# Patient Record
Sex: MALE | Race: WHITE | NOT HISPANIC OR LATINO | Employment: OTHER | URBAN - NONMETROPOLITAN AREA
[De-identification: names, ages, dates, MRNs, and addresses within clinical notes are randomized per-mention and may not be internally consistent; named-entity substitution may affect disease eponyms.]

---

## 2017-02-12 ENCOUNTER — HOSPITAL ENCOUNTER (INPATIENT)
Facility: HOSPITAL | Age: 72
LOS: 3 days | Discharge: NURSING, REHAB OR LONG-TERM CARE | End: 2017-02-15
Attending: EMERGENCY MEDICINE | Admitting: INTERNAL MEDICINE

## 2017-02-12 ENCOUNTER — APPOINTMENT (OUTPATIENT)
Dept: GENERAL RADIOLOGY | Facility: HOSPITAL | Age: 72
End: 2017-02-12

## 2017-02-12 ENCOUNTER — APPOINTMENT (OUTPATIENT)
Dept: CT IMAGING | Facility: HOSPITAL | Age: 72
End: 2017-02-12

## 2017-02-12 DIAGNOSIS — Z74.09 IMPAIRED FUNCTIONAL MOBILITY, BALANCE, GAIT, AND ENDURANCE: ICD-10-CM

## 2017-02-12 DIAGNOSIS — Z78.9 IMPAIRED MOBILITY AND ADLS: ICD-10-CM

## 2017-02-12 DIAGNOSIS — J18.9 PNEUMONIA OF LOWER LOBE DUE TO INFECTIOUS ORGANISM, UNSPECIFIED LATERALITY: ICD-10-CM

## 2017-02-12 DIAGNOSIS — I48.0 PAROXYSMAL ATRIAL FIBRILLATION (HCC): Primary | ICD-10-CM

## 2017-02-12 DIAGNOSIS — Z74.09 IMPAIRED MOBILITY AND ADLS: ICD-10-CM

## 2017-02-12 PROBLEM — J44.9 COPD (CHRONIC OBSTRUCTIVE PULMONARY DISEASE) (HCC): Status: ACTIVE | Noted: 2017-02-12

## 2017-02-12 PROBLEM — R29.6 MULTIPLE FALLS: Status: ACTIVE | Noted: 2017-02-12

## 2017-02-12 PROBLEM — F10.10 ALCOHOL ABUSE: Status: ACTIVE | Noted: 2017-02-12

## 2017-02-12 PROBLEM — I48.20 CHRONIC ATRIAL FIBRILLATION WITH RVR (HCC): Status: ACTIVE | Noted: 2017-02-12

## 2017-02-12 PROBLEM — D75.1 POLYCYTHEMIA: Status: ACTIVE | Noted: 2017-02-12

## 2017-02-12 LAB
ALBUMIN SERPL-MCNC: 4.1 G/DL (ref 3.5–5)
ALBUMIN/GLOB SERPL: 1.4 G/DL (ref 1.1–2.5)
ALP SERPL-CCNC: 112 U/L (ref 24–120)
ALT SERPL W P-5'-P-CCNC: 24 U/L (ref 0–54)
ANION GAP SERPL CALCULATED.3IONS-SCNC: 12 MMOL/L (ref 4–13)
ANION GAP SERPL CALCULATED.3IONS-SCNC: 9 MMOL/L (ref 4–13)
APTT PPP: 32.5 SECONDS (ref 24.1–34.8)
AST SERPL-CCNC: 37 U/L (ref 7–45)
BASOPHILS # BLD AUTO: 0.01 10*3/MM3 (ref 0–0.2)
BASOPHILS # BLD AUTO: 0.01 10*3/MM3 (ref 0–0.2)
BASOPHILS NFR BLD AUTO: 0.1 % (ref 0–2)
BASOPHILS NFR BLD AUTO: 0.1 % (ref 0–2)
BILIRUB SERPL-MCNC: 2.5 MG/DL (ref 0.1–1)
BUN BLD-MCNC: 17 MG/DL (ref 5–21)
BUN BLD-MCNC: 17 MG/DL (ref 5–21)
BUN/CREAT SERPL: 13.5 (ref 7–25)
BUN/CREAT SERPL: 16.3 (ref 7–25)
CALCIUM SPEC-SCNC: 10.3 MG/DL (ref 8.4–10.4)
CALCIUM SPEC-SCNC: 10.8 MG/DL (ref 8.4–10.4)
CHLORIDE SERPL-SCNC: 105 MMOL/L (ref 98–110)
CHLORIDE SERPL-SCNC: 106 MMOL/L (ref 98–110)
CO2 SERPL-SCNC: 23 MMOL/L (ref 24–31)
CO2 SERPL-SCNC: 24 MMOL/L (ref 24–31)
CREAT BLD-MCNC: 1.04 MG/DL (ref 0.5–1.4)
CREAT BLD-MCNC: 1.26 MG/DL (ref 0.5–1.4)
D DIMER PPP FEU-MCNC: 2.81 MG/L (FEU) (ref 0–0.5)
DEPRECATED RDW RBC AUTO: 52.5 FL (ref 40–54)
DEPRECATED RDW RBC AUTO: 53.1 FL (ref 40–54)
EOSINOPHIL # BLD AUTO: 0 10*3/MM3 (ref 0–0.7)
EOSINOPHIL # BLD AUTO: 0 10*3/MM3 (ref 0–0.7)
EOSINOPHIL NFR BLD AUTO: 0 % (ref 0–4)
EOSINOPHIL NFR BLD AUTO: 0 % (ref 0–4)
ERYTHROCYTE [DISTWIDTH] IN BLOOD BY AUTOMATED COUNT: 14.4 % (ref 12–15)
ERYTHROCYTE [DISTWIDTH] IN BLOOD BY AUTOMATED COUNT: 14.5 % (ref 12–15)
ETHANOL UR QL: <0.01 %
FOLATE SERPL-MCNC: 10.2 NG/ML
GFR SERPL CREATININE-BSD FRML MDRD: 56 ML/MIN/1.73
GFR SERPL CREATININE-BSD FRML MDRD: 70 ML/MIN/1.73
GLOBULIN UR ELPH-MCNC: 2.9 GM/DL
GLUCOSE BLD-MCNC: 96 MG/DL (ref 70–100)
GLUCOSE BLD-MCNC: 98 MG/DL (ref 70–100)
HCT VFR BLD AUTO: 48.9 % (ref 40–52)
HCT VFR BLD AUTO: 53.5 % (ref 40–52)
HGB BLD-MCNC: 16.8 G/DL (ref 14–18)
HGB BLD-MCNC: 18.4 G/DL (ref 14–18)
HOLD SPECIMEN: NORMAL
HOLD SPECIMEN: NORMAL
IMM GRANULOCYTES # BLD: 0.04 10*3/MM3 (ref 0–0.03)
IMM GRANULOCYTES # BLD: 0.08 10*3/MM3 (ref 0–0.03)
IMM GRANULOCYTES NFR BLD: 0.3 % (ref 0–5)
IMM GRANULOCYTES NFR BLD: 0.5 % (ref 0–5)
INR PPP: 1.36 (ref 0.91–1.09)
LYMPHOCYTES # BLD AUTO: 1.02 10*3/MM3 (ref 0.72–4.86)
LYMPHOCYTES # BLD AUTO: 1.28 10*3/MM3 (ref 0.72–4.86)
LYMPHOCYTES NFR BLD AUTO: 6.5 % (ref 15–45)
LYMPHOCYTES NFR BLD AUTO: 9.9 % (ref 15–45)
MAGNESIUM SERPL-MCNC: 2.1 MG/DL (ref 1.4–2.2)
MCH RBC QN AUTO: 34.3 PG (ref 28–32)
MCH RBC QN AUTO: 34.7 PG (ref 28–32)
MCHC RBC AUTO-ENTMCNC: 34.4 G/DL (ref 33–36)
MCHC RBC AUTO-ENTMCNC: 34.4 G/DL (ref 33–36)
MCV RBC AUTO: 100.9 FL (ref 82–95)
MCV RBC AUTO: 99.8 FL (ref 82–95)
MONOCYTES # BLD AUTO: 1.13 10*3/MM3 (ref 0.19–1.3)
MONOCYTES # BLD AUTO: 1.4 10*3/MM3 (ref 0.19–1.3)
MONOCYTES NFR BLD AUTO: 8.7 % (ref 4–12)
MONOCYTES NFR BLD AUTO: 9 % (ref 4–12)
NEUTROPHILS # BLD AUTO: 10.51 10*3/MM3 (ref 1.87–8.4)
NEUTROPHILS # BLD AUTO: 13.08 10*3/MM3 (ref 1.87–8.4)
NEUTROPHILS NFR BLD AUTO: 81 % (ref 39–78)
NEUTROPHILS NFR BLD AUTO: 83.9 % (ref 39–78)
NT-PROBNP SERPL-MCNC: 4130 PG/ML (ref 0–900)
PLATELET # BLD AUTO: 133 10*3/MM3 (ref 130–400)
PLATELET # BLD AUTO: 147 10*3/MM3 (ref 130–400)
PMV BLD AUTO: 11.6 FL (ref 6–12)
PMV BLD AUTO: 12.3 FL (ref 6–12)
POTASSIUM BLD-SCNC: 3.8 MMOL/L (ref 3.5–5.3)
POTASSIUM BLD-SCNC: 4.7 MMOL/L (ref 3.5–5.3)
PROT SERPL-MCNC: 7 G/DL (ref 6.3–8.7)
PROTHROMBIN TIME: 17.2 SECONDS (ref 11.9–14.6)
RBC # BLD AUTO: 4.9 10*6/MM3 (ref 4.8–5.9)
RBC # BLD AUTO: 5.3 10*6/MM3 (ref 4.8–5.9)
SODIUM BLD-SCNC: 139 MMOL/L (ref 135–145)
SODIUM BLD-SCNC: 140 MMOL/L (ref 135–145)
TROPONIN I SERPL-MCNC: 0.02 NG/ML (ref 0–0.07)
VIT B12 BLD-MCNC: 293 PG/ML (ref 239–931)
WBC NRBC COR # BLD: 12.97 10*3/MM3 (ref 4.8–10.8)
WBC NRBC COR # BLD: 15.59 10*3/MM3 (ref 4.8–10.8)
WHOLE BLOOD HOLD SPECIMEN: NORMAL
WHOLE BLOOD HOLD SPECIMEN: NORMAL

## 2017-02-12 PROCEDURE — 84484 ASSAY OF TROPONIN QUANT: CPT

## 2017-02-12 PROCEDURE — 85610 PROTHROMBIN TIME: CPT | Performed by: EMERGENCY MEDICINE

## 2017-02-12 PROCEDURE — 85025 COMPLETE CBC W/AUTO DIFF WBC: CPT | Performed by: FAMILY MEDICINE

## 2017-02-12 PROCEDURE — 93010 ELECTROCARDIOGRAM REPORT: CPT | Performed by: INTERNAL MEDICINE

## 2017-02-12 PROCEDURE — 85379 FIBRIN DEGRADATION QUANT: CPT | Performed by: EMERGENCY MEDICINE

## 2017-02-12 PROCEDURE — 93005 ELECTROCARDIOGRAM TRACING: CPT | Performed by: EMERGENCY MEDICINE

## 2017-02-12 PROCEDURE — 83880 ASSAY OF NATRIURETIC PEPTIDE: CPT | Performed by: EMERGENCY MEDICINE

## 2017-02-12 PROCEDURE — 82746 ASSAY OF FOLIC ACID SERUM: CPT | Performed by: FAMILY MEDICINE

## 2017-02-12 PROCEDURE — 99284 EMERGENCY DEPT VISIT MOD MDM: CPT

## 2017-02-12 PROCEDURE — 82607 VITAMIN B-12: CPT | Performed by: FAMILY MEDICINE

## 2017-02-12 PROCEDURE — 80307 DRUG TEST PRSMV CHEM ANLYZR: CPT | Performed by: EMERGENCY MEDICINE

## 2017-02-12 PROCEDURE — 71250 CT THORAX DX C-: CPT

## 2017-02-12 PROCEDURE — 71010 HC CHEST PA OR AP: CPT

## 2017-02-12 PROCEDURE — 83735 ASSAY OF MAGNESIUM: CPT | Performed by: EMERGENCY MEDICINE

## 2017-02-12 PROCEDURE — 85730 THROMBOPLASTIN TIME PARTIAL: CPT | Performed by: EMERGENCY MEDICINE

## 2017-02-12 PROCEDURE — 70450 CT HEAD/BRAIN W/O DYE: CPT

## 2017-02-12 PROCEDURE — 25010000002 CEFTRIAXONE: Performed by: EMERGENCY MEDICINE

## 2017-02-12 PROCEDURE — 85025 COMPLETE CBC W/AUTO DIFF WBC: CPT | Performed by: EMERGENCY MEDICINE

## 2017-02-12 PROCEDURE — 80053 COMPREHEN METABOLIC PANEL: CPT | Performed by: EMERGENCY MEDICINE

## 2017-02-12 RX ORDER — ESCITALOPRAM OXALATE 20 MG/1
20 TABLET ORAL DAILY
Status: ON HOLD | COMMUNITY
End: 2020-12-07

## 2017-02-12 RX ORDER — DILTIAZEM HCL/D5W 125 MG/125
10 PLASTIC BAG, INJECTION (ML) INTRAVENOUS
Status: DISCONTINUED | OUTPATIENT
Start: 2017-02-12 | End: 2017-02-14

## 2017-02-12 RX ORDER — LORAZEPAM 2 MG/ML
1 INJECTION INTRAMUSCULAR
Status: DISCONTINUED | OUTPATIENT
Start: 2017-02-12 | End: 2017-02-15 | Stop reason: HOSPADM

## 2017-02-12 RX ORDER — LORAZEPAM 0.5 MG/1
0.5 TABLET ORAL
Status: DISCONTINUED | OUTPATIENT
Start: 2017-02-12 | End: 2017-02-15 | Stop reason: HOSPADM

## 2017-02-12 RX ORDER — LORAZEPAM 2 MG/ML
0.5 INJECTION INTRAMUSCULAR
Status: DISCONTINUED | OUTPATIENT
Start: 2017-02-12 | End: 2017-02-15 | Stop reason: HOSPADM

## 2017-02-12 RX ORDER — ONDANSETRON 2 MG/ML
4 INJECTION INTRAMUSCULAR; INTRAVENOUS EVERY 6 HOURS PRN
Status: DISCONTINUED | OUTPATIENT
Start: 2017-02-12 | End: 2017-02-15 | Stop reason: HOSPADM

## 2017-02-12 RX ORDER — ACETAMINOPHEN 325 MG/1
650 TABLET ORAL EVERY 4 HOURS PRN
Status: DISCONTINUED | OUTPATIENT
Start: 2017-02-12 | End: 2017-02-15 | Stop reason: HOSPADM

## 2017-02-12 RX ORDER — MAGNESIUM HYDROXIDE/ALUMINUM HYDROXICE/SIMETHICONE 120; 1200; 1200 MG/30ML; MG/30ML; MG/30ML
30 SUSPENSION ORAL EVERY 6 HOURS PRN
Status: DISCONTINUED | OUTPATIENT
Start: 2017-02-12 | End: 2017-02-15 | Stop reason: HOSPADM

## 2017-02-12 RX ORDER — SODIUM CHLORIDE 0.9 % (FLUSH) 0.9 %
1-10 SYRINGE (ML) INJECTION AS NEEDED
Status: DISCONTINUED | OUTPATIENT
Start: 2017-02-12 | End: 2017-02-15 | Stop reason: HOSPADM

## 2017-02-12 RX ORDER — HYDROCODONE BITARTRATE AND ACETAMINOPHEN 5; 325 MG/1; MG/1
1 TABLET ORAL EVERY 4 HOURS PRN
Status: DISCONTINUED | OUTPATIENT
Start: 2017-02-12 | End: 2017-02-15 | Stop reason: HOSPADM

## 2017-02-12 RX ORDER — PRAVASTATIN SODIUM 20 MG
20 TABLET ORAL NIGHTLY
COMMUNITY

## 2017-02-12 RX ORDER — SODIUM CHLORIDE 0.9 % (FLUSH) 0.9 %
10 SYRINGE (ML) INJECTION AS NEEDED
Status: DISCONTINUED | OUTPATIENT
Start: 2017-02-12 | End: 2017-02-15 | Stop reason: HOSPADM

## 2017-02-12 RX ORDER — LORAZEPAM 1 MG/1
1 TABLET ORAL
Status: DISCONTINUED | OUTPATIENT
Start: 2017-02-12 | End: 2017-02-15 | Stop reason: HOSPADM

## 2017-02-12 RX ORDER — DONEPEZIL HYDROCHLORIDE 5 MG/1
5 TABLET, FILM COATED ORAL NIGHTLY
Status: ON HOLD | COMMUNITY
End: 2020-12-07

## 2017-02-12 RX ORDER — PRAVASTATIN SODIUM 20 MG
20 TABLET ORAL DAILY
Status: DISCONTINUED | OUTPATIENT
Start: 2017-02-12 | End: 2017-02-14

## 2017-02-12 RX ORDER — DONEPEZIL HYDROCHLORIDE 5 MG/1
5 TABLET, FILM COATED ORAL NIGHTLY
Status: DISCONTINUED | OUTPATIENT
Start: 2017-02-12 | End: 2017-02-15 | Stop reason: HOSPADM

## 2017-02-12 RX ORDER — NICOTINE 21 MG/24HR
1 PATCH, TRANSDERMAL 24 HOURS TRANSDERMAL EVERY 24 HOURS
Status: DISCONTINUED | OUTPATIENT
Start: 2017-02-12 | End: 2017-02-15 | Stop reason: HOSPADM

## 2017-02-12 RX ORDER — BISOPROLOL FUMARATE 10 MG/1
10 TABLET, FILM COATED ORAL DAILY
Status: ON HOLD | COMMUNITY
End: 2020-12-07

## 2017-02-12 RX ORDER — BISOPROLOL FUMARATE 10 MG/1
10 TABLET, FILM COATED ORAL DAILY
Status: DISCONTINUED | OUTPATIENT
Start: 2017-02-12 | End: 2017-02-15 | Stop reason: HOSPADM

## 2017-02-12 RX ORDER — OXYCODONE AND ACETAMINOPHEN 7.5; 325 MG/1; MG/1
2 TABLET ORAL EVERY 4 HOURS PRN
Status: DISCONTINUED | OUTPATIENT
Start: 2017-02-12 | End: 2017-02-15 | Stop reason: HOSPADM

## 2017-02-12 RX ORDER — ESCITALOPRAM OXALATE 10 MG/1
20 TABLET ORAL DAILY
Status: DISCONTINUED | OUTPATIENT
Start: 2017-02-12 | End: 2017-02-15 | Stop reason: HOSPADM

## 2017-02-12 RX ADMIN — HYDROCODONE BITARTRATE AND ACETAMINOPHEN 1 TABLET: 5; 325 TABLET ORAL at 17:15

## 2017-02-12 RX ADMIN — PRAVASTATIN SODIUM 20 MG: 20 TABLET ORAL at 17:14

## 2017-02-12 RX ADMIN — RIVAROXABAN 20 MG: 20 TABLET, FILM COATED ORAL at 17:14

## 2017-02-12 RX ADMIN — CEFTRIAXONE 1 G: 1 INJECTION, POWDER, FOR SOLUTION INTRAMUSCULAR; INTRAVENOUS at 13:22

## 2017-02-12 RX ADMIN — ESCITALOPRAM 20 MG: 10 TABLET, FILM COATED ORAL at 17:14

## 2017-02-12 RX ADMIN — BISOPROLOL FUMARATE 10 MG: 10 TABLET ORAL at 17:15

## 2017-02-12 RX ADMIN — NICOTINE 1 PATCH: 21 PATCH, EXTENDED RELEASE TRANSDERMAL at 17:14

## 2017-02-12 RX ADMIN — Medication 10 MG/HR: at 09:27

## 2017-02-12 RX ADMIN — DONEPEZIL HYDROCHLORIDE 5 MG: 5 TABLET, FILM COATED ORAL at 20:44

## 2017-02-12 NOTE — H&P
[facility] Medicine Services  HISTORY AND PHYSICAL    Date of Admission: 2/12/2017  Primary Care Physician: Yohana Carlson MD    Subjective     Chief Complaint:   Fall with pain right shoulder and right ribs    History of Present Illness  This 71-year-old white male was admitted after a fall at home a chief complaint of right shoulder discomfort and right rib pain.  The patient's caregiver states that the patient has been falling frequently over the past several days.  He apparently fell this morning attempting to go to the bathroom.        Review of Systems   Constitutional: Negative.    HENT: Positive for hearing loss.    Eyes: Negative.    Respiratory: Positive for cough and wheezing.    Cardiovascular: Positive for chest pain.   Gastrointestinal: Negative.    Endocrine: Negative.    Genitourinary: Negative.    Musculoskeletal: Positive for arthralgias and gait problem.        Right rib pain and right shoulder pain   Skin: Negative.    Allergic/Immunologic: Negative.    Hematological: Negative.    Psychiatric/Behavioral: Negative.         Otherwise complete ROS reviewed and negative except as mentioned in the HPI.      Past Medical History:     Past Medical History   Diagnosis Date   • A-fib    • Alcohol abuse    • Anxiety    • COPD (chronic obstructive pulmonary disease)    • COPD (chronic obstructive pulmonary disease)    • Hypotension    • Lung disease    • Neurological disease    • Polycythemia        Past Surgical History:  Past Surgical History   Procedure Laterality Date   • Pacemaker implantation     • Knee surgery     • Wrist surgery         Family History:   family history includes COPD in his sister; Cancer in his father; Heart disease in his father; No Known Problems in his mother.    Social History:    reports that he has been smoking.  He has a 120.00 pack-year smoking history. He does not have any smokeless tobacco history on file. He reports that he  "drinks about 16.8 oz of alcohol per week  He reports that he does not use illicit drugs.    Medications:  Prior to Admission medications    Medication Sig Start Date End Date Taking? Authorizing Provider   bisoprolol (ZEBeta) 10 MG tablet Take 10 mg by mouth Daily.   Yes Historical Provider, MD   donepezil (ARICEPT) 5 MG tablet Take 5 mg by mouth Every Night.   Yes Historical Provider, MD   escitalopram (LEXAPRO) 20 MG tablet Take 20 mg by mouth Daily.   Yes Historical Provider, MD   pravastatin (PRAVACHOL) 20 MG tablet Take 20 mg by mouth Daily.   Yes Historical Provider, MD   rivaroxaban (XARELTO) 20 MG tablet Take 20 mg by mouth Daily.   Yes Historical Provider, MD       Allergies:  No Known Allergies    Objective     Vital Signs:   Visit Vitals   • /89 (BP Location: Right arm, Patient Position: Lying)   • Pulse 106   • Temp 98.1 °F (36.7 °C) (Tympanic)   • Resp 20   • Ht 69\" (175.3 cm)   • Wt 190 lb 9.6 oz (86.5 kg)   • SpO2 95%   • BMI 28.15 kg/m2       Physical Exam   Constitutional: He is oriented to person, place, and time. He appears well-developed.   HENT:   Head: Normocephalic and atraumatic.   Right Ear: External ear normal.   Left Ear: External ear normal.   Nose: Nose normal.   Mouth/Throat: Oropharynx is clear and moist.   Eyes: Conjunctivae and EOM are normal. Pupils are equal, round, and reactive to light. No scleral icterus.   Neck: Normal range of motion. Neck supple. No JVD present. No tracheal deviation present. No thyromegaly present.   Cardiovascular: Normal heart sounds.  An irregularly irregular rhythm present. Exam reveals decreased pulses.    Pulmonary/Chest: Effort normal. He has wheezes (scattered throughout). He has rhonchi (scattered throughout).   Abdominal: Soft. Bowel sounds are normal. He exhibits no distension and no mass. There is no tenderness.   Musculoskeletal: Normal range of motion. He exhibits no edema or deformity.   Neurological: He is alert and oriented to person, " place, and time. He has normal reflexes. No cranial nerve deficit or sensory deficit. He exhibits abnormal muscle tone (bilateral lower extremity weakness).   Skin: Skin is warm and dry. Ecchymosis: right hand. No erythema.   Psychiatric: He has a normal mood and affect. Judgment and thought content normal. His speech is delayed. He is slowed. Cognition and memory are impaired. He exhibits abnormal recent memory.           Results Reviewed:   Specimen:  Blood Updated:  02/12/17 0929     WBC 15.59 (H) 10*3/mm3      RBC 5.30 10*6/mm3      Hemoglobin 18.4 (H) g/dL      Hematocrit 53.5 (H) %      .9 (H) fL      MCH 34.7 (H) pg      MCHC 34.4 g/dL      RDW 14.4 %      RDW-SD 53.1 fl      MPV 11.6 fL      Platelets 147 10*3/mm3      Neutrophil % 83.9 (H) %      Lymphocyte % 6.5 (L) %      Monocyte % 9.0 %      Eosinophil % 0.0 %      Basophil % 0.1 %      Immature Grans % 0.5 %      Neutrophils, Absolute 13.08 (H) 10*3/mm3      Lymphocytes, Absolute 1.02 10*3/mm3      Monocytes, Absolute 1.40 (H) 10*3/mm3      Eosinophils, Absolute 0.00 10*3/mm3      Basophils, Absolute 0.01 10*3/mm3      Immature Grans, Absolute 0.08 (H) 10*3/mm3     Comprehensive Metabolic Panel [[id number]]  (Abnormal) Collected:  02/12/17 0919    Specimen:  Blood Updated:  02/12/17 0939     Glucose 98 mg/dL      BUN 17 mg/dL      Creatinine 1.26 mg/dL      Sodium 140 mmol/L      Potassium 4.7 mmol/L      Chloride 105 mmol/L      CO2 23.0 (L) mmol/L      Calcium 10.8 (H) mg/dL      Total Protein 7.0 g/dL      Albumin 4.10 g/dL      ALT (SGPT) 24 U/L      AST (SGOT) 37 U/L      Alkaline Phosphatase 112 U/L      Total Bilirubin 2.5 (H) mg/dL      eGFR Non African Amer 56 (L) mL/min/1.73      Globulin 2.9 gm/dL      A/G Ratio 1.4 g/dL      BUN/Creatinine Ratio 13.5      Anion Gap 12.0 mmol/L     Narrative:       The MDRD GFR formula is only valid for adults with stable renal function between ages 18 and 70.    Magnesium [[id number]]  (Normal)  Collected:  02/12/17 0919    Specimen:  Blood Updated:  02/12/17 0939     Magnesium 2.1 mg/dL     POC Troponin, Rapid [[id number]]  (Normal) Collected:  02/12/17 0925    Specimen:  Blood Updated:  02/12/17 0940     Troponin I 0.02 ng/mL       Serial Number: [id number]    : [id number]       BNP [[id number]]  (Abnormal) Collected:  02/12/17 0919    Specimen:  Blood Updated:  02/12/17 0948     proBNP 4130.0 (H) pg/mL     aPTT [[id number]]  (Normal) Collected:  02/12/17 0919    Specimen:  Blood Updated:  02/12/17 0948     PTT 32.5 seconds     Protime-INR [[id number]]  (Abnormal) Collected:  02/12/17 0919    Specimen:  Blood Updated:  02/12/17 0948     Protime 17.2 (H) Seconds      INR 1.36 (H)     D-dimer, Quantitative [[id number]]  (Abnormal) Collected:  02/12/17 0919    Specimen:  Blood Updated:  02/12/17 0948     D-Dimer, Quantitative 2.81 (H) mg/L (FEU)     Ethanol [[id number]]  (Normal) Collected:  02/12/17 0919    Specimen:  Blood Updated:  02/12/17 1009     Ethanol % <0.010 %        Ct Head Without Contrast    Result Date: 2/12/2017  Narrative: EXAMINATION:  CT HEAD WO CONTRAST-  2/12/2017 10:09 AM CST  HISTORY: The patient fell and hit the head. Difficulty walking.  TECHNIQUE: Multiple axial images were obtained through the brain without contrast infusion. Multiplanar images were reconstructed.  DLP: 647 mGy-cm. Automated dosage control was utilized.  COMPARISON: 10/21/2014.  FINDINGS: There are no hemorrhage, edema or mass effect. There is mild to moderate atrophy with associated ventricular prominence. There are chronic lacunar infarcts in both of the thalami. Low density in the white matter is nonspecific and most likely due to chronic small vessel disease. There is mild mucosal thickening in the right ethmoid region. The other visualized paranasal sinuses and mastoid air cells are clear. Vascular calcification is noted.      Impression: 1. No hemorrhage, edema or mass effect. 2. Mild to moderate atrophy with  associated ventricular prominence. 3. Chronic-appearing lacunar infarcts in the thalami bilaterally. 4. Low density in the white matter is nonspecific and likely due to chronic small vessel disease. Vascular calcification is noted. 5. Mild mucosal thickening in the right ethmoid region.  The full report of this exam was immediately signed and available to the emergency room. The patient is currently in the emergency room.   This report was finalized on 02/12/2017 10:45 by Dr. Jett Gore MD.    Ct Chest Without Contrast    Result Date: 2/12/2017  Narrative: EXAMINATION:  CT CHEST WO CONTRAST-  2/12/2017 10:09 AM CST  HISTORY: Multiple falling episodes. No other history is given for this study.  COMPARISON: No comparison study.  DLP: 467 mGy-cm. Automated dosage control was utilized.  TECHNIQUE: Spiral CT was performed of the chest without contrast. Multiplanar images were reconstructed.  FINDINGS: There is atheromatous disease of the thoracic aorta and coronary arteries. Heart size appears to be normal. There are no enlarged mediastinal lymph nodes. There is mild dependent atelectasis posteriorly in both lungs. There is no dense consolidation. There is mild bullous disease. There are degenerative changes of the spine. Chronic appearing compression fractures are noted.      Impression: 1. Atheromatous disease of the thoracic aorta and coronary arteries. 2. Mild dependent atelectasis posteriorly in both lungs. No dense consolidation. Mild bullous disease. 3. Mild ectasia of the thoracic aorta measuring about 4.6 cm in diameter.  The full report of this exam was immediately signed and available to the emergency room. The patient is currently in the emergency room.   This report was finalized on 02/12/2017 10:49 by Dr. Jett Gore MD.    Xr Chest 1 View    Result Date: 2/12/2017  Narrative: EXAMINATION:  XR CHEST 1 VW-  2/12/2017 9:39 AM CST  HISTORY: Shortness of breath. Rib pain.  COMPARISON: 10/21/2014.   FINDINGS:  There is poor inspiration. There is patchy infiltrate in the perihilar regions and lung bases. There is mild cardiomegaly. There is a pacemaker on the left. There are old rib fractures on the right.      Impression: 1. Poor inspiration. 2. Patchy infiltrate in the perihilar regions and lung bases. This may represent mild edema or pneumonia. 3. Mild cardiomegaly.   This report was finalized on 02/12/2017 10:04 by Dr. Jett Gore MD.     I have personally reviewed and interpreted the radiology studies and ECG obtained at time of admission.     Assessment / Plan      Assessment & Plan  Hospital Problem List     * (Principal)Chronic atrial fibrillation with RVR    Multiple falls    Polycythemia    COPD (chronic obstructive pulmonary disease)    Alcohol abuse          PLAN:   Admit to telemetry floor  [facility] dr  Cardiology consultation  Ativan IV when necessary for evidence of alcohol withdrawal  Given the patient's recent history of increased falls, he may no longer be a good candidate for anticoagulation.  Defer that decision to cardiology.    Code Status:   Full  Surrogate decision-maker is Corw Redding, his guardian         I discussed the patients findings and my recommendations with: Patient and sister at bedside    Estimated length of stay: 2-3 days    Parag Stokes DO   02/12/17   3:27 PM

## 2017-02-12 NOTE — ED PROVIDER NOTES
Subjective   HPI Comments: Patient found by caregiver on floor this AM where he apparently fell last night trying to go to bathroom.   C/o pain in right shoulder and right ribs.  Caregiver says that patient has been falling a lot over past several days.    Patient is a 71 y.o. male presenting with fall.   History provided by:  Patient and caregiver   used: No    Fall   Mechanism of injury: fall    Injury location:  Shoulder/arm and torso  Shoulder/arm injury location:  R shoulder  Torso injury location:  R chest  Incident location:  Home  Time since incident:  10 hours  Arrived directly from scene: yes    Fall:     Fall occurred:  Standing    Impact surface:  Hard floor    Point of impact:  Unable to specify    Entrapped after fall: no    Protective equipment: none    Suspicion of alcohol use: yes    Suspicion of drug use: no    Tetanus status:  Unknown  Prior to arrival data:     Bystander interventions:  None    Patient ambulatory at scene: no      Blood loss:  None    Responsiveness at scene:  Alert    Orientation at scene:  Person, place, situation and time    Loss of consciousness: no      Amnesic to event: no      Airway interventions:  None    Breathing interventions:  None    IV access status:  None    IO access:  None    Fluids administered:  None    Cardiac interventions:  None    Medications administered:  None    Immobilization:  None    Airway condition since incident:  Stable    Breathing condition since incident:  Stable    Circulation condition since incident:  Stable    Mental status condition since incident:  Stable      Review of Systems   Constitutional: Negative.    HENT: Negative.    Eyes: Negative.    Respiratory: Positive for cough.    Cardiovascular: Negative.    Gastrointestinal: Negative.    Genitourinary: Negative.    Musculoskeletal: Negative.    Hematological: Negative.    Psychiatric/Behavioral: Negative.    All other systems reviewed and are negative.      Past  Medical History   Diagnosis Date   • A-fib    • Alcohol abuse    • Anxiety    • COPD (chronic obstructive pulmonary disease)    • COPD (chronic obstructive pulmonary disease)    • Hypotension    • Lung disease    • Neurological disease    • Polycythemia        No Known Allergies    Past Surgical History   Procedure Laterality Date   • Pacemaker implantation     • Knee surgery     • Wrist surgery         Family History   Problem Relation Age of Onset   • No Known Problems Mother    • Cancer Father    • Heart disease Father    • COPD Sister        Social History     Social History   • Marital status:      Spouse name: N/A   • Number of children: N/A   • Years of education: N/A     Social History Main Topics   • Smoking status: Heavy Tobacco Smoker     Packs/day: 2.00     Years: 60.00   • Smokeless tobacco: None   • Alcohol use 16.8 oz/week     7 Shots of liquor, 21 Cans of beer per week      Comment: 2-3 BEERS, 1 GIN DAILY   • Drug use: No   • Sexual activity: No     Other Topics Concern   • None     Social History Narrative   • None       Prior to Admission medications    Medication Sig Start Date End Date Taking? Authorizing Provider   bisoprolol (ZEBeta) 10 MG tablet Take 10 mg by mouth Daily.   Yes Historical Provider, MD   donepezil (ARICEPT) 5 MG tablet Take 5 mg by mouth Every Night.   Yes Historical Provider, MD   escitalopram (LEXAPRO) 20 MG tablet Take 20 mg by mouth Daily.   Yes Historical Provider, MD   pravastatin (PRAVACHOL) 20 MG tablet Take 20 mg by mouth Daily.   Yes Historical Provider, MD   rivaroxaban (XARELTO) 20 MG tablet Take 20 mg by mouth Daily.   Yes Historical Provider, MD       Medications   sodium chloride 0.9 % flush 10 mL (not administered)   diltiaZEM (CARDIZEM) 125mg/125 mL infusion (5 mg/hr Intravenous Rate/Dose Change 2/12/17 5797)   bisoprolol (ZEBeta) tablet 10 mg (not administered)   donepezil (ARICEPT) tablet 5 mg (not administered)   escitalopram (LEXAPRO) tablet 20 mg  (not administered)   pravastatin (PRAVACHOL) tablet 20 mg (not administered)   rivaroxaban (XARELTO) tablet 20 mg (not administered)   sodium chloride 0.9 % flush 1-10 mL (not administered)   acetaminophen (TYLENOL) tablet 650 mg (not administered)   HYDROcodone-acetaminophen (NORCO) 5-325 MG per tablet 1 tablet (not administered)   oxyCODONE-acetaminophen (PERCOCET) 7.5-325 MG per tablet 2 tablet (not administered)   aluminum-magnesium hydroxide-simethicone (MAALOX/MYLANTA) suspension 30 mL (not administered)   bisacodyl (DULCOLAX) EC tablet 5 mg (not administered)   ondansetron (ZOFRAN) injection 4 mg (not administered)   nicotine (NICODERM CQ) 21 MG/24HR patch 1 patch (not administered)   LORazepam (ATIVAN) tablet 0.5 mg (not administered)     Or   LORazepam (ATIVAN) injection 0.5 mg (not administered)     Or   LORazepam (ATIVAN) tablet 1 mg (not administered)     Or   LORazepam (ATIVAN) injection 1 mg (not administered)     Or   LORazepam (ATIVAN) injection 1 mg (not administered)     Or   LORazepam (ATIVAN) injection 1 mg (not administered)   cefTRIAXone (ROCEPHIN) 1 g/100 mL 0.9% NS (MBP) (1 g Intravenous New Bag 2/12/17 1322)       Vitals:    02/12/17 1414   BP: 131/89   Pulse: 106   Resp: 20   Temp: 98.1 °F (36.7 °C)   SpO2: 95%         Objective   Physical Exam   Constitutional: He is oriented to person, place, and time. He appears well-developed and well-nourished.   HENT:   Head: Normocephalic.   Some bruising on forehead.   Eyes: EOM are normal. Pupils are equal, round, and reactive to light.   Cardiovascular:   Tachycardic and irregular   Pulmonary/Chest: Effort normal.   Scattered rhonchi.   Abdominal: Soft. Bowel sounds are normal.   Musculoskeletal: Normal range of motion.   Neurological: He is alert and oriented to person, place, and time.   Skin: Skin is warm and dry.   Psychiatric: He has a normal mood and affect. His behavior is normal.   Nursing note and vitals reviewed.      Procedures          Lab Results (last 24 hours)     Procedure Component Value Units Date/Time    CBC & Differential [[id number]] Collected:  02/12/17 0919    Specimen:  Blood Updated:  02/12/17 0929    Narrative:       The following orders were created for panel order CBC & Differential.  Procedure                               Abnormality         Status                     ---------                               -----------         ------                     CBC Auto Differential[[id number]]         Abnormal            Final result                 Please view results for these tests on the individual orders.    Comprehensive Metabolic Panel [[id number]]  (Abnormal) Collected:  02/12/17 0919    Specimen:  Blood Updated:  02/12/17 0939     Glucose 98 mg/dL      BUN 17 mg/dL      Creatinine 1.26 mg/dL      Sodium 140 mmol/L      Potassium 4.7 mmol/L      Chloride 105 mmol/L      CO2 23.0 (L) mmol/L      Calcium 10.8 (H) mg/dL      Total Protein 7.0 g/dL      Albumin 4.10 g/dL      ALT (SGPT) 24 U/L      AST (SGOT) 37 U/L      Alkaline Phosphatase 112 U/L      Total Bilirubin 2.5 (H) mg/dL      eGFR Non African Amer 56 (L) mL/min/1.73      Globulin 2.9 gm/dL      A/G Ratio 1.4 g/dL      BUN/Creatinine Ratio 13.5      Anion Gap 12.0 mmol/L     Narrative:       The MDRD GFR formula is only valid for adults with stable renal function between ages 18 and 70.    aPTT [[id number]]  (Normal) Collected:  02/12/17 0919    Specimen:  Blood Updated:  02/12/17 0948     PTT 32.5 seconds     Protime-INR [[id number]]  (Abnormal) Collected:  02/12/17 0919    Specimen:  Blood Updated:  02/12/17 0948     Protime 17.2 (H) Seconds      INR 1.36 (H)     D-dimer, Quantitative [[id number]]  (Abnormal) Collected:  02/12/17 0919    Specimen:  Blood Updated:  02/12/17 0948     D-Dimer, Quantitative 2.81 (H) mg/L (FEU)     Narrative:       Reference Range is 0-0.50 mg/L FEU. However, results <0.50 mg/L FEU tends to rule out DVT or PE. Results >0.50 mg/L FEU are not useful  in predicting absence or presence of DVT or PE.    BNP [[id number]]  (Abnormal) Collected:  02/12/17 0919    Specimen:  Blood Updated:  02/12/17 0948     proBNP 4130.0 (H) pg/mL     Magnesium [[id number]]  (Normal) Collected:  02/12/17 0919    Specimen:  Blood Updated:  02/12/17 0939     Magnesium 2.1 mg/dL     Ethanol [[id number]]  (Normal) Collected:  02/12/17 0919    Specimen:  Blood Updated:  02/12/17 1009     Ethanol % <0.010 %     Narrative:       Not for legal purposes. Chain of Custody not followed.     CBC Auto Differential [[id number]]  (Abnormal) Collected:  02/12/17 0919    Specimen:  Blood Updated:  02/12/17 0929     WBC 15.59 (H) 10*3/mm3      RBC 5.30 10*6/mm3      Hemoglobin 18.4 (H) g/dL      Hematocrit 53.5 (H) %      .9 (H) fL      MCH 34.7 (H) pg      MCHC 34.4 g/dL      RDW 14.4 %      RDW-SD 53.1 fl      MPV 11.6 fL      Platelets 147 10*3/mm3      Neutrophil % 83.9 (H) %      Lymphocyte % 6.5 (L) %      Monocyte % 9.0 %      Eosinophil % 0.0 %      Basophil % 0.1 %      Immature Grans % 0.5 %      Neutrophils, Absolute 13.08 (H) 10*3/mm3      Lymphocytes, Absolute 1.02 10*3/mm3      Monocytes, Absolute 1.40 (H) 10*3/mm3      Eosinophils, Absolute 0.00 10*3/mm3      Basophils, Absolute 0.01 10*3/mm3      Immature Grans, Absolute 0.08 (H) 10*3/mm3     POC Troponin, Rapid [[id number]]  (Normal) Collected:  02/12/17 0925    Specimen:  Blood Updated:  02/12/17 0940     Troponin I 0.02 ng/mL       Serial Number: [id number]    : [id number]             CT Chest Without Contrast   Final Result   1. Atheromatous disease of the thoracic aorta and coronary arteries.   2. Mild dependent atelectasis posteriorly in both lungs. No dense   consolidation. Mild bullous disease.   3. Mild ectasia of the thoracic aorta measuring about 4.6 cm in   diameter.       The full report of this exam was immediately signed and available to the   emergency room. The patient is currently in the emergency room.           This  report was finalized on 02/12/2017 10:49 by Dr. Jett Gore MD.      CT Head Without Contrast   Final Result   1. No hemorrhage, edema or mass effect.   2. Mild to moderate atrophy with associated ventricular prominence.   3. Chronic-appearing lacunar infarcts in the thalami bilaterally.   4. Low density in the white matter is nonspecific and likely due to   chronic small vessel disease. Vascular calcification is noted.   5. Mild mucosal thickening in the right ethmoid region.       The full report of this exam was immediately signed and available to the   emergency room. The patient is currently in the emergency room.           This report was finalized on 02/12/2017 10:45 by Dr. Jett Gore MD.      XR Chest 1 View   Final Result   1. Poor inspiration.   2. Patchy infiltrate in the perihilar regions and lung bases. This may   represent mild edema or pneumonia.   3. Mild cardiomegaly.           This report was finalized on 02/12/2017 10:04 by Dr. Jett Gore MD.          ED Course  ED Course          MDM  Number of Diagnoses or Management Options  Paroxysmal atrial fibrillation: new and requires workup  Pneumonia of lower lobe due to infectious organism, unspecified laterality: new and requires workup     Amount and/or Complexity of Data Reviewed  Clinical lab tests: ordered and reviewed  Tests in the radiology section of CPT®: ordered and reviewed  Tests in the medicine section of CPT®: ordered and reviewed    Risk of Complications, Morbidity, and/or Mortality  Presenting problems: moderate  Diagnostic procedures: moderate  Management options: moderate    Patient Progress  Patient progress: stable      Final diagnoses:   Paroxysmal atrial fibrillation   Pneumonia of lower lobe due to infectious organism, unspecified laterality        Laz Joshi Jr., MD  02/12/17 3286

## 2017-02-13 ENCOUNTER — APPOINTMENT (OUTPATIENT)
Dept: CARDIOLOGY | Facility: HOSPITAL | Age: 72
End: 2017-02-13

## 2017-02-13 LAB
ANION GAP SERPL CALCULATED.3IONS-SCNC: 5 MMOL/L (ref 4–13)
BASOPHILS # BLD AUTO: 0.01 10*3/MM3 (ref 0–0.2)
BASOPHILS NFR BLD AUTO: 0.1 % (ref 0–2)
BH CV ECHO MEAS - AO MAX PG (FULL): 2.3 MMHG
BH CV ECHO MEAS - AO MAX PG: 5.7 MMHG
BH CV ECHO MEAS - AO MEAN PG (FULL): 1 MMHG
BH CV ECHO MEAS - AO MEAN PG: 3 MMHG
BH CV ECHO MEAS - AO ROOT AREA: 10.8 CM^2
BH CV ECHO MEAS - AO ROOT DIAM: 3.7 CM
BH CV ECHO MEAS - AO V2 MAX: 119 CM/SEC
BH CV ECHO MEAS - AO V2 MEAN: 76.5 CM/SEC
BH CV ECHO MEAS - AO V2 VTI: 17.1 CM
BH CV ECHO MEAS - AVA(I,A): 2.8 CM^2
BH CV ECHO MEAS - AVA(I,D): 2.8 CM^2
BH CV ECHO MEAS - AVA(V,A): 2.7 CM^2
BH CV ECHO MEAS - AVA(V,D): 2.7 CM^2
BH CV ECHO MEAS - CONTRAST EF 4CH: 69.3 ML/M^2
BH CV ECHO MEAS - EDV(CUBED): 175.6 ML
BH CV ECHO MEAS - EDV(MOD-SP4): 75.2 ML
BH CV ECHO MEAS - EDV(TEICH): 153.7 ML
BH CV ECHO MEAS - EF(CUBED): 72.8 %
BH CV ECHO MEAS - EF(MOD-SP4): 69.3 %
BH CV ECHO MEAS - EF(TEICH): 63.9 %
BH CV ECHO MEAS - ESV(CUBED): 47.8 ML
BH CV ECHO MEAS - ESV(MOD-SP4): 23.1 ML
BH CV ECHO MEAS - ESV(TEICH): 55.5 ML
BH CV ECHO MEAS - FS: 35.2 %
BH CV ECHO MEAS - IVS/LVPW: 0.98
BH CV ECHO MEAS - IVSD: 1.6 CM
BH CV ECHO MEAS - LA DIMENSION: 4.7 CM
BH CV ECHO MEAS - LA/AO: 1.3
BH CV ECHO MEAS - LAT PEAK E' VEL: 9.9 CM/SEC
BH CV ECHO MEAS - LV MASS(C)D: 408 GRAMS
BH CV ECHO MEAS - LV MAX PG: 3.3 MMHG
BH CV ECHO MEAS - LV MEAN PG: 2 MMHG
BH CV ECHO MEAS - LV V1 MAX: 91.4 CM/SEC
BH CV ECHO MEAS - LV V1 MEAN: 59.9 CM/SEC
BH CV ECHO MEAS - LV V1 VTI: 13.8 CM
BH CV ECHO MEAS - LVIDD: 5.6 CM
BH CV ECHO MEAS - LVIDS: 3.6 CM
BH CV ECHO MEAS - LVLD AP4: 6.5 CM
BH CV ECHO MEAS - LVLS AP4: 5.3 CM
BH CV ECHO MEAS - LVOT AREA (M): 3.5 CM^2
BH CV ECHO MEAS - LVOT AREA: 3.5 CM^2
BH CV ECHO MEAS - LVOT DIAM: 2.1 CM
BH CV ECHO MEAS - LVPWD: 1.6 CM
BH CV ECHO MEAS - MV DEC TIME: 0.14 SEC
BH CV ECHO MEAS - MV E MAX VEL: 52.9 CM/SEC
BH CV ECHO MEAS - RAP SYSTOLE: 10 MMHG
BH CV ECHO MEAS - RVSP: 36.6 MMHG
BH CV ECHO MEAS - SV(AO): 183.9 ML
BH CV ECHO MEAS - SV(CUBED): 127.8 ML
BH CV ECHO MEAS - SV(LVOT): 47.8 ML
BH CV ECHO MEAS - SV(MOD-SP4): 52.1 ML
BH CV ECHO MEAS - SV(TEICH): 98.1 ML
BH CV ECHO MEAS - TR MAX VEL: 258 CM/SEC
BUN BLD-MCNC: 15 MG/DL (ref 5–21)
BUN/CREAT SERPL: 18.1 (ref 7–25)
CALCIUM SPEC-SCNC: 10.2 MG/DL (ref 8.4–10.4)
CHLORIDE SERPL-SCNC: 108 MMOL/L (ref 98–110)
CO2 SERPL-SCNC: 26 MMOL/L (ref 24–31)
CREAT BLD-MCNC: 0.83 MG/DL (ref 0.5–1.4)
DEPRECATED RDW RBC AUTO: 52 FL (ref 40–54)
EOSINOPHIL # BLD AUTO: 0.01 10*3/MM3 (ref 0–0.7)
EOSINOPHIL NFR BLD AUTO: 0.1 % (ref 0–4)
ERYTHROCYTE [DISTWIDTH] IN BLOOD BY AUTOMATED COUNT: 14.4 % (ref 12–15)
GFR SERPL CREATININE-BSD FRML MDRD: 91 ML/MIN/1.73
GLUCOSE BLD-MCNC: 106 MG/DL (ref 70–100)
HCT VFR BLD AUTO: 47.2 % (ref 40–52)
HGB BLD-MCNC: 16.1 G/DL (ref 14–18)
IMM GRANULOCYTES # BLD: 0.05 10*3/MM3 (ref 0–0.03)
IMM GRANULOCYTES NFR BLD: 0.4 % (ref 0–5)
LEFT ATRIUM VOLUME: 99.4 CM3
LYMPHOCYTES # BLD AUTO: 1.24 10*3/MM3 (ref 0.72–4.86)
LYMPHOCYTES NFR BLD AUTO: 10.2 % (ref 15–45)
MCH RBC QN AUTO: 34 PG (ref 28–32)
MCHC RBC AUTO-ENTMCNC: 34.1 G/DL (ref 33–36)
MCV RBC AUTO: 99.8 FL (ref 82–95)
MONOCYTES # BLD AUTO: 1.02 10*3/MM3 (ref 0.19–1.3)
MONOCYTES NFR BLD AUTO: 8.4 % (ref 4–12)
NEUTROPHILS # BLD AUTO: 9.79 10*3/MM3 (ref 1.87–8.4)
NEUTROPHILS NFR BLD AUTO: 80.8 % (ref 39–78)
PLATELET # BLD AUTO: 124 10*3/MM3 (ref 130–400)
PMV BLD AUTO: 12 FL (ref 6–12)
POTASSIUM BLD-SCNC: 3.5 MMOL/L (ref 3.5–5.3)
RBC # BLD AUTO: 4.73 10*6/MM3 (ref 4.8–5.9)
SODIUM BLD-SCNC: 139 MMOL/L (ref 135–145)
WBC NRBC COR # BLD: 12.12 10*3/MM3 (ref 4.8–10.8)

## 2017-02-13 PROCEDURE — 99222 1ST HOSP IP/OBS MODERATE 55: CPT | Performed by: INTERNAL MEDICINE

## 2017-02-13 PROCEDURE — 97166 OT EVAL MOD COMPLEX 45 MIN: CPT | Performed by: OCCUPATIONAL THERAPIST

## 2017-02-13 PROCEDURE — G8979 MOBILITY GOAL STATUS: HCPCS | Performed by: PHYSICAL THERAPIST

## 2017-02-13 PROCEDURE — 80048 BASIC METABOLIC PNL TOTAL CA: CPT | Performed by: FAMILY MEDICINE

## 2017-02-13 PROCEDURE — 97162 PT EVAL MOD COMPLEX 30 MIN: CPT

## 2017-02-13 PROCEDURE — G8987 SELF CARE CURRENT STATUS: HCPCS | Performed by: OCCUPATIONAL THERAPIST

## 2017-02-13 PROCEDURE — G8988 SELF CARE GOAL STATUS: HCPCS | Performed by: OCCUPATIONAL THERAPIST

## 2017-02-13 PROCEDURE — G8978 MOBILITY CURRENT STATUS: HCPCS | Performed by: PHYSICAL THERAPIST

## 2017-02-13 PROCEDURE — 25010000002 PERFLUTREN (DEFINITY) 8.476 MG IN SODIUM CHLORIDE 10 ML INJECTION: Performed by: FAMILY MEDICINE

## 2017-02-13 PROCEDURE — 93306 TTE W/DOPPLER COMPLETE: CPT | Performed by: INTERNAL MEDICINE

## 2017-02-13 PROCEDURE — C8929 TTE W OR WO FOL WCON,DOPPLER: HCPCS

## 2017-02-13 PROCEDURE — 85025 COMPLETE CBC W/AUTO DIFF WBC: CPT | Performed by: FAMILY MEDICINE

## 2017-02-13 RX ADMIN — RIVAROXABAN 20 MG: 20 TABLET, FILM COATED ORAL at 08:27

## 2017-02-13 RX ADMIN — DONEPEZIL HYDROCHLORIDE 5 MG: 5 TABLET, FILM COATED ORAL at 21:22

## 2017-02-13 RX ADMIN — ESCITALOPRAM 20 MG: 10 TABLET, FILM COATED ORAL at 08:27

## 2017-02-13 RX ADMIN — NICOTINE 1 PATCH: 21 PATCH, EXTENDED RELEASE TRANSDERMAL at 16:20

## 2017-02-13 RX ADMIN — PRAVASTATIN SODIUM 20 MG: 20 TABLET ORAL at 08:27

## 2017-02-13 RX ADMIN — SODIUM CHLORIDE 5 ML: 9 INJECTION INTRAMUSCULAR; INTRAVENOUS; SUBCUTANEOUS at 12:40

## 2017-02-13 RX ADMIN — BISOPROLOL FUMARATE 10 MG: 10 TABLET ORAL at 08:27

## 2017-02-13 RX ADMIN — Medication 10 MG/HR: at 08:56

## 2017-02-13 RX ADMIN — HYDROCODONE BITARTRATE AND ACETAMINOPHEN 1 TABLET: 5; 325 TABLET ORAL at 02:38

## 2017-02-13 NOTE — PROGRESS NOTES
Acute Care - Occupational Therapy Initial Evaluation  [facility]     Patient Name: Ezequiel Abdalla  : 1945  MRN: [id number]  Today's Date: 2017  Onset of Illness/Injury or Date of Surgery Date: 17  Date of Referral to OT: 17  Referring Physician: NSARIN Remy    Admit Date: 2017       ICD-10-CM ICD-9-CM   1. Paroxysmal atrial fibrillation I48.0 427.31   2. Pneumonia of lower lobe due to infectious organism, unspecified laterality J18.9 483.8   3. Impaired functional mobility, balance, gait, and endurance Z74.09 V49.89   4. Impaired mobility and ADLs Z74.09 799.89     Patient Active Problem List   Diagnosis   • Polycythemia   • COPD (chronic obstructive pulmonary disease)   • Alcohol abuse   • Chronic atrial fibrillation with RVR   • Multiple falls     Past Medical History   Diagnosis Date   • A-fib    • Alcohol abuse    • Anxiety    • COPD (chronic obstructive pulmonary disease)    • COPD (chronic obstructive pulmonary disease)    • Hypotension    • Lung disease    • Neurological disease    • Polycythemia      Past Surgical History   Procedure Laterality Date   • Pacemaker implantation       [facility] [facility]    • Knee surgery     • Wrist surgery            OT ASSESSMENT FLOWSHEET (last 72 hours)      OT Evaluation       17 1305 17 1040 17 1517 17 1508       Rehab Evaluation    Document Type evaluation   See MAR  -TR evaluation  -LC (r) AK (t) LC (c)       Subjective Information agree to therapy;complains of;weakness;fatigue;dyspnea  -TR agree to therapy;complains of;weakness;fatigue;dyspnea;pain  -LC (r) AK (t) LC (c)       Patient Effort, Rehab Treatment good  -TR        Symptoms Noted During/After Treatment shortness of breath  -TR        Symptoms Noted Comment with mobility  -TR        General Information    Patient Profile Review yes  -TR yes  -LC (r) AK (t) LC (c)       Onset of Illness/Injury or Date of Surgery Date 17  -TR 17  -LC (r) AK (t) LC (c)        Referring Physician NASRIN Remy  -TR Dr. Stokes  - (r) AK (t) TAYE (c)       General Observations Pt reclined in chair, IV L UE, alert, family member present.   -TR pt. fowlers in bed; IV; family/friends in room  - (r) AK (t) LC (c)       Pertinent History Of Current Problem Multiple falls at home, A-fib, pneumonia, polycythemia, alcohol abuse, dementia, R shoulder and rib pain, old rib fxs per pt.   -TR pt. admitted after increasing weakness and multiple falls over the past year. His caregiver says he can not care for him as well anymore. pt. has hx of chronic Afib with RVR, COPD, and polycythemia  - (r) AK (t) TAYE (c)       Precautions/Limitations fall precautions  -TR fall precautions  - (r) AK (t) TAYE (c)       Prior Level of Function min assist:;mod assist:;all household mobility;transfer;gait;ADL's   Min to Mod A for mobility and ADL, supervision toileting.   -TR min assist:;mod assist:;all household mobility;gait;transfer;bed mobility;feeding;grooming;dressing;bathing;home management  - (r) AK (t) LC (c)       Equipment Currently Used at Home shower chair;walker, rolling;cane, straight;wheelchair  -TR shower chair;walker, rolling;cane, straight;wheelchair  - (r) AK (t) TAYE (c)  shower chair  -     Plans/Goals Discussed With patient and family;agreed upon  -TR patient;other;agreed upon  - (r) AK (t) LC (c)       Risks Reviewed patient and family:;LOB;dizziness;increased discomfort;change in vital signs  -TR patient:;other:;LOB;nausea/vomiting;increased discomfort;dizziness;change in vital signs   caregiver  - (r) AK (t) TAYE (c)       Benefits Reviewed patient and family:;improve function;increase independence;increase strength;increase balance;increase knowledge  -TR patient:;other:;improve function;increase balance;increase strength;increase independence;decrease pain;decrease risk of DVT;improve skin integrity  - (r) AK (t) LC (c)       Barriers to Rehab medically complex;previous  functional deficit;cognitive status  -TR medically complex;previous functional deficit;cognitive status;environmental barriers  -LC (r) AK (t) LC (c)       Living Environment    Lives With alone  -TR alone  -LC (r) AK (t) LC (c) alone  -SJ      Living Arrangements house  -TR house  -LC (r) AK (t) LC (c) house  -SJ      Home Accessibility stairs to enter home;tub/shower is not walk in  -TR stairs to enter home  -LC (r) AK (t) LC (c) no concerns  -SJ      Number of Stairs to Enter Home 2  -TR 2  -LC (r) AK (t) LC (c)       Stair Railings at Home none  -TR none  -LC (r) AK (t) LC (c) none  -SJ      Type of Financial/Environmental Concern  none  -LC (r) AK (t) LC (c) none  -SJ      Transportation Available  ambulance  -LC (r) AK (t) LC (c) car  -SJ      Living Environment Comment home too small to navigate walker and wheelchair  -TR home too small to navigate walker and wheelchair  -LC (r) AK (t) LC (c)       Clinical Impression    Date of Referral to OT 02/13/17  -TR        OT Diagnosis Impaired mobility and ADL  -TR        Impairments Found (describe specific impairments) aerobic capacity/endurance;arousal, attention, and cognition;ergonomics and body mechanics;gait, locomotion, and balance;muscle performance;posture;ROM  -TR        Patient/Family Goals Statement Return home. Family reports they are working on providing 24 hour care for pt.   -TR        Criteria for Skilled Therapeutic Interventions Met yes;treatment indicated  -TR        Rehab Potential good, to achieve stated therapy goals  -TR        Therapy Frequency 3-5 times/wk  -TR        Predicted Duration of Therapy Intervention (days/wks) 10 days  -TR        Anticipated Equipment Needs At Discharge tub bench   grab bars in bathroom  -TR        Anticipated Discharge Disposition home with 24/7 care;home with home health;skilled nursing facility;inpatient rehabilitation facility   TCU, depending upon pt progress  -TR        Functional Level Prior    Ambulation     1-->assistive equipment   falls frequently at home  doesn't use walker but supposed to  -SJ     Transferring    1-->assistive equipment  -SJ     Toileting    1-->assistive equipment  -SJ     Bathing    1-->assistive equipment  -SJ     Dressing    0-->independent  -SJ     Eating    0-->independent  -SJ     Communication    0-->understands/communicates without difficulty  -SJ     Swallowing    0-->swallows foods/liquids without difficulty  -SJ     Vital Signs    Pretreatment Heart Rate (beats/min) 96  -TR        Intratreatment Heart Rate (beats/min) 97  -TR        Posttreatment Heart Rate (beats/min) 98  -TR        Pre SpO2 (%) 92  -TR        O2 Delivery Pre Treatment room air  -TR        Intra SpO2 (%) 90  -TR        O2 Delivery Intra Treatment room air  -TR        Post SpO2 (%) 93  -TR        O2 Delivery Post Treatment room air  -TR        Pre Patient Position Sitting  -TR        Intra Patient Position Sitting   Following mobility  -TR        Post Patient Position Sitting  -TR        Pain Assessment    Pain Assessment 0-10  -TR [place] FACES  -LC (r) AK (t) LC (c)       [place] FACES Pain Rating  2  -LC (r) AK (t) LC (c)       Pain Score 7  -TR        Pain Type Acute pain  -TR Chronic pain  -LC (r) AK (t) LC (c)       Pain Location Shoulder   and R ribs  -TR Abdomen  -LC (r) AK (t) LC (c)       Pain Orientation Right  -TR Right  -LC (r) AK (t) LC (c)       Pain Frequency  Intermittent  -LC (r) AK (t) LC (c)       Pain Onset  Sudden  -LC (r) AK (t) LC (c)       Pain Intervention(s) Repositioned;Ambulation/increased activity;Medication (See MAR)   Notified RN  -TR Repositioned;Ambulation/increased activity  -LC (r) AK (t) LC (c)       Response to Interventions Tolerated  -TR tolerated  -LC (r) AK (t) LC (c)       Vision Assessment/Intervention    Visual Impairment WFL with corrective lenses  -TR WFL with corrective lenses  -LC (r) AK (t) LC (c)       Visual Impairment Comment able to read name badge  -TR         Cognitive Assessment/Intervention    Current Cognitive/Communication Assessment functional  -TR functional  -LC (r) AK (t) LC (c)       Orientation Status oriented to;person;place;time   knew month but not year  -TR oriented to;person;disoriented to;place;time;situation  -LC (r) AK (t) LC (c)       Follows Commands/Answers Questions able to follow single-step instructions;100% of the time  -TR able to follow single-step instructions;100% of the time  -LC (r) AK (t) LC (c)       Personal Safety decreased awareness, need for assist;decreased awareness, need for safety  -TR mild impairment;at risk behaviors demonstrated;decreased awareness, need for assist;decreased awareness, need for safety;decreased insight to deficits  -LC (r) AK (t) LC (c)       Personal Safety Interventions fall prevention program maintained;gait belt;nonskid shoes/slippers when out of bed;supervised activity  -TR fall prevention program maintained;gait belt;nonskid shoes/slippers when out of bed;supervised activity  -LC (r) AK (t) LC (c)       ROM (Range of Motion)    General ROM Detail L UE AROM WFL. R UE distal AROM WFL. R shoulder flexion 25% limited by pain.   -TR BLE grossly WFL  -LC (r) AK (t) LC (c)       MMT (Manual Muscle Testing)    General MMT Assessment Detail L UE 4-/5. R UE not tested due to pain.   -TR BLE grossly 3+/5  -LC (r) AK (t) LC (c)       Bed Mobility, Assessment/Treatment    Bed Mobility, Assistive Device  bed rails;head of bed elevated  -LC (r) AK (t) LC (c)       Bed Mobility, Scoot/Bridge, [place]  minimum assist (75% patient effort);verbal cues required  -LC (r) AK (t) LC (c)       Bed Mob, Supine to Sit, [place]  moderate assist (50% patient effort);verbal cues required  -LC (r) AK (t) LC (c)       Bed Mobility, Safety Issues  decreased use of arms for pushing/pulling;decreased use of legs for bridging/pushing;cognitive deficits limit understanding  -LC (r) AK (t) LC (c)       Bed Mobility, Impairments   strength decreased;impaired balance  -LC (r) AK (t) LC (c)       Bed Mobility, Comment Not tested, up in chair.   -TR        Transfer Assessment/Treatment    Transfers, Sit-Stand [place] minimum assist (75% patient effort);verbal cues required  -TR minimum assist (75% patient effort);verbal cues required  -LC (r) AK (t) LC (c)       Transfers, Stand-Sit [place] minimum assist (75% patient effort);verbal cues required  -TR minimum assist (75% patient effort);verbal cues required  -LC (r) AK (t) LC (c)       Transfers, Sit-Stand-Sit, Assist Device rolling walker  -TR rolling walker  -LC (r) AK (t) LC (c)       Toilet Transfer, [place] minimum assist (75% patient effort);verbal cues required  -TR        Toilet Transfer, Assistive Device rolling walker   grab bar  -TR        Transfer, Safety Issues balance decreased during turns;step length decreased;weight-shifting ability decreased;impulsivity;loses balance backward;sequencing ability decreased  -TR balance decreased during turns;step length decreased;weight-shifting ability decreased;knees buckling;sequencing ability decreased  -LC (r) AK (t) LC (c)       Transfer, Impairments impaired balance;strength decreased;postural control impaired;pain  -TR strength decreased;impaired balance;coordination impaired  -LC (r) AK (t) LC (c)       Functional Mobility    Functional Mobility- Ind. Level minimum assist (75% patient effort);verbal cues required  -TR        Functional Mobility- Device rolling walker  -TR        Functional Mobility- Safety Issues balance decreased during turns;sequencing ability decreased;step length decreased;weight-shifting ability decreased;loses balance backward  -TR        Functional Mobility- Comment In room and bathroom  -TR        Upper Body Bathing Assessment/Training    UB Bathing Assess/Train, Comment Expected level: Min A  -TR        Lower Body Bathing Assessment/Training    LB Bathing Assess/Train, Comment Expected level: Mod  A  -TR        Upper Body Dressing Assessment/Training    UB Dressing Assess/Train, Comment Expected level: Min A  -TR        Lower Body Dressing Assessment/Training    LB Dressing Assess/Train, Clothing Type donning:;socks  -TR        LB Dressing Assess/Train, Position sitting  -TR        LB Dressing Assess/Train, [place] maximum assist (25% patient effort)  -TR        LB Dressing Assess/Train, Impairments decreased flexibility;pain;postural control impaired  -TR        LB Dressing Assess/Train, Comment Simulated  -TR        Toileting Assessment/Training    Toileting Assess/Train, Assistive Device grab bars  -TR        Toileting Assess/Train, Position sitting  -TR        Toileting Assess/Train, Indepen Level minimum assist (75% patient effort)  -TR        Toileting Assess/Train, Impairments pain;impaired balance;strength decreased;postural control impaired  -TR        Toileting Assess/Train, Comment Min A for clothing mgt and transfer  -TR        Grooming Assessment/Training    Grooming Assess/Train, Position standing  -TR        Grooming Assess/Train, Indepen Level contact guard assist;minimum assist (75% patient effort)  -TR        Grooming Assess/Train, Impairments impaired balance;strength decreased;postural control impaired;pain  -TR        Grooming Assess/Train, Comment Washing hands at sink. Min A for balance.   -TR        Motor Skills/Interventions    Additional Documentation Balance Skills Training (Group)  -TR Balance Skills Training (Group)  -TAYE (r) AK (t) LC (c)       Balance Skills Training    Sitting-Level of Assistance Close supervision;Contact guard  -TR Close supervision;Contact guard  -LC (r) AK (t) LC (c)       Sitting-Balance Support Feet supported  -TR Left upper extremity supported;Right upper extremity supported;Feet supported  -LC (r) AK (t) LC (c)       Standing-Level of Assistance Contact guard;Minimum assistance   1 loss of balance, Min A to regain balance.   -TR Contact guard;Minimum  assistance  -LC (r) AK (t) LC (c)       Static Standing Balance Support assistive device  -TR assistive device  -LC (r) AK (t) LC (c)       Gait Balance-Level of Assistance  Minimum assistance;Moderate assistance  -LC (r) AK (t) LC (c)       Gait Balance Support  assistive device  -LC (r) AK (t) LC (c)       Sensory Assessment/Intervention    Sensory Impairment --   WNL per pt.   -TR        General Therapy Interventions    Planned Therapy Interventions activity intolerance;adaptive equipment training;ADL retraining;balance training;bed mobility training;energy conservation;home exercise program;ROM (Range of Motion);strengthening;transfer training  -TR        Positioning and Restraints    Pre-Treatment Position sitting in chair/recliner  -TR in bed  -LC (r) AK (t) LC (c)       Post Treatment Position chair  -TR chair  -LC (r) AK (t) LC (c)       In Chair sitting;call light within reach;encouraged to call for assist;with nsg  -TR sitting;call light within reach;encouraged to call for assist;notified nsg;legs elevated  -LC (r) AK (t) LC (c)         User Key  (r) = Recorded By, (t) = Taken By, (c) = Cosigned By    Initials Name Effective Dates    SJ Brit Landa RN 08/02/16 -     TAYE Sow, PT DPT 08/02/16 -     BIN Hernandez, OTR/L 06/22/15 -     FRANNIE Higgins, PT Student 12/19/16 -            Occupational Therapy Education     Title: PT OT SLP Therapies (Active)     Topic: Occupational Therapy (Active)     Point: ADL training (Done)    Description: Instruct learner(s) on proper safety adaptation and remediation techniques during self care or transfers.   Instruct in proper use of assistive devices.    Learning Progress Summary    Learner Readiness Method Response Comment Documented by Status   Patient Acceptance E,D VU,NR Education provided on purpose of OT eval, impairments found, need for continued intervention, d/c planning, fall prevention and planned interventions. TR 02/13/17 1345 Done    Family Acceptance E,D VU,NR Education provided on purpose of OT eval, impairments found, need for continued intervention, d/c planning, fall prevention and planned interventions. TR 02/13/17 1348 Done               Point: Precautions (Done)    Description: Instruct learner(s) on prescribed precautions during self-care and functional transfers.    Learning Progress Summary    Learner Readiness Method Response Comment Documented by Status   Patient Acceptance E,D VU,NR Education provided on purpose of OT eval, impairments found, need for continued intervention, d/c planning, fall prevention and planned interventions. TR 02/13/17 1348 Done   Family Acceptance E,D VU,NR Education provided on purpose of OT eval, impairments found, need for continued intervention, d/c planning, fall prevention and planned interventions. TR 02/13/17 1348 Done               Point: Body mechanics (Done)    Description: Instruct learner(s) on proper positioning and spine alignment during self-care, functional mobility activities and/or exercises.    Learning Progress Summary    Learner Readiness Method Response Comment Documented by Status   Patient Acceptance E,D VU,NR Education provided on purpose of OT eval, impairments found, need for continued intervention, d/c planning, fall prevention and planned interventions. TR 02/13/17 1348 Done   Family Acceptance E,D VU,NR Education provided on purpose of OT eval, impairments found, need for continued intervention, d/c planning, fall prevention and planned interventions. TR 02/13/17 1348 Done                      User Key     Initials Effective Dates Name Provider Type Discipline    TR 06/22/15 -  Cleo Hernandez, OTR/L Occupational Therapist OT                  OT Recommendation and Plan  Anticipated Equipment Needs At Discharge: tub bench (grab bars in bathroom)  Anticipated Discharge Disposition: home with 24/7 care, home with home health, skilled nursing facility, inpatient rehabilitation  facility (TCU, depending upon pt progress)  Planned Therapy Interventions: activity intolerance, adaptive equipment training, ADL retraining, balance training, bed mobility training, energy conservation, home exercise program, ROM (Range of Motion), strengthening, transfer training  Therapy Frequency: 3-5 times/wk  Plan of Care Review  Plan Of Care Reviewed With: patient, family  Progress: improving  Outcome Summary/Follow up Plan: OT Eval completed. Pt displays decreased UE strength, endurance, ROM, impaired sitting/standing balance and decreased safety awareness. Min A for UB ADL and Mod A for LB ADL. Min A for transfers and mobility with a RW. OT will address these deficits. Anticipated d/c is home with 24 hour care, SNF, TCU or inpatient rehab depending upon pt progress. He is at a high risk for falls at this time.           OT Goals       02/13/17 1349          Transfer Training OT LTG    Transfer Training OT LTG, Date Established 02/13/17  -TR      Transfer Training OT LTG, Time to Achieve by discharge  -TR      Transfer Training OT LTG, Activity Type bed to chair /chair to bed;sit to stand/stand to sit;toilet  -TR      Transfer Training OT LTG, [place] Level supervision required  -TR      Transfer Training OT LTG, Assist Device walker, rolling  -TR      Transfer Training OT LTG, Additional Goal with good safety awareness  -TR      Safety Awareness OT LTG    Safety Awareness OT LTG, Date Established 02/13/17  -TR      Safety Awareness OT LTG, Time to Achieve by discharge  -TR      Safety Awareness OT LTG, Activity Type good safety awareness;with ADL's  -TR      Safety Awareness OT LTG, [place] Level min verbal cues  -TR      Safety Awareness OT LTG, Additional Goal Caregivers will be independent with fall prevention techniques.   -TR      ADL OT LTG    ADL OT LTG, Date Established 02/13/17  -TR      ADL OT LTG, Time to Achieve by discharge  -TR      ADL OT LTG, Activity Type ADL skills  -TR      ADL  OT LTG, [place] Level standby assist  -TR      ADL OT LTG, Additional Goal UB bathing, UB dressing, Grooming while standing and toileting.   -TR        User Key  (r) = Recorded By, (t) = Taken By, (c) = Cosigned By    Initials Name Provider Type    BIN Hernandez OTR/L Occupational Therapist                Outcome Measures       02/13/17 1305 02/13/17 1040       How much help from another person do you currently need...    Turning from your back to your side while in flat bed without using bedrails?  3  -LC (r) AK (t) LC (c)     Moving from lying on back to sitting on the side of a flat bed without bedrails?  3  -LC (r) AK (t) LC (c)     Moving to and from a bed to a chair (including a wheelchair)?  2  -LC (r) AK (t) LC (c)     Standing up from a chair using your arms (e.g., wheelchair, bedside chair)?  3  -LC (r) AK (t) LC (c)     Climbing 3-5 steps with a railing?  1  -LC (r) AK (t) LC (c)     To walk in hospital room?  3  -LC (r) AK (t) LC (c)     AM-PAC 6 Clicks Score  15  -LC (r) AK (t)     How much help from another is currently needed...    Putting on and taking off regular lower body clothing? 2  -TR      Bathing (including washing, rinsing, and drying) 2  -TR      Toileting (which includes using toilet bed pan or urinal) 3  -TR      Putting on and taking off regular upper body clothing 3  -TR      Taking care of personal grooming (such as brushing teeth) 3  -TR      Eating meals 4  -TR      Score 17  -TR      Functional Assessment    Outcome Measure Options AM-PAC 6 Clicks Daily Activity (OT)  -TR AM-PAC 6 Clicks Basic Mobility (PT)  -LC (r) AK (t) LC (c)       User Key  (r) = Recorded By, (t) = Taken By, (c) = Cosigned By    Initials Name Provider Type    TAYE Sow, PT DPT Physical Therapist    BIN Hernandez, OTR/L Occupational Therapist    FRANNIE Higgins, PT Student PT Student          Time Calculation:   OT Start Time: 1250  OT Stop Time: 1345  OT Time Calculation (min): 55  min  OT Non-Billable Time (min):  (Chart review and eval time split. )    Therapy Charges for Today     Code Description Service Date Service Provider Modifiers Qty    13681735154 HC OT SELFCARE CURRENT 2/13/2017 ERICK Dominguez/L GO, CK 1    08958065620 HC OT SELFCARE PROJECTED 2/13/2017 ERICK Dominguez/L GO, CJ 1    43068116782 HC OT EVAL MOD COMPLEXITY 4 2/13/2017 ERICK Dominguez/L GO, KX 1          OT G-codes  OT Professional Judgement Used?: Yes  OT Functional Scales Options: AM-PAC 6 Clicks Daily Activity (OT)  Score: 17  Functional Limitation: Self care  Self Care Current Status (): At least 40 percent but less than 60 percent impaired, limited or restricted  Self Care Goal Status (): At least 20 percent but less than 40 percent impaired, limited or restricted    ERICK Platt/ORALIA  2/13/2017

## 2017-02-13 NOTE — PLAN OF CARE
Problem: Patient Care Overview (Adult)  Goal: Plan of Care Review  Outcome: Ongoing (interventions implemented as appropriate)    02/12/17 3973   Coping/Psychosocial Response Interventions   Plan Of Care Reviewed With patient   Patient Care Overview   Progress no change   Outcome Evaluation   Outcome Summary/Follow up Plan patient admitted to floor today for a recent fall so came to er and was found to be in afib rvr. complains of pain in right side and right shoulder. doesn't rate pain using number scale r/t dementia         Problem: Arrhythmia/Dysrhythmia (Symptomatic) (Adult)  Goal: Signs and Symptoms of Listed Potential Problems Will be Absent or Manageable (Arrhythmia/Dysrhythmia)  Outcome: Ongoing (interventions implemented as appropriate)    Problem: Fall Risk (Adult)  Goal: Identify Related Risk Factors and Signs and Symptoms  Outcome: Outcome(s) achieved Date Met:  02/12/17  Goal: Absence of Falls  Outcome: Ongoing (interventions implemented as appropriate)    Problem: Pressure Ulcer Risk (Eric Scale) (Adult,Obstetrics,Pediatric)  Goal: Identify Related Risk Factors and Signs and Symptoms  Outcome: Outcome(s) achieved Date Met:  02/12/17  Goal: Skin Integrity  Outcome: Ongoing (interventions implemented as appropriate)

## 2017-02-13 NOTE — DISCHARGE PLACEMENT REQUEST
"Leelee Rendon (71 y.o. Male)     Date of Birth Social Security Number Address Home Phone MRN    1945 [id number] [facility] [address] [phone] [id number]    Yazidism Marital Status          Jew        Admission Date Admission Type Admitting Provider Attending Provider Department, Room/Bed    2/12/17 Emergency Parag Stokes DO Robinson, Maurice S, DO [facility] 4B, 443/1    Discharge Date Discharge Disposition Discharge Destination                      Attending Provider: Parag Stokes DO     Allergies:  No Known Allergies    Isolation:  None   Infection:  None   Code Status:  FULL    Ht:  69\" (175.3 cm)   Wt:  190 lb 9.6 oz (86.5 kg)    Admission Cmt:  None   Principal Problem:  Chronic atrial fibrillation with RVR [I48.2]                 Active Insurance as of 2/12/2017     Primary Coverage     Payor Plan Insurance Group Employer/Plan Group    MEDICARE MEDICARE A & B      Payor Plan Address Payor Plan Phone Number Effective From Effective To    [address] [phone] 2/1/2008     [place] [address]       Subscriber Name Subscriber Birth Date Member ID       LEELEE RENDON 1945 [id number]           Secondary Coverage     Payor Plan Insurance Group Employer/Plan Group    [facility] [id number]     Payor Plan Address Payor Plan Phone Number Effective From Effective To    [address]  1/1/2017     [place] [address]       Subscriber Name Subscriber Birth Date Member ID       LEELEE RENDON 1945 [id number]                 Emergency Contacts      (Rel.) Home Phone Work Phone Mobile Phone    Gorline,Joe (Guardian) -- -- [phone]               History & Physical      Parag Stokes DO at 2/12/2017  3:06 PM              [facility]  HISTORY AND PHYSICAL    Date of Admission: 2/12/2017  Primary Care Physician: Yohana Carlson MD    Subjective     Chief " Complaint:   Fall with pain right shoulder and right ribs    History of Present Illness  This 71-year-old white male was admitted after a fall at home a chief complaint of right shoulder discomfort and right rib pain.  The patient's caregiver states that the patient has been falling frequently over the past several days.  He apparently fell this morning attempting to go to the bathroom.        Review of Systems   Constitutional: Negative.    HENT: Positive for hearing loss.    Eyes: Negative.    Respiratory: Positive for cough and wheezing.    Cardiovascular: Positive for chest pain.   Gastrointestinal: Negative.    Endocrine: Negative.    Genitourinary: Negative.    Musculoskeletal: Positive for arthralgias and gait problem.        Right rib pain and right shoulder pain   Skin: Negative.    Allergic/Immunologic: Negative.    Hematological: Negative.    Psychiatric/Behavioral: Negative.         Otherwise complete ROS reviewed and negative except as mentioned in the HPI.      Past Medical History:     Past Medical History   Diagnosis Date   • A-fib    • Alcohol abuse    • Anxiety    • COPD (chronic obstructive pulmonary disease)    • COPD (chronic obstructive pulmonary disease)    • Hypotension    • Lung disease    • Neurological disease    • Polycythemia        Past Surgical History:  Past Surgical History   Procedure Laterality Date   • Pacemaker implantation     • Knee surgery     • Wrist surgery         Family History:   family history includes COPD in his sister; Cancer in his father; Heart disease in his father; No Known Problems in his mother.    Social History:    reports that he has been smoking.  He has a 120.00 pack-year smoking history. He does not have any smokeless tobacco history on file. He reports that he drinks about 16.8 oz of alcohol per week  He reports that he does not use illicit drugs.    Medications:  Prior to Admission medications    Medication Sig Start Date End Date Taking? Authorizing  "Provider   bisoprolol (ZEBeta) 10 MG tablet Take 10 mg by mouth Daily.   Yes Historical Provider, MD   donepezil (ARICEPT) 5 MG tablet Take 5 mg by mouth Every Night.   Yes Historical Provider, MD   escitalopram (LEXAPRO) 20 MG tablet Take 20 mg by mouth Daily.   Yes Historical Provider, MD   pravastatin (PRAVACHOL) 20 MG tablet Take 20 mg by mouth Daily.   Yes Historical Provider, MD   rivaroxaban (XARELTO) 20 MG tablet Take 20 mg by mouth Daily.   Yes Historical Provider, MD       Allergies:  No Known Allergies    Objective     Vital Signs:   Visit Vitals   • /89 (BP Location: Right arm, Patient Position: Lying)   • Pulse 106   • Temp 98.1 °F (36.7 °C) (Tympanic)   • Resp 20   • Ht 69\" (175.3 cm)   • Wt 190 lb 9.6 oz (86.5 kg)   • SpO2 95%   • BMI 28.15 kg/m2       Physical Exam   Constitutional: He is oriented to person, place, and time. He appears well-developed.   HENT:   Head: Normocephalic and atraumatic.   Right Ear: External ear normal.   Left Ear: External ear normal.   Nose: Nose normal.   Mouth/Throat: Oropharynx is clear and moist.   Eyes: Conjunctivae and EOM are normal. Pupils are equal, round, and reactive to light. No scleral icterus.   Neck: Normal range of motion. Neck supple. No JVD present. No tracheal deviation present. No thyromegaly present.   Cardiovascular: Normal heart sounds.  An irregularly irregular rhythm present. Exam reveals decreased pulses.    Pulmonary/Chest: Effort normal. He has wheezes (scattered throughout). He has rhonchi (scattered throughout).   Abdominal: Soft. Bowel sounds are normal. He exhibits no distension and no mass. There is no tenderness.   Musculoskeletal: Normal range of motion. He exhibits no edema or deformity.   Neurological: He is alert and oriented to person, place, and time. He has normal reflexes. No cranial nerve deficit or sensory deficit. He exhibits abnormal muscle tone (bilateral lower extremity weakness).   Skin: Skin is warm and dry. " Ecchymosis: right hand. No erythema.   Psychiatric: He has a normal mood and affect. Judgment and thought content normal. His speech is delayed. He is slowed. Cognition and memory are impaired. He exhibits abnormal recent memory.           Results Reviewed:   Specimen:  Blood Updated:  02/12/17 0929     WBC 15.59 (H) 10*3/mm3      RBC 5.30 10*6/mm3      Hemoglobin 18.4 (H) g/dL      Hematocrit 53.5 (H) %      .9 (H) fL      MCH 34.7 (H) pg      MCHC 34.4 g/dL      RDW 14.4 %      RDW-SD 53.1 fl      MPV 11.6 fL      Platelets 147 10*3/mm3      Neutrophil % 83.9 (H) %      Lymphocyte % 6.5 (L) %      Monocyte % 9.0 %      Eosinophil % 0.0 %      Basophil % 0.1 %      Immature Grans % 0.5 %      Neutrophils, Absolute 13.08 (H) 10*3/mm3      Lymphocytes, Absolute 1.02 10*3/mm3      Monocytes, Absolute 1.40 (H) 10*3/mm3      Eosinophils, Absolute 0.00 10*3/mm3      Basophils, Absolute 0.01 10*3/mm3      Immature Grans, Absolute 0.08 (H) 10*3/mm3     Comprehensive Metabolic Panel [[id number]]  (Abnormal) Collected:  02/12/17 0919    Specimen:  Blood Updated:  02/12/17 0939     Glucose 98 mg/dL      BUN 17 mg/dL      Creatinine 1.26 mg/dL      Sodium 140 mmol/L      Potassium 4.7 mmol/L      Chloride 105 mmol/L      CO2 23.0 (L) mmol/L      Calcium 10.8 (H) mg/dL      Total Protein 7.0 g/dL      Albumin 4.10 g/dL      ALT (SGPT) 24 U/L      AST (SGOT) 37 U/L      Alkaline Phosphatase 112 U/L      Total Bilirubin 2.5 (H) mg/dL      eGFR Non African Amer 56 (L) mL/min/1.73      Globulin 2.9 gm/dL      A/G Ratio 1.4 g/dL      BUN/Creatinine Ratio 13.5      Anion Gap 12.0 mmol/L     Narrative:       The MDRD GFR formula is only valid for adults with stable renal function between ages 18 and 70.    Magnesium [[id number]]  (Normal) Collected:  02/12/17 0919    Specimen:  Blood Updated:  02/12/17 0939     Magnesium 2.1 mg/dL     POC Troponin, Rapid [[id number]]  (Normal) Collected:  02/12/17 0925    Specimen:  Blood Updated:   02/12/17 0940     Troponin I 0.02 ng/mL       Serial Number: [id number]    : [id number]       BNP [[id number]]  (Abnormal) Collected:  02/12/17 0919    Specimen:  Blood Updated:  02/12/17 0948     proBNP 4130.0 (H) pg/mL     aPTT [[id number]]  (Normal) Collected:  02/12/17 0919    Specimen:  Blood Updated:  02/12/17 0948     PTT 32.5 seconds     Protime-INR [[id number]]  (Abnormal) Collected:  02/12/17 0919    Specimen:  Blood Updated:  02/12/17 0948     Protime 17.2 (H) Seconds      INR 1.36 (H)     D-dimer, Quantitative [[id number]]  (Abnormal) Collected:  02/12/17 0919    Specimen:  Blood Updated:  02/12/17 0948     D-Dimer, Quantitative 2.81 (H) mg/L (FEU)     Ethanol [[id number]]  (Normal) Collected:  02/12/17 0919    Specimen:  Blood Updated:  02/12/17 1009     Ethanol % <0.010 %        Ct Head Without Contrast    Result Date: 2/12/2017  Narrative: EXAMINATION:  CT HEAD WO CONTRAST-  2/12/2017 10:09 AM CST  HISTORY: The patient fell and hit the head. Difficulty walking.  TECHNIQUE: Multiple axial images were obtained through the brain without contrast infusion. Multiplanar images were reconstructed.  DLP: 647 mGy-cm. Automated dosage control was utilized.  COMPARISON: 10/21/2014.  FINDINGS: There are no hemorrhage, edema or mass effect. There is mild to moderate atrophy with associated ventricular prominence. There are chronic lacunar infarcts in both of the thalami. Low density in the white matter is nonspecific and most likely due to chronic small vessel disease. There is mild mucosal thickening in the right ethmoid region. The other visualized paranasal sinuses and mastoid air cells are clear. Vascular calcification is noted.      Impression: 1. No hemorrhage, edema or mass effect. 2. Mild to moderate atrophy with associated ventricular prominence. 3. Chronic-appearing lacunar infarcts in the thalami bilaterally. 4. Low density in the white matter is nonspecific and likely due to chronic small vessel disease.  Vascular calcification is noted. 5. Mild mucosal thickening in the right ethmoid region.  The full report of this exam was immediately signed and available to the emergency room. The patient is currently in the emergency room.   This report was finalized on 02/12/2017 10:45 by Dr. eJtt Gore MD.    Ct Chest Without Contrast    Result Date: 2/12/2017  Narrative: EXAMINATION:  CT CHEST WO CONTRAST-  2/12/2017 10:09 AM CST  HISTORY: Multiple falling episodes. No other history is given for this study.  COMPARISON: No comparison study.  DLP: 467 mGy-cm. Automated dosage control was utilized.  TECHNIQUE: Spiral CT was performed of the chest without contrast. Multiplanar images were reconstructed.  FINDINGS: There is atheromatous disease of the thoracic aorta and coronary arteries. Heart size appears to be normal. There are no enlarged mediastinal lymph nodes. There is mild dependent atelectasis posteriorly in both lungs. There is no dense consolidation. There is mild bullous disease. There are degenerative changes of the spine. Chronic appearing compression fractures are noted.      Impression: 1. Atheromatous disease of the thoracic aorta and coronary arteries. 2. Mild dependent atelectasis posteriorly in both lungs. No dense consolidation. Mild bullous disease. 3. Mild ectasia of the thoracic aorta measuring about 4.6 cm in diameter.  The full report of this exam was immediately signed and available to the emergency room. The patient is currently in the emergency room.   This report was finalized on 02/12/2017 10:49 by Dr. Jett Gore MD.    Xr Chest 1 View    Result Date: 2/12/2017  Narrative: EXAMINATION:  XR CHEST 1 VW-  2/12/2017 9:39 AM CST  HISTORY: Shortness of breath. Rib pain.  COMPARISON: 10/21/2014.  FINDINGS:  There is poor inspiration. There is patchy infiltrate in the perihilar regions and lung bases. There is mild cardiomegaly. There is a pacemaker on the left. There are old rib fractures on the  right.      Impression: 1. Poor inspiration. 2. Patchy infiltrate in the perihilar regions and lung bases. This may represent mild edema or pneumonia. 3. Mild cardiomegaly.   This report was finalized on 02/12/2017 10:04 by Dr. Jett Gore MD.     I have personally reviewed and interpreted the radiology studies and ECG obtained at time of admission.     Assessment / Plan      Assessment & Plan  Hospital Problem List     * (Principal)Chronic atrial fibrillation with RVR    Multiple falls    Polycythemia    COPD (chronic obstructive pulmonary disease)    Alcohol abuse          PLAN:   Admit to telemetry floor  [facility]  Cardiology consultation  [facility]  Given the patient's recent history of increased falls, he may no longer be a good candidate for anticoagulation.  Defer that decision to cardiology.    Code Status:   Full  Surrogate decision-maker is Crow Redding, his guardian         I discussed the patients findings and my recommendations with: Patient and sister at bedside    Estimated length of stay: 2-3 days    Parag Stokes DO   02/12/17   3:27 PM                 Electronically signed by Parag Stokes DO at 2/12/2017  3:27 PM        Physician Progress Notes (last 72 hours) (Notes from 2/10/2017  2:29 PM through 2/13/2017  2:29 PM)     No notes of this type exist for this encounter.           Consult Notes (last 72 hours) (Notes from 2/10/2017  2:29 PM through 2/13/2017  2:29 PM)      NASRIN Cardenas at 2/13/2017  9:49 AM  Version 2 of 2         [facility] CONSULT NOTE    Referring Provider: Parag Stokes DO    Reason for Consultation: A-Fib RVR     Chief Complaint   Patient presents with   • Fall     friend reports he left pt last night around 2300 and when he checke don him this am pt was found laying in the bathroom floor, pt is unaware of what time he fell, pt does deny a loc and has a hx of falling     • Shoulder Pain   • Rib Pain       Subjective .     History of present illness:  Ezequiel Abdalla is a 71 y.o. male with a known PMH significant for ETOH abuse, tobacco abuse, COPD, Atrial Fibrillation chronically anticoagulated with Xarelto, pacemaker, who was admitted to [facility] on 2/12 under the care of the hospitalist team after being found down at home. At presentation, he was complaining of right shoulder and rib pain. According to records, his caregiver states that he frequently falls at home due to generalized weakness.  He was noted to be in A-Fib RVR and placed on a Cardizem gtt. Cardiology was consulted for the management of A-Fib. Patient is an extremely vague historian. Guardian expressed concerns regarding him being discharged home and he feels that he needs inpatient rehab due to his weakness.     History  Past Medical History   Diagnosis Date   • A-fib    • Alcohol abuse    • Anxiety    • COPD (chronic obstructive pulmonary disease)    • COPD (chronic obstructive pulmonary disease)    • Hypotension    • Lung disease    • Neurological disease    • Polycythemia    ,   Past Surgical History   Procedure Laterality Date   • Pacemaker implantation     • Knee surgery     • Wrist surgery     ,   Family History   Problem Relation Age of Onset   • No Known Problems Mother    • Cancer Father    • Heart disease Father    • COPD Sister    ,   Social History   Substance Use Topics   • Smoking status: Heavy Tobacco Smoker     Packs/day: 2.00     Years: 60.00   • Smokeless tobacco: None   • Alcohol use 16.8 oz/week     7 Shots of liquor, 21 Cans of beer per week      Comment: 2-3 BEERS, 1 GIN DAILY   ,     Medications  Current Facility-Administered Medications   Medication Dose Route Frequency Provider Last Rate Last Dose   • acetaminophen (TYLENOL) tablet 650 mg  650 mg Oral Q4H PRN Parag Stokes DO       • aluminum-magnesium hydroxide-simethicone (MAALOX/MYLANTA) suspension 30 mL  30 mL Oral Q6H PRN Parag TORRES  Kike, DO       • bisacodyl (DULCOLAX) EC tablet 5 mg  5 mg Oral Daily PRN Parag Stokes DO       • bisoprolol (ZEBeta) tablet 10 mg  10 mg Oral Daily Parag Stokes, DO   10 mg at 02/13/17 0827   • diltiaZEM (CARDIZEM) 125mg/125 mL infusion  10 mg/hr Intravenous Titrated Laz Joshi Jr., MD 10 mL/hr at 02/13/17 0856 10 mg/hr at 02/13/17 0856   • donepezil (ARICEPT) tablet 5 mg  5 mg Oral Nightly Parag Stokes DO   5 mg at 02/12/17 2044   • escitalopram (LEXAPRO) tablet 20 mg  20 mg Oral Daily Parag Stokes, DO   20 mg at 02/13/17 0827   • HYDROcodone-acetaminophen (NORCO) 5-325 MG per tablet 1 tablet  1 tablet Oral Q4H PRN Parag Stokes DO   1 tablet at 02/13/17 0238   • LORazepam (ATIVAN) tablet 0.5 mg  0.5 mg Oral Q2H PRN Parag Stokes DO        Or   • LORazepam (ATIVAN) injection 0.5 mg  0.5 mg Intravenous Q2H PRN Parag Stokes DO        Or   • LORazepam (ATIVAN) tablet 1 mg  1 mg Oral Q1H PRN Parag Stokes DO        Or   • LORazepam (ATIVAN) injection 1 mg  1 mg Intravenous Q1H PRN Parag Stokes DO        Or   • LORazepam (ATIVAN) injection 1 mg  1 mg Intravenous Q15 Min PRN Parag Stokes DO        Or   • LORazepam (ATIVAN) injection 1 mg  1 mg Intramuscular Q15 Min PRN Parag Stokes DO       • nicotine (NICODERM CQ) 21 MG/24HR patch 1 patch  1 patch Transdermal Q24H Parag Stokes DO   1 patch at 02/12/17 1714   • ondansetron (ZOFRAN) injection 4 mg  4 mg Intravenous Q6H PRN Parag Stokes DO       • oxyCODONE-acetaminophen (PERCOCET) 7.5-325 MG per tablet 2 tablet  2 tablet Oral Q4H PRN Parag Stokes DO       • pravastatin (PRAVACHOL) tablet 20 mg  20 mg Oral Daily Parag Stokes DO   20 mg at 02/13/17 0827   • rivaroxaban (XARELTO) tablet 20 mg  20 mg Oral Daily Parag Stokes DO   20 mg at 02/13/17 0827   • sodium chloride 0.9 % flush 1-10 mL  1-10 mL Intravenous PRN Parag Stokes DO       • sodium  "chloride 0.9 % flush 10 mL  10 mL Intravenous PRN Laz Joshi Jr., MD           Allergies:  Review of patient's allergies indicates no known allergies.    Review of Systems  Review of Systems   Unable to perform ROS: other   Cardiovascular: Positive for chest pain (Left sided rib pain r/t falls). Negative for irregular heartbeat and leg swelling.   Respiratory: Negative for hemoptysis.    Musculoskeletal: Positive for falls (Frequent at home).   Gastrointestinal: Negative for hematemesis, hematochezia, melena, nausea and vomiting.   Genitourinary: Negative for hematuria.   Patient is an extremely poor historian. The documented ROS was obtained from the guardian at the bedside.     Objective     Physical Exam:  Patient Vitals for the past 24 hrs:   BP Temp Temp src Pulse Resp SpO2 Height Weight   02/13/17 0807 124/89 98.6 °F (37 °C) Temporal Art 92 18 94 % - -   02/13/17 0400 97/78 98.9 °F (37.2 °C) Temporal Art 93 18 93 % - -   02/13/17 0000 117/82 98.6 °F (37 °C) Temporal Art 96 16 93 % - -   02/12/17 2012 100/79 99.3 °F (37.4 °C) Temporal Art 93 20 90 % - -   02/12/17 1414 131/89 98.1 °F (36.7 °C) Tympanic 106 20 95 % 69\" (175.3 cm) 190 lb 9.6 oz (86.5 kg)   02/12/17 1201 104/71 - - 109 - 92 % - -   02/12/17 1106 103/76 - - 103 - 92 % - -   02/12/17 1102 96/72 - - 104 - 93 % - -     Physical Exam   Constitutional: No distress.   Guardian at bedside    HENT:   Head: Normocephalic.   Cardiovascular: Normal rate, S1 normal, S2 normal, intact distal pulses and normal pulses.  An irregular rhythm present. murmur heard.  Telemetry: Paroxysmal A-Fib    Pulmonary/Chest: Effort normal and breath sounds normal. No accessory muscle usage. No respiratory distress.   Abdominal: Soft. Normal appearance and bowel sounds are normal. He exhibits no distension. There is no tenderness.   Neurological: He is alert.   Skin: Skin is warm and dry. No rash noted. He is not diaphoretic.   Scattered ecchymosis on bilateral arms, " left chest/abdomen   Psychiatric: Cognition and memory are impaired.   Vitals reviewed.      Results Review:   I reviewed the patient's new clinical results.  Lab Results   Component Value Date    WBC 12.12 (H) 02/13/2017    HGB 16.1 02/13/2017    HCT 47.2 02/13/2017    MCV 99.8 (H) 02/13/2017     (L) 02/13/2017     Lab Results   Component Value Date    GLUCOSE 106 (H) 02/13/2017    CALCIUM 10.2 02/13/2017     02/13/2017    K 3.5 02/13/2017    CO2 26.0 02/13/2017     02/13/2017    BUN 15 02/13/2017    CREATININE 0.83 02/13/2017    EGFRIFNONA 91 02/13/2017    BCR 18.1 02/13/2017    ANIONGAP 5.0 02/13/2017       Imaging Results (last 72 hours)     Procedure Component Value Units Date/Time    XR Chest 1 View [[id number]] Collected:  02/12/17 1003     Updated:  02/12/17 1006    Narrative:       EXAMINATION:  XR CHEST 1 VW-  2/12/2017 9:39 AM CST     HISTORY: Shortness of breath. Rib pain.     COMPARISON: 10/21/2014.     FINDINGS:  There is poor inspiration. There is patchy infiltrate in the  perihilar regions and lung bases. There is mild cardiomegaly. There is a  pacemaker on the left. There are old rib fractures on the right.       Impression:       1. Poor inspiration.  2. Patchy infiltrate in the perihilar regions and lung bases. This may  represent mild edema or pneumonia.  3. Mild cardiomegaly.        This report was finalized on 02/12/2017 10:04 by Dr. Jett Gore MD.    CT Head Without Contrast [[id number]] Collected:  02/12/17 1043     Updated:  02/12/17 1048    Narrative:       EXAMINATION:  CT HEAD WO CONTRAST-  2/12/2017 10:09 AM CST     HISTORY: The patient fell and hit the head. Difficulty walking.     TECHNIQUE: Multiple axial images were obtained through the brain without  contrast infusion. Multiplanar images were reconstructed.     DLP: 647 mGy-cm. Automated dosage control was utilized.     COMPARISON: 10/21/2014.     FINDINGS: There are no hemorrhage, edema or mass effect. There is  mild  to moderate atrophy with associated ventricular prominence. There are  chronic lacunar infarcts in both of the thalami. Low density in the  white matter is nonspecific and most likely due to chronic small vessel  disease. There is mild mucosal thickening in the right ethmoid region.  The other visualized paranasal sinuses and mastoid air cells are clear.  Vascular calcification is noted.       Impression:       1. No hemorrhage, edema or mass effect.  2. Mild to moderate atrophy with associated ventricular prominence.  3. Chronic-appearing lacunar infarcts in the thalami bilaterally.  4. Low density in the white matter is nonspecific and likely due to  chronic small vessel disease. Vascular calcification is noted.  5. Mild mucosal thickening in the right ethmoid region.     The full report of this exam was immediately signed and available to the  emergency room. The patient is currently in the emergency room.        This report was finalized on 02/12/2017 10:45 by Dr. Jett Gore MD.    CT Chest Without Contrast [[id number]] Collected:  02/12/17 1046     Updated:  02/12/17 1051    Narrative:       EXAMINATION:  CT CHEST WO CONTRAST-  2/12/2017 10:09 AM CST     HISTORY: Multiple falling episodes. No other history is given for this  study.     COMPARISON: No comparison study.     DLP: 467 mGy-cm. Automated dosage control was utilized.     TECHNIQUE: Spiral CT was performed of the chest without contrast.  Multiplanar images were reconstructed.     FINDINGS: There is atheromatous disease of the thoracic aorta and  coronary arteries. Heart size appears to be normal. There are no  enlarged mediastinal lymph nodes. There is mild dependent atelectasis  posteriorly in both lungs. There is no dense consolidation. There is  mild bullous disease. There are degenerative changes of the spine.  Chronic appearing compression fractures are noted.       Impression:       1. Atheromatous disease of the thoracic aorta and  coronary arteries.  2. Mild dependent atelectasis posteriorly in both lungs. No dense  consolidation. Mild bullous disease.  3. Mild ectasia of the thoracic aorta measuring about 4.6 cm in  diameter.     The full report of this exam was immediately signed and available to the  emergency room. The patient is currently in the emergency room.        This report was finalized on 02/12/2017 10:49 by Dr. Jett Gore MD.          Principal Problem:    -Paroxysmal atrial fibrillation with RVR- HR has improved. Chronically anticoagulated with Xarelto. I do not feel that the patient is a good candidate for anticoagulation due to his ETOH abuse and frequent falls at home.     Active Problems:    -Polycythemia    -COPD (chronic obstructive pulmonary disease)    -Alcohol abuse    -Multiple falls at home     -Generalized weakness    Plan   1. Monitor telemetry  2. Continue anticoagulation with Xarelto for now- but would recommend discontinuing at discharge due to frequent falls and ETOH abuse.   3. Falls precautions  4. ETOH withdrawal per primary team   5. Wean Cardizem gtt  6. Obtain Echo   7. PT and OT eval and treat.   8. Guardian expressed concerns regarding him being discharged home due to his significant weakness. He will likely need inpatient rehab at discharge.   Obtain pacemaker information     Further orders per Dr. Ansari upon his evaluation of the patient.     Thank you for the consultation, cardiology will gladly continue to follow.     NASRIN Cardenas        Please note this cardiology consultation note is the result of a face to face consultation with the patient, in addition to reviewing medical records at length by myself, NASRIN Greenberg.     Time: More than 50% of time spent in counseling and coordination of care:  Total face-to-face/floor time 45 min.  Time spent in counseling 25 min. Counseling included the following topics: lab results, reviewing medical records, assessment, discussing the  plan of care      Electronically signed by NASRIN Cardenas at 2/13/2017 11:35 AM      NASRIN Cardenas at 2/13/2017  9:49 AM  Version 1 of 2         [facility] HEART GROUP CONSULT NOTE    Referring Provider: Parag Stokes DO    Reason for Consultation: A-Fib RVR     Chief Complaint   Patient presents with   • Fall     friend reports he left pt last night around 2300 and when he checke don him this am pt was found laying in the bathroom floor, pt is unaware of what time he fell, pt does deny a loc and has a hx of falling    • Shoulder Pain   • Rib Pain       Subjective .     History of present illness:  Ezequiel Abdalla is a 71 y.o. male with a known PMH significant for ETOH abuse, tobacco abuse, COPD, Atrial Fibrillation chronically anticoagulated with Xarelto, pacemaker, who was admitted to [facility] on 2/12 under the care of the hospitalist team after being found down at home. At presentation, he was complaining of right shoulder and rib pain. According to records, his caregiver states that he frequently falls at home due to generalized weakness.  He was noted to be in A-Fib RVR and placed on a Cardizem gtt. Cardiology was consulted for the management of A-Fib. Patient is an extremely vague historian. Guardian expressed concerns regarding him being discharged home and he feels that he needs inpatient rehab due to his weakness.     History  Past Medical History   Diagnosis Date   • A-fib    • Alcohol abuse    • Anxiety    • COPD (chronic obstructive pulmonary disease)    • COPD (chronic obstructive pulmonary disease)    • Hypotension    • Lung disease    • Neurological disease    • Polycythemia    ,   Past Surgical History   Procedure Laterality Date   • Pacemaker implantation     • Knee surgery     • Wrist surgery     ,   Family History   Problem Relation Age of Onset   • No Known Problems Mother    • Cancer Father    • Heart disease Father    • COPD Sister    ,   Social History   Substance  Use Topics   • Smoking status: Heavy Tobacco Smoker     Packs/day: 2.00     Years: 60.00   • Smokeless tobacco: None   • Alcohol use 16.8 oz/week     7 Shots of liquor, 21 Cans of beer per week      Comment: 2-3 BEERS, 1 GIN DAILY   ,     Medications  Current Facility-Administered Medications   Medication Dose Route Frequency Provider Last Rate Last Dose   • acetaminophen (TYLENOL) tablet 650 mg  650 mg Oral Q4H PRN Parag Stokes DO       • aluminum-magnesium hydroxide-simethicone (MAALOX/MYLANTA) suspension 30 mL  30 mL Oral Q6H PRN Parag Stokes, DO       • bisacodyl (DULCOLAX) EC tablet 5 mg  5 mg Oral Daily PRN Parag Stokes DO       • bisoprolol (ZEBeta) tablet 10 mg  10 mg Oral Daily Parag Stokes DO   10 mg at 02/13/17 0827   • diltiaZEM (CARDIZEM) 125mg/125 mL infusion  10 mg/hr Intravenous Titrated Laz Joshi Jr., MD 10 mL/hr at 02/13/17 0856 10 mg/hr at 02/13/17 0856   • donepezil (ARICEPT) tablet 5 mg  5 mg Oral Nightly Parag Stokes, DO   5 mg at 02/12/17 2044   • escitalopram (LEXAPRO) tablet 20 mg  20 mg Oral Daily Parag Stokes DO   20 mg at 02/13/17 0827   • HYDROcodone-acetaminophen (NORCO) 5-325 MG per tablet 1 tablet  1 tablet Oral Q4H PRN Parag Stokes DO   1 tablet at 02/13/17 0238   • LORazepam (ATIVAN) tablet 0.5 mg  0.5 mg Oral Q2H PRN Parag Stokes DO        Or   • LORazepam (ATIVAN) injection 0.5 mg  0.5 mg Intravenous Q2H PRN Parag Stokes DO        Or   • LORazepam (ATIVAN) tablet 1 mg  1 mg Oral Q1H PRN Parag Stokes DO        Or   • LORazepam (ATIVAN) injection 1 mg  1 mg Intravenous Q1H PRN Parag Stokes DO        Or   • LORazepam (ATIVAN) injection 1 mg  1 mg Intravenous Q15 Min PRN Parag Stokes DO        Or   • LORazepam (ATIVAN) injection 1 mg  1 mg Intramuscular Q15 Min PRN Parag Stokes, DO       • nicotine (NICODERM CQ) 21 MG/24HR patch 1 patch  1 patch Transdermal Q24H Parag Stokes, DO   1  "patch at 02/12/17 1714   • ondansetron (ZOFRAN) injection 4 mg  4 mg Intravenous Q6H PRN Parag Stokes, DO       • oxyCODONE-acetaminophen (PERCOCET) 7.5-325 MG per tablet 2 tablet  2 tablet Oral Q4H PRN Parag Stokes, DO       • pravastatin (PRAVACHOL) tablet 20 mg  20 mg Oral Daily Parag Stokes, DO   20 mg at 02/13/17 0827   • rivaroxaban (XARELTO) tablet 20 mg  20 mg Oral Daily Parag Stokes, DO   20 mg at 02/13/17 0827   • sodium chloride 0.9 % flush 1-10 mL  1-10 mL Intravenous PRN Parag Stokes, DO       • sodium chloride 0.9 % flush 10 mL  10 mL Intravenous PRN Laz Joshi Jr., MD           Allergies:  Review of patient's allergies indicates no known allergies.    Review of Systems  Review of Systems   Unable to perform ROS: other   Cardiovascular: Positive for chest pain (Left sided rib pain r/t falls). Negative for irregular heartbeat and leg swelling.   Respiratory: Negative for hemoptysis.    Musculoskeletal: Positive for falls (Frequent at home).   Gastrointestinal: Negative for hematemesis, hematochezia, melena, nausea and vomiting.   Genitourinary: Negative for hematuria.   Patient is an extremely poor historian. The documented ROS was obtained from the guardian at the bedside.     Objective     Physical Exam:  Patient Vitals for the past 24 hrs:   BP Temp Temp src Pulse Resp SpO2 Height Weight   02/13/17 0807 124/89 98.6 °F (37 °C) Temporal Art 92 18 94 % - -   02/13/17 0400 97/78 98.9 °F (37.2 °C) Temporal Art 93 18 93 % - -   02/13/17 0000 117/82 98.6 °F (37 °C) Temporal Art 96 16 93 % - -   02/12/17 2012 100/79 99.3 °F (37.4 °C) Temporal Art 93 20 90 % - -   02/12/17 1414 131/89 98.1 °F (36.7 °C) Tympanic 106 20 95 % 69\" (175.3 cm) 190 lb 9.6 oz (86.5 kg)   02/12/17 1201 104/71 - - 109 - 92 % - -   02/12/17 1106 103/76 - - 103 - 92 % - -   02/12/17 1102 96/72 - - 104 - 93 % - -     Physical Exam   Constitutional: No distress.   Guardian at bedside    HENT:   Head: " Normocephalic.   Cardiovascular: Normal rate, S1 normal, S2 normal, intact distal pulses and normal pulses.  An irregular rhythm present. murmur heard.  Telemetry: Paroxysmal A-Fib    Pulmonary/Chest: Effort normal and breath sounds normal. No accessory muscle usage. No respiratory distress.   Abdominal: Soft. Normal appearance and bowel sounds are normal. He exhibits no distension. There is no tenderness.   Neurological: He is alert.   Skin: Skin is warm and dry. No rash noted. He is not diaphoretic.   Scattered ecchymosis on bilateral arms, left chest/abdomen   Psychiatric: Cognition and memory are impaired.   Vitals reviewed.      Results Review:   I reviewed the patient's new clinical results.  Lab Results   Component Value Date    WBC 12.12 (H) 02/13/2017    HGB 16.1 02/13/2017    HCT 47.2 02/13/2017    MCV 99.8 (H) 02/13/2017     (L) 02/13/2017     Lab Results   Component Value Date    GLUCOSE 106 (H) 02/13/2017    CALCIUM 10.2 02/13/2017     02/13/2017    K 3.5 02/13/2017    CO2 26.0 02/13/2017     02/13/2017    BUN 15 02/13/2017    CREATININE 0.83 02/13/2017    EGFRIFNONA 91 02/13/2017    BCR 18.1 02/13/2017    ANIONGAP 5.0 02/13/2017       Imaging Results (last 72 hours)     Procedure Component Value Units Date/Time    XR Chest 1 View [[id number]] Collected:  02/12/17 1003     Updated:  02/12/17 1006    Narrative:       EXAMINATION:  XR CHEST 1 VW-  2/12/2017 9:39 AM CST     HISTORY: Shortness of breath. Rib pain.     COMPARISON: 10/21/2014.     FINDINGS:  There is poor inspiration. There is patchy infiltrate in the  perihilar regions and lung bases. There is mild cardiomegaly. There is a  pacemaker on the left. There are old rib fractures on the right.       Impression:       1. Poor inspiration.  2. Patchy infiltrate in the perihilar regions and lung bases. This may  represent mild edema or pneumonia.  3. Mild cardiomegaly.        This report was finalized on 02/12/2017 10:04 by   Jett Gore MD.    CT Head Without Contrast [[id number]] Collected:  02/12/17 1043     Updated:  02/12/17 1048    Narrative:       EXAMINATION:  CT HEAD WO CONTRAST-  2/12/2017 10:09 AM CST     HISTORY: The patient fell and hit the head. Difficulty walking.     TECHNIQUE: Multiple axial images were obtained through the brain without  contrast infusion. Multiplanar images were reconstructed.     DLP: 647 mGy-cm. Automated dosage control was utilized.     COMPARISON: 10/21/2014.     FINDINGS: There are no hemorrhage, edema or mass effect. There is mild  to moderate atrophy with associated ventricular prominence. There are  chronic lacunar infarcts in both of the thalami. Low density in the  white matter is nonspecific and most likely due to chronic small vessel  disease. There is mild mucosal thickening in the right ethmoid region.  The other visualized paranasal sinuses and mastoid air cells are clear.  Vascular calcification is noted.       Impression:       1. No hemorrhage, edema or mass effect.  2. Mild to moderate atrophy with associated ventricular prominence.  3. Chronic-appearing lacunar infarcts in the thalami bilaterally.  4. Low density in the white matter is nonspecific and likely due to  chronic small vessel disease. Vascular calcification is noted.  5. Mild mucosal thickening in the right ethmoid region.     The full report of this exam was immediately signed and available to the  emergency room. The patient is currently in the emergency room.        This report was finalized on 02/12/2017 10:45 by Dr. Jett Gore MD.    CT Chest Without Contrast [[id number]] Collected:  02/12/17 1046     Updated:  02/12/17 1051    Narrative:       EXAMINATION:  CT CHEST WO CONTRAST-  2/12/2017 10:09 AM CST     HISTORY: Multiple falling episodes. No other history is given for this  study.     COMPARISON: No comparison study.     DLP: 467 mGy-cm. Automated dosage control was utilized.     TECHNIQUE: Spiral CT was  performed of the chest without contrast.  Multiplanar images were reconstructed.     FINDINGS: There is atheromatous disease of the thoracic aorta and  coronary arteries. Heart size appears to be normal. There are no  enlarged mediastinal lymph nodes. There is mild dependent atelectasis  posteriorly in both lungs. There is no dense consolidation. There is  mild bullous disease. There are degenerative changes of the spine.  Chronic appearing compression fractures are noted.       Impression:       1. Atheromatous disease of the thoracic aorta and coronary arteries.  2. Mild dependent atelectasis posteriorly in both lungs. No dense  consolidation. Mild bullous disease.  3. Mild ectasia of the thoracic aorta measuring about 4.6 cm in  diameter.     The full report of this exam was immediately signed and available to the  emergency room. The patient is currently in the emergency room.        This report was finalized on 02/12/2017 10:49 by Dr. Jett Gore MD.          Principal Problem:    -Paroxysmal atrial fibrillation with RVR- HR has improved. Chronically anticoagulated with Xarelto. I do not feel that the patient is a good candidate for anticoagulation due to his ETOH abuse and frequent falls at home.     Active Problems:    -Polycythemia    -COPD (chronic obstructive pulmonary disease)    -Alcohol abuse    -Multiple falls at home     -Generalized weakness    Plan   1. Monitor telemetry  2. Continue anticoagulation with Xarelto for now- but would recommend discontinuing at discharge due to frequent falls and ETOH abuse.   3. Falls precautions  4. ETOH withdrawal per primary team   5. Wean Cardizem gtt  6. Obtain Echo   7. PT and OT eval and treat.   8. Guardian expressed concerns regarding him being discharged home due to his significant weakness. He will likely need inpatient rehab at discharge.     Further orders per Dr. Ansari upon his evaluation of the patient.     Thank you for the consultation, cardiology  will gladly continue to follow.     NASRIN Cardenas        Please note this cardiology consultation note is the result of a face to face consultation with the patient, in addition to reviewing medical records at length by myself, NASRIN Greenberg.     Time: More than 50% of time spent in counseling and coordination of care:  Total face-to-face/floor time 45 min.  Time spent in counseling 25 min. Counseling included the following topics: lab results, reviewing medical records, assessment, discussing the plan of care      Electronically signed by NASRIN Cardenas at 2/13/2017 10:55 AM             Occupational Therapy Notes (last 72 hours) (Notes from 2/10/2017  2:29 PM through 2/13/2017  2:29 PM)      ERICK Dominguez/L at 2/13/2017  1:53 PM  Version 1 of 1         Problem: Patient Care Overview (Adult)  Goal: Plan of Care Review  Outcome: Ongoing (interventions implemented as appropriate)    02/13/17 1349   Coping/Psychosocial Response Interventions   Plan Of Care Reviewed With patient;family   Patient Care Overview   Progress improving   Outcome Evaluation   Outcome Summary/Follow up Plan OT Eval completed. Pt displays decreased UE strength, endurance, ROM, impaired sitting/standing balance and decreased safety awareness. Min A for UB ADL and Mod A for LB ADL. Min A for transfers and mobility with a RW. OT will address these deficits. Anticipated d/c is home with 24 hour care, SNF, TCU or inpatient rehab depending upon pt progress. He is at a high risk for falls at this time.          Problem: Inpatient Occupational Therapy  Goal: Transfer Training Goal 1 LTG- OT  Outcome: Ongoing (interventions implemented as appropriate)    02/13/17 1349   Transfer Training OT LTG   Transfer Training OT LTG, Date Established 02/13/17   Transfer Training OT LTG, Time to Achieve by discharge   Transfer Training OT LTG, Activity Type bed to chair /chair to bed;sit to stand/stand to sit;toilet   Transfer  Training OT LTG, [place] Level supervision required   Transfer Training OT LTG, Assist Device walker, rolling   Transfer Training OT LTG, Additional Goal with good safety awareness       Goal: Safety Awareness Goal LTG- OT  Outcome: Ongoing (interventions implemented as appropriate)    17 1349   Safety Awareness OT LTG   Safety Awareness OT LTG, Date Established 17   Safety Awareness OT LTG, Time to Achieve by discharge   Safety Awareness OT LTG, Activity Type good safety awareness;with ADL's   Safety Awareness OT LTG, [place] Level min verbal cues   Safety Awareness OT LTG, Additional Goal Caregivers will be independent with fall prevention techniques.        Goal: ADL Goal LTG- OT  Outcome: Ongoing (interventions implemented as appropriate)    17 1349   ADL OT LTG   ADL OT LTG, Date Established 17   ADL OT LTG, Time to Achieve by discharge   ADL OT LTG, Activity Type ADL skills   ADL OT LTG, [place] Level standby assist   ADL OT LTG, Additional Goal UB bathing, UB dressing, Grooming while standing and toileting.               Electronically signed by Cleo Hernandez OTR/L at 2017  1:53 PM      Cleo Hernandez OTR/L at 2017  1:55 PM  Version 1 of 1         Acute Care - Occupational Therapy Initial Evaluation  [facility]     Patient Name: Ezequiel Abdalla  : 1945  MRN: [id number]  Today's Date: 2017  Onset of Illness/Injury or Date of Surgery Date: 17  Date of Referral to OT: 17  Referring Physician: NASRIN Remy    Admit Date: 2017       ICD-10-CM ICD-9-CM   1. Paroxysmal atrial fibrillation I48.0 427.31   2. Pneumonia of lower lobe due to infectious organism, unspecified laterality J18.9 483.8   3. Impaired functional mobility, balance, gait, and endurance Z74.09 V49.89   4. Impaired mobility and ADLs Z74.09 799.89     Patient Active Problem List   Diagnosis   • Polycythemia   • COPD (chronic obstructive pulmonary disease)   •  Alcohol abuse   • Chronic atrial fibrillation with RVR   • Multiple falls     Past Medical History   Diagnosis Date   • A-fib    • Alcohol abuse    • Anxiety    • COPD (chronic obstructive pulmonary disease)    • COPD (chronic obstructive pulmonary disease)    • Hypotension    • Lung disease    • Neurological disease    • Polycythemia      Past Surgical History   Procedure Laterality Date   • Pacemaker implantation       [facility]    • Knee surgery     • Wrist surgery            OT ASSESSMENT FLOWSHEET (last 72 hours)      OT Evaluation       02/13/17 1305 02/13/17 1040 02/12/17 1517 02/12/17 1508       Rehab Evaluation    Document Type evaluation   See MAR  -TR evaluation  -LC (r) AK (t) LC (c)       Subjective Information agree to therapy;complains of;weakness;fatigue;dyspnea  -TR agree to therapy;complains of;weakness;fatigue;dyspnea;pain  -LC (r) AK (t) LC (c)       Patient Effort, Rehab Treatment good  -TR        Symptoms Noted During/After Treatment shortness of breath  -TR        Symptoms Noted Comment with mobility  -TR        General Information    Patient Profile Review yes  -TR yes  -LC (r) AK (t) LC (c)       Onset of Illness/Injury or Date of Surgery Date 02/12/17  -TR 02/12/17  -LC (r) AK (t) LC (c)       Referring Physician NASRIN Remy  -TR Dr. Stokes  -LC (r) AK (t) LC (c)       General Observations Pt reclined in chair, IV L UE, alert, family member present.   -TR pt. fowlers in bed; IV; family/friends in room  -LC (r) AK (t) LC (c)       Pertinent History Of Current Problem Multiple falls at home, A-fib, pneumonia, polycythemia, alcohol abuse, dementia, R shoulder and rib pain, old rib fxs per pt.   -TR pt. admitted after increasing weakness and multiple falls over the past year. His caregiver says he can not care for him as well anymore. pt. has hx of chronic Afib with RVR, COPD, and polycythemia  -LC (r) AK (t) LC (c)       Precautions/Limitations fall precautions  -TR fall precautions   - (r) AK (t) TAYE (c)       Prior Level of Function min assist:;mod assist:;all household mobility;transfer;gait;ADL's   Min to Mod A for mobility and ADL, supervision toileting.   -TR min assist:;mod assist:;all household mobility;gait;transfer;bed mobility;feeding;grooming;dressing;bathing;home management  - (r) AK (t) TAYE (c)       Equipment Currently Used at Home shower chair;walker, rolling;cane, straight;wheelchair  -TR shower chair;walker, rolling;cane, straight;wheelchair  -TAYE (r) AK (t) TAYE (c)  shower chair  -     Plans/Goals Discussed With patient and family;agreed upon  -TR patient;other;agreed upon  - (r) AK (t) TAYE (c)       Risks Reviewed patient and family:;LOB;dizziness;increased discomfort;change in vital signs  -TR patient:;other:;LOB;nausea/vomiting;increased discomfort;dizziness;change in vital signs   caregiver  -TAYE (r) AK (t) TAYE (c)       Benefits Reviewed patient and family:;improve function;increase independence;increase strength;increase balance;increase knowledge  -TR patient:;other:;improve function;increase balance;increase strength;increase independence;decrease pain;decrease risk of DVT;improve skin integrity  - (r) AK (t) LC (c)       Barriers to Rehab medically complex;previous functional deficit;cognitive status  -TR medically complex;previous functional deficit;cognitive status;environmental barriers  - (r) AK (t) LC (c)       Living Environment    Lives With alone  -TR alone  - (r) AK (t) LC (c) alone  -SJ      Living Arrangements house  -TR house  - (r) AK (t) LC (c) house  -SJ      Home Accessibility stairs to enter home;tub/shower is not walk in  -TR stairs to enter home  - (r) AK (t) LC (c) no concerns  -SJ      Number of Stairs to Enter Home 2  -TR 2  -LC (r) AK (t) TAYE (c)       Stair Railings at Home none  -TR none  -LC (r) AK (t) LC (c) none  -SJ      Type of Financial/Environmental Concern  none  - (r) AK (t) LC (c) none  -SJ      Transportation Available   ambulance  -LC (r) AK (t) TAYE (c) car  -SJ      Living Environment Comment home too small to navigate walker and wheelchair  -TR home too small to navigate walker and wheelchair  -LC (r) AK (t) TAYE (c)       Clinical Impression    Date of Referral to OT 02/13/17  -TR        OT Diagnosis Impaired mobility and ADL  -TR        Impairments Found (describe specific impairments) aerobic capacity/endurance;arousal, attention, and cognition;ergonomics and body mechanics;gait, locomotion, and balance;muscle performance;posture;ROM  -TR        Patient/Family Goals Statement Return home. Family reports they are working on providing 24 hour care for pt.   -TR        Criteria for Skilled Therapeutic Interventions Met yes;treatment indicated  -TR        Rehab Potential good, to achieve stated therapy goals  -TR        Therapy Frequency 3-5 times/wk  -TR        Predicted Duration of Therapy Intervention (days/wks) 10 days  -TR        Anticipated Equipment Needs At Discharge tub bench   grab bars in bathroom  -TR        Anticipated Discharge Disposition home with 24/7 care;home with home health;skilled nursing facility;inpatient rehabilitation facility   TCU, depending upon pt progress  -TR        Functional Level Prior    Ambulation    1-->assistive equipment   falls frequently at home  doesn't use walker but supposed to  -SJ     Transferring    1-->assistive equipment  -SJ     Toileting    1-->assistive equipment  -SJ     Bathing    1-->assistive equipment  -SJ     Dressing    0-->independent  -SJ     Eating    0-->independent  -SJ     Communication    0-->understands/communicates without difficulty  -SJ     Swallowing    0-->swallows foods/liquids without difficulty  -SJ     Vital Signs    Pretreatment Heart Rate (beats/min) 96  -TR        Intratreatment Heart Rate (beats/min) 97  -TR        Posttreatment Heart Rate (beats/min) 98  -TR        Pre SpO2 (%) 92  -TR        O2 Delivery Pre Treatment room air  -TR        Intra SpO2 (%)  90  -TR        O2 Delivery Intra Treatment room air  -TR        Post SpO2 (%) 93  -TR        O2 Delivery Post Treatment room air  -TR        Pre Patient Position Sitting  -TR        Intra Patient Position Sitting   Following mobility  -TR        Post Patient Position Sitting  -TR        Pain Assessment    Pain Assessment 0-10  -TR [place] FACES  -LC (r) AK (t) LC (c)       [place] FACES Pain Rating  2  -LC (r) AK (t) LC (c)       Pain Score 7  -TR        Pain Type Acute pain  -TR Chronic pain  -LC (r) AK (t) LC (c)       Pain Location Shoulder   and R ribs  -TR Abdomen  -LC (r) AK (t) LC (c)       Pain Orientation Right  -TR Right  -LC (r) AK (t) LC (c)       Pain Frequency  Intermittent  -LC (r) AK (t) LC (c)       Pain Onset  Sudden  -LC (r) AK (t) LC (c)       Pain Intervention(s) Repositioned;Ambulation/increased activity;Medication (See MAR)   Notified RN  -TR Repositioned;Ambulation/increased activity  -LC (r) AK (t) LC (c)       Response to Interventions Tolerated  -TR tolerated  -LC (r) AK (t) LC (c)       Vision Assessment/Intervention    Visual Impairment WFL with corrective lenses  -TR WFL with corrective lenses  -LC (r) AK (t) LC (c)       Visual Impairment Comment able to read name badge  -TR        Cognitive Assessment/Intervention    Current Cognitive/Communication Assessment functional  -TR functional  -LC (r) AK (t) LC (c)       Orientation Status oriented to;person;place;time   knew month but not year  -TR oriented to;person;disoriented to;place;time;situation  -LC (r) AK (t) LC (c)       Follows Commands/Answers Questions able to follow single-step instructions;100% of the time  -TR able to follow single-step instructions;100% of the time  -LC (r) AK (t) LC (c)       Personal Safety decreased awareness, need for assist;decreased awareness, need for safety  -TR mild impairment;at risk behaviors demonstrated;decreased awareness, need for assist;decreased awareness, need for safety;decreased  insight to deficits  -LC (r) AK (t) LC (c)       Personal Safety Interventions fall prevention program maintained;gait belt;nonskid shoes/slippers when out of bed;supervised activity  -TR fall prevention program maintained;gait belt;nonskid shoes/slippers when out of bed;supervised activity  -LC (r) AK (t) LC (c)       ROM (Range of Motion)    General ROM Detail L UE AROM WFL. R UE distal AROM WFL. R shoulder flexion 25% limited by pain.   -TR BLE grossly WFL  -LC (r) AK (t) LC (c)       MMT (Manual Muscle Testing)    General MMT Assessment Detail L UE 4-/5. R UE not tested due to pain.   -TR BLE grossly 3+/5  -LC (r) AK (t) LC (c)       Bed Mobility, Assessment/Treatment    Bed Mobility, Assistive Device  bed rails;head of bed elevated  -LC (r) AK (t) LC (c)       Bed Mobility, Scoot/Bridge, [place]  minimum assist (75% patient effort);verbal cues required  -LC (r) AK (t) LC (c)       Bed Mob, Supine to Sit, [place]  moderate assist (50% patient effort);verbal cues required  -LC (r) AK (t) LC (c)       Bed Mobility, Safety Issues  decreased use of arms for pushing/pulling;decreased use of legs for bridging/pushing;cognitive deficits limit understanding  -LC (r) AK (t) LC (c)       Bed Mobility, Impairments  strength decreased;impaired balance  -LC (r) AK (t) LC (c)       Bed Mobility, Comment Not tested, up in chair.   -TR        Transfer Assessment/Treatment    Transfers, Sit-Stand [place] minimum assist (75% patient effort);verbal cues required  -TR minimum assist (75% patient effort);verbal cues required  -LC (r) AK (t) LC (c)       Transfers, Stand-Sit [place] minimum assist (75% patient effort);verbal cues required  -TR minimum assist (75% patient effort);verbal cues required  -LC (r) AK (t) LC (c)       Transfers, Sit-Stand-Sit, Assist Device rolling walker  -TR rolling walker  -LC (r) AK (t) LC (c)       Toilet Transfer, [place] minimum assist (75% patient effort);verbal cues  required  -TR        Toilet Transfer, Assistive Device rolling walker   grab bar  -TR        Transfer, Safety Issues balance decreased during turns;step length decreased;weight-shifting ability decreased;impulsivity;loses balance backward;sequencing ability decreased  -TR balance decreased during turns;step length decreased;weight-shifting ability decreased;knees buckling;sequencing ability decreased  -LC (r) AK (t) LC (c)       Transfer, Impairments impaired balance;strength decreased;postural control impaired;pain  -TR strength decreased;impaired balance;coordination impaired  -LC (r) AK (t) LC (c)       Functional Mobility    Functional Mobility- Ind. Level minimum assist (75% patient effort);verbal cues required  -TR        Functional Mobility- Device rolling walker  -TR        Functional Mobility- Safety Issues balance decreased during turns;sequencing ability decreased;step length decreased;weight-shifting ability decreased;loses balance backward  -TR        Functional Mobility- Comment In room and bathroom  -TR        Upper Body Bathing Assessment/Training    UB Bathing Assess/Train, Comment Expected level: Min A  -TR        Lower Body Bathing Assessment/Training    LB Bathing Assess/Train, Comment Expected level: Mod A  -TR        Upper Body Dressing Assessment/Training    UB Dressing Assess/Train, Comment Expected level: Min A  -TR        Lower Body Dressing Assessment/Training    LB Dressing Assess/Train, Clothing Type donning:;socks  -TR        LB Dressing Assess/Train, Position sitting  -TR        LB Dressing Assess/Train, [place] maximum assist (25% patient effort)  -TR        LB Dressing Assess/Train, Impairments decreased flexibility;pain;postural control impaired  -TR        LB Dressing Assess/Train, Comment Simulated  -TR        Toileting Assessment/Training    Toileting Assess/Train, Assistive Device grab bars  -TR        Toileting Assess/Train, Position sitting  -TR        Toileting  Assess/Train, [place]ep[place] Level minimum assist (75% patient effort)  -TR        Toileting Assess/Train, Impairments pain;impaired balance;strength decreased;postural control impaired  -TR        Toileting Assess/Train, Comment Min A for clothing mgt and transfer  -TR        Grooming Assessment/Training    Grooming Assess/Train, Position standing  -TR        Grooming Assess/Train, Indepen Level contact guard assist;minimum assist (75% patient effort)  -TR        Grooming Assess/Train, Impairments impaired balance;strength decreased;postural control impaired;pain  -TR        Grooming Assess/Train, Comment Washing hands at sink. Min A for balance.   -TR        Motor Skills/Interventions    Additional Documentation Balance Skills Training (Group)  -TR Balance Skills Training (Group)  -LC (r) AK (t) LC (c)       Balance Skills Training    Sitting-Level of Assistance Close supervision;Contact guard  -TR Close supervision;Contact guard  -LC (r) AK (t) LC (c)       Sitting-Balance Support Feet supported  -TR Left upper extremity supported;Right upper extremity supported;Feet supported  -LC (r) AK (t) LC (c)       Standing-Level of Assistance Contact guard;Minimum assistance   1 loss of balance, Min A to regain balance.   -TR Contact guard;Minimum assistance  -LC (r) AK (t) LC (c)       Static Standing Balance Support assistive device  -TR assistive device  -LC (r) AK (t) LC (c)       Gait Balance-Level of Assistance  Minimum assistance;Moderate assistance  -LC (r) AK (t) LC (c)       Gait Balance Support  assistive device  -LC (r) AK (t) LC (c)       Sensory Assessment/Intervention    Sensory Impairment --   WNL per pt.   -TR        General Therapy Interventions    Planned Therapy Interventions activity intolerance;adaptive equipment training;ADL retraining;balance training;bed mobility training;energy conservation;home exercise program;ROM (Range of Motion);strengthening;transfer training  -TR        Positioning and Restraints     Pre-Treatment Position sitting in chair/recliner  -TR in bed  -LC (r) AK (t) LC (c)       Post Treatment Position chair  -TR chair  -LC (r) AK (t) LC (c)       In Chair sitting;call light within reach;encouraged to call for assist;with nsg  -TR sitting;call light within reach;encouraged to call for assist;notified nsg;legs elevated  -LC (r) AK (t) LC (c)         User Key  (r) = Recorded By, (t) = Taken By, (c) = Cosigned By    Initials Name Effective Dates    SJ Brit Landa RN 08/02/16 -     LC Michael Sow, PT DPT 08/02/16 -     TR Cleo Hernandez, OTR/L 06/22/15 -     AK Jayesh Higgins, PT Student 12/19/16 -            Occupational Therapy Education     Title: PT OT SLP Therapies (Active)     Topic: Occupational Therapy (Active)     Point: ADL training (Done)    Description: Instruct learner(s) on proper safety adaptation and remediation techniques during self care or transfers.   Instruct in proper use of assistive devices.    Learning Progress Summary    Learner Readiness Method Response Comment Documented by Status   Patient Acceptance E,D VU,NR Education provided on purpose of OT eval, impairments found, need for continued intervention, d/c planning, fall prevention and planned interventions. TR 02/13/17 1348 Done   Family Acceptance E,D VU,NR Education provided on purpose of OT eval, impairments found, need for continued intervention, d/c planning, fall prevention and planned interventions. TR 02/13/17 1348 Done               Point: Precautions (Done)    Description: Instruct learner(s) on prescribed precautions during self-care and functional transfers.    Learning Progress Summary    Learner Readiness Method Response Comment Documented by Status   Patient Acceptance E,D VU,NR Education provided on purpose of OT eval, impairments found, need for continued intervention, d/c planning, fall prevention and planned interventions. TR 02/13/17 1348 Done   Family Acceptance E,D VU,NR Education provided  on purpose of OT eval, impairments found, need for continued intervention, d/c planning, fall prevention and planned interventions. TR 02/13/17 1348 Done               Point: Body mechanics (Done)    Description: Instruct learner(s) on proper positioning and spine alignment during self-care, functional mobility activities and/or exercises.    Learning Progress Summary    Learner Readiness Method Response Comment Documented by Status   Patient Acceptance E,D VU,NR Education provided on purpose of OT eval, impairments found, need for continued intervention, d/c planning, fall prevention and planned interventions. TR 02/13/17 1348 Done   Family Acceptance E,D VU,NR Education provided on purpose of OT eval, impairments found, need for continued intervention, d/c planning, fall prevention and planned interventions. TR 02/13/17 1348 Done                      User Key     Initials Effective Dates Name Provider Type Discipline    TR 06/22/15 -  Cleo Hernandez, OTR/L Occupational Therapist OT                  OT Recommendation and Plan  Anticipated Equipment Needs At Discharge: tub bench (grab bars in bathroom)  Anticipated Discharge Disposition: home with /7 care, home with home health, skilled nursing facility, inpatient rehabilitation facility (TCU, depending upon pt progress)  Planned Therapy Interventions: activity intolerance, adaptive equipment training, ADL retraining, balance training, bed mobility training, energy conservation, home exercise program, ROM (Range of Motion), strengthening, transfer training  Therapy Frequency: 3-5 times/wk  Plan of Care Review  Plan Of Care Reviewed With: patient, family  Progress: improving  Outcome Summary/Follow up Plan: OT Eval completed. Pt displays decreased UE strength, endurance, ROM, impaired sitting/standing balance and decreased safety awareness. Min A for UB ADL and Mod A for LB ADL. Min A for transfers and mobility with a RW. OT will address these deficits.  Anticipated d/c is home with 24 hour care, SNF, TCU or inpatient rehab depending upon pt progress. He is at a high risk for falls at this time.           OT Goals       02/13/17 1349          Transfer Training OT LTG    Transfer Training OT LTG, Date Established 02/13/17  -TR      Transfer Training OT LTG, Time to Achieve by discharge  -TR      Transfer Training OT LTG, Activity Type bed to chair /chair to bed;sit to stand/stand to sit;toilet  -TR      Transfer Training OT LTG, [place] Level supervision required  -TR      Transfer Training OT LTG, Assist Device walker, rolling  -TR      Transfer Training OT LTG, Additional Goal with good safety awareness  -TR      Safety Awareness OT LTG    Safety Awareness OT LTG, Date Established 02/13/17  -TR      Safety Awareness OT LTG, Time to Achieve by discharge  -TR      Safety Awareness OT LTG, Activity Type good safety awareness;with ADL's  -TR      Safety Awareness OT LTG, [place] Level min verbal cues  -TR      Safety Awareness OT LTG, Additional Goal Caregivers will be independent with fall prevention techniques.   -TR      ADL OT LTG    ADL OT LTG, Date Established 02/13/17  -TR      ADL OT LTG, Time to Achieve by discharge  -TR      ADL OT LTG, Activity Type ADL skills  -TR      ADL OT LTG, [place] Level standby assist  -TR      ADL OT LTG, Additional Goal UB bathing, UB dressing, Grooming while standing and toileting.   -TR        User Key  (r) = Recorded By, (t) = Taken By, (c) = Cosigned By    Initials Name Provider Type    TR Cleo Hernandez, OTR/L Occupational Therapist                Outcome Measures       02/13/17 1305 02/13/17 1040       How much help from another person do you currently need...    Turning from your back to your side while in flat bed without using bedrails?  3  -LC (r) AK (t) LC (c)     Moving from lying on back to sitting on the side of a flat bed without bedrails?  3  -LC (r) AK (t) LC (c)     Moving to and from a bed to a  chair (including a wheelchair)?  2  -LC (r) AK (t) LC (c)     Standing up from a chair using your arms (e.g., wheelchair, bedside chair)?  3  -LC (r) AK (t) LC (c)     Climbing 3-5 steps with a railing?  1  -LC (r) AK (t) LC (c)     To walk in hospital room?  3  -LC (r) AK (t) LC (c)     AM-PAC 6 Clicks Score  15  -LC (r) AK (t)     How much help from another is currently needed...    Putting on and taking off regular lower body clothing? 2  -TR      Bathing (including washing, rinsing, and drying) 2  -TR      Toileting (which includes using toilet bed pan or urinal) 3  -TR      Putting on and taking off regular upper body clothing 3  -TR      Taking care of personal grooming (such as brushing teeth) 3  -TR      Eating meals 4  -TR      Score 17  -TR      Functional Assessment    Outcome Measure Options AM-PAC 6 Clicks Daily Activity (OT)  -TR AM-PAC 6 Clicks Basic Mobility (PT)  -LC (r) AK (t) LC (c)       User Key  (r) = Recorded By, (t) = Taken By, (c) = Cosigned By    Initials Name Provider Type    TAYE Sow, PT DPT Physical Therapist    TR ERICK Dominguez/L Occupational Therapist    FRANNIE Higgins, PT Student PT Student          Time Calculation:   OT Start Time: 1250  OT Stop Time: 1345  OT Time Calculation (min): 55 min  OT Non-Billable Time (min):  (Chart review and eval time split. )    Therapy Charges for Today     Code Description Service Date Service Provider Modifiers Qty    88400727159 HC OT SELFCARE CURRENT 2/13/2017 ERICK Dominguez/L GO, CK 1    18580599771 HC OT SELFCARE PROJECTED 2/13/2017 ERICK Dominguez/L GO, CJ 1    30638278939 HC OT EVAL MOD COMPLEXITY 4 2/13/2017 ERICK Dominguez/L GO, KX 1          OT G-codes  OT Professional Judgement Used?: Yes  OT Functional Scales Options: AM-PAC 6 Clicks Daily Activity (OT)  Score: 17  Functional Limitation: Self care  Self Care Current Status (): At least 40 percent but less than 60 percent impaired, limited  or restricted  Self Care Goal Status (): At least 20 percent but less than 40 percent impaired, limited or restricted    Cleo Hernandez OTR/L  2/13/2017     Electronically signed by ERICK Dominguez/L at 2/13/2017  1:55 PM

## 2017-02-13 NOTE — PLAN OF CARE
Problem: Patient Care Overview (Adult)  Goal: Plan of Care Review  Outcome: Ongoing (interventions implemented as appropriate)    02/13/17 1641   Coping/Psychosocial Response Interventions   Plan Of Care Reviewed With patient;family   Patient Care Overview   Progress progress toward functional goals as expected   Outcome Evaluation   Outcome Summary/Follow up Plan pt HR 90's sinus rhythm. no falls encouraged to ask for assistance with getting up out of bed.       Goal: Adult Individualization and Mutuality  Outcome: Ongoing (interventions implemented as appropriate)  Goal: Discharge Needs Assessment  Outcome: Ongoing (interventions implemented as appropriate)    Problem: Arrhythmia/Dysrhythmia (Symptomatic) (Adult)  Goal: Signs and Symptoms of Listed Potential Problems Will be Absent or Manageable (Arrhythmia/Dysrhythmia)  Outcome: Ongoing (interventions implemented as appropriate)    Problem: Fall Risk (Adult)  Goal: Absence of Falls  Outcome: Ongoing (interventions implemented as appropriate)    Problem: Pressure Ulcer Risk (Eric Scale) (Adult,Obstetrics,Pediatric)  Goal: Skin Integrity  Outcome: Ongoing (interventions implemented as appropriate)

## 2017-02-13 NOTE — CONSULTS
[facility] HEART GROUP CONSULT NOTE    Referring Provider: Parag Stokes DO    Reason for Consultation: A-Fib RVR     Chief Complaint   Patient presents with   • Fall     friend reports he left pt last night around 2300 and when he checke don him this am pt was found laying in the bathroom floor, pt is unaware of what time he fell, pt does deny a loc and has a hx of falling    • Shoulder Pain   • Rib Pain       Subjective .     History of present illness:  Ezequiel Abdalla is a 71 y.o. male with a known PMH significant for ETOH abuse, tobacco abuse, COPD, Atrial Fibrillation chronically anticoagulated with Xarelto, pacemaker, who was admitted to [facility] on 2/12 under the care of the hospitalist team after being found down at home. At presentation, he was complaining of right shoulder and rib pain. According to records, his caregiver states that he frequently falls at home due to generalized weakness.  He was noted to be in A-Fib RVR and placed on a Cardizem gtt. Cardiology was consulted for the management of A-Fib. Patient is an extremely vague historian. Guardian expressed concerns regarding him being discharged home and he feels that he needs inpatient rehab due to his weakness.     History  Past Medical History   Diagnosis Date   • A-fib    • Alcohol abuse    • Anxiety    • COPD (chronic obstructive pulmonary disease)    • COPD (chronic obstructive pulmonary disease)    • Hypotension    • Lung disease    • Neurological disease    • Polycythemia    ,   Past Surgical History   Procedure Laterality Date   • Pacemaker implantation     • Knee surgery     • Wrist surgery     ,   Family History   Problem Relation Age of Onset   • No Known Problems Mother    • Cancer Father    • Heart disease Father    • COPD Sister    ,   Social History   Substance Use Topics   • Smoking status: Heavy Tobacco Smoker     Packs/day: 2.00     Years: 60.00   • Smokeless tobacco: None   • Alcohol use 16.8 oz/week     7 Shots of  liquor, 21 Cans of beer per week      Comment: 2-3 BEERS, 1 GIN DAILY   ,     Medications  Current Facility-Administered Medications   Medication Dose Route Frequency Provider Last Rate Last Dose   • acetaminophen (TYLENOL) tablet 650 mg  650 mg Oral Q4H PRN Parag Stokes, DO       • aluminum-magnesium hydroxide-simethicone (MAALOX/MYLANTA) suspension 30 mL  30 mL Oral Q6H PRN Parag Stokes, DO       • bisacodyl (DULCOLAX) EC tablet 5 mg  5 mg Oral Daily PRN Parag Stokes DO       • bisoprolol (ZEBeta) tablet 10 mg  10 mg Oral Daily Parag Stokes DO   10 mg at 02/13/17 0827   • diltiaZEM (CARDIZEM) 125mg/125 mL infusion  10 mg/hr Intravenous Titrated Laz Joshi Jr., MD 10 mL/hr at 02/13/17 0856 10 mg/hr at 02/13/17 0856   • donepezil (ARICEPT) tablet 5 mg  5 mg Oral Nightly Parag Stokes DO   5 mg at 02/12/17 2044   • escitalopram (LEXAPRO) tablet 20 mg  20 mg Oral Daily Parag Stokes DO   20 mg at 02/13/17 0827   • HYDROcodone-acetaminophen (NORCO) 5-325 MG per tablet 1 tablet  1 tablet Oral Q4H PRN Parag Stokes, DO   1 tablet at 02/13/17 0238   • LORazepam (ATIVAN) tablet 0.5 mg  0.5 mg Oral Q2H PRN Parag Stokes DO        Or   • LORazepam (ATIVAN) injection 0.5 mg  0.5 mg Intravenous Q2H PRN Parag Stokes DO        Or   • LORazepam (ATIVAN) tablet 1 mg  1 mg Oral Q1H PRN Parag Stokes DO        Or   • LORazepam (ATIVAN) injection 1 mg  1 mg Intravenous Q1H PRN Parag Stokes DO        Or   • LORazepam (ATIVAN) injection 1 mg  1 mg Intravenous Q15 Min PRN Parag Stokes DO        Or   • LORazepam (ATIVAN) injection 1 mg  1 mg Intramuscular Q15 Min PRN Parag Stokes DO       • nicotine (NICODERM CQ) 21 MG/24HR patch 1 patch  1 patch Transdermal Q24H Parag Stokes DO   1 patch at 02/12/17 1714   • ondansetron (ZOFRAN) injection 4 mg  4 mg Intravenous Q6H PRN Parag Stokes DO       • oxyCODONE-acetaminophen (PERCOCET) 7.5-325  "MG per tablet 2 tablet  2 tablet Oral Q4H PRN Parag Stokes, DO       • pravastatin (PRAVACHOL) tablet 20 mg  20 mg Oral Daily Parag S Kike, DO   20 mg at 02/13/17 0827   • rivaroxaban (XARELTO) tablet 20 mg  20 mg Oral Daily Parag S Kike, DO   20 mg at 02/13/17 0827   • sodium chloride 0.9 % flush 1-10 mL  1-10 mL Intravenous PRN Parag Stokes, DO       • sodium chloride 0.9 % flush 10 mL  10 mL Intravenous PRN Laz Joshi Jr., MD           Allergies:  Review of patient's allergies indicates no known allergies.    Review of Systems  Review of Systems   Unable to perform ROS: other   Cardiovascular: Positive for chest pain (Left sided rib pain r/t falls). Negative for irregular heartbeat and leg swelling.   Respiratory: Negative for hemoptysis.    Musculoskeletal: Positive for falls (Frequent at home).   Gastrointestinal: Negative for hematemesis, hematochezia, melena, nausea and vomiting.   Genitourinary: Negative for hematuria.   Patient is an extremely poor historian. The documented ROS was obtained from the guardian at the bedside.     Objective     Physical Exam:  Patient Vitals for the past 24 hrs:   BP Temp Temp src Pulse Resp SpO2 Height Weight   02/13/17 0807 124/89 98.6 °F (37 °C) Temporal Art 92 18 94 % - -   02/13/17 0400 97/78 98.9 °F (37.2 °C) Temporal Art 93 18 93 % - -   02/13/17 0000 117/82 98.6 °F (37 °C) Temporal Art 96 16 93 % - -   02/12/17 2012 100/79 99.3 °F (37.4 °C) Temporal Art 93 20 90 % - -   02/12/17 1414 131/89 98.1 °F (36.7 °C) Tympanic 106 20 95 % 69\" (175.3 cm) 190 lb 9.6 oz (86.5 kg)   02/12/17 1201 104/71 - - 109 - 92 % - -   02/12/17 1106 103/76 - - 103 - 92 % - -   02/12/17 1102 96/72 - - 104 - 93 % - -     Physical Exam   Constitutional: No distress.   Guardian at bedside    HENT:   Head: Normocephalic.   Cardiovascular: Normal rate, S1 normal, S2 normal, intact distal pulses and normal pulses.  An irregular rhythm present.   No murmur " heard.  Telemetry: Paroxysmal A-Fib    Pulmonary/Chest: Effort normal and breath sounds normal. No accessory muscle usage. No respiratory distress.   Abdominal: Soft. Normal appearance and bowel sounds are normal. He exhibits no distension. There is no tenderness.   Neurological: He is alert.   Skin: Skin is warm and dry. No rash noted. He is not diaphoretic.   Scattered ecchymosis on bilateral arms, left chest/abdomen   Psychiatric: Cognition and memory are impaired.   Vitals reviewed.      Results Review:   I reviewed the patient's new clinical results.  Lab Results   Component Value Date    WBC 12.12 (H) 02/13/2017    HGB 16.1 02/13/2017    HCT 47.2 02/13/2017    MCV 99.8 (H) 02/13/2017     (L) 02/13/2017     Lab Results   Component Value Date    GLUCOSE 106 (H) 02/13/2017    CALCIUM 10.2 02/13/2017     02/13/2017    K 3.5 02/13/2017    CO2 26.0 02/13/2017     02/13/2017    BUN 15 02/13/2017    CREATININE 0.83 02/13/2017    EGFRIFNONA 91 02/13/2017    BCR 18.1 02/13/2017    ANIONGAP 5.0 02/13/2017       Imaging Results (last 72 hours)     Procedure Component Value Units Date/Time    XR Chest 1 View [[id number]] Collected:  02/12/17 1003     Updated:  02/12/17 1006    Narrative:       EXAMINATION:  XR CHEST 1 VW-  2/12/2017 9:39 AM CST     HISTORY: Shortness of breath. Rib pain.     COMPARISON: 10/21/2014.     FINDINGS:  There is poor inspiration. There is patchy infiltrate in the  perihilar regions and lung bases. There is mild cardiomegaly. There is a  pacemaker on the left. There are old rib fractures on the right.       Impression:       1. Poor inspiration.  2. Patchy infiltrate in the perihilar regions and lung bases. This may  represent mild edema or pneumonia.  3. Mild cardiomegaly.        This report was finalized on 02/12/2017 10:04 by Dr. Jett Gore MD.    CT Head Without Contrast [[id number]] Collected:  02/12/17 1043     Updated:  02/12/17 1048    Narrative:       EXAMINATION:   CT HEAD WO CONTRAST-  2/12/2017 10:09 AM CST     HISTORY: The patient fell and hit the head. Difficulty walking.     TECHNIQUE: Multiple axial images were obtained through the brain without  contrast infusion. Multiplanar images were reconstructed.     DLP: 647 mGy-cm. Automated dosage control was utilized.     COMPARISON: 10/21/2014.     FINDINGS: There are no hemorrhage, edema or mass effect. There is mild  to moderate atrophy with associated ventricular prominence. There are  chronic lacunar infarcts in both of the thalami. Low density in the  white matter is nonspecific and most likely due to chronic small vessel  disease. There is mild mucosal thickening in the right ethmoid region.  The other visualized paranasal sinuses and mastoid air cells are clear.  Vascular calcification is noted.       Impression:       1. No hemorrhage, edema or mass effect.  2. Mild to moderate atrophy with associated ventricular prominence.  3. Chronic-appearing lacunar infarcts in the thalami bilaterally.  4. Low density in the white matter is nonspecific and likely due to  chronic small vessel disease. Vascular calcification is noted.  5. Mild mucosal thickening in the right ethmoid region.     The full report of this exam was immediately signed and available to the  emergency room. The patient is currently in the emergency room.        This report was finalized on 02/12/2017 10:45 by Dr. Jett Gore MD.    CT Chest Without Contrast [[id number]] Collected:  02/12/17 1046     Updated:  02/12/17 1051    Narrative:       EXAMINATION:  CT CHEST WO CONTRAST-  2/12/2017 10:09 AM CST     HISTORY: Multiple falling episodes. No other history is given for this  study.     COMPARISON: No comparison study.     DLP: 467 mGy-cm. Automated dosage control was utilized.     TECHNIQUE: Spiral CT was performed of the chest without contrast.  Multiplanar images were reconstructed.     FINDINGS: There is atheromatous disease of the thoracic aorta  and  coronary arteries. Heart size appears to be normal. There are no  enlarged mediastinal lymph nodes. There is mild dependent atelectasis  posteriorly in both lungs. There is no dense consolidation. There is  mild bullous disease. There are degenerative changes of the spine.  Chronic appearing compression fractures are noted.       Impression:       1. Atheromatous disease of the thoracic aorta and coronary arteries.  2. Mild dependent atelectasis posteriorly in both lungs. No dense  consolidation. Mild bullous disease.  3. Mild ectasia of the thoracic aorta measuring about 4.6 cm in  diameter.     The full report of this exam was immediately signed and available to the  emergency room. The patient is currently in the emergency room.        This report was finalized on 02/12/2017 10:49 by Dr. Jett Gore MD.          Principal Problem:    -Paroxysmal atrial fibrillation with RVR- HR has improved. Chronically anticoagulated with Xarelto. I do not feel that the patient is a good candidate for anticoagulation due to his ETOH abuse and frequent falls at home.     Active Problems:    -Polycythemia    -COPD (chronic obstructive pulmonary disease)    -Alcohol abuse    -Multiple falls at home     -Generalized weakness    Plan   1. Monitor telemetry  2. Continue anticoagulation with Xarelto for now- but would recommend discontinuing at discharge due to frequent falls and ETOH abuse.   3. Falls precautions  4. ETOH withdrawal per primary team   5. Wean Cardizem gtt  6. Obtain Echo   7. PT and OT eval and treat.   8. Guardian expressed concerns regarding him being discharged home due to his significant weakness. He will likely need inpatient rehab at discharge.   9. Obtain pacemaker information     Further orders per Dr. Ansari upon his evaluation of the patient.     Thank you for the consultation, cardiology will gladly continue to follow.     NASRIN Cardenas        Please note this cardiology consultation note is  the result of a face to face consultation with the patient, in addition to reviewing medical records at length by myself, NASRIN Greenberg.     Time: More than 50% of time spent in counseling and coordination of care:  Total face-to-face/floor time 45 min.  Time spent in counseling 25 min. Counseling included the following topics: lab results, reviewing medical records, assessment, discussing the plan of care

## 2017-02-13 NOTE — PLAN OF CARE
Problem: Patient Care Overview (Adult)  Goal: Plan of Care Review  Outcome: Ongoing (interventions implemented as appropriate)    02/13/17 0439   Coping/Psychosocial Response Interventions   Plan Of Care Reviewed With patient   Patient Care Overview   Progress progress toward functional goals as expected   Outcome Evaluation   Outcome Summary/Follow up Plan Alert to self and place, bed alarm on due to recent falls. C/o discomfort in shoulder, unable to use pain scale verbally d/t dementia. Continue to infuse Cardizem at 5 for afib.       Goal: Adult Individualization and Mutuality  Outcome: Ongoing (interventions implemented as appropriate)  Goal: Discharge Needs Assessment  Outcome: Ongoing (interventions implemented as appropriate)    Problem: Arrhythmia/Dysrhythmia (Symptomatic) (Adult)  Goal: Signs and Symptoms of Listed Potential Problems Will be Absent or Manageable (Arrhythmia/Dysrhythmia)  Outcome: Ongoing (interventions implemented as appropriate)    Problem: Fall Risk (Adult)  Goal: Absence of Falls  Outcome: Ongoing (interventions implemented as appropriate)    Problem: Pressure Ulcer Risk (Eric Scale) (Adult,Obstetrics,Pediatric)  Goal: Skin Integrity  Outcome: Ongoing (interventions implemented as appropriate)

## 2017-02-13 NOTE — CONSULTS
Adult Nutrition  Assessment/PES    Patient Name:  Ezequiel Abdalla  YOB: 1945  MRN: [id number]  Admit Date:  2/12/2017    Assessment Date:  2/13/2017        Reason for Assessment       02/13/17 1620    Reason for Assessment    Reason For Assessment/Visit identified at risk by screening criteria;admission assessment    Identified At Risk By Screening Criteria MST SCORE 2+;reduced oral intake over the last month;unintentional loss of 10 lbs or more in the past 2 mos    Diagnosis Diagnosis    Cardiac PAF    Hematological Other (comment)   Polycythemia    Neurological Other (comment)   ?occassional disoriented    Pulmonary/Critical Care COPD    Substance Use ETOH              Nutrition/Diet History       02/13/17 1623    Nutrition/Diet History    Factors Affecting Nutritional Intake Factors    Reported/Observed By Patient;Family    Appetite Poor;Poor at this Time    Food Habit/Preferences Small Meals    Best Intake of Sweet Snacks            Anthropometrics       02/13/17 1623    Anthropometrics    RD Documented Current Weight  86.2 kg (190 lb)    Anthropometrics (Special Considerations)    RD Calculated    lb    RD Calculated %     Usual Body Weight (UBW)    Usual Body Weight --   unknown    Weight Loss Time Frame unknown    Body Mass Index (BMI)    BMI Grade 25 - 29.9 - overweight   28.15            Labs/Tests/Procedures/Meds       02/13/17 1624    Labs/Tests/Procedures/Meds    Labs/Tests Review Reviewed    Medication Review Reviewed, pertinent;Anticoag            Physical Findings       02/13/17 1627    Physical Findings/Assessment    Additional Documentation Physical Appearance (Group)    Physical Appearance    Overall Physical Appearance listlessness    Skin other (see comments)   Eric Score 17-low risk indicated            Estimated/Assessed Needs       02/13/17 1627    Calculation Measurements    Weight Used For Calculations 86.2 kg (190 lb)    Height Used for Calculations 1.753 m (5'  "9\")    Estimated/Assessed Energy Needs    Energy Need Method Kcal/kg    kcal/kg 25    25 Kcal/Kg (kcal) 2154.57    Estimated Kcal Range  3515-8250 kcal/day    Estimated/Assessed Protein Needs    Weight Used for Protein Calculation 86.2 kg (190 lb)    Protein (gm/kg) 1.3    1.3 Gm Protein (gm) 112.04    Estimated Protein Range 110-115 gm PRO/day    Estimated/Assessed Fluid Needs    Fluid Need Method Other (comment)   1ml/kcal; 2100-2200ml/day            Nutrition Prescription Ordered       02/13/17 1629    Nutrition Prescription PO    Current PO Diet Regular    Fluid Consistency Thin            Evaluation of Received Nutrient/Fluid Intake       02/13/17 1627    Evaluation of Received Nutrient/Fluid Intake    Number of Days Evaluated Other (comment)   <24 hr intake    Nutrition Delivered Fluid Evaluation    Fluid Intake Evaluation    Oral Fluid (mL) 600    IV Fluid (mL) 275    Total Fluid Intake (mL) 875    Total Fluid Intake (mL/kg) 10.15 mL/kg    PO Evaluation    Number of Days PO Intake Evaluated Other (comment)   <24 hr intake    Number of Meals 1    % PO Intake 50              Problem/Interventions:        Problem 1       02/13/17 1631    Nutrition Diagnoses Problem 1    Problem 1 Predicted Suboptimal Intake    Etiology (related to) Medical Diagnosis    Cardiac PAF    Pulmonary/Critical Care COPD    Substance Use ETOH    Signs/Symptoms (evidenced by) PO Intake    Percent (%) intake recorded 50 %    Over number of meals 1                    Intervention Goal       02/13/17 1631    Intervention Goal    General Meet nutritional needs for age/condition    PO Meet estimated needs;Increase intake    Weight Maintain weight            Nutrition Intervention       02/13/17 1633    Nutrition Intervention    RD/Tech Action Follow Tx progress;Care plan reviewd;Encourage intake;Interview for preference;Advise available snack;Advise alternate selection;Recommend/ordered    Recommended/Ordered Supplement;Other (comment)   MV " and thiamine            Nutrition Prescription       02/13/17 1633    Nutrition Prescription PO    PO Prescription Begin/change supplement    Supplement Ensure Pudding    Supplement Frequency 2 times a day    New PO Prescription Ordered? Yes    Other Orders    Supplement Vitamin mineral supplement   Pt may benefit from Multivitamin and Thiamine            Education/Evaluation       02/13/17 1635    Monitor/Evaluation    Monitor Per protocol   likely to d/c to SNF, RD at facility to continue to assess/treat nutritional needs per facility protocol        Comments:  Pt with hx ETOH abuse and noted weakness. Likely to benefit from Multivitamin and Thiamine if felt to be clinically appropriate. RD did order Ensure Pudding BID secondary to reported poor appetite and PO intake per family.    Electronically signed by:  Donna Pate  02/13/17 4:36 PM

## 2017-02-13 NOTE — PLAN OF CARE
Problem: Patient Care Overview (Adult)  Goal: Plan of Care Review  Outcome: Ongoing (interventions implemented as appropriate)    02/13/17 0528   Coping/Psychosocial Response Interventions   Plan Of Care Reviewed With patient;family   Patient Care Overview   Progress no change   Outcome Evaluation   Outcome Summary/Follow up Plan Initial RD assessment, Poor appetite/intake noted. Ordered Ensure Pudding BID. Recommend multivitamin and thiamine.

## 2017-02-13 NOTE — PLAN OF CARE
Problem: Patient Care Overview (Adult)  Goal: Plan of Care Review  Outcome: Ongoing (interventions implemented as appropriate)    02/13/17 1148   Coping/Psychosocial Response Interventions   Plan Of Care Reviewed With patient;caregiver   Outcome Evaluation   Outcome Summary/Follow up Plan PT eval completed. pt. didn't self report any pain but winces and withdraws with movement from pain in R abdomen. pt. is a poor historian so his caregiver gave much of the history. caregiver states that the patient has been falling quite often recently and he is unable to provide the care that the pt requires anymore. pt. is min a-mod a for all bed mobility. pt. transferred sit to stand min a x1 and ambulated 10 ft. from bed to chair min-mod a x2 with RW. pt. could benefit from skilled physical therapy to address deficits in strength, balance, and functional mobility. Due to pt's decline in functional status and living environment I recommend d/c to TCU vs. SNF vs. extended care facility to further address functional deficits before returning home.         Problem: Inpatient Physical Therapy  Goal: Bed Mobility Goal LTG- PT  Outcome: Ongoing (interventions implemented as appropriate)    02/13/17 1040   Bed Mobility PT LTG   Bed Mobility PT LTG, Date Established 02/13/17   Bed Mobility PT LTG, Time to Achieve 2 wks   Bed Mobility PT LTG, Activity Type all bed mobility   Bed Mobility PT LTG, [place] Level contact guard assist   Bed Mobility PT Goal LTG, Assist Device bed rails       Goal: Transfer Training Goal 1 LTG- PT  Outcome: Ongoing (interventions implemented as appropriate)    02/13/17 1040   Transfer Training PT LTG   Transfer Training PT LTG, Date Established 02/13/17   Transfer Training PT LTG, Time to Achieve 2 wks   Transfer Training PT LTG, Activity Type bed to chair /chair to bed;sit to stand/stand to sit   Transfer Training PT LTG, [place] Level minimum assist (75% patient effort);contact guard assist    Transfer Training PT LTG, Assist Device walker, rolling       Goal: Gait Training Goal LTG- PT  Outcome: Ongoing (interventions implemented as appropriate)    02/13/17 1040   Gait Training PT LTG   Gait Training Goal PT LTG, Date Established 02/13/17   Gait Training Goal PT LTG, Time to Achieve 2 wks   Gait Training Goal PT LTG, [place] Level minimum assist (75% patient effort)   Gait Training Goal PT LTG, Assist Device walker, rolling   Gait Training Goal PT LTG, Distance to Achieve 80 ft.       Goal: Strength Goal LTG- PT  Outcome: Ongoing (interventions implemented as appropriate)    02/13/17 1040   Strength Goal PT LTG   Strength Goal PT LTG, Date Established 02/13/17   Strength Goal PT LTG, Time to Achieve 2 wks   Strength Goal PT LTG, Functional Goal pt. will complete 5 sit to stands CGA with RW.

## 2017-02-13 NOTE — PROGRESS NOTES
Acute Care - Physical Therapy Initial Evaluation  [facility]     Patient Name: Ezequiel Abdalla  : 1945  MRN: [id number]  Today's Date: 2017   Onset of Illness/Injury or Date of Surgery Date: 17  Date of Referral to PT: 17  Referring Physician: NASRIN Remy      Admit Date: 2017     Visit Dx:    ICD-10-CM ICD-9-CM   1. Paroxysmal atrial fibrillation I48.0 427.31   2. Pneumonia of lower lobe due to infectious organism, unspecified laterality J18.9 483.8   3. Impaired functional mobility, balance, gait, and endurance Z74.09 V49.89   4. Impaired mobility and ADLs Z74.09 799.89     Patient Active Problem List   Diagnosis   • Polycythemia   • COPD (chronic obstructive pulmonary disease)   • Alcohol abuse   • Chronic atrial fibrillation with RVR   • Multiple falls     Past Medical History   Diagnosis Date   • A-fib    • Alcohol abuse    • Anxiety    • COPD (chronic obstructive pulmonary disease)    • COPD (chronic obstructive pulmonary disease)    • Hypotension    • Lung disease    • Neurological disease    • Polycythemia      Past Surgical History   Procedure Laterality Date   • Pacemaker implantation       [place]    • Knee surgery     • Wrist surgery            PT ASSESSMENT (last 72 hours)      PT Evaluation       17 1425 17 1305    Rehab Evaluation    Document Type  evaluation   See MAR  -TR    Subjective Information  agree to therapy;complains of;weakness;fatigue;dyspnea  -TR    Patient Effort, Rehab Treatment  good  -TR    Symptoms Noted During/After Treatment  shortness of breath  -TR    Symptoms Noted Comment  with mobility  -TR    General Information    Patient Profile Review  yes  -TR    Onset of Illness/Injury or Date of Surgery Date  17  -TR    Referring Physician  NASRIN Remy  -TR    General Observations  Pt reclined in chair, IV L UE, alert, family member present.   -TR    Pertinent History Of Current Problem  Multiple falls at home, A-fib,  pneumonia, polycythemia, alcohol abuse, dementia, R shoulder and rib pain, old rib fxs per pt.   -TR    Precautions/Limitations  fall precautions  -TR    Prior Level of Function  min assist:;mod assist:;all household mobility;transfer;gait;ADL's   Min to Mod A for mobility and ADL, supervision toileting.   -TR    Equipment Currently Used at Home walker, rolling;cane, straight;wheelchair;shower chair  -ALEXIA shower chair;walker, rolling;cane, straight;wheelchair  -TR    Plans/Goals Discussed With  patient and family;agreed upon  -TR    Risks Reviewed  patient and family:;LOB;dizziness;increased discomfort;change in vital signs  -TR    Benefits Reviewed  patient and family:;improve function;increase independence;increase strength;increase balance;increase knowledge  -TR    Barriers to Rehab  medically complex;previous functional deficit;cognitive status  -TR    Living Environment    Lives With alone  - alone  -TR    Living Arrangements house  - house  -TR    Home Accessibility  stairs to enter home;tub/shower is not walk in  -TR    Number of Stairs to Enter Home  2  -TR    Stair Railings at Home  none  -TR    Transportation Available ambulance;family or friend will provide  -     Living Environment Comment  home too small to navigate walker and wheelchair  -TR    Vital Signs    Pretreatment Heart Rate (beats/min)  96  -TR    Intratreatment Heart Rate (beats/min)  97  -TR    Posttreatment Heart Rate (beats/min)  98  -TR    Pre SpO2 (%)  92  -TR    O2 Delivery Pre Treatment  room air  -TR    Intra SpO2 (%)  90  -TR    O2 Delivery Intra Treatment  room air  -TR    Post SpO2 (%)  93  -TR    O2 Delivery Post Treatment  room air  -TR    Pre Patient Position  Sitting  -TR    Intra Patient Position  Sitting   Following mobility  -TR    Post Patient Position  Sitting  -TR    Pain Assessment    Pain Assessment  0-10  -TR    Pain Score  7  -TR    Pain Type  Acute pain  -TR    Pain Location  Shoulder   and R ribs  -TR    Pain  Orientation  Right  -TR    Pain Intervention(s)  Repositioned;Ambulation/increased activity;Medication (See MAR)   Notified RN  -TR    Response to Interventions  Tolerated  -TR    Vision Assessment/Intervention    Visual Impairment  WFL with corrective lenses  -TR    Visual Impairment Comment  able to read name badge  -TR    Cognitive Assessment/Intervention    Current Cognitive/Communication Assessment  functional  -TR    Orientation Status  oriented to;person;place;time   knew month but not year  -TR    Follows Commands/Answers Questions  able to follow single-step instructions;100% of the time  -TR    Personal Safety  decreased awareness, need for assist;decreased awareness, need for safety  -TR    Personal Safety Interventions  fall prevention program maintained;gait belt;nonskid shoes/slippers when out of bed;supervised activity  -TR    ROM (Range of Motion)    General ROM Detail  L UE AROM WFL. R UE distal AROM WFL. R shoulder flexion 25% limited by pain.   -TR    MMT (Manual Muscle Testing)    General MMT Assessment Detail  L UE 4-/5. R UE not tested due to pain.   -TR    Bed Mobility, Assessment/Treatment    Bed Mobility, Comment  Not tested, up in chair.   -TR    Transfer Assessment/Treatment    Transfers, Sit-Stand [place]  minimum assist (75% patient effort);verbal cues required  -TR    Transfers, Stand-Sit [place]  minimum assist (75% patient effort);verbal cues required  -TR    Transfers, Sit-Stand-Sit, Assist Device  rolling walker  -TR    Toilet Transfer, [place]  minimum assist (75% patient effort);verbal cues required  -TR    Toilet Transfer, Assistive Device  rolling walker   grab bar  -TR    Transfer, Safety Issues  balance decreased during turns;step length decreased;weight-shifting ability decreased;impulsivity;loses balance backward;sequencing ability decreased  -TR    Transfer, Impairments  impaired balance;strength decreased;postural control impaired;pain  -TR    Motor  Skills/Interventions    Additional Documentation  Balance Skills Training (Group)  -TR    Balance Skills Training    Sitting-Level of Assistance  Close supervision;Contact guard  -TR    Sitting-Balance Support  Feet supported  -TR    Standing-Level of Assistance  Contact guard;Minimum assistance   1 loss of balance, Min A to regain balance.   -TR    Static Standing Balance Support  assistive device  -TR    Sensory Assessment/Intervention    Sensory Impairment  --   WNL per pt.   -TR    Positioning and Restraints    Pre-Treatment Position  sitting in chair/recliner  -TR    Post Treatment Position  chair  -TR    In Chair  sitting;call light within reach;encouraged to call for assist;with nsg  -TR      02/13/17 1040 02/12/17 2557    Rehab Evaluation    Document Type evaluation  - (r) AK (t) LC (c)     Subjective Information agree to therapy;complains of;weakness;fatigue;dyspnea;pain  - (r) AK (t) LC (c)     General Information    Patient Profile Review yes  - (r) AK (t) LC (c)     Onset of Illness/Injury or Date of Surgery Date 02/12/17  - (r) AK (t) LC (c)     Referring Physician Dr. Stokes  - (r) AK (t) LC (c)     General Observations pt. fowlers in bed; IV; family/friends in room  - (r) AK (t) LC (c)     Pertinent History Of Current Problem pt. admitted after increasing weakness and multiple falls over the past year. His caregiver says he can not care for him as well anymore. pt. has hx of chronic Afib with RVR, COPD, and polycythemia  - (r) AK (t) LC (c)     Precautions/Limitations fall precautions  - (r) AK (t) LC (c)     Prior Level of Function min assist:;mod assist:;all household mobility;gait;transfer;bed mobility;feeding;grooming;dressing;bathing;home management  - (r) AK (t) LC (c)     Equipment Currently Used at Home shower chair;walker, rolling;cane, straight;wheelchair  - (r) AK (t) LC (c)     Plans/Goals Discussed With patient;other;agreed upon  - (r) AK (t) LC (c)     Risks  Reviewed patient:;other:;LOB;nausea/vomiting;increased discomfort;dizziness;change in vital signs   caregiver  - (r) AK (t) LC (c)     Benefits Reviewed patient:;other:;improve function;increase balance;increase strength;increase independence;decrease pain;decrease risk of DVT;improve skin integrity  - (r) AK (t) LC (c)     Barriers to Rehab medically complex;previous functional deficit;cognitive status;environmental barriers  - (r) AK (t) LC (c)     Living Environment    Lives With alone  - (r) AK (t) LC (c) alone  -    Living Arrangements house  - (r) AK (t) LC (c) house  -SJ    Home Accessibility stairs to enter home  - (r) AK (t) LC (c) no concerns  -    Number of Stairs to Enter Home 2  - (r) AK (t)  (c)     Stair Railings at Home none  - (r) AK (t) LC (c) none  -    Type of Financial/Environmental Concern none  - (r) AK (t) LC (c) none  -    Transportation Available ambulance  - (r) AK (t)  (c) car  -    Living Environment Comment home too small to navigate walker and wheelchair  - (r) AK (t) LC (c)     Clinical Impression    Date of Referral to PT 02/13/17  - (r) AK (t)  (c)     PT Diagnosis impaired gait and mobility  - (r) AK (t)  (c)     Patient/Family Goals Statement caregiver feels unable to take care of pt. and feels he needs alt. care plan  - (r) AK (t)  (c)     Criteria for Skilled Therapeutic Interventions Met yes;treatment indicated  - (r) AK (t) LC (c)     Rehab Potential fair, will monitor progress closely  - (r) AK (t) LC (c)     Predicted Duration of Therapy Intervention (days/wks) 10  - (r) AK (t)  (c)     Pain Assessment    Pain Assessment [place] FACES  - (r) AK (t) LC (c)     [place] FACES Pain Rating 2  - (r) AK (t) LC (c)     Pain Type Chronic pain  - (r) AK (t) LC (c)     Pain Location Abdomen  - (r) AK (t) LC (c)     Pain Orientation Right  -LC (r) AK (t) LC (c)     Pain Frequency Intermittent  -LC (r) AK (t) LC (c)      Pain Onset Sudden  -LC (r) AK (t) LC (c)     Pain Intervention(s) Repositioned;Ambulation/increased activity  - (r) AK (t) LC (c)     Response to Interventions tolerated  -LC (r) AK (t) LC (c)     Vision Assessment/Intervention    Visual Impairment WFL with corrective lenses  - (r) AK (t) LC (c)     Cognitive Assessment/Intervention    Current Cognitive/Communication Assessment functional  - (r) AK (t) LC (c)     Orientation Status oriented to;person;disoriented to;place;time;situation  - (r) AK (t)  (c)     Follows Commands/Answers Questions able to follow single-step instructions;100% of the time  - (r) AK (t) LC (c)     Personal Safety mild impairment;at risk behaviors demonstrated;decreased awareness, need for assist;decreased awareness, need for safety;decreased insight to deficits  - (r) AK (t)  (c)     Personal Safety Interventions fall prevention program maintained;gait belt;nonskid shoes/slippers when out of bed;supervised activity  - (r) AK (t)  (c)     ROM (Range of Motion)    General ROM Detail BLE grossly WFL  - (r) AK (t) LC (c)     MMT (Manual Muscle Testing)    General MMT Assessment Detail BLE grossly 3+/5  - (r) AK (t)  (c)     Bed Mobility, Assessment/Treatment    Bed Mobility, Assistive Device bed rails;head of bed elevated  - (r) AK (t)  (c)     Bed Mobility, Scoot/Bridge, [place] minimum assist (75% patient effort);verbal cues required  - (r) AK (t)  (c)     Bed Mob, Supine to Sit, [place] moderate assist (50% patient effort);verbal cues required  - (r) AK (t) LC (c)     Bed Mobility, Safety Issues decreased use of arms for pushing/pulling;decreased use of legs for bridging/pushing;cognitive deficits limit understanding  - (r) AK (t)  (c)     Bed Mobility, Impairments strength decreased;impaired balance  - (r) AK (t)  (c)     Transfer Assessment/Treatment    Transfers, Sit-Stand [place] minimum assist (75% patient effort);verbal cues  required  - (r) AK (t)  (c)     Transfers, Stand-Sit [place] minimum assist (75% patient effort);verbal cues required  - (r) AK (t)  (c)     Transfers, Sit-Stand-Sit, Assist Device rolling walker  - (r) AK (t)  (c)     Transfer, Safety Issues balance decreased during turns;step length decreased;weight-shifting ability decreased;knees buckling;sequencing ability decreased  - (r) AK (t)  (c)     Transfer, Impairments strength decreased;impaired balance;coordination impaired  - (r) AK (t)  (c)     Gait Assessment/Treatment    Gait, [place] Level minimum assist (75% patient effort);moderate assist (50% patient effort);1 person + 1 person to manage equipment  - (r) AK (t)  (c)     Gait, Assistive Device rolling walker  - (r) AK (t)  (c)     Gait, Distance (Feet) 10  - (r) AK (t)  (c)     Gait, Gait Pattern Analysis swing-to gait  - (r) AK (t)  (c)     Gait, Gait Deviations [place]vin decreased;forward flexed posture;knee buckling;limb motion velocity decreased;narrow base;step length decreased;stride length decreased  - (r) AK (t)  (c)     Gait, Safety Issues balance decreased during turns;step length decreased;weight-shifting ability decreased  - (r) AK (t)  (c)     Gait, Impairments strength decreased;impaired balance;coordination impaired  - (r) AK (t)  (c)     Motor Skills/Interventions    Additional Documentation Balance Skills Training (Group)  - (r) AK (t)  (c)     Balance Skills Training    Sitting-Level of Assistance Close supervision;Contact guard  - (r) AK (t)  (c)     Sitting-Balance Support Left upper extremity supported;Right upper extremity supported;Feet supported  - (r) AK (t)  (c)     Standing-Level of Assistance Contact guard;Minimum assistance  - (r) AK (t)  (c)     Static Standing Balance Support assistive device  - (r) AK (t)  (c)     Gait Balance-Level of Assistance Minimum assistance;Moderate assistance  - (r) AK (t)   (c)     Gait Balance Support assistive device  -LC (r) AK (t) LC (c)     Positioning and Restraints    Pre-Treatment Position in bed  -LC (r) AK (t) LC (c)     Post Treatment Position chair  -LC (r) AK (t) LC (c)     In Chair sitting;call light within reach;encouraged to call for assist;notified nsg;legs elevated  -LC (r) AK (t) LC (c)       02/12/17 1508       General Information    Equipment Currently Used at Home shower chair  -SJ       User Key  (r) = Recorded By, (t) = Taken By, (c) = Cosigned By    Initials Name Provider Type     Brit Landa, RN Registered Nurse    TAYE Sow, PT DPT Physical Therapist    ALEXIA Martel, MSW     TR Cleo Hernandez, OTR/L Occupational Therapist    FRANNIE Higgins, PT Student PT Student          Physical Therapy Education     Title: PT OT SLP Therapies (Active)     Topic: Physical Therapy (Done)     Point: Mobility training (Done)    Learning Progress Summary    Learner Readiness Method Response Comment Documented by Status   Patient Acceptance E VU,NR,NL educated on ambulating with assistive device. educated on benefits of activity. educated on safety during transfers. AK 02/13/17 1147 Done   Caregiver Acceptance E VU,NR,NL educated on ambulating with assistive device. educated on benefits of activity. educated on safety during transfers. AK 02/13/17 1147 Done                      User Key     Initials Effective Dates Name Provider Type Discipline    AK 12/19/16 -  Jayesh Higgins, PT Student PT Student PT                PT Recommendation and Plan  Anticipated Equipment Needs At Discharge: gait belt  Anticipated Discharge Disposition: transitional care, skilled nursing facility, extended care facility  Planned Therapy Interventions: balance training, bed mobility training, gait training, patient/family education, strengthening, transfer training, stair training  PT Frequency: daily, 2 times/day, per priority policy  Plan of Care Review  Plan Of  Care Reviewed With: patient, caregiver  Outcome Summary/Follow up Plan: PT alexal completed. pt. didn't self report any pain but winces and withdraws with movement from pain in R abdomen. pt. is a poor historian so his caregiver gave much of the history. caregiver states that the patient has been falling quite often recently and he is unable to provide the care that the pt requires anymore. pt. is min a-mod a for all bed mobility. pt. transferred sit to stand min a x1 and ambulated 10 ft. from bed to chair min-mod a x2 with RW. pt. could benefit from skilled physical therapy to address deficits in strength, balance, and functional mobility. Due to pt's decline in functional status and living environment I recommend d/c to TCU vs. SNF vs. extended care facility to further address functional deficits before returning home.          IP PT Goals       02/13/17 1040 02/13/17 0940       Bed Mobility PT LTG    Bed Mobility PT LTG, Date Established  02/13/17  -LC (r) AK (t) LC (c)     Bed Mobility PT LTG, Time to Achieve  2 wks  -LC (r) AK (t) LC (c)     Bed Mobility PT LTG, Activity Type  all bed mobility  -LC (r) AK (t) LC (c)     Bed Mobility PT LTG, [place] Level  contact guard assist  -LC (r) AK (t) LC (c)     Bed Mobility PT Goal  LTG, Assist Device  bed rails  -LC (r) AK (t) LC (c)     Transfer Training PT LTG    Transfer Training PT LTG, Date Established 02/13/17  -LC (r) AK (t) LC (c)      Transfer Training PT LTG, Time to Achieve 2 wks  -LC (r) AK (t) LC (c)      Transfer Training PT LTG, Activity Type bed to chair /chair to bed;sit to stand/stand to sit  -LC (r) AK (t) LC (c)      Transfer Training PT LTG, [place] Level minimum assist (75% patient effort);contact guard assist  -LC (r) AK (t) LC (c)      Transfer Training PT LTG, Assist Device walker, rolling  -LC (r) AK (t) LC (c)      Gait Training PT LTG    Gait Training Goal PT LTG, Date Established 02/13/17  -LC (r) AK (t) LC (c)      Gait Training  Goal PT LTG, Time to Achieve 2 wks  -LC (r) AK (t) LC (c)      Gait Training Goal PT LTG, [place] Level minimum assist (75% patient effort)  -LC (r) AK (t) LC (c)      Gait Training Goal PT LTG, Assist Device walker, rolling  -LC (r) AK (t) LC (c)      Gait Training Goal PT LTG, Distance to Achieve 80 ft.  -LC (r) AK (t) LC (c)      Strength Goal PT LTG    Strength Goal PT LTG, Date Established 02/13/17  -LC (r) AK (t) LC (c)      Strength Goal PT LTG, Time to Achieve 2 wks  -LC (r) AK (t) LC (c)      Strength Goal PT LTG, Functional Goal pt. will complete 5 sit to stands CGA with RW.  -LC (r) AK (t) LC (c)        User Key  (r) = Recorded By, (t) = Taken By, (c) = Cosigned By    Initials Name Provider Type    TAYE Sow, PT DPT Physical Therapist    FRANNIE Higgins, PT Student PT Student                Outcome Measures       02/13/17 1305 02/13/17 1040       How much help from another person do you currently need...    Turning from your back to your side while in flat bed without using bedrails?  3  -LC (r) AK (t) LC (c)     Moving from lying on back to sitting on the side of a flat bed without bedrails?  3  -LC (r) AK (t) LC (c)     Moving to and from a bed to a chair (including a wheelchair)?  2  -LC (r) AK (t) LC (c)     Standing up from a chair using your arms (e.g., wheelchair, bedside chair)?  3  -LC (r) AK (t) LC (c)     Climbing 3-5 steps with a railing?  1  -LC (r) AK (t) LC (c)     To walk in hospital room?  3  -LC (r) AK (t) LC (c)     AM-PAC 6 Clicks Score  15  -LC (r) AK (t)     How much help from another is currently needed...    Putting on and taking off regular lower body clothing? 2  -TR      Bathing (including washing, rinsing, and drying) 2  -TR      Toileting (which includes using toilet bed pan or urinal) 3  -TR      Putting on and taking off regular upper body clothing 3  -TR      Taking care of personal grooming (such as brushing teeth) 3  -TR      Eating meals 4  -TR      Score  17  -TR      Functional Assessment    Outcome Measure Options AM-PAC 6 Clicks Daily Activity (OT)  -TR AM-PAC 6 Clicks Basic Mobility (PT)  -LC (r) AK (t) TAYE (c)       User Key  (r) = Recorded By, (t) = Taken By, (c) = Cosigned By    Initials Name Provider Type    TAYE Sow, PT DPT Physical Therapist    TR Cleo Hernandez, OTR/L Occupational Therapist    FRANNIE Higgins, PT Student PT Student           Time Calculation:       Therapy Charges for Today     Code Description Service Date Service Provider Modifiers Qty    [id number] HC PT EVAL MOD COMPLEXITY 3 2/13/2017 Jayesh Higgins, PT Student GP 1          PT G-Codes  PT Professional Judgement Used?: Yes  Outcome Measure Options: AM-PAC 6 Clicks Daily Activity (OT)  Score: 15  Functional Limitation: Mobility: Walking and moving around  Mobility: Walking and Moving Around Current Status (): At least 40 percent but less than 60 percent impaired, limited or restricted  Mobility: Walking and Moving Around Goal Status (): At least 20 percent but less than 40 percent impaired, limited or restricted      Jayesh Higgins, PT Student  2/13/2017

## 2017-02-13 NOTE — PROGRESS NOTES
[facility] Medicine Services  INPATIENT PROGRESS NOTE    Length of Stay: 1  Date of Admission: 2/12/2017  Primary Care Physician: Katalina Freeman DO    Subjective     Chief Complaint:     Fall with right shoulder and right rib pain    HPI     The patient appears alert today.  He is profoundly hard of hearing so interaction is somewhat limited as a result.  The patient has agreed to a plan of discharge to SNF for rehabilitation.  Basic metabolic panel is normal and white blood cell count is trending downward.  The patient displays no evidence of active alcohol withdrawal at present.      Review of Systems     Constitutional: Negative.   HENT: Positive for hearing loss.   Eyes: Negative.   Respiratory: Positive for cough and wheezing.   Cardiovascular: Positive for chest pain.   Gastrointestinal: Negative.   Endocrine: Negative.   Genitourinary: Negative.   Musculoskeletal: Positive for arthralgias and gait problem.   Right rib pain and right shoulder pain   Skin: Negative.   Allergic/Immunologic: Negative.   Hematological: Negative.   Psychiatric/Behavioral: Negative.   All pertinent negatives and positives are as above. All other systems have been reviewed and are negative unless otherwise stated.     Objective    Temp:  [98.6 °F (37 °C)-99.3 °F (37.4 °C)] 98.6 °F (37 °C)  Heart Rate:  [92-96] 92  Resp:  [16-20] 18  BP: ()/(78-89) 124/89     Specimen:  Blood Updated:  02/12/17 1720     WBC 12.97 (H) 10*3/mm3      RBC 4.90 10*6/mm3      Hemoglobin 16.8 g/dL      Hematocrit 48.9 %      MCV 99.8 (H) fL      MCH 34.3 (H) pg      MCHC 34.4 g/dL      RDW 14.5 %      RDW-SD 52.5 fl      MPV 12.3 (H) fL      Platelets 133 10*3/mm3      Neutrophil % 81.0 (H) %      Lymphocyte % 9.9 (L) %      Monocyte % 8.7 %      Eosinophil % 0.0 %      Basophil % 0.1 %      Immature Grans % 0.3 %      Neutrophils, Absolute 10.51 (H) 10*3/mm3      Lymphocytes, Absolute 1.28 10*3/mm3       Monocytes, Absolute 1.13 10*3/mm3      Eosinophils, Absolute 0.00 10*3/mm3      Basophils, Absolute 0.01 10*3/mm3      Immature Grans, Absolute 0.04 (H) 10*3/mm3     Basic Metabolic Panel [[id number]]  (Normal) Collected:  02/12/17 1647    Specimen:  Blood Updated:  02/12/17 1731     Glucose 96 mg/dL      BUN 17 mg/dL      Creatinine 1.04 mg/dL      Sodium 139 mmol/L      Potassium 3.8 mmol/L      Chloride 106 mmol/L      CO2 24.0 mmol/L      Calcium 10.3 mg/dL      eGFR Non African Amer 70 mL/min/1.73      BUN/Creatinine Ratio 16.3      Anion Gap 9.0 mmol/L     Narrative:       The MDRD GFR formula is only valid for adults with stable renal function between ages 18 and 70.    Vitamin B12 [[id number]]  (Normal) Collected:  02/12/17 1647    Specimen:  Blood Updated:  02/12/17 1834     Vitamin B-12 293 pg/mL     Folate [[id number]] Collected:  02/12/17 1647    Specimen:  Blood Updated:  02/12/17 1834     Folate 10.20 ng/mL     CBC Auto Differential [[id number]]  (Abnormal) Collected:  02/13/17 0452    Specimen:  Blood Updated:  02/13/17 0523     WBC 12.12 (H) 10*3/mm3      RBC 4.73 (L) 10*6/mm3      Hemoglobin 16.1 g/dL      Hematocrit 47.2 %      MCV 99.8 (H) fL      MCH 34.0 (H) pg      MCHC 34.1 g/dL      RDW 14.4 %      RDW-SD 52.0 fl      MPV 12.0 fL      Platelets 124 (L) 10*3/mm3      Neutrophil % 80.8 (H) %      Lymphocyte % 10.2 (L) %      Monocyte % 8.4 %      Eosinophil % 0.1 %      Basophil % 0.1 %      Immature Grans % 0.4 %      Neutrophils, Absolute 9.79 (H) 10*3/mm3      Lymphocytes, Absolute 1.24 10*3/mm3      Monocytes, Absolute 1.02 10*3/mm3      Eosinophils, Absolute 0.01 10*3/mm3      Basophils, Absolute 0.01 10*3/mm3      Immature Grans, Absolute 0.05 (H) 10*3/mm3     Basic Metabolic Panel [[id number]]  (Abnormal) Collected:  02/13/17 0452    Specimen:  Blood Updated:  02/13/17 0535     Glucose 106 (H) mg/dL      BUN 15 mg/dL      Creatinine 0.83 mg/dL      Sodium 139 mmol/L      Potassium 3.5 mmol/L       Chloride 108 mmol/L      CO2 26.0 mmol/L      Calcium 10.2 mg/dL      eGFR Non African Amer 91 mL/min/1.73      BUN/Creatinine Ratio 18.1      Anion Gap 5.0 mmol/L     Narrative:       The MDRD GFR formula is only valid for adults with stable renal function between ages 18 and 70.          Imaging Results (last 24 hours)     ** No results found for the last 24 hours. **             Intake/Output Summary (Last 24 hours) at 02/13/17 1458  Last data filed at 02/13/17 0000   Gross per 24 hour   Intake     75 ml   Output    400 ml   Net   -325 ml       Physical Exam    Constitutional: He is oriented to person, place, and time. He appears well-developed.   HENT:   Head: Normocephalic and atraumatic.   Right Ear: External ear normal.   Left Ear: External ear normal.   Nose: Nose normal.   Mouth/Throat: Oropharynx is clear and moist.   Eyes: Conjunctivae and EOM are normal. Pupils are equal, round, and reactive to light. No scleral icterus.   Neck: Normal range of motion. Neck supple. No JVD present. No tracheal deviation present. No thyromegaly present.   Cardiovascular: Normal heart sounds. An irregularly irregular rhythm present. Exam reveals decreased pulses.   Pulmonary/Chest: Effort normal. He has wheezes (scattered throughout). He has rhonchi (scattered throughout).   Abdominal: Soft. Bowel sounds are normal. He exhibits no distension and no mass. There is no tenderness.   Musculoskeletal: Normal range of motion. He exhibits no edema or deformity.   Neurological: He is alert and oriented to person, place, and time. He has normal reflexes. No cranial nerve deficit or sensory deficit. He exhibits abnormal muscle tone (bilateral lower extremity weakness).   Skin: Skin is warm and dry. Ecchymosis: right hand. No erythema.   Psychiatric: He has a normal mood and affect. Judgment and thought content normal. His speech is delayed. He is slowed. Cognition and memory are impaired. He exhibits abnormal recent memory.           Results Review:  I have reviewed the labs, radiology results, and diagnostic studies since my last progress note and made treatment changes reflective of the results.   I have reviewed the current medications.    Assessment/Plan     Hospital Problem List     * (Principal)Chronic atrial fibrillation with RVR    Multiple falls    Polycythemia    COPD (chronic obstructive pulmonary disease)    Alcohol abuse          PLAN:    Discontinue anticoagulants upon discharge from the hospital  Pursue SNF placement.    Parag Stokes DO   02/13/17   2:58 PM

## 2017-02-13 NOTE — PLAN OF CARE
Problem: Patient Care Overview (Adult)  Goal: Plan of Care Review  Outcome: Ongoing (interventions implemented as appropriate)    02/13/17 1349   Coping/Psychosocial Response Interventions   Plan Of Care Reviewed With patient;family   Patient Care Overview   Progress improving   Outcome Evaluation   Outcome Summary/Follow up Plan OT Eval completed. Pt displays decreased UE strength, endurance, ROM, impaired sitting/standing balance and decreased safety awareness. Min A for UB ADL and Mod A for LB ADL. Min A for transfers and mobility with a RW. OT will address these deficits. Anticipated d/c is home with 24 hour care, SNF, TCU or inpatient rehab depending upon pt progress. He is at a high risk for falls at this time.          Problem: Inpatient Occupational Therapy  Goal: Transfer Training Goal 1 LTG- OT  Outcome: Ongoing (interventions implemented as appropriate)    02/13/17 1349   Transfer Training OT LTG   Transfer Training OT LTG, Date Established 02/13/17   Transfer Training OT LTG, Time to Achieve by discharge   Transfer Training OT LTG, Activity Type bed to chair /chair to bed;sit to stand/stand to sit;toilet   Transfer Training OT LTG, Schleicher Level supervision required   Transfer Training OT LTG, Assist Device walker, rolling   Transfer Training OT LTG, Additional Goal with good safety awareness       Goal: Safety Awareness Goal LTG- OT  Outcome: Ongoing (interventions implemented as appropriate)    02/13/17 1349   Safety Awareness OT LTG   Safety Awareness OT LTG, Date Established 02/13/17   Safety Awareness OT LTG, Time to Achieve by discharge   Safety Awareness OT LTG, Activity Type good safety awareness;with ADL's   Safety Awareness OT LTG, Schleicher Level min verbal cues   Safety Awareness OT LTG, Additional Goal Caregivers will be independent with fall prevention techniques.        Goal: ADL Goal LTG- OT  Outcome: Ongoing (interventions implemented as appropriate)    02/13/17 1349   ADL OT LTG    ADL OT LTG, Date Established 02/13/17   ADL OT LTG, Time to Achieve by discharge   ADL OT LTG, Activity Type ADL skills   ADL OT LTG, Starke Level standby assist   ADL OT LTG, Additional Goal UB bathing, UB dressing, Grooming while standing and toileting.

## 2017-02-14 LAB
ANION GAP SERPL CALCULATED.3IONS-SCNC: 6 MMOL/L (ref 4–13)
BASOPHILS # BLD AUTO: 0.01 10*3/MM3 (ref 0–0.2)
BASOPHILS NFR BLD AUTO: 0.1 % (ref 0–2)
BUN BLD-MCNC: 13 MG/DL (ref 5–21)
BUN/CREAT SERPL: 16.5 (ref 7–25)
CALCIUM SPEC-SCNC: 10.1 MG/DL (ref 8.4–10.4)
CHLORIDE SERPL-SCNC: 108 MMOL/L (ref 98–110)
CO2 SERPL-SCNC: 26 MMOL/L (ref 24–31)
CREAT BLD-MCNC: 0.79 MG/DL (ref 0.5–1.4)
DEPRECATED RDW RBC AUTO: 53.1 FL (ref 40–54)
EOSINOPHIL # BLD AUTO: 0.1 10*3/MM3 (ref 0–0.7)
EOSINOPHIL NFR BLD AUTO: 1 % (ref 0–4)
ERYTHROCYTE [DISTWIDTH] IN BLOOD BY AUTOMATED COUNT: 14.5 % (ref 12–15)
GFR SERPL CREATININE-BSD FRML MDRD: 96 ML/MIN/1.73
GLUCOSE BLD-MCNC: 93 MG/DL (ref 70–100)
HCT VFR BLD AUTO: 47.2 % (ref 40–52)
HGB BLD-MCNC: 15.9 G/DL (ref 14–18)
IMM GRANULOCYTES # BLD: 0.02 10*3/MM3 (ref 0–0.03)
IMM GRANULOCYTES NFR BLD: 0.2 % (ref 0–5)
LYMPHOCYTES # BLD AUTO: 1.76 10*3/MM3 (ref 0.72–4.86)
LYMPHOCYTES NFR BLD AUTO: 18.1 % (ref 15–45)
MCH RBC QN AUTO: 33.9 PG (ref 28–32)
MCHC RBC AUTO-ENTMCNC: 33.7 G/DL (ref 33–36)
MCV RBC AUTO: 100.6 FL (ref 82–95)
MONOCYTES # BLD AUTO: 0.92 10*3/MM3 (ref 0.19–1.3)
MONOCYTES NFR BLD AUTO: 9.5 % (ref 4–12)
NEUTROPHILS # BLD AUTO: 6.92 10*3/MM3 (ref 1.87–8.4)
NEUTROPHILS NFR BLD AUTO: 71.1 % (ref 39–78)
PLATELET # BLD AUTO: 131 10*3/MM3 (ref 130–400)
PMV BLD AUTO: 12 FL (ref 6–12)
POTASSIUM BLD-SCNC: 3.4 MMOL/L (ref 3.5–5.3)
RBC # BLD AUTO: 4.69 10*6/MM3 (ref 4.8–5.9)
SODIUM BLD-SCNC: 140 MMOL/L (ref 135–145)
WBC NRBC COR # BLD: 9.73 10*3/MM3 (ref 4.8–10.8)

## 2017-02-14 PROCEDURE — 97110 THERAPEUTIC EXERCISES: CPT

## 2017-02-14 PROCEDURE — 85025 COMPLETE CBC W/AUTO DIFF WBC: CPT | Performed by: FAMILY MEDICINE

## 2017-02-14 PROCEDURE — 94799 UNLISTED PULMONARY SVC/PX: CPT

## 2017-02-14 PROCEDURE — 94760 N-INVAS EAR/PLS OXIMETRY 1: CPT

## 2017-02-14 PROCEDURE — 97116 GAIT TRAINING THERAPY: CPT

## 2017-02-14 PROCEDURE — 94640 AIRWAY INHALATION TREATMENT: CPT

## 2017-02-14 PROCEDURE — 99233 SBSQ HOSP IP/OBS HIGH 50: CPT | Performed by: INTERNAL MEDICINE

## 2017-02-14 PROCEDURE — 80048 BASIC METABOLIC PNL TOTAL CA: CPT | Performed by: FAMILY MEDICINE

## 2017-02-14 RX ORDER — PRAVASTATIN SODIUM 20 MG
20 TABLET ORAL NIGHTLY
Status: DISCONTINUED | OUTPATIENT
Start: 2017-02-14 | End: 2017-02-15 | Stop reason: HOSPADM

## 2017-02-14 RX ORDER — DILTIAZEM HYDROCHLORIDE 240 MG/1
240 CAPSULE, COATED, EXTENDED RELEASE ORAL
Status: DISCONTINUED | OUTPATIENT
Start: 2017-02-14 | End: 2017-02-14

## 2017-02-14 RX ORDER — DILTIAZEM HYDROCHLORIDE 180 MG/1
180 CAPSULE, COATED, EXTENDED RELEASE ORAL
Status: DISCONTINUED | OUTPATIENT
Start: 2017-02-14 | End: 2017-02-15 | Stop reason: HOSPADM

## 2017-02-14 RX ADMIN — HYDROCODONE BITARTRATE AND ACETAMINOPHEN 1 TABLET: 5; 325 TABLET ORAL at 04:16

## 2017-02-14 RX ADMIN — ESCITALOPRAM 20 MG: 10 TABLET, FILM COATED ORAL at 08:55

## 2017-02-14 RX ADMIN — PRAVASTATIN SODIUM 20 MG: 20 TABLET ORAL at 23:32

## 2017-02-14 RX ADMIN — NICOTINE 1 PATCH: 21 PATCH, EXTENDED RELEASE TRANSDERMAL at 15:45

## 2017-02-14 RX ADMIN — DONEPEZIL HYDROCHLORIDE 5 MG: 5 TABLET, FILM COATED ORAL at 23:30

## 2017-02-14 RX ADMIN — BISOPROLOL FUMARATE 10 MG: 10 TABLET ORAL at 08:55

## 2017-02-14 RX ADMIN — DILTIAZEM HYDROCHLORIDE 180 MG: 180 CAPSULE, COATED, EXTENDED RELEASE ORAL at 15:37

## 2017-02-14 RX ADMIN — RIVAROXABAN 20 MG: 20 TABLET, FILM COATED ORAL at 08:55

## 2017-02-14 RX ADMIN — TIOTROPIUM BROMIDE 1 CAPSULE: 18 CAPSULE ORAL; RESPIRATORY (INHALATION) at 08:42

## 2017-02-14 NOTE — PLAN OF CARE
Problem: Patient Care Overview (Adult)  Goal: Plan of Care Review  Outcome: Ongoing (interventions implemented as appropriate)    Problem: Arrhythmia/Dysrhythmia (Symptomatic) (Adult)  Goal: Signs and Symptoms of Listed Potential Problems Will be Absent or Manageable (Arrhythmia/Dysrhythmia)  Outcome: Ongoing (interventions implemented as appropriate)    02/14/17 0428   Arrhythmia/Dysrhythmia (Symptomatic)   Problems Assessed (Arrhythmia/Dysrhythmia) all   Problems Present (Arrhythmia/Dysrhythmia) none         Problem: Fall Risk (Adult)  Goal: Absence of Falls  Outcome: Ongoing (interventions implemented as appropriate)    02/14/17 0428   Fall Risk (Adult)   Absence of Falls achieves outcome         Problem: Pressure Ulcer Risk (Eric Scale) (Adult,Obstetrics,Pediatric)  Goal: Skin Integrity  Outcome: Ongoing (interventions implemented as appropriate)    02/14/17 0428   Pressure Ulcer Risk (Eric Scale) (Adult,Obstetrics,Pediatric)   Skin Integrity making progress toward outcome

## 2017-02-14 NOTE — PLAN OF CARE
Problem: Patient Care Overview (Adult)  Goal: Plan of Care Review  Outcome: Ongoing (interventions implemented as appropriate)    02/14/17 1437   Coping/Psychosocial Response Interventions   Plan Of Care Reviewed With patient   Patient Care Overview   Progress improving   Outcome Evaluation   Outcome Summary/Follow up Plan Pt alert and oriented x 3 disoriented to time, vss Afib WNL, Cardizem decreased to 180mg, awaiting plcement         Problem: Arrhythmia/Dysrhythmia (Symptomatic) (Adult)  Goal: Signs and Symptoms of Listed Potential Problems Will be Absent or Manageable (Arrhythmia/Dysrhythmia)  Outcome: Ongoing (interventions implemented as appropriate)    Problem: Fall Risk (Adult)  Goal: Absence of Falls  Outcome: Ongoing (interventions implemented as appropriate)    Problem: Pressure Ulcer Risk (Eric Scale) (Adult,Obstetrics,Pediatric)  Goal: Skin Integrity  Outcome: Ongoing (interventions implemented as appropriate)

## 2017-02-14 NOTE — PLAN OF CARE
Problem: Patient Care Overview (Adult)  Goal: Plan of Care Review  Outcome: Ongoing (interventions implemented as appropriate)    02/14/17 0428 02/14/17 0436   Coping/Psychosocial Response Interventions   Plan Of Care Reviewed With --  patient   Patient Care Overview   Progress --  no change   Outcome Evaluation   Outcome Summary/Follow up Plan Oriented to self only, VSS, pt has not rested tonight, continues to deny pain but BP was elevated this am and pt has been restless most of the night changing position from bed to chair and back again. VIKIWA 7. Telemetry A-fib HR  w/ freq. MF PVCs, coup, and 3R. --

## 2017-02-14 NOTE — PROGRESS NOTES
[facility] Medicine Services  INPATIENT PROGRESS NOTE    Length of Stay: 2  Date of Admission: 2/12/2017  Primary Care Physician: Katalina Freeman DO    Subjective     Chief Complaint:     Fall with right shoulder and right rib pain.    HPI     No events noted of the past 24 hours.  The patient is still being evaluated by multiple SNFs for rehabilitation placement.  His heart rate is well controlled.      Review of Systems     Constitutional: Negative.   HENT: Positive for hearing loss.   Eyes: Negative.   Respiratory: Positive for cough and wheezing.   Cardiovascular: Positive for chest pain.   Gastrointestinal: Negative.   Endocrine: Negative.   Genitourinary: Negative.   Musculoskeletal: Positive for arthralgias and gait problem.   Right rib pain and right shoulder pain   Skin: Negative.   Allergic/Immunologic: Negative.   Hematological: Negative.   Psychiatric/Behavioral: Negative.  All pertinent negatives and positives are as above. All other systems have been reviewed and are negative unless otherwise stated.     Objective    Temp:  [97.1 °F (36.2 °C)-98.2 °F (36.8 °C)] 97.1 °F (36.2 °C)  Heart Rate:  [94-99] 94  Resp:  [17-20] 18  BP: (106-158)/() 106/54    Lab Results (last 24 hours)     Procedure Component Value Units Date/Time    CBC & Differential [[id number]] Collected:  02/14/17 0430    Specimen:  Blood Updated:  02/14/17 0451    Narrative:       The following orders were created for panel order CBC & Differential.  Procedure                               Abnormality         Status                     ---------                               -----------         ------                     CBC Auto Differential[[id number]]         Abnormal            Final result                 Please view results for these tests on the individual orders.    CBC Auto Differential [[id number]]  (Abnormal) Collected:  02/14/17 0430    Specimen:  Blood Updated:  02/14/17 0451      WBC 9.73 10*3/mm3      RBC 4.69 (L) 10*6/mm3      Hemoglobin 15.9 g/dL      Hematocrit 47.2 %      .6 (H) fL      MCH 33.9 (H) pg      MCHC 33.7 g/dL      RDW 14.5 %      RDW-SD 53.1 fl      MPV 12.0 fL      Platelets 131 10*3/mm3      Neutrophil % 71.1 %      Lymphocyte % 18.1 %      Monocyte % 9.5 %      Eosinophil % 1.0 %      Basophil % 0.1 %      Immature Grans % 0.2 %      Neutrophils, Absolute 6.92 10*3/mm3      Lymphocytes, Absolute 1.76 10*3/mm3      Monocytes, Absolute 0.92 10*3/mm3      Eosinophils, Absolute 0.10 10*3/mm3      Basophils, Absolute 0.01 10*3/mm3      Immature Grans, Absolute 0.02 10*3/mm3     Basic Metabolic Panel [[id number]]  (Abnormal) Collected:  02/14/17 0430    Specimen:  Blood Updated:  02/14/17 0500     Glucose 93 mg/dL      BUN 13 mg/dL      Creatinine 0.79 mg/dL      Sodium 140 mmol/L      Potassium 3.4 (L) mmol/L      Chloride 108 mmol/L      CO2 26.0 mmol/L      Calcium 10.1 mg/dL      eGFR Non African Amer 96 mL/min/1.73      BUN/Creatinine Ratio 16.5      Anion Gap 6.0 mmol/L     Narrative:       The MDRD GFR formula is only valid for adults with stable renal function between ages 18 and 70.          Imaging Results (last 24 hours)     ** No results found for the last 24 hours. **             Intake/Output Summary (Last 24 hours) at 02/14/17 1351  Last data filed at 02/14/17 0842   Gross per 24 hour   Intake    600 ml   Output    300 ml   Net    300 ml       Physical Exam    Constitutional: He is oriented to person, place, and time. He appears well-developed.   HENT:   Head: Normocephalic and atraumatic.   Right Ear: External ear normal.   Left Ear: External ear normal.   Nose: Nose normal.   Mouth/Throat: Oropharynx is clear and moist.   Eyes: Conjunctivae and EOM are normal. Pupils are equal, round, and reactive to light. No scleral icterus.   Neck: Normal range of motion. Neck supple. No JVD present. No tracheal deviation present. No thyromegaly present.    Cardiovascular: Normal heart sounds. An irregularly irregular rhythm present. Exam reveals decreased pulses.   Pulmonary/Chest: Effort normal. He has wheezes (scattered throughout). He has rhonchi (scattered throughout).   Abdominal: Soft. Bowel sounds are normal. He exhibits no distension and no mass. There is no tenderness.   Musculoskeletal: Normal range of motion. He exhibits no edema or deformity.   Neurological: He is alert and oriented to person, place, and time. He has normal reflexes. No cranial nerve deficit or sensory deficit. He exhibits abnormal muscle tone (bilateral lower extremity weakness).   Skin: Skin is warm and dry. Ecchymosis: right hand. No erythema.   Psychiatric: He has a normal mood and affect. Judgment and thought content normal. His speech is delayed. He is slowed. Cognition and memory are impaired but are significantly improved.y.      Results Review:  I have reviewed the labs, radiology results, and diagnostic studies since my last progress note and made treatment changes reflective of the results.   I have reviewed the current medications.    Assessment/Plan     Hospital Problem List     * (Principal)Chronic atrial fibrillation with RVR    Multiple falls    Polycythemia    COPD (chronic obstructive pulmonary disease)    Alcohol abuse          PLAN:    Discharge pending SNF placement.  D/C Cardizem drip in favor of Cardizem  mg by mouth daily    Parag Stokes DO   02/14/17   1:51 PM

## 2017-02-14 NOTE — PLAN OF CARE
Problem: Patient Care Overview (Adult)  Goal: Plan of Care Review  Outcome: Ongoing (interventions implemented as appropriate)    02/14/17 0957   Coping/Psychosocial Response Interventions   Plan Of Care Reviewed With patient   Patient Care Overview   Progress progress towards functional goals is fair   Outcome Evaluation   Outcome Summary/Follow up Plan Pt. improved with PT today. Pt. was cga-min x 2 for bed mobility and min x 1 to stand up. Pt. walked 75' with rwx, cga x 1. Pt. would benefit from further PT to increase strength and endurance, as well as safety.

## 2017-02-15 VITALS
SYSTOLIC BLOOD PRESSURE: 117 MMHG | TEMPERATURE: 98.2 F | WEIGHT: 191.13 LBS | BODY MASS INDEX: 28.31 KG/M2 | HEIGHT: 69 IN | DIASTOLIC BLOOD PRESSURE: 82 MMHG | OXYGEN SATURATION: 93 % | RESPIRATION RATE: 18 BRPM | HEART RATE: 93 BPM

## 2017-02-15 LAB
ANION GAP SERPL CALCULATED.3IONS-SCNC: 6 MMOL/L (ref 4–13)
BASOPHILS # BLD AUTO: 0.01 10*3/MM3 (ref 0–0.2)
BASOPHILS NFR BLD AUTO: 0.1 % (ref 0–2)
BUN BLD-MCNC: 10 MG/DL (ref 5–21)
BUN/CREAT SERPL: 14.1 (ref 7–25)
CALCIUM SPEC-SCNC: 9.7 MG/DL (ref 8.4–10.4)
CHLORIDE SERPL-SCNC: 108 MMOL/L (ref 98–110)
CO2 SERPL-SCNC: 25 MMOL/L (ref 24–31)
CREAT BLD-MCNC: 0.71 MG/DL (ref 0.5–1.4)
DEPRECATED RDW RBC AUTO: 53 FL (ref 40–54)
EOSINOPHIL # BLD AUTO: 0.07 10*3/MM3 (ref 0–0.7)
EOSINOPHIL NFR BLD AUTO: 0.8 % (ref 0–4)
ERYTHROCYTE [DISTWIDTH] IN BLOOD BY AUTOMATED COUNT: 14.5 % (ref 12–15)
GFR SERPL CREATININE-BSD FRML MDRD: 109 ML/MIN/1.73
GLUCOSE BLD-MCNC: 99 MG/DL (ref 70–100)
HCT VFR BLD AUTO: 46.8 % (ref 40–52)
HGB BLD-MCNC: 15.7 G/DL (ref 14–18)
IMM GRANULOCYTES # BLD: 0.04 10*3/MM3 (ref 0–0.03)
IMM GRANULOCYTES NFR BLD: 0.4 % (ref 0–5)
LYMPHOCYTES # BLD AUTO: 1.5 10*3/MM3 (ref 0.72–4.86)
LYMPHOCYTES NFR BLD AUTO: 16.5 % (ref 15–45)
MCH RBC QN AUTO: 33.5 PG (ref 28–32)
MCHC RBC AUTO-ENTMCNC: 33.5 G/DL (ref 33–36)
MCV RBC AUTO: 100 FL (ref 82–95)
MONOCYTES # BLD AUTO: 0.84 10*3/MM3 (ref 0.19–1.3)
MONOCYTES NFR BLD AUTO: 9.2 % (ref 4–12)
NEUTROPHILS # BLD AUTO: 6.65 10*3/MM3 (ref 1.87–8.4)
NEUTROPHILS NFR BLD AUTO: 73 % (ref 39–78)
PLATELET # BLD AUTO: 147 10*3/MM3 (ref 130–400)
PMV BLD AUTO: 12.4 FL (ref 6–12)
POTASSIUM BLD-SCNC: 3.4 MMOL/L (ref 3.5–5.3)
RBC # BLD AUTO: 4.68 10*6/MM3 (ref 4.8–5.9)
SODIUM BLD-SCNC: 139 MMOL/L (ref 135–145)
WBC NRBC COR # BLD: 9.11 10*3/MM3 (ref 4.8–10.8)

## 2017-02-15 PROCEDURE — 97535 SELF CARE MNGMENT TRAINING: CPT | Performed by: OCCUPATIONAL THERAPIST

## 2017-02-15 PROCEDURE — 97116 GAIT TRAINING THERAPY: CPT

## 2017-02-15 PROCEDURE — 80048 BASIC METABOLIC PNL TOTAL CA: CPT | Performed by: FAMILY MEDICINE

## 2017-02-15 PROCEDURE — 94640 AIRWAY INHALATION TREATMENT: CPT

## 2017-02-15 PROCEDURE — 85025 COMPLETE CBC W/AUTO DIFF WBC: CPT | Performed by: FAMILY MEDICINE

## 2017-02-15 RX ORDER — ACETAMINOPHEN 325 MG/1
650 TABLET ORAL EVERY 6 HOURS PRN
Refills: 0 | Status: ON HOLD
Start: 2017-02-15 | End: 2020-12-07

## 2017-02-15 RX ORDER — DILTIAZEM HYDROCHLORIDE 180 MG/1
180 CAPSULE, COATED, EXTENDED RELEASE ORAL
Status: ON HOLD
Start: 2017-02-15 | End: 2020-12-07

## 2017-02-15 RX ORDER — POTASSIUM CHLORIDE 20MEQ/15ML
40 LIQUID (ML) ORAL ONCE
Status: COMPLETED | OUTPATIENT
Start: 2017-02-15 | End: 2017-02-15

## 2017-02-15 RX ORDER — LANOLIN ALCOHOL/MO/W.PET/CERES
100 CREAM (GRAM) TOPICAL DAILY
Start: 2017-02-15

## 2017-02-15 RX ADMIN — HYDROCODONE BITARTRATE AND ACETAMINOPHEN 1 TABLET: 5; 325 TABLET ORAL at 08:56

## 2017-02-15 RX ADMIN — ESCITALOPRAM 20 MG: 10 TABLET, FILM COATED ORAL at 08:55

## 2017-02-15 RX ADMIN — BISOPROLOL FUMARATE 10 MG: 10 TABLET ORAL at 08:55

## 2017-02-15 RX ADMIN — RIVAROXABAN 20 MG: 20 TABLET, FILM COATED ORAL at 08:55

## 2017-02-15 RX ADMIN — DILTIAZEM HYDROCHLORIDE 180 MG: 180 CAPSULE, COATED, EXTENDED RELEASE ORAL at 08:55

## 2017-02-15 RX ADMIN — POTASSIUM CHLORIDE 40 MEQ: 20 SOLUTION ORAL at 13:42

## 2017-02-15 NOTE — PLAN OF CARE
Problem: Patient Care Overview (Adult)  Goal: Plan of Care Review  Outcome: Ongoing (interventions implemented as appropriate)    02/15/17 0359   Coping/Psychosocial Response Interventions   Plan Of Care Reviewed With patient   Patient Care Overview   Progress progress toward functional goals as expected   Outcome Evaluation   Outcome Summary/Follow up Plan alert and orinted, vss, ativan given due to pt anxious wanting a drink of gen or whiskey, no tremors noted        Goal: Adult Individualization and Mutuality  Outcome: Ongoing (interventions implemented as appropriate)    Problem: Arrhythmia/Dysrhythmia (Symptomatic) (Adult)  Goal: Signs and Symptoms of Listed Potential Problems Will be Absent or Manageable (Arrhythmia/Dysrhythmia)  Outcome: Ongoing (interventions implemented as appropriate)    Problem: Fall Risk (Adult)  Goal: Absence of Falls  Outcome: Ongoing (interventions implemented as appropriate)    Problem: Pressure Ulcer Risk (Eric Scale) (Adult,Obstetrics,Pediatric)  Goal: Skin Integrity  Outcome: Outcome(s) achieved Date Met:  02/15/17

## 2017-02-15 NOTE — PROGRESS NOTES
LOS: 2 days   Patient Care Team:  Katalina Freeman DO as PCP - General (Family Medicine)  Carlos Ansari MD as Consulting Physician (Cardiology)    Chief Complaint: Shortness of breath and falls    Subjective   Feeling much better  No chest pain or excessive shortness of breath  No new complaints    Interval History: Improved overall    Patient Complaints:  Chief Complaint   Patient presents with   • Fall     friend reports he left pt last night around 2300 and when he checke don him this am pt was found laying in the bathroom floor, pt is unaware of what time he fell, pt does deny a loc and has a hx of falling    • Shoulder Pain   • Rib Pain     Denies chest pain. Denies excessive shortness of breath. Denies abdominal pain, nausea vomiting or diarrhoea.    Telemetry: no malignant arrhythmia. No significant pauses. Controlled artes    History taken from: Patient and chart  Review of Systems:    The following systems were reviewed and negative; ENT, respiratory,  gastrointestinal, integument, hematologic / lymphatic, neurological, behavioral/psych and allergies / immunologic    Labs:    WBC WBC   Date Value Ref Range Status   02/14/2017 9.73 4.80 - 10.80 10*3/mm3 Final   02/13/2017 12.12 (H) 4.80 - 10.80 10*3/mm3 Final   02/12/2017 12.97 (H) 4.80 - 10.80 10*3/mm3 Final   02/12/2017 15.59 (H) 4.80 - 10.80 10*3/mm3 Final      HGB HEMOGLOBIN   Date Value Ref Range Status   02/14/2017 15.9 14.0 - 18.0 g/dL Final   02/13/2017 16.1 14.0 - 18.0 g/dL Final   02/12/2017 16.8 14.0 - 18.0 g/dL Final   02/12/2017 18.4 (H) 14.0 - 18.0 g/dL Final      HCT HEMATOCRIT   Date Value Ref Range Status   02/14/2017 47.2 40.0 - 52.0 % Final   02/13/2017 47.2 40.0 - 52.0 % Final   02/12/2017 48.9 40.0 - 52.0 % Final   02/12/2017 53.5 (H) 40.0 - 52.0 % Final      Platlets PLATELETS   Date Value Ref Range Status   02/14/2017 131 130 - 400 10*3/mm3 Final   02/13/2017 124 (L) 130 - 400 10*3/mm3 Final   02/12/2017 133 130 - 400 10*3/mm3  Final   02/12/2017 147 130 - 400 10*3/mm3 Final      MCV MCV   Date Value Ref Range Status   02/14/2017 100.6 (H) 82.0 - 95.0 fL Final   02/13/2017 99.8 (H) 82.0 - 95.0 fL Final   02/12/2017 99.8 (H) 82.0 - 95.0 fL Final   02/12/2017 100.9 (H) 82.0 - 95.0 fL Final        Results from last 7 days  Lab Units 02/14/17  0430 02/13/17  0452 02/12/17  1647 02/12/17  0919   SODIUM mmol/L 140 139 139 140   POTASSIUM mmol/L 3.4* 3.5 3.8 4.7   CHLORIDE mmol/L 108 108 106 105   TOTAL CO2 mmol/L 26.0 26.0 24.0 23.0*   BUN mg/dL 13 15 17 17   CREATININE mg/dL 0.79 0.83 1.04 1.26   CALCIUM mg/dL 10.1 10.2 10.3 10.8*   BILIRUBIN mg/dL  --   --   --  2.5*   ALK PHOS U/L  --   --   --  112   ALT (SGPT) U/L  --   --   --  24   AST (SGOT) U/L  --   --   --  37   GLUCOSE mg/dL 93 106* 96 98   No results found for: CKTOTAL, CKMB, CKMBINDEX, TROPONINI, TROPONINT  PT/INR:    PROTIME   Date Value Ref Range Status   02/12/2017 17.2 (H) 11.9 - 14.6 Seconds Final   /  INR   Date Value Ref Range Status   02/12/2017 1.36 (H) 0.91 - 1.09 Final       Imaging Results (last 72 hours)     Procedure Component Value Units Date/Time    XR Chest 1 View [[id number]] Collected:  02/12/17 1003     Updated:  02/12/17 1006    Narrative:       EXAMINATION:  XR CHEST 1 VW-  2/12/2017 9:39 AM CST     HISTORY: Shortness of breath. Rib pain.     COMPARISON: 10/21/2014.     FINDINGS:  There is poor inspiration. There is patchy infiltrate in the  perihilar regions and lung bases. There is mild cardiomegaly. There is a  pacemaker on the left. There are old rib fractures on the right.       Impression:       1. Poor inspiration.  2. Patchy infiltrate in the perihilar regions and lung bases. This may  represent mild edema or pneumonia.  3. Mild cardiomegaly.        This report was finalized on 02/12/2017 10:04 by Dr. Jett Gore MD.    CT Head Without Contrast [[id number]] Collected:  02/12/17 1043     Updated:  02/12/17 1048    Narrative:       EXAMINATION:  CT HEAD  WO CONTRAST-  2/12/2017 10:09 AM CST     HISTORY: The patient fell and hit the head. Difficulty walking.     TECHNIQUE: Multiple axial images were obtained through the brain without  contrast infusion. Multiplanar images were reconstructed.     DLP: 647 mGy-cm. Automated dosage control was utilized.     COMPARISON: 10/21/2014.     FINDINGS: There are no hemorrhage, edema or mass effect. There is mild  to moderate atrophy with associated ventricular prominence. There are  chronic lacunar infarcts in both of the thalami. Low density in the  white matter is nonspecific and most likely due to chronic small vessel  disease. There is mild mucosal thickening in the right ethmoid region.  The other visualized paranasal sinuses and mastoid air cells are clear.  Vascular calcification is noted.       Impression:       1. No hemorrhage, edema or mass effect.  2. Mild to moderate atrophy with associated ventricular prominence.  3. Chronic-appearing lacunar infarcts in the thalami bilaterally.  4. Low density in the white matter is nonspecific and likely due to  chronic small vessel disease. Vascular calcification is noted.  5. Mild mucosal thickening in the right ethmoid region.     The full report of this exam was immediately signed and available to the  emergency room. The patient is currently in the emergency room.        This report was finalized on 02/12/2017 10:45 by Dr. Jett Gore MD.    CT Chest Without Contrast [[id number]] Collected:  02/12/17 1046     Updated:  02/12/17 1051    Narrative:       EXAMINATION:  CT CHEST WO CONTRAST-  2/12/2017 10:09 AM CST     HISTORY: Multiple falling episodes. No other history is given for this  study.     COMPARISON: No comparison study.     DLP: 467 mGy-cm. Automated dosage control was utilized.     TECHNIQUE: Spiral CT was performed of the chest without contrast.  Multiplanar images were reconstructed.     FINDINGS: There is atheromatous disease of the thoracic aorta  and  coronary arteries. Heart size appears to be normal. There are no  enlarged mediastinal lymph nodes. There is mild dependent atelectasis  posteriorly in both lungs. There is no dense consolidation. There is  mild bullous disease. There are degenerative changes of the spine.  Chronic appearing compression fractures are noted.       Impression:       1. Atheromatous disease of the thoracic aorta and coronary arteries.  2. Mild dependent atelectasis posteriorly in both lungs. No dense  consolidation. Mild bullous disease.  3. Mild ectasia of the thoracic aorta measuring about 4.6 cm in  diameter.     The full report of this exam was immediately signed and available to the  emergency room. The patient is currently in the emergency room.        This report was finalized on 02/12/2017 10:49 by Dr. Jett Gore MD.          Objective     Medication Review:   Current Facility-Administered Medications   Medication Dose Route Frequency Provider Last Rate Last Dose   • acetaminophen (TYLENOL) tablet 650 mg  650 mg Oral Q4H PRN Parag Stokes, DO       • aluminum-magnesium hydroxide-simethicone (MAALOX/MYLANTA) suspension 30 mL  30 mL Oral Q6H PRN Parag Stokes DO       • bisacodyl (DULCOLAX) EC tablet 5 mg  5 mg Oral Daily PRN Parag Stokes DO       • bisoprolol (ZEBeta) tablet 10 mg  10 mg Oral Daily Parag Stokes DO   10 mg at 02/14/17 0855   • diltiaZEM CD (CARDIZEM CD) 24 hr capsule 180 mg  180 mg Oral Q24H Parag Stokes, DO   180 mg at 02/14/17 1537   • donepezil (ARICEPT) tablet 5 mg  5 mg Oral Nightly Parag Stokes DO   5 mg at 02/13/17 2122   • escitalopram (LEXAPRO) tablet 20 mg  20 mg Oral Daily Parag Stokes DO   20 mg at 02/14/17 0855   • HYDROcodone-acetaminophen (NORCO) 5-325 MG per tablet 1 tablet  1 tablet Oral Q4H PRN Parag Stokes DO   1 tablet at 02/14/17 0416   • LORazepam (ATIVAN) tablet 0.5 mg  0.5 mg Oral Q2H PRN Parag Stokes DO        Or   •  "LORazepam (ATIVAN) injection 0.5 mg  0.5 mg Intravenous Q2H PRN Parag Stokes DO        Or   • LORazepam (ATIVAN) tablet 1 mg  1 mg Oral Q1H PRN Parag Stokes DO        Or   • LORazepam (ATIVAN) injection 1 mg  1 mg Intravenous Q1H PRN Parag Stokes DO        Or   • LORazepam (ATIVAN) injection 1 mg  1 mg Intravenous Q15 Min PRN Parag Stokes DO        Or   • LORazepam (ATIVAN) injection 1 mg  1 mg Intramuscular Q15 Min PRN Parag Stokse DO       • nicotine (NICODERM CQ) 21 MG/24HR patch 1 patch  1 patch Transdermal Q24H Parag Stokes DO   1 patch at 02/14/17 1545   • ondansetron (ZOFRAN) injection 4 mg  4 mg Intravenous Q6H PRN Parag Stokes DO       • oxyCODONE-acetaminophen (PERCOCET) 7.5-325 MG per tablet 2 tablet  2 tablet Oral Q4H PRN Parag Stokes DO       • pravastatin (PRAVACHOL) tablet 20 mg  20 mg Oral Nightly Carlos Ansari MD       • rivaroxaban (XARELTO) tablet 20 mg  20 mg Oral Daily Parag Stokes DO   20 mg at 02/14/17 0855   • sodium chloride 0.9 % flush 1-10 mL  1-10 mL Intravenous PRN Parag Stokes DO       • sodium chloride 0.9 % flush 10 mL  10 mL Intravenous PRN Laz Joshi Jr., MD       • tiotropium (SPIRIVA) 18 MCG per inhalation capsule 1 capsule  1 capsule Inhalation Daily - RT Parag Stokes DO   1 capsule at 02/14/17 0842       Vital Sign Min/Max for last 24 hours  Temp  Min: 97.1 °F (36.2 °C)  Max: 98.1 °F (36.7 °C)   BP  Min: 106/54  Max: 158/104   Pulse  Min: 94  Max: 102   Resp  Min: 17  Max: 20   SpO2  Min: 91 %  Max: 96 %   No Data Recorded   Weight  Min: 191 lb 2 oz (86.7 kg)  Max: 191 lb 2 oz (86.7 kg)     Flowsheet Rows         First Filed Value    Admission Height  69\" (175.3 cm) Documented at 02/12/2017 0901    Admission Weight  190 lb (86.2 kg) Documented at 02/12/2017 0901          Physical Exam:  Ears ears appear intact with no abnormalities noted  Nose nares normal, septum midline, mucosa normal and no " drainage  Neck suppple, trachea midline, no thyromegaly, no carotid bruit and no JVD  Back no kyphosis present, no scoliosis present, no skin lesions, erythema, or scars, no tenderness to percussion or palpation and range of motion normal  Lungs respirations regular, respirations even and respirations unlabored  Heart normal S1, S2,  2/6 pansystolic murmur in the left sternal border,  Irregular, no rub and no click  Abdomen normal bowel sounds, no masses, no hepatomegaly, no splenomegaly, no guarding and no rebound tenderness  Skin no bleeding, bruising or rash     Results Review:   I reviewed the patient's new clinical results.  I reviewed the patient's new imaging results and agree with the interpretation.  I reviewed the patient's other test results and agree with the interpretation  I personally viewed and interpreted the patient's EKG/Telemetry data  Discussed with patient    Medication Review: Performed    Assessment/Plan     Principal Problem:    Chronic atrial fibrillation with RVR  Active Problems:    Polycythemia    COPD (chronic obstructive pulmonary disease)    Alcohol abuse    Multiple falls    Plan  Agree with oral rate control agents  No long term anticoagulation due to repeated falls with injury  Supportive care  Telemetry  Optimal medical therapy  Deep vein thrombosis prophylaxis   Low salt cardiac diet  Carlos Ansari MD  02/14/17  10:20 PM

## 2017-02-15 NOTE — PLAN OF CARE
Problem: Patient Care Overview (Adult)  Goal: Plan of Care Review  Outcome: Ongoing (interventions implemented as appropriate)    02/15/17 0951   Coping/Psychosocial Response Interventions   Plan Of Care Reviewed With patient   Patient Care Overview   Progress progress towards functional goals is fair   Outcome Evaluation   Outcome Summary/Follow up Plan OT tx completed. Min A with UB ADL. Mod to Max A with LB ADL. Min A for transfers and mobility with a RW. CGA for bed mobility. Pt is a high fall risk and was incontinent urine prior to tx. Anticipated d/c is home with 24 hour care or SNF.

## 2017-02-15 NOTE — PLAN OF CARE
Problem: Patient Care Overview (Adult)  Goal: Plan of Care Review  Outcome: Ongoing (interventions implemented as appropriate)    02/15/17 1039   Coping/Psychosocial Response Interventions   Plan Of Care Reviewed With patient   Patient Care Overview   Progress progress towards functional goals is fair   Outcome Evaluation   Outcome Summary/Follow up Plan Pt. was more lethargic this morning, but participated. Pt. was cga-min for bed mobilty, transfers, and gait. Pt. walked 80' with rwx. Pt. required cues to take longer steps and for walker placement. Pt. would benefit from further PT for strength and balance.

## 2017-02-15 NOTE — PROGRESS NOTES
Continued Stay Note   [facility]     Patient Name: Ezequiel Abdalla  MRN: [id number]  Today's Date: 2/15/2017    Admit Date: 2/12/2017          Discharge Plan       02/15/17 1133    Case Management/Social Work Plan    Plan PT  has a bed offer with [facility]. PT's guardian has selected [facility]. PT may dc to SNF when medically ready. PT's Guardian will transport PT to SNF.  [facility] [phone] phone [phone] fax    Patient/Family In Agreement With Plan yes              Discharge Codes     None            FARHANA Sebastian

## 2017-02-15 NOTE — DISCHARGE SUMMARY
[facility] Medicine Services  DISCHARGE SUMMARY       Date of Admission: 2/12/2017  Date of Discharge:  2/15/2017  Primary Care Physician: Katalina Freeman DO    Presenting Problem/History of Present Illness:  Paroxysmal atrial fibrillation [I48.0]     Final Discharge Diagnoses:  Hospital Problem List     * (Principal)Chronic atrial fibrillation with RVR    Polycythemia    COPD (chronic obstructive pulmonary disease)    Alcohol abuse    Multiple falls          Consults: Dr. Ansari with Cardiology    Procedures Performed:   Imaging Results (last 7 days)     Procedure Component Value Units Date/Time    XR Chest 1 View [[id number]] Collected:  02/12/17 1003     Updated:  02/12/17 1006    Narrative:       EXAMINATION:  XR CHEST 1 VW-  2/12/2017 9:39 AM CST     HISTORY: Shortness of breath. Rib pain.     COMPARISON: 10/21/2014.     FINDINGS:  There is poor inspiration. There is patchy infiltrate in the  perihilar regions and lung bases. There is mild cardiomegaly. There is a  pacemaker on the left. There are old rib fractures on the right.       Impression:       1. Poor inspiration.  2. Patchy infiltrate in the perihilar regions and lung bases. This may  represent mild edema or pneumonia.  3. Mild cardiomegaly.        This report was finalized on 02/12/2017 10:04 by Dr. Jett Gore MD.    CT Head Without Contrast [[id number]] Collected:  02/12/17 1043     Updated:  02/12/17 1048    Narrative:       EXAMINATION:  CT HEAD WO CONTRAST-  2/12/2017 10:09 AM CST     HISTORY: The patient fell and hit the head. Difficulty walking.     TECHNIQUE: Multiple axial images were obtained through the brain without  contrast infusion. Multiplanar images were reconstructed.     DLP: 647 mGy-cm. Automated dosage control was utilized.     COMPARISON: 10/21/2014.     FINDINGS: There are no hemorrhage, edema or mass effect. There is mild  to moderate atrophy with associated ventricular prominence.  There are  chronic lacunar infarcts in both of the thalami. Low density in the  white matter is nonspecific and most likely due to chronic small vessel  disease. There is mild mucosal thickening in the right ethmoid region.  The other visualized paranasal sinuses and mastoid air cells are clear.  Vascular calcification is noted.       Impression:       1. No hemorrhage, edema or mass effect.  2. Mild to moderate atrophy with associated ventricular prominence.  3. Chronic-appearing lacunar infarcts in the thalami bilaterally.  4. Low density in the white matter is nonspecific and likely due to  chronic small vessel disease. Vascular calcification is noted.  5. Mild mucosal thickening in the right ethmoid region.     The full report of this exam was immediately signed and available to the  emergency room. The patient is currently in the emergency room.        This report was finalized on 02/12/2017 10:45 by Dr. Jett Gore MD.    CT Chest Without Contrast [[id number]] Collected:  02/12/17 1046     Updated:  02/12/17 1051    Narrative:       EXAMINATION:  CT CHEST WO CONTRAST-  2/12/2017 10:09 AM CST     HISTORY: Multiple falling episodes. No other history is given for this  study.     COMPARISON: No comparison study.     DLP: 467 mGy-cm. Automated dosage control was utilized.     TECHNIQUE: Spiral CT was performed of the chest without contrast.  Multiplanar images were reconstructed.     FINDINGS: There is atheromatous disease of the thoracic aorta and  coronary arteries. Heart size appears to be normal. There are no  enlarged mediastinal lymph nodes. There is mild dependent atelectasis  posteriorly in both lungs. There is no dense consolidation. There is  mild bullous disease. There are degenerative changes of the spine.  Chronic appearing compression fractures are noted.       Impression:       1. Atheromatous disease of the thoracic aorta and coronary arteries.  2. Mild dependent atelectasis posteriorly in both  lungs. No dense  consolidation. Mild bullous disease.  3. Mild ectasia of the thoracic aorta measuring about 4.6 cm in  diameter.     The full report of this exam was immediately signed and available to the  emergency room. The patient is currently in the emergency room.        This report was finalized on 02/12/2017 10:49 by Dr. Jett Gore MD.          Pertinent Test Results:   Lab Results (last 24 hours)     Procedure Component Value Units Date/Time    CBC & Differential [[id number]] Collected:  02/15/17 0359    Specimen:  Blood Updated:  02/15/17 0451    Narrative:       The following orders were created for panel order CBC & Differential.  Procedure                               Abnormality         Status                     ---------                               -----------         ------                     CBC Auto Differential[[id number]]         Abnormal            Final result                 Please view results for these tests on the individual orders.    CBC Auto Differential [[id number]]  (Abnormal) Collected:  02/15/17 0359    Specimen:  Blood Updated:  02/15/17 0451     WBC 9.11 10*3/mm3      RBC 4.68 (L) 10*6/mm3      Hemoglobin 15.7 g/dL      Hematocrit 46.8 %      .0 (H) fL      MCH 33.5 (H) pg      MCHC 33.5 g/dL      RDW 14.5 %      RDW-SD 53.0 fl      MPV 12.4 (H) fL      Platelets 147 10*3/mm3      Neutrophil % 73.0 %      Lymphocyte % 16.5 %      Monocyte % 9.2 %      Eosinophil % 0.8 %      Basophil % 0.1 %      Immature Grans % 0.4 %      Neutrophils, Absolute 6.65 10*3/mm3      Lymphocytes, Absolute 1.50 10*3/mm3      Monocytes, Absolute 0.84 10*3/mm3      Eosinophils, Absolute 0.07 10*3/mm3      Basophils, Absolute 0.01 10*3/mm3      Immature Grans, Absolute 0.04 (H) 10*3/mm3     Basic Metabolic Panel [[id number]]  (Abnormal) Collected:  02/15/17 0359    Specimen:  Blood Updated:  02/15/17 0516     Glucose 99 mg/dL      BUN 10 mg/dL      Creatinine 0.71 mg/dL      Sodium 139 mmol/L       Potassium 3.4 (L) mmol/L      Chloride 108 mmol/L      CO2 25.0 mmol/L      Calcium 9.7 mg/dL      eGFR Non African Amer 109 mL/min/1.73      BUN/Creatinine Ratio 14.1      Anion Gap 6.0 mmol/L     Narrative:       The MDRD GFR formula is only valid for adults with stable renal function between ages 18 and 70.          Chief Complaint on Day of Discharge: No complaints; denies pain    Hospital Course:  The patient is a 71 y.o. male who presented to [facility] with a chief complaint of shoulder and rib pain after sustaining a fall.  Patient is had a very unsteady gait per family which is resulted in multiple falls preceding this hospitalization.  He has a known history of atrial fibrillation, and is also anticoagulated with Xarelto on an outpatient basis.  Unfortunately, he also has a long-standing history of alcohol abuse.  His granddaughter currently is at bedside and reports that in the past he typically would drink a fifth of gin on a daily basis.  He now drinks less than that amount of hard liquor, more like a pint of gin daily, but also drinks a sixpack of beer on a daily basis as well.  During this hospitalization he has been monitored closely for any signs or symptoms of alcohol withdrawal.  He has had only one dose of Ativan, and at the present time does not appear to be experiencing any signs or symptoms of alcohol withdrawal.    Cardiology was consult to during this hospitalization as he did have A. fib with RVR.  He briefly hard a Cardizem intravenous drip.  He is now been transitioned from intravenous over to oral Cardizem.  We have also continued his outpatient beta blocker therapy.  His heart rate is much better controlled at this time.  I discussed the case with Dr. Ansari earlier this morning, and based upon his recent history of frequent falls, decision was made to discontinue his anticoagulation.  He is in need of ongoing physical therapy and occupational therapy, is too unsteady to be  "discharged back home to his previous living environment, and we have made arrangements for him to be discharged to [facility] and rehabilitation today.  He will have outpatient follow-up with his primary care provider, in addition to Dr. Ansari on an outpatient basis.  Please continue physical therapy and occupational therapy after discharge to [facility] and rehabilitation.    Nutrition also evaluated patient during this hospitalization.  Family reports that his primary diet was alcohol.  Would recommend continuing Ensure supplements at least twice daily.  Would also recommend continuing a multivitamin in addition to an oral thiamine supplement following discharge.  Plan for discharge today for ongoing rehabilitation.      Condition on Discharge:  stable    Physical Exam on Discharge:  Visit Vitals   • /82 (BP Location: Right arm, Patient Position: Lying)   • Pulse 93   • Temp 98.2 °F (36.8 °C) (Tympanic)   • Resp 18   • Ht 69\" (175.3 cm)   • Wt 191 lb 2 oz (86.7 kg)   • SpO2 93%   • BMI 28.22 kg/m2     Physical Exam      Discharge Disposition:  Skilled Nursing Facility (DC - External)    Discharge Medications:   Ezequiel Abdalla   Home Medication Instructions ALINE:[id number]    Printed on:02/15/17 9038   Medication Information                      acetaminophen (TYLENOL) 325 MG tablet  Take 2 tablets by mouth Every 6 (Six) Hours As Needed for mild pain (1-3).             bisoprolol (ZEBeta) 10 MG tablet  Take 10 mg by mouth Daily.             diltiaZEM CD (CARDIZEM CD) 180 MG 24 hr capsule  Take 1 capsule by mouth Daily.             donepezil (ARICEPT) 5 MG tablet  Take 5 mg by mouth Every Night.             escitalopram (LEXAPRO) 20 MG tablet  Take 20 mg by mouth Daily.             pravastatin (PRAVACHOL) 20 MG tablet  Take 20 mg by mouth Daily.             tiotropium (SPIRIVA) 18 MCG per inhalation capsule  Place 1 capsule into inhaler and inhale Daily.                 Discharge " Diet: Regular diet; please add Ensure supplements/pudding BID.  Continue MVI and thiamine on outpatient basis    Activity at Discharge: walk with assistance only; fall risk    Follow-up Appointments:   Follow-up with primary care provider [facility] and rehabilitation in 1 week.  Follow-up with Dr. Ansari in the cardiology clinic in 6 weeks.    Test Results Pending at Discharge: None    Andrea Cote MD  02/15/17  12:25 PM    Time: 25 minutes

## 2017-02-16 NOTE — THERAPY DISCHARGE NOTE
Acute Care - Physical Therapy Discharge Summary  [facility]       Patient Name: Ezequiel Abdalla  : 1945  MRN: [id number]    Today's Date: 2017  Onset of Illness/Injury or Date of Surgery Date: 17    Date of Referral to PT: 17  Referring Physician: NASRIN Remy      Admit Date: 2017      PT Recommendation and Plan    Visit Dx:    ICD-10-CM ICD-9-CM   1. Paroxysmal atrial fibrillation I48.0 427.31   2. Pneumonia of lower lobe due to infectious organism, unspecified laterality J18.9 483.8   3. Impaired functional mobility, balance, gait, and endurance Z74.09 V49.89   4. Impaired mobility and ADLs Z74.09 799.89             Outcome Measures       02/15/17 1039 02/15/17 0852 17 0938    How much help from another person do you currently need...    Turning from your back to your side while in flat bed without using bedrails? 3  -MF  3  -MF    Moving from lying on back to sitting on the side of a flat bed without bedrails? 3  -MF  3  -MF    Moving to and from a bed to a chair (including a wheelchair)? 3  -MF  3  -MF    Standing up from a chair using your arms (e.g., wheelchair, bedside chair)? 3  -MF  3  -MF    Climbing 3-5 steps with a railing? 2  -MF  2  -MF    To walk in hospital room? 3  -MF  3  -MF    AM-PAC 6 Clicks Score 17  -MF  17  -MF    How much help from another is currently needed...    Putting on and taking off regular lower body clothing?  2  -TR     Bathing (including washing, rinsing, and drying)  2  -TR     Toileting (which includes using toilet bed pan or urinal)  2  -TR     Putting on and taking off regular upper body clothing  3  -TR     Taking care of personal grooming (such as brushing teeth)  3  -TR     Eating meals  4  -TR     Score  16  -TR     Functional Assessment    Outcome Measure Options AM-PAC 6 Clicks Basic Mobility (PT)  -MF AM-PAC 6 Clicks Daily Activity (OT)  -TR AM-PAC 6 Clicks Basic Mobility (PT)  -MF      17 1305 17 1040       How much  help from another person do you currently need...    Turning from your back to your side while in flat bed without using bedrails?  3  -LC (r) AK (t) LC (c)     Moving from lying on back to sitting on the side of a flat bed without bedrails?  3  -LC (r) AK (t) LC (c)     Moving to and from a bed to a chair (including a wheelchair)?  2  -LC (r) AK (t) LC (c)     Standing up from a chair using your arms (e.g., wheelchair, bedside chair)?  3  -LC (r) AK (t) LC (c)     Climbing 3-5 steps with a railing?  1  -LC (r) AK (t) LC (c)     To walk in hospital room?  3  -LC (r) AK (t) LC (c)     AM-PAC 6 Clicks Score  15  -LC (r) AK (t)     How much help from another is currently needed...    Putting on and taking off regular lower body clothing? 2  -TR      Bathing (including washing, rinsing, and drying) 2  -TR      Toileting (which includes using toilet bed pan or urinal) 3  -TR      Putting on and taking off regular upper body clothing 3  -TR      Taking care of personal grooming (such as brushing teeth) 3  -TR      Eating meals 4  -TR      Score 17  -TR      Functional Assessment    Outcome Measure Options AM-PAC 6 Clicks Daily Activity (OT)  -TR AM-PAC 6 Clicks Basic Mobility (PT)  -LC (r) AK (t) LC (c)       User Key  (r) = Recorded By, (t) = Taken By, (c) = Cosigned By    Initials Name Provider Type    KEVIN Hernandez, PTA Physical Therapy Assistant    TAYE Sow, PT DPT Physical Therapist    TR Cleo Hernandez, OTR/L Occupational Therapist    AK Jayesh Higgins, PT Student PT Student                      IP PT Goals       02/16/17 0959 02/13/17 1040 02/13/17 0940    Bed Mobility PT LTG    Bed Mobility PT LTG, Date Established   02/13/17  -LC (r) AK (t) LC (c)    Bed Mobility PT LTG, Time to Achieve   2 wks  -LC (r) AK (t) LC (c)    Bed Mobility PT LTG, Activity Type   all bed mobility  -LC (r) AK (t) LC (c)    Bed Mobility PT LTG, [place] Level   contact guard assist  -LC (r) AK (t) TAYE (c)    Bed  Mobility PT Goal  LTG, Assist Device   bed rails  -LC (r) AK (t) LC (c)    Bed Mobility PT LTG, Date Goal Reviewed 02/16/17  -CORY      Bed Mobility PT LTG, Outcome goal not met  -CORY      Bed Mobility PT LTG, Reason Goal Not Met discharged from facility  -CORY      Transfer Training PT LTG    Transfer Training PT LTG, Date Established  02/13/17  -LC (r) AK (t) LC (c)     Transfer Training PT LTG, Time to Achieve  2 wks  -LC (r) AK (t) LC (c)     Transfer Training PT LTG, Activity Type  bed to chair /chair to bed;sit to stand/stand to sit  -LC (r) AK (t) LC (c)     Transfer Training PT LTG, [place] Level  minimum assist (75% patient effort);contact guard assist  -LC (r) AK (t) LC (c)     Transfer Training PT LTG, Assist Device  walker, rolling  -LC (r) AK (t) LC (c)     Transfer Training PT  LTG, Date Goal Reviewed 02/16/17  -CORY      Transfer Training PT LTG, Outcome goal met  -CORY      Gait Training PT LTG    Gait Training Goal PT LTG, Date Established  02/13/17  -LC (r) AK (t) LC (c)     Gait Training Goal PT LTG, Time to Achieve  2 wks  -LC (r) AK (t) LC (c)     Gait Training Goal PT LTG, [place] Level  minimum assist (75% patient effort)  -LC (r) AK (t) LC (c)     Gait Training Goal PT LTG, Assist Device  walker, rolling  -LC (r) AK (t) LC (c)     Gait Training Goal PT LTG, Distance to Achieve  80 ft.  -LC (r) AK (t) LC (c)     Gait Training Goal PT LTG, Date Goal Reviewed 02/16/17  -CORY      Gait Training Goal PT LTG, Outcome goal met  -CORY      Strength Goal PT LTG    Strength Goal PT LTG, Date Established  02/13/17  -LC (r) AK (t) LC (c)     Strength Goal PT LTG, Time to Achieve  2 wks  -LC (r) AK (t) LC (c)     Strength Goal PT LTG, Functional Goal  pt. will complete 5 sit to stands CGA with RW.  -LC (r) AK (t) LC (c)     Strength Goal PT LTG, Date Goal Reviewed 02/16/17  -CORY      Strength Goal PT LTG, Outcome goal not met  -CORY      Strength Goal PT LTG, Reason Goal Not Met discharged from facility  -CORY         User Key  (r) = Recorded By, (t) = Taken By, (c) = Cosigned By    Initials Name Provider Type    CORY James, PTA Physical Therapy Assistant    TAYE Sow, PT DPT Physical Therapist    FRANNIE Higgins, PT Student PT Student              PT Discharge Summary  Anticipated Discharge Disposition: skilled nursing facility  Reason for Discharge: Discharge from facility  Outcomes Achieved: Patient able to partially acheive established goals, Refer to plan of care for updates on goals achieved  Discharge Destination: SNF      Natanael James PTA   2/16/2017

## 2017-02-16 NOTE — PLAN OF CARE
Problem: Inpatient Physical Therapy  Goal: Bed Mobility Goal LTG- PT  Outcome: Unable to achieve outcome(s) by discharge Date Met:  02/16/17 02/13/17 0940 02/16/17 0959   Bed Mobility PT LTG   Bed Mobility PT LTG, Date Established 02/13/17 --    Bed Mobility PT LTG, Time to Achieve 2 wks --    Bed Mobility PT LTG, Activity Type all bed mobility --    Bed Mobility PT LTG, [place] Level contact guard assist --    Bed Mobility PT Goal LTG, Assist Device bed rails --    Bed Mobility PT LTG, Date Goal Reviewed --  02/16/17   Bed Mobility PT LTG, Outcome --  goal not met   Bed Mobility PT LTG, Reason Goal Not Met --  discharged from facility       Goal: Transfer Training Goal 1 LTG- PT  Outcome: Outcome(s) achieved Date Met:  02/16/17 02/13/17 1040 02/16/17 0959   Transfer Training PT LTG   Transfer Training PT LTG, Date Established 02/13/17 --    Transfer Training PT LTG, Time to Achieve 2 wks --    Transfer Training PT LTG, Activity Type bed to chair /chair to bed;sit to stand/stand to sit --    Transfer Training PT LTG, [place] Level minimum assist (75% patient effort);contact guard assist --    Transfer Training PT LTG, Assist Device walker, rolling --    Transfer Training PT LTG, Date Goal Reviewed --  02/16/17   Transfer Training PT LTG, Outcome --  goal met       Goal: Gait Training Goal LTG- PT  Outcome: Outcome(s) achieved Date Met:  02/16/17 02/13/17 1040 02/16/17 0959   Gait Training PT LTG   Gait Training Goal PT LTG, Date Established 02/13/17 --    Gait Training Goal PT LTG, Time to Achieve 2 wks --    Gait Training Goal PT LTG, [place] Level minimum assist (75% patient effort) --    Gait Training Goal PT LTG, Assist Device walker, rolling --    Gait Training Goal PT LTG, Distance to Achieve 80 ft. --    Gait Training Goal PT LTG, Date Goal Reviewed --  02/16/17   Gait Training Goal PT LTG, Outcome --  goal met       Goal: Strength Goal LTG- PT  Outcome: Unable to achieve outcome(s) by  discharge Date Met:  02/16/17 02/13/17 1040 02/16/17 0959   Strength Goal PT LTG   Strength Goal PT LTG, Date Established 02/13/17 --    Strength Goal PT LTG, Time to Achieve 2 wks --    Strength Goal PT LTG, Functional Goal pt. will complete 5 sit to stands CGA with RW. --    Strength Goal PT LTG, Date Goal Reviewed --  02/16/17   Strength Goal PT LTG, Outcome --  goal not met   Strength Goal PT LTG, Reason Goal Not Met --  discharged from facility

## 2017-02-16 NOTE — THERAPY DISCHARGE NOTE
Acute Care - Occupational Therapy Discharge Summary  [facility]     Patient Name: Ezequiel Abdalla  : 1945  MRN: [id number]    Today's Date: 2017  Onset of Illness/Injury or Date of Surgery Date: 17    Date of Referral to OT: 17  Referring Physician: NASRIN Remy      Admit Date: 2017        OT Recommendation and Plan    Visit Dx:    ICD-10-CM ICD-9-CM   1. Paroxysmal atrial fibrillation I48.0 427.31   2. Pneumonia of lower lobe due to infectious organism, unspecified laterality J18.9 483.8   3. Impaired functional mobility, balance, gait, and endurance Z74.09 V49.89   4. Impaired mobility and ADLs Z74.09 799.89                     OT Goals       17 0719 17 1349       Transfer Training OT LTG    Transfer Training OT LTG, Date Established  17  -TR     Transfer Training OT LTG, Time to Achieve  by discharge  -TR     Transfer Training OT LTG, Activity Type  bed to chair /chair to bed;sit to stand/stand to sit;toilet  -TR     Transfer Training OT LTG, [place] Level  supervision required  -TR     Transfer Training OT LTG, Assist Device  walker, rolling  -TR     Transfer Training OT LTG, Additional Goal  with good safety awareness  -TR     Transfer Training OT LTG, Date Goal Reviewed 17  -TS      Transfer Training OT LTG, Outcome goal not met  -TS      Transfer Training OT LTG, Reason Goal Not Met discharged from facility  -TS      Safety Awareness OT LTG    Safety Awareness OT LTG, Date Established  17  -TR     Safety Awareness OT LTG, Time to Achieve  by discharge  -TR     Safety Awareness OT LTG, Activity Type  good safety awareness;with ADL's  -TR     Safety Awareness OT LTG, [place] Level  min verbal cues  -TR     Safety Awareness OT LTG, Additional Goal  Caregivers will be independent with fall prevention techniques.   -TR     Safety Awareness OT LTG, Date Goal Reviewed 17  -TS      Safety Awareness OT LTG, Outcome goal not met  -TS       Safety Awareness OT LTG, Reason Goal Not Met discharged from facility  -TS      ADL OT LTG    ADL OT LTG, Date Established  02/13/17  -TR     ADL OT LTG, Time to Achieve  by discharge  -TR     ADL OT LTG, Activity Type  ADL skills  -TR     ADL OT LTG, [place] Level  standby assist  -TR     ADL OT LTG, Additional Goal  UB bathing, UB dressing, Grooming while standing and toileting.   -TR     ADL OT LTG, Date Goal Reviewed 02/16/17  -TS      ADL OT LTG, Outcome goal not met  -TS      ADL OT LTG, Reason Goal Not Met discharged from facility  -TS        User Key  (r) = Recorded By, (t) = Taken By, (c) = Cosigned By    Initials Name Provider Type     Cleo Reddy DEGROOT/L Occupational Therapy Assistant    TR Cleo Hernandez OTR/L Occupational Therapist                Outcome Measures       02/15/17 1039 02/15/17 0852 02/14/17 0938    How much help from another person do you currently need...    Turning from your back to your side while in flat bed without using bedrails? 3  -MF  3  -MF    Moving from lying on back to sitting on the side of a flat bed without bedrails? 3  -MF  3  -MF    Moving to and from a bed to a chair (including a wheelchair)? 3  -MF  3  -MF    Standing up from a chair using your arms (e.g., wheelchair, bedside chair)? 3  -MF  3  -MF    Climbing 3-5 steps with a railing? 2  -MF  2  -MF    To walk in hospital room? 3  -MF  3  -MF    AM-PAC 6 Clicks Score 17  -MF  17  -MF    How much help from another is currently needed...    Putting on and taking off regular lower body clothing?  2  -TR     Bathing (including washing, rinsing, and drying)  2  -TR     Toileting (which includes using toilet bed pan or urinal)  2  -TR     Putting on and taking off regular upper body clothing  3  -TR     Taking care of personal grooming (such as brushing teeth)  3  -TR     Eating meals  4  -TR     Score  16  -TR     Functional Assessment    Outcome Measure Options AM-PAC 6 Clicks Basic Mobility (PT)  -MF  AM-PAC 6 Clicks Daily Activity (OT)  -TR AM-[facility] 6 Clicks Basic Mobility (PT)  -MF      02/13/17 1305 02/13/17 1040       How much help from another person do you currently need...    Turning from your back to your side while in flat bed without using bedrails?  3  -LC (r) AK (t) LC (c)     Moving from lying on back to sitting on the side of a flat bed without bedrails?  3  -LC (r) AK (t) LC (c)     Moving to and from a bed to a chair (including a wheelchair)?  2  -LC (r) AK (t) LC (c)     Standing up from a chair using your arms (e.g., wheelchair, bedside chair)?  3  -LC (r) AK (t) LC (c)     Climbing 3-5 steps with a railing?  1  -LC (r) AK (t) LC (c)     To walk in hospital room?  3  -LC (r) AK (t) LC (c)     AM-PAC 6 Clicks Score  15  -LC (r) AK (t)     How much help from another is currently needed...    Putting on and taking off regular lower body clothing? 2  -TR      Bathing (including washing, rinsing, and drying) 2  -TR      Toileting (which includes using toilet bed pan or urinal) 3  -TR      Putting on and taking off regular upper body clothing 3  -TR      Taking care of personal grooming (such as brushing teeth) 3  -TR      Eating meals 4  -TR      Score 17  -TR      Functional Assessment    Outcome Measure Options AM-PAC 6 Clicks Daily Activity (OT)  -TR AM-PAC 6 Clicks Basic Mobility (PT)  -LC (r) AK (t) LC (c)       User Key  (r) = Recorded By, (t) = Taken By, (c) = Cosigned By    Initials Name Provider Type     Rosemary Hernandez, PTA Physical Therapy Assistant    TAYE Sow, PT DPT Physical Therapist    BIN Hernandez, OTR/L Occupational Therapist    FRANNIE Higgins, PT Student PT Student              OT Discharge Summary  Reason for Discharge: Discharge from facility  Outcomes Achieved: Refer to plan of care for updates on goals achieved  Discharge Destination: SNF      Cleo Reddy, DEGROOT/ORALIA  2/16/2017

## 2017-02-16 NOTE — PLAN OF CARE
Problem: Inpatient Occupational Therapy  Goal: Transfer Training Goal 1 LTG- OT  Outcome: Unable to achieve outcome(s) by discharge Date Met:  02/16/17 02/13/17 1349 02/16/17 0719   Transfer Training OT LTG   Transfer Training OT LTG, Date Established 02/13/17 --    Transfer Training OT LTG, Time to Achieve by discharge --    Transfer Training OT LTG, Activity Type bed to chair /chair to bed;sit to stand/stand to sit;toilet --    Transfer Training OT LTG, [place] Level supervision required --    Transfer Training OT LTG, Assist Device walker, rolling --    Transfer Training OT LTG, Additional Goal with good safety awareness --    Transfer Training OT LTG, Date Goal Reviewed --  02/16/17   Transfer Training OT LTG, Outcome --  goal not met   Transfer Training OT LTG, Reason Goal Not Met --  discharged from facility       Goal: Safety Awareness Goal LTG- OT  Outcome: Unable to achieve outcome(s) by discharge Date Met:  02/16/17 02/13/17 1349 02/16/17 0719   Safety Awareness OT LTG   Safety Awareness OT LTG, Date Established 02/13/17 --    Safety Awareness OT LTG, Time to Achieve by discharge --    Safety Awareness OT LTG, Activity Type good safety awareness;with ADL's --    Safety Awareness OT LTG, [place] Level min verbal cues --    Safety Awareness OT LTG, Additional Goal Caregivers will be independent with fall prevention techniques.  --    Safety Awareness OT LTG, Date Goal Reviewed --  02/16/17   Safety Awareness OT LTG, Outcome --  goal not met   Safety Awareness OT LTG, Reason Goal Not Met --  discharged from facility       Goal: ADL Goal LTG- OT  Outcome: Unable to achieve outcome(s) by discharge Date Met:  02/16/17 02/13/17 1349 02/16/17 0719   ADL OT LTG   ADL OT LTG, Date Established 02/13/17 --    ADL OT LTG, Time to Achieve by discharge --    ADL OT LTG, Activity Type ADL skills --    ADL OT LTG, [place] Level standby assist --    ADL OT LTG, Additional Goal UB bathing, UB  dressing, Grooming while standing and toileting.  --    ADL OT LTG, Date Goal Reviewed --  02/16/17   ADL OT LTG, Outcome --  goal not met   ADL OT LTG, Reason Goal Not Met --  discharged from facility

## 2017-03-14 ENCOUNTER — APPOINTMENT (OUTPATIENT)
Dept: GENERAL RADIOLOGY | Age: 72
End: 2017-03-14
Payer: MEDICARE

## 2017-03-14 ENCOUNTER — HOSPITAL ENCOUNTER (EMERGENCY)
Age: 72
Discharge: NURSING, REHAB OR LONG-TERM CARE | End: 2017-03-14
Attending: EMERGENCY MEDICINE
Payer: MEDICARE

## 2017-03-14 VITALS
HEART RATE: 103 BPM | OXYGEN SATURATION: 96 % | RESPIRATION RATE: 14 BRPM | HEIGHT: 69 IN | SYSTOLIC BLOOD PRESSURE: 95 MMHG | BODY MASS INDEX: 28.14 KG/M2 | WEIGHT: 190 LBS | DIASTOLIC BLOOD PRESSURE: 78 MMHG | TEMPERATURE: 97.9 F

## 2017-03-14 DIAGNOSIS — S42.294A OTHER CLOSED NONDISPLACED FRACTURE OF PROXIMAL END OF RIGHT HUMERUS, INITIAL ENCOUNTER: Primary | ICD-10-CM

## 2017-03-14 PROCEDURE — 73060 X-RAY EXAM OF HUMERUS: CPT

## 2017-03-14 PROCEDURE — 99282 EMERGENCY DEPT VISIT SF MDM: CPT | Performed by: EMERGENCY MEDICINE

## 2017-03-14 PROCEDURE — 99284 EMERGENCY DEPT VISIT MOD MDM: CPT

## 2017-03-14 ASSESSMENT — ENCOUNTER SYMPTOMS: BACK PAIN: 0

## 2017-09-23 ENCOUNTER — APPOINTMENT (OUTPATIENT)
Dept: GENERAL RADIOLOGY | Facility: HOSPITAL | Age: 72
End: 2017-09-23

## 2017-09-23 ENCOUNTER — HOSPITAL ENCOUNTER (EMERGENCY)
Facility: HOSPITAL | Age: 72
Discharge: HOME | End: 2017-09-24
Admitting: FAMILY MEDICINE

## 2017-09-23 DIAGNOSIS — S70.01XA CONTUSION OF RIGHT HIP, INITIAL ENCOUNTER: Primary | ICD-10-CM

## 2017-09-23 DIAGNOSIS — S51.011A SKIN TEAR OF ELBOW WITHOUT COMPLICATION, RIGHT, INITIAL ENCOUNTER: ICD-10-CM

## 2017-09-23 PROCEDURE — 73070 X-RAY EXAM OF ELBOW: CPT

## 2017-09-23 PROCEDURE — 73502 X-RAY EXAM HIP UNI 2-3 VIEWS: CPT

## 2017-09-23 PROCEDURE — 99284 EMERGENCY DEPT VISIT MOD MDM: CPT

## 2017-09-24 VITALS
SYSTOLIC BLOOD PRESSURE: 111 MMHG | OXYGEN SATURATION: 94 % | RESPIRATION RATE: 20 BRPM | BODY MASS INDEX: 28.29 KG/M2 | WEIGHT: 191 LBS | DIASTOLIC BLOOD PRESSURE: 71 MMHG | HEIGHT: 69 IN | HEART RATE: 96 BPM | TEMPERATURE: 98.3 F

## 2017-09-24 NOTE — ED PROVIDER NOTES
Subjective   History of Present Illness  72-year-old male presents from the nursing home who had found him on the floor and he is complaining of right hip pain.  Patient is not a good historian and believes that his wife is here with him and that he was at home walking casually and then had fallen.  Patient does not usually ambulate.  Patient was found at the nursing home floor.  They do not believe he had hit his head noticed the patient.  There is no evidence of head injury.  The patient just reports he has right anterior hip pain.  There is also a superficial skin tear to his right elbow.  The patient does not complain of pain here  Review of Systems   All other systems reviewed and are negative.      Past Medical History:   Diagnosis Date   • A-fib    • Alcohol abuse    • Anxiety    • COPD (chronic obstructive pulmonary disease)    • COPD (chronic obstructive pulmonary disease)    • Hypotension    • Lung disease    • Malaise and fatigue    • Neurological disease    • Polycythemia    • SOB (shortness of breath)    • Brina-Parkinson-White syndrome        No Known Allergies    Past Surgical History:   Procedure Laterality Date   • KNEE SURGERY     • PACEMAKER IMPLANTATION      [place]    • WRIST SURGERY         Family History   Problem Relation Age of Onset   • No Known Problems Mother    • Cancer Father    • Heart disease Father    • COPD Sister        Social History     Social History   • Marital status:      Spouse name: N/A   • Number of children: N/A   • Years of education: N/A     Social History Main Topics   • Smoking status: Heavy Tobacco Smoker     Packs/day: 2.00     Years: 60.00   • Smokeless tobacco: None   • Alcohol use 16.8 oz/week     21 Cans of beer, 7 Shots of liquor per week      Comment: 2-3 BEERS, 1 GIN DAILY   • Drug use: No   • Sexual activity: No     Other Topics Concern   • None     Social History Narrative           Objective   Physical Exam   Constitutional: He is oriented to  person, place, and time. He appears well-developed and well-nourished.   HENT:   Head: Normocephalic.   Eyes: Pupils are equal, round, and reactive to light.   Neck: Normal range of motion.   Cardiovascular: Normal rate and regular rhythm.    Pulmonary/Chest: Effort normal.   Abdominal: Soft.   Musculoskeletal: Normal range of motion.   Mild tenderness to palpation lesser trochanter femur   Neurological: He is alert and oriented to person, place, and time.   Psychiatric: He has a normal mood and affect. His behavior is normal.   Nursing note and vitals reviewed.      Procedures         ED Course  ED Course                  MDM  Number of Diagnoses or Management Options  Diagnosis management comments: Negative films.  We will discharge this patient stable condition       Amount and/or Complexity of Data Reviewed  Tests in the radiology section of CPT®: reviewed and ordered    Risk of Complications, Morbidity, and/or Mortality  Presenting problems: moderate  Diagnostic procedures: moderate  Management options: moderate    Patient Progress  Patient progress: improved      Final diagnoses:   Contusion of right hip, initial encounter   Skin tear of elbow without complication, right, initial encounter            Mendez Bennett PA-C  09/24/17 0206

## 2018-04-20 LAB
ALBUMIN SERPL-MCNC: 3.6 G/DL (ref 3.5–5.2)
ALP BLD-CCNC: 113 U/L (ref 40–130)
ALT SERPL-CCNC: 7 U/L (ref 5–41)
ANION GAP SERPL CALCULATED.3IONS-SCNC: 9 MMOL/L (ref 7–19)
AST SERPL-CCNC: 11 U/L (ref 5–40)
BASOPHILS ABSOLUTE: 0 K/UL (ref 0–0.2)
BASOPHILS RELATIVE PERCENT: 0.6 % (ref 0–1)
BILIRUB SERPL-MCNC: 0.6 MG/DL (ref 0.2–1.2)
BUN BLDV-MCNC: 10 MG/DL (ref 8–23)
CALCIUM SERPL-MCNC: 10.1 MG/DL (ref 8.8–10.2)
CHLORIDE BLD-SCNC: 107 MMOL/L (ref 98–111)
CHOLESTEROL, TOTAL: 125 MG/DL (ref 160–199)
CO2: 28 MMOL/L (ref 22–29)
CREAT SERPL-MCNC: 0.7 MG/DL (ref 0.5–1.2)
EOSINOPHILS ABSOLUTE: 0.1 K/UL (ref 0–0.6)
EOSINOPHILS RELATIVE PERCENT: 1.7 % (ref 0–5)
GFR NON-AFRICAN AMERICAN: >60
GLUCOSE BLD-MCNC: 70 MG/DL (ref 74–109)
HCT VFR BLD CALC: 45.7 % (ref 42–52)
HDLC SERPL-MCNC: 48 MG/DL (ref 55–121)
HEMOGLOBIN: 14.4 G/DL (ref 14–18)
LDL CHOLESTEROL CALCULATED: 67 MG/DL
LYMPHOCYTES ABSOLUTE: 2.7 K/UL (ref 1.1–4.5)
LYMPHOCYTES RELATIVE PERCENT: 38.8 % (ref 20–40)
MCH RBC QN AUTO: 30.7 PG (ref 27–31)
MCHC RBC AUTO-ENTMCNC: 31.5 G/DL (ref 33–37)
MCV RBC AUTO: 97.4 FL (ref 80–94)
MONOCYTES ABSOLUTE: 0.5 K/UL (ref 0–0.9)
MONOCYTES RELATIVE PERCENT: 7.5 % (ref 0–10)
NEUTROPHILS ABSOLUTE: 3.6 K/UL (ref 1.5–7.5)
NEUTROPHILS RELATIVE PERCENT: 51.1 % (ref 50–65)
PDW BLD-RTO: 14.2 % (ref 11.5–14.5)
PLATELET # BLD: 180 K/UL (ref 130–400)
PMV BLD AUTO: 10.9 FL (ref 9.4–12.4)
POTASSIUM SERPL-SCNC: 4.1 MMOL/L (ref 3.5–5)
RBC # BLD: 4.69 M/UL (ref 4.7–6.1)
SODIUM BLD-SCNC: 144 MMOL/L (ref 136–145)
TOTAL PROTEIN: 5.5 G/DL (ref 6.6–8.7)
TRIGL SERPL-MCNC: 49 MG/DL (ref 0–149)
WBC # BLD: 7 K/UL (ref 4.8–10.8)

## 2018-06-09 ENCOUNTER — HOSPITAL ENCOUNTER (EMERGENCY)
Age: 73
Discharge: HOME | End: 2018-06-10
Attending: EMERGENCY MEDICINE
Payer: MEDICARE

## 2018-06-09 DIAGNOSIS — N39.0 URINARY TRACT INFECTION WITHOUT HEMATURIA, SITE UNSPECIFIED: ICD-10-CM

## 2018-06-09 DIAGNOSIS — F03.91 DEMENTIA WITH BEHAVIORAL DISTURBANCE, UNSPECIFIED DEMENTIA TYPE: Primary | ICD-10-CM

## 2018-06-09 LAB
AMPHETAMINE SCREEN, URINE: NEGATIVE
BARBITURATE SCREEN URINE: NEGATIVE
BENZODIAZEPINE SCREEN, URINE: NEGATIVE
CANNABINOID SCREEN URINE: NEGATIVE
COCAINE METABOLITE SCREEN URINE: NEGATIVE
Lab: ABNORMAL
OPIATE SCREEN URINE: POSITIVE

## 2018-06-09 PROCEDURE — 99285 EMERGENCY DEPT VISIT HI MDM: CPT

## 2018-06-09 RX ORDER — BUPROPION HYDROCHLORIDE 150 MG/1
150 TABLET, EXTENDED RELEASE ORAL 2 TIMES DAILY
COMMUNITY

## 2018-06-09 RX ORDER — ACETAMINOPHEN 325 MG/1
650 TABLET ORAL EVERY 6 HOURS PRN
COMMUNITY

## 2018-06-09 RX ORDER — POLYETHYLENE GLYCOL 3350 17 G/17G
17 POWDER, FOR SOLUTION ORAL DAILY PRN
COMMUNITY

## 2018-06-09 RX ORDER — DIVALPROEX SODIUM 125 MG/1
125 CAPSULE, COATED PELLETS ORAL 2 TIMES DAILY
COMMUNITY

## 2018-06-09 RX ORDER — POTASSIUM CHLORIDE 7.45 MG/ML
10 INJECTION INTRAVENOUS ONCE
COMMUNITY

## 2018-06-09 RX ORDER — PRAVASTATIN SODIUM 20 MG
20 TABLET ORAL DAILY
COMMUNITY

## 2018-06-09 RX ORDER — THIAMINE MONONITRATE (VIT B1) 100 MG
100 TABLET ORAL DAILY
COMMUNITY

## 2018-06-09 RX ORDER — DILTIAZEM HYDROCHLORIDE 180 MG/1
180 CAPSULE, EXTENDED RELEASE ORAL DAILY
COMMUNITY

## 2018-06-09 RX ORDER — HYDROCODONE BITARTRATE AND ACETAMINOPHEN 7.5; 325 MG/1; MG/1
1 TABLET ORAL EVERY 6 HOURS PRN
Status: ON HOLD | COMMUNITY
End: 2019-06-07 | Stop reason: SDUPTHER

## 2018-06-09 RX ORDER — LORAZEPAM 0.5 MG/1
0.5 TABLET ORAL EVERY 6 HOURS PRN
COMMUNITY

## 2018-06-10 ENCOUNTER — APPOINTMENT (OUTPATIENT)
Dept: CT IMAGING | Age: 73
End: 2018-06-10
Payer: MEDICARE

## 2018-06-10 VITALS
WEIGHT: 180 LBS | DIASTOLIC BLOOD PRESSURE: 69 MMHG | HEIGHT: 69 IN | TEMPERATURE: 97.9 F | BODY MASS INDEX: 26.66 KG/M2 | RESPIRATION RATE: 16 BRPM | SYSTOLIC BLOOD PRESSURE: 106 MMHG | HEART RATE: 85 BPM | OXYGEN SATURATION: 92 %

## 2018-06-10 LAB
ALBUMIN SERPL-MCNC: 3.4 G/DL (ref 3.5–5.2)
ALP BLD-CCNC: 100 U/L (ref 40–130)
ALT SERPL-CCNC: <5 U/L (ref 5–41)
ANION GAP SERPL CALCULATED.3IONS-SCNC: 9 MMOL/L (ref 7–19)
AST SERPL-CCNC: 7 U/L (ref 5–40)
BACTERIA: NEGATIVE /HPF
BASOPHILS ABSOLUTE: 0 K/UL (ref 0–0.2)
BASOPHILS RELATIVE PERCENT: 0.3 % (ref 0–1)
BILIRUB SERPL-MCNC: 0.3 MG/DL (ref 0.2–1.2)
BILIRUBIN URINE: NEGATIVE
BLOOD, URINE: NEGATIVE
BUN BLDV-MCNC: 16 MG/DL (ref 8–23)
CALCIUM SERPL-MCNC: 9.8 MG/DL (ref 8.8–10.2)
CHLORIDE BLD-SCNC: 105 MMOL/L (ref 98–111)
CLARITY: CLEAR
CO2: 28 MMOL/L (ref 22–29)
COLOR: YELLOW
CREAT SERPL-MCNC: 0.8 MG/DL (ref 0.5–1.2)
EOSINOPHILS ABSOLUTE: 0.1 K/UL (ref 0–0.6)
EOSINOPHILS RELATIVE PERCENT: 1.1 % (ref 0–5)
EPITHELIAL CELLS, UA: 3 /HPF (ref 0–5)
ETHANOL: <10 MG/DL (ref 0–0.08)
GFR NON-AFRICAN AMERICAN: >60
GLUCOSE BLD-MCNC: 133 MG/DL (ref 74–109)
GLUCOSE URINE: NEGATIVE MG/DL
HCT VFR BLD CALC: 46.6 % (ref 42–52)
HEMOGLOBIN: 14.7 G/DL (ref 14–18)
HYALINE CASTS: 6 /HPF (ref 0–8)
INR BLD: 1.02 (ref 0.88–1.18)
KETONES, URINE: NEGATIVE MG/DL
LEUKOCYTE ESTERASE, URINE: ABNORMAL
LYMPHOCYTES ABSOLUTE: 2.5 K/UL (ref 1.1–4.5)
LYMPHOCYTES RELATIVE PERCENT: 28.5 % (ref 20–40)
MCH RBC QN AUTO: 31.5 PG (ref 27–31)
MCHC RBC AUTO-ENTMCNC: 31.5 G/DL (ref 33–37)
MCV RBC AUTO: 100 FL (ref 80–94)
MONOCYTES ABSOLUTE: 0.5 K/UL (ref 0–0.9)
MONOCYTES RELATIVE PERCENT: 6.1 % (ref 0–10)
NEUTROPHILS ABSOLUTE: 5.6 K/UL (ref 1.5–7.5)
NEUTROPHILS RELATIVE PERCENT: 63.7 % (ref 50–65)
NITRITE, URINE: NEGATIVE
PDW BLD-RTO: 14.1 % (ref 11.5–14.5)
PH UA: 6
PLATELET # BLD: 165 K/UL (ref 130–400)
PMV BLD AUTO: 10.8 FL (ref 9.4–12.4)
POTASSIUM SERPL-SCNC: 4 MMOL/L (ref 3.5–5)
PROTEIN UA: NEGATIVE MG/DL
PROTHROMBIN TIME: 13.3 SEC (ref 12–14.6)
RBC # BLD: 4.66 M/UL (ref 4.7–6.1)
RBC UA: 1 /HPF (ref 0–4)
SODIUM BLD-SCNC: 142 MMOL/L (ref 136–145)
SPECIFIC GRAVITY UA: 1.02
TOTAL PROTEIN: 5.9 G/DL (ref 6.6–8.7)
URINE REFLEX TO CULTURE: YES
UROBILINOGEN, URINE: 1 E.U./DL
VALPROIC ACID LEVEL: 33.5 UG/ML (ref 50–100)
WBC # BLD: 8.8 K/UL (ref 4.8–10.8)
WBC UA: 8 /HPF (ref 0–5)

## 2018-06-10 PROCEDURE — 80053 COMPREHEN METABOLIC PANEL: CPT

## 2018-06-10 PROCEDURE — 2580000003 HC RX 258: Performed by: EMERGENCY MEDICINE

## 2018-06-10 PROCEDURE — 80164 ASSAY DIPROPYLACETIC ACD TOT: CPT

## 2018-06-10 PROCEDURE — 85025 COMPLETE CBC W/AUTO DIFF WBC: CPT

## 2018-06-10 PROCEDURE — 81001 URINALYSIS AUTO W/SCOPE: CPT

## 2018-06-10 PROCEDURE — 99284 EMERGENCY DEPT VISIT MOD MDM: CPT | Performed by: EMERGENCY MEDICINE

## 2018-06-10 PROCEDURE — 87086 URINE CULTURE/COLONY COUNT: CPT

## 2018-06-10 PROCEDURE — 36415 COLL VENOUS BLD VENIPUNCTURE: CPT

## 2018-06-10 PROCEDURE — 85610 PROTHROMBIN TIME: CPT

## 2018-06-10 PROCEDURE — 80307 DRUG TEST PRSMV CHEM ANLYZR: CPT

## 2018-06-10 PROCEDURE — 70450 CT HEAD/BRAIN W/O DYE: CPT

## 2018-06-10 PROCEDURE — 6360000002 HC RX W HCPCS: Performed by: EMERGENCY MEDICINE

## 2018-06-10 PROCEDURE — G0480 DRUG TEST DEF 1-7 CLASSES: HCPCS

## 2018-06-10 PROCEDURE — 96374 THER/PROPH/DIAG INJ IV PUSH: CPT

## 2018-06-10 RX ORDER — CEPHALEXIN 500 MG/1
500 CAPSULE ORAL 2 TIMES DAILY
Qty: 14 CAPSULE | Refills: 0 | Status: SHIPPED | OUTPATIENT
Start: 2018-06-10 | End: 2018-06-17

## 2018-06-10 RX ADMIN — CEFTRIAXONE 1 G: 1 INJECTION, POWDER, FOR SOLUTION INTRAMUSCULAR; INTRAVENOUS at 02:15

## 2018-06-12 LAB — URINE CULTURE, ROUTINE: NORMAL

## 2019-04-16 ENCOUNTER — OFFICE VISIT (OUTPATIENT)
Dept: UROLOGY | Facility: CLINIC | Age: 74
End: 2019-04-16

## 2019-04-16 VITALS — BODY MASS INDEX: 28.29 KG/M2 | HEIGHT: 69 IN | TEMPERATURE: 98 F | WEIGHT: 191 LBS

## 2019-04-16 DIAGNOSIS — R97.20 ELEVATED PROSTATE SPECIFIC ANTIGEN (PSA): Primary | ICD-10-CM

## 2019-04-16 PROCEDURE — 99204 OFFICE O/P NEW MOD 45 MIN: CPT | Performed by: UROLOGY

## 2019-04-16 RX ORDER — PHENAZOPYRIDINE HYDROCHLORIDE 100 MG/1
100 TABLET, FILM COATED ORAL 3 TIMES DAILY PRN
Status: ON HOLD | COMMUNITY
End: 2020-12-07

## 2019-04-16 RX ORDER — DIVALPROEX SODIUM 125 MG/1
125 TABLET, DELAYED RELEASE ORAL 3 TIMES DAILY
Status: ON HOLD | COMMUNITY
End: 2020-12-07

## 2019-04-16 RX ORDER — POTASSIUM CHLORIDE 20 MEQ/1
20 TABLET, EXTENDED RELEASE ORAL 2 TIMES DAILY
COMMUNITY
End: 2020-12-10 | Stop reason: HOSPADM

## 2019-04-16 RX ORDER — QUETIAPINE FUMARATE 25 MG/1
25 TABLET, FILM COATED ORAL NIGHTLY
Status: ON HOLD | COMMUNITY
End: 2020-12-07

## 2019-04-16 NOTE — PROGRESS NOTES
Mr. Abdalla is 74 y.o. male    Chief Complaint   Patient presents with   • Elevated PSA       History of Present Illness  Elevated PSA  Patient is here with an elevated PSA. He has no personal history of prostate cancer. His AUA Symptom Score is 4/35, manifested as obstructive symptoms including weak stream. He has no prior genitourinary history of previous  surgery.  Previous PSA values are :   5.4-4.1   He reports weak stream. He denies frequency. Patient states symptoms are of mild severity. Onset of symptoms was unknown years ago and was gradual in onset.    Unfortunately patient has severe dementia and is unable to answer very many questions at all.  He is with a representative from the nursing home staff he was also unable to answer any of his questions regarding his health.  Most of his review of systems and HPI is down from review of his notes.    The following portions of the patient's history were reviewed and updated as appropriate: allergies, current medications, past family history, past medical history, past social history, past surgical history and problem list.    Review of Systems   Constitutional: Negative for appetite change, chills, fever and unexpected weight change.   HENT: Negative for congestion, ear pain, facial swelling, hearing loss, nosebleeds, trouble swallowing and voice change.    Eyes: Negative for photophobia, pain, discharge and visual disturbance.   Respiratory: Negative for cough, choking, chest tightness and shortness of breath.    Cardiovascular: Negative for chest pain and palpitations.   Gastrointestinal: Negative for abdominal distention, abdominal pain, blood in stool, constipation, diarrhea, nausea and vomiting.   Endocrine: Negative for cold intolerance, heat intolerance and polydipsia.   Genitourinary: Negative for decreased urine volume, difficulty urinating, discharge, dysuria, enuresis, flank pain, frequency, genital sores, hematuria, penile pain, penile swelling,  scrotal swelling, testicular pain and urgency.   Musculoskeletal: Negative for arthralgias, joint swelling, neck pain and neck stiffness.   Skin: Negative for pallor and rash.   Allergic/Immunologic: Negative for immunocompromised state.   Neurological: Negative for dizziness, tremors, seizures, syncope, light-headedness and headaches.   Hematological: Negative for adenopathy. Does not bruise/bleed easily.   Psychiatric/Behavioral: Negative for agitation, confusion, dysphoric mood, hallucinations, self-injury and suicidal ideas.       I have reviewed the review of systems      Current Outpatient Medications:   •  acetaminophen (TYLENOL) 325 MG tablet, Take 2 tablets by mouth Every 6 (Six) Hours As Needed for mild pain (1-3)., Disp: , Rfl: 0  •  bisoprolol (ZEBeta) 10 MG tablet, Take 10 mg by mouth Daily., Disp: , Rfl:   •  diltiaZEM CD (CARDIZEM CD) 180 MG 24 hr capsule, Take 1 capsule by mouth Daily., Disp: , Rfl:   •  divalproex (DEPAKOTE) 125 MG DR tablet, Take 125 mg by mouth 3 (Three) Times a Day., Disp: , Rfl:   •  phenazopyridine (PYRIDIUM) 100 MG tablet, Take 100 mg by mouth 3 (Three) Times a Day As Needed for bladder spasms., Disp: , Rfl:   •  potassium chloride (K-DUR,KLOR-CON) 20 MEQ CR tablet, Take 20 mEq by mouth 2 (Two) Times a Day., Disp: , Rfl:   •  QUEtiapine (SEROquel) 25 MG tablet, Take 25 mg by mouth Every Night., Disp: , Rfl:   •  diclofenac (VOLTAREN) 50 MG EC tablet, Take 1 tablet by mouth 2 (Two) Times a Day., Disp: 14 tablet, Rfl: 0  •  donepezil (ARICEPT) 5 MG tablet, Take 5 mg by mouth Every Night., Disp: , Rfl:   •  escitalopram (LEXAPRO) 20 MG tablet, Take 20 mg by mouth Daily., Disp: , Rfl:   •  pravastatin (PRAVACHOL) 20 MG tablet, Take 20 mg by mouth Daily., Disp: , Rfl:   •  thiamine (VITAMIN B1) 100 MG tablet, Take 1 tablet by mouth Daily., Disp: , Rfl:   •  tiotropium (SPIRIVA) 18 MCG per inhalation capsule, Place 1 capsule into inhaler and inhale Daily., Disp: , Rfl:     Past  "Medical History:   Diagnosis Date   • A-fib (CMS/[facility])    • Alcohol abuse    • Anxiety    • COPD (chronic obstructive pulmonary disease) (CMS/[facility])    • COPD (chronic obstructive pulmonary disease) (CMS/[facility])    • Hypotension    • Lung disease    • Malaise and fatigue    • Neurological disease    • Polycythemia    • SOB (shortness of breath)    • Brina-Parkinson-White syndrome        Past Surgical History:   Procedure Laterality Date   • KNEE SURGERY     • PACEMAKER IMPLANTATION      [facility]    • WRIST SURGERY         Social History     Socioeconomic History   • Marital status:      Spouse name: Not on file   • Number of children: Not on file   • Years of education: Not on file   • Highest education level: Not on file   Tobacco Use   • Smoking status: Heavy Tobacco Smoker     Packs/day: 2.00     Years: 60.00     Pack years: 120.00   • Smokeless tobacco: Never Used   Substance and Sexual Activity   • Alcohol use: Yes     Alcohol/week: 16.8 oz     Types: 21 Cans of beer, 7 Shots of liquor per week     Comment: 2-3 BEERS, 1 GIN DAILY   • Drug use: No   • Sexual activity: No       Family History   Problem Relation Age of Onset   • No Known Problems Mother    • Cancer Father    • Heart disease Father    • COPD Sister        Objective    Temp 98 °F (36.7 °C)   Ht 175.3 cm (69\")   Wt 86.6 kg (191 lb)   BMI 28.21 kg/m²     Physical Exam  Constitutional: No apparent distress.  His vital signs are reviewed  Psychiatric: Patient does have a flat affect.  Oriented to self but not place or time  Eyes: Unremarkable  Musculoskeletal: Patient is in a wheelchair and does apparently have lower extremity atrophy  GI: Abdomen is soft, non-tender  Respiratory: No distress; Unlabored movement; No accessory musculature needed with symmetric movements  Skin: No pallor or diaphoresis  ; Penis and testicles are normal; Prostate 40-50 mL without nodule        Admission on 02/12/2017, Discharged on 02/15/2017   No results displayed " because visit has over 200 results.          Results for orders placed or performed during the hospital encounter of 02/12/17   Comprehensive Metabolic Panel   Result Value Ref Range    Glucose 98 70 - 100 mg/dL    BUN 17 5 - 21 mg/dL    Creatinine 1.26 0.50 - 1.40 mg/dL    Sodium 140 135 - 145 mmol/L    Potassium 4.7 3.5 - 5.3 mmol/L    Chloride 105 98 - 110 mmol/L    CO2 23.0 (L) 24.0 - 31.0 mmol/L    Calcium 10.8 (H) 8.4 - 10.4 mg/dL    Total Protein 7.0 6.3 - 8.7 g/dL    Albumin 4.10 3.50 - 5.00 g/dL    ALT (SGPT) 24 0 - 54 U/L    AST (SGOT) 37 7 - 45 U/L    Alkaline Phosphatase 112 24 - 120 U/L    Total Bilirubin 2.5 (H) 0.1 - 1.0 mg/dL    eGFR Non African Amer 56 (L) >60 mL/min/1.73    Globulin 2.9 gm/dL    A/G Ratio 1.4 1.1 - 2.5 g/dL    BUN/Creatinine Ratio 13.5 7.0 - 25.0    Anion Gap 12.0 4.0 - 13.0 mmol/L   aPTT   Result Value Ref Range    PTT 32.5 24.1 - 34.8 seconds   Protime-INR   Result Value Ref Range    Protime 17.2 (H) 11.9 - 14.6 Seconds    INR 1.36 (H) 0.91 - 1.09   D-dimer, Quantitative   Result Value Ref Range    D-Dimer, Quantitative 2.81 (H) 0.00 - 0.50 mg/L (FEU)   BNP   Result Value Ref Range    proBNP 4,130.0 (H) 0.0 - 900.0 pg/mL   Magnesium   Result Value Ref Range    Magnesium 2.1 1.4 - 2.2 mg/dL   Ethanol   Result Value Ref Range    Ethanol % <0.010 <0.010 %   CBC Auto Differential   Result Value Ref Range    WBC 15.59 (H) 4.80 - 10.80 10*3/mm3    RBC 5.30 4.80 - 5.90 10*6/mm3    Hemoglobin 18.4 (H) 14.0 - 18.0 g/dL    Hematocrit 53.5 (H) 40.0 - 52.0 %    .9 (H) 82.0 - 95.0 fL    MCH 34.7 (H) 28.0 - 32.0 pg    MCHC 34.4 33.0 - 36.0 g/dL    RDW 14.4 12.0 - 15.0 %    RDW-SD 53.1 40.0 - 54.0 fl    MPV 11.6 6.0 - 12.0 fL    Platelets 147 130 - 400 10*3/mm3    Neutrophil % 83.9 (H) 39.0 - 78.0 %    Lymphocyte % 6.5 (L) 15.0 - 45.0 %    Monocyte % 9.0 4.0 - 12.0 %    Eosinophil % 0.0 0.0 - 4.0 %    Basophil % 0.1 0.0 - 2.0 %    Immature Grans % 0.5 0.0 - 5.0 %    Neutrophils, Absolute  13.08 (H) 1.87 - 8.40 10*3/mm3    Lymphocytes, Absolute 1.02 0.72 - 4.86 10*3/mm3    Monocytes, Absolute 1.40 (H) 0.19 - 1.30 10*3/mm3    Eosinophils, Absolute 0.00 0.00 - 0.70 10*3/mm3    Basophils, Absolute 0.01 0.00 - 0.20 10*3/mm3    Immature Grans, Absolute 0.08 (H) 0.00 - 0.03 10*3/mm3   Basic Metabolic Panel   Result Value Ref Range    Glucose 96 70 - 100 mg/dL    BUN 17 5 - 21 mg/dL    Creatinine 1.04 0.50 - 1.40 mg/dL    Sodium 139 135 - 145 mmol/L    Potassium 3.8 3.5 - 5.3 mmol/L    Chloride 106 98 - 110 mmol/L    CO2 24.0 24.0 - 31.0 mmol/L    Calcium 10.3 8.4 - 10.4 mg/dL    eGFR Non African Amer 70 >60 mL/min/1.73    BUN/Creatinine Ratio 16.3 7.0 - 25.0    Anion Gap 9.0 4.0 - 13.0 mmol/L   Vitamin B12   Result Value Ref Range    Vitamin B-12 293 239 - 931 pg/mL   Folate   Result Value Ref Range    Folate 10.20 >2.75 ng/mL   CBC Auto Differential   Result Value Ref Range    WBC 12.97 (H) 4.80 - 10.80 10*3/mm3    RBC 4.90 4.80 - 5.90 10*6/mm3    Hemoglobin 16.8 14.0 - 18.0 g/dL    Hematocrit 48.9 40.0 - 52.0 %    MCV 99.8 (H) 82.0 - 95.0 fL    MCH 34.3 (H) 28.0 - 32.0 pg    MCHC 34.4 33.0 - 36.0 g/dL    RDW 14.5 12.0 - 15.0 %    RDW-SD 52.5 40.0 - 54.0 fl    MPV 12.3 (H) 6.0 - 12.0 fL    Platelets 133 130 - 400 10*3/mm3    Neutrophil % 81.0 (H) 39.0 - 78.0 %    Lymphocyte % 9.9 (L) 15.0 - 45.0 %    Monocyte % 8.7 4.0 - 12.0 %    Eosinophil % 0.0 0.0 - 4.0 %    Basophil % 0.1 0.0 - 2.0 %    Immature Grans % 0.3 0.0 - 5.0 %    Neutrophils, Absolute 10.51 (H) 1.87 - 8.40 10*3/mm3    Lymphocytes, Absolute 1.28 0.72 - 4.86 10*3/mm3    Monocytes, Absolute 1.13 0.19 - 1.30 10*3/mm3    Eosinophils, Absolute 0.00 0.00 - 0.70 10*3/mm3    Basophils, Absolute 0.01 0.00 - 0.20 10*3/mm3    Immature Grans, Absolute 0.04 (H) 0.00 - 0.03 10*3/mm3   Basic Metabolic Panel   Result Value Ref Range    Glucose 106 (H) 70 - 100 mg/dL    BUN 15 5 - 21 mg/dL    Creatinine 0.83 0.50 - 1.40 mg/dL    Sodium 139 135 - 145 mmol/L     Potassium 3.5 3.5 - 5.3 mmol/L    Chloride 108 98 - 110 mmol/L    CO2 26.0 24.0 - 31.0 mmol/L    Calcium 10.2 8.4 - 10.4 mg/dL    eGFR Non African Amer 91 >60 mL/min/1.73    BUN/Creatinine Ratio 18.1 7.0 - 25.0    Anion Gap 5.0 4.0 - 13.0 mmol/L   CBC Auto Differential   Result Value Ref Range    WBC 12.12 (H) 4.80 - 10.80 10*3/mm3    RBC 4.73 (L) 4.80 - 5.90 10*6/mm3    Hemoglobin 16.1 14.0 - 18.0 g/dL    Hematocrit 47.2 40.0 - 52.0 %    MCV 99.8 (H) 82.0 - 95.0 fL    MCH 34.0 (H) 28.0 - 32.0 pg    MCHC 34.1 33.0 - 36.0 g/dL    RDW 14.4 12.0 - 15.0 %    RDW-SD 52.0 40.0 - 54.0 fl    MPV 12.0 6.0 - 12.0 fL    Platelets 124 (L) 130 - 400 10*3/mm3    Neutrophil % 80.8 (H) 39.0 - 78.0 %    Lymphocyte % 10.2 (L) 15.0 - 45.0 %    Monocyte % 8.4 4.0 - 12.0 %    Eosinophil % 0.1 0.0 - 4.0 %    Basophil % 0.1 0.0 - 2.0 %    Immature Grans % 0.4 0.0 - 5.0 %    Neutrophils, Absolute 9.79 (H) 1.87 - 8.40 10*3/mm3    Lymphocytes, Absolute 1.24 0.72 - 4.86 10*3/mm3    Monocytes, Absolute 1.02 0.19 - 1.30 10*3/mm3    Eosinophils, Absolute 0.01 0.00 - 0.70 10*3/mm3    Basophils, Absolute 0.01 0.00 - 0.20 10*3/mm3    Immature Grans, Absolute 0.05 (H) 0.00 - 0.03 10*3/mm3   Basic Metabolic Panel   Result Value Ref Range    Glucose 93 70 - 100 mg/dL    BUN 13 5 - 21 mg/dL    Creatinine 0.79 0.50 - 1.40 mg/dL    Sodium 140 135 - 145 mmol/L    Potassium 3.4 (L) 3.5 - 5.3 mmol/L    Chloride 108 98 - 110 mmol/L    CO2 26.0 24.0 - 31.0 mmol/L    Calcium 10.1 8.4 - 10.4 mg/dL    eGFR Non African Amer 96 >60 mL/min/1.73    BUN/Creatinine Ratio 16.5 7.0 - 25.0    Anion Gap 6.0 4.0 - 13.0 mmol/L   CBC Auto Differential   Result Value Ref Range    WBC 9.73 4.80 - 10.80 10*3/mm3    RBC 4.69 (L) 4.80 - 5.90 10*6/mm3    Hemoglobin 15.9 14.0 - 18.0 g/dL    Hematocrit 47.2 40.0 - 52.0 %    .6 (H) 82.0 - 95.0 fL    MCH 33.9 (H) 28.0 - 32.0 pg    MCHC 33.7 33.0 - 36.0 g/dL    RDW 14.5 12.0 - 15.0 %    RDW-SD 53.1 40.0 - 54.0 fl    MPV 12.0 6.0  - 12.0 fL    Platelets 131 130 - 400 10*3/mm3    Neutrophil % 71.1 39.0 - 78.0 %    Lymphocyte % 18.1 15.0 - 45.0 %    Monocyte % 9.5 4.0 - 12.0 %    Eosinophil % 1.0 0.0 - 4.0 %    Basophil % 0.1 0.0 - 2.0 %    Immature Grans % 0.2 0.0 - 5.0 %    Neutrophils, Absolute 6.92 1.87 - 8.40 10*3/mm3    Lymphocytes, Absolute 1.76 0.72 - 4.86 10*3/mm3    Monocytes, Absolute 0.92 0.19 - 1.30 10*3/mm3    Eosinophils, Absolute 0.10 0.00 - 0.70 10*3/mm3    Basophils, Absolute 0.01 0.00 - 0.20 10*3/mm3    Immature Grans, Absolute 0.02 0.00 - 0.03 10*3/mm3   Basic Metabolic Panel   Result Value Ref Range    Glucose 99 70 - 100 mg/dL    BUN 10 5 - 21 mg/dL    Creatinine 0.71 0.50 - 1.40 mg/dL    Sodium 139 135 - 145 mmol/L    Potassium 3.4 (L) 3.5 - 5.3 mmol/L    Chloride 108 98 - 110 mmol/L    CO2 25.0 24.0 - 31.0 mmol/L    Calcium 9.7 8.4 - 10.4 mg/dL    eGFR Non African Amer 109 >60 mL/min/1.73    BUN/Creatinine Ratio 14.1 7.0 - 25.0    Anion Gap 6.0 4.0 - 13.0 mmol/L   CBC Auto Differential   Result Value Ref Range    WBC 9.11 4.80 - 10.80 10*3/mm3    RBC 4.68 (L) 4.80 - 5.90 10*6/mm3    Hemoglobin 15.7 14.0 - 18.0 g/dL    Hematocrit 46.8 40.0 - 52.0 %    .0 (H) 82.0 - 95.0 fL    MCH 33.5 (H) 28.0 - 32.0 pg    MCHC 33.5 33.0 - 36.0 g/dL    RDW 14.5 12.0 - 15.0 %    RDW-SD 53.0 40.0 - 54.0 fl    MPV 12.4 (H) 6.0 - 12.0 fL    Platelets 147 130 - 400 10*3/mm3    Neutrophil % 73.0 39.0 - 78.0 %    Lymphocyte % 16.5 15.0 - 45.0 %    Monocyte % 9.2 4.0 - 12.0 %    Eosinophil % 0.8 0.0 - 4.0 %    Basophil % 0.1 0.0 - 2.0 %    Immature Grans % 0.4 0.0 - 5.0 %    Neutrophils, Absolute 6.65 1.87 - 8.40 10*3/mm3    Lymphocytes, Absolute 1.50 0.72 - 4.86 10*3/mm3    Monocytes, Absolute 0.84 0.19 - 1.30 10*3/mm3    Eosinophils, Absolute 0.07 0.00 - 0.70 10*3/mm3    Basophils, Absolute 0.01 0.00 - 0.20 10*3/mm3    Immature Grans, Absolute 0.04 (H) 0.00 - 0.03 10*3/mm3   POC Troponin, Rapid   Result Value Ref Range    Troponin I 0.02  0.00 - 0.07 ng/mL   Adult Transthoracic Echo Complete With Contrast   Result Value Ref Range    IVSd 1.6 cm    LVIDd 5.6 cm    LVIDs 3.6 cm    LVPWd 1.6 cm    IVS/LVPW 0.98     FS 35.2 %    EDV(Teich) 153.7 ml    ESV(Teich) 55.5 ml    EF(Teich) 63.9 %    EDV(cubed) 175.6 ml    ESV(cubed) 47.8 ml    EF(cubed) 72.8 %    LV mass(C)d 408.0 grams    SV(Teich) 98.1 ml    SV(cubed) 127.8 ml    Ao root diam 3.7 cm    Ao root area 10.8 cm^2    LA dimension 4.7 cm    LA/Ao 1.3     LVOT diam 2.1 cm    LVOT area 3.5 cm^2    LVOT area(traced) 3.5 cm^2    LVLd ap4 6.5 cm    EDV(MOD-sp4) 75.2 ml    LVLs ap4 5.3 cm    ESV(MOD-sp4) 23.1 ml    EF(MOD-sp4) 69.3 %    SV(MOD-sp4) 52.1 ml    EF - Contrast (4Ch) 69.3 ml/m^2    MV E max ji 52.9 cm/sec    MV dec time 0.14 sec    Ao pk ji 119.0 cm/sec    Ao max PG 5.7 mmHg    Ao max PG (full) 2.3 mmHg    Ao V2 mean 76.5 cm/sec    Ao mean PG 3.0 mmHg    Ao mean PG (full) 1.0 mmHg    Ao V2 VTI 17.1 cm    VINNY(I,A) 2.8 cm^2    VINNY(I,D) 2.8 cm^2    VINNY(V,A) 2.7 cm^2    VINNY(V,D) 2.7 cm^2    LV V1 max PG 3.3 mmHg    LV V1 mean PG 2.0 mmHg    LV V1 max 91.4 cm/sec    LV V1 mean 59.9 cm/sec    LV V1 VTI 13.8 cm    SV(Ao) 183.9 ml    SV(LVOT) 47.8 ml    TR max ji 258.0 cm/sec    RVSP(TR) 36.6 mmHg    RAP systole 10.0 mmHg    LA volume 99.4 cm3    Lat Peak E' Ji 9.9 cm/sec   Light Blue Top   Result Value Ref Range    Extra Tube hold for add-on    Green Top (Gel)   Result Value Ref Range    Extra Tube Hold for add-ons.    Lavender Top   Result Value Ref Range    Extra Tube hold for add-on    Red Top   Result Value Ref Range    Extra Tube Hold for add-ons.      Patient's Body mass index is 28.21 kg/m². BMI is above normal parameters. Recommendations include: educational material.    Assessment and Plan    Ezequiel was seen today for elevated psa.    Diagnoses and all orders for this visit:    Elevated prostate specific antigen (PSA)  -     PSA DIAGNOSTIC; Future    I reviewed his outside records.  In  summary this is a 74-year-old gentleman with severe dementia and COPD who was found to have a PSA of 5.4.  This was repeated and was 4.1.    Today I did attempt to discuss with him the meaning of PSA.  He very clearly did not grasp of the meaning of this due to his severe dementia.  I discussed with both him and the nursing home representative that I would not have ever checked his PSA to start with given his overall age and medical condition.  At this point I cannot guarantee that he does not have prostate cancer but he is in very poor health and would not recommend a biopsy.  I will recheck this in 6 months to ensure that it is not significantly rising and he will see the nurse practitioner for that.  If it is not I would not suggest ever rechecking this due to his overall medical condition

## 2019-05-16 ENCOUNTER — APPOINTMENT (OUTPATIENT)
Dept: GENERAL RADIOLOGY | Age: 74
End: 2019-05-16
Payer: MEDICARE

## 2019-05-16 ENCOUNTER — APPOINTMENT (OUTPATIENT)
Dept: CT IMAGING | Age: 74
End: 2019-05-16
Payer: MEDICARE

## 2019-05-16 ENCOUNTER — HOSPITAL ENCOUNTER (EMERGENCY)
Age: 74
Discharge: HOME | End: 2019-05-17
Attending: EMERGENCY MEDICINE
Payer: MEDICARE

## 2019-05-16 VITALS
WEIGHT: 180 LBS | HEART RATE: 71 BPM | OXYGEN SATURATION: 93 % | BODY MASS INDEX: 26.66 KG/M2 | RESPIRATION RATE: 16 BRPM | DIASTOLIC BLOOD PRESSURE: 65 MMHG | TEMPERATURE: 98.2 F | HEIGHT: 69 IN | SYSTOLIC BLOOD PRESSURE: 93 MMHG

## 2019-05-16 DIAGNOSIS — S09.90XA CLOSED HEAD INJURY, INITIAL ENCOUNTER: Primary | ICD-10-CM

## 2019-05-16 DIAGNOSIS — N30.00 ACUTE CYSTITIS WITHOUT HEMATURIA: ICD-10-CM

## 2019-05-16 LAB
ALBUMIN SERPL-MCNC: 3.2 G/DL (ref 3.5–5.2)
ALP BLD-CCNC: 109 U/L (ref 40–130)
ALT SERPL-CCNC: 11 U/L (ref 5–41)
ANION GAP SERPL CALCULATED.3IONS-SCNC: 5 MMOL/L (ref 7–19)
AST SERPL-CCNC: 10 U/L (ref 5–40)
BACTERIA: ABNORMAL /HPF
BASOPHILS ABSOLUTE: 0 K/UL (ref 0–0.2)
BASOPHILS RELATIVE PERCENT: 0.5 % (ref 0–1)
BILIRUB SERPL-MCNC: <0.2 MG/DL (ref 0.2–1.2)
BILIRUBIN URINE: NEGATIVE
BLOOD, URINE: NEGATIVE
BUN BLDV-MCNC: 16 MG/DL (ref 8–23)
CALCIUM SERPL-MCNC: 9.7 MG/DL (ref 8.8–10.2)
CHLORIDE BLD-SCNC: 110 MMOL/L (ref 98–111)
CLARITY: ABNORMAL
CO2: 25 MMOL/L (ref 22–29)
COLOR: ABNORMAL
CREAT SERPL-MCNC: 1 MG/DL (ref 0.5–1.2)
EOSINOPHILS ABSOLUTE: 0.1 K/UL (ref 0–0.6)
EOSINOPHILS RELATIVE PERCENT: 1.5 % (ref 0–5)
EPITHELIAL CELLS, UA: 0 /HPF (ref 0–5)
GFR NON-AFRICAN AMERICAN: >60
GLUCOSE BLD-MCNC: 114 MG/DL (ref 74–109)
GLUCOSE URINE: NEGATIVE MG/DL
HCT VFR BLD CALC: 45.4 % (ref 42–52)
HEMOGLOBIN: 14.2 G/DL (ref 14–18)
HYALINE CASTS: 7 /HPF (ref 0–8)
KETONES, URINE: NEGATIVE MG/DL
LEUKOCYTE ESTERASE, URINE: ABNORMAL
LYMPHOCYTES ABSOLUTE: 2.3 K/UL (ref 1.1–4.5)
LYMPHOCYTES RELATIVE PERCENT: 26.4 % (ref 20–40)
MCH RBC QN AUTO: 31.9 PG (ref 27–31)
MCHC RBC AUTO-ENTMCNC: 31.3 G/DL (ref 33–37)
MCV RBC AUTO: 102 FL (ref 80–94)
MONOCYTES ABSOLUTE: 0.6 K/UL (ref 0–0.9)
MONOCYTES RELATIVE PERCENT: 7.1 % (ref 0–10)
NEUTROPHILS ABSOLUTE: 5.5 K/UL (ref 1.5–7.5)
NEUTROPHILS RELATIVE PERCENT: 63.7 % (ref 50–65)
NITRITE, URINE: POSITIVE
PDW BLD-RTO: 14.1 % (ref 11.5–14.5)
PH UA: 8 (ref 5–8)
PLATELET # BLD: 175 K/UL (ref 130–400)
PMV BLD AUTO: 10.1 FL (ref 9.4–12.4)
POTASSIUM SERPL-SCNC: 4.7 MMOL/L (ref 3.5–5)
PROTEIN UA: 100 MG/DL
RBC # BLD: 4.45 M/UL (ref 4.7–6.1)
RBC UA: 2 /HPF (ref 0–4)
SODIUM BLD-SCNC: 140 MMOL/L (ref 136–145)
SPECIFIC GRAVITY UA: 1.02 (ref 1–1.03)
TOTAL PROTEIN: 5.4 G/DL (ref 6.6–8.7)
URINE REFLEX TO CULTURE: YES
UROBILINOGEN, URINE: 1 E.U./DL
VALPROIC ACID LEVEL: 17.7 UG/ML (ref 50–100)
WBC # BLD: 8.6 K/UL (ref 4.8–10.8)
WBC UA: 404 /HPF (ref 0–5)

## 2019-05-16 PROCEDURE — 96365 THER/PROPH/DIAG IV INF INIT: CPT

## 2019-05-16 PROCEDURE — 80053 COMPREHEN METABOLIC PANEL: CPT

## 2019-05-16 PROCEDURE — 80164 ASSAY DIPROPYLACETIC ACD TOT: CPT

## 2019-05-16 PROCEDURE — 99285 EMERGENCY DEPT VISIT HI MDM: CPT

## 2019-05-16 PROCEDURE — 36415 COLL VENOUS BLD VENIPUNCTURE: CPT

## 2019-05-16 PROCEDURE — 81001 URINALYSIS AUTO W/SCOPE: CPT

## 2019-05-16 PROCEDURE — 73080 X-RAY EXAM OF ELBOW: CPT

## 2019-05-16 PROCEDURE — 87086 URINE CULTURE/COLONY COUNT: CPT

## 2019-05-16 PROCEDURE — 2580000003 HC RX 258: Performed by: EMERGENCY MEDICINE

## 2019-05-16 PROCEDURE — 6360000002 HC RX W HCPCS: Performed by: EMERGENCY MEDICINE

## 2019-05-16 PROCEDURE — 87077 CULTURE AEROBIC IDENTIFY: CPT

## 2019-05-16 PROCEDURE — 96366 THER/PROPH/DIAG IV INF ADDON: CPT

## 2019-05-16 PROCEDURE — 93005 ELECTROCARDIOGRAM TRACING: CPT

## 2019-05-16 PROCEDURE — 70450 CT HEAD/BRAIN W/O DYE: CPT

## 2019-05-16 PROCEDURE — 85025 COMPLETE CBC W/AUTO DIFF WBC: CPT

## 2019-05-16 PROCEDURE — 87186 SC STD MICRODIL/AGAR DIL: CPT

## 2019-05-16 RX ADMIN — CEFTRIAXONE 1 G: 1 INJECTION, POWDER, FOR SOLUTION INTRAMUSCULAR; INTRAVENOUS at 22:23

## 2019-05-17 PROCEDURE — 96366 THER/PROPH/DIAG IV INF ADDON: CPT

## 2019-05-17 PROCEDURE — 99285 EMERGENCY DEPT VISIT HI MDM: CPT | Performed by: EMERGENCY MEDICINE

## 2019-05-17 RX ORDER — CEPHALEXIN 500 MG/1
500 CAPSULE ORAL 4 TIMES DAILY
Qty: 28 CAPSULE | Refills: 0 | Status: SHIPPED | OUTPATIENT
Start: 2019-05-17 | End: 2019-05-17 | Stop reason: SDUPTHER

## 2019-05-17 RX ORDER — CEPHALEXIN 500 MG/1
500 CAPSULE ORAL 4 TIMES DAILY
Qty: 28 CAPSULE | Refills: 0 | Status: SHIPPED | OUTPATIENT
Start: 2019-05-17 | End: 2019-05-24

## 2019-05-17 NOTE — ED NOTES
[address] [id number] notified of transfer back to [facility].       Garima Rasmussen RN  05/17/19 0109

## 2019-05-17 NOTE — ED NOTES
Bed: 03  Expected date:   Expected time:   Means of arrival:   Comments:  EMS: Reina Sampson RN  05/16/19 2044

## 2019-05-17 NOTE — ED PROVIDER NOTES
[facility]  eMERGENCY dEPARTMENT eNCOUnter      Pt Name: Devon Randle  MRN: [id number]  [place] 1945  Date of evaluation: 5/16/2019  Provider: Berkley Fernandez MD    [address] [place] [address]       Chief Complaint   Patient presents with   Rayshawn Drain Fall     pt presents to ED with c/o falls          HISTORY OF PRESENT ILLNESS   (Location/Symptom, Timing/Onset,Context/Setting, Quality, Duration, Modifying Factors, Severity)  Note limiting factors. Devon Randle is a 66 y.o. male who presents to the emergency department after an unwitnessed fall. Patient is a resident of [facility] and has a known history of dementia and somewhat recurrent falls. Send him here for further evaluation. Patient tells me he hurts all over can tell me his name and knows that he is at the hospital but does not know the current year. He is awake alert nontoxic appearing and very pleasant. HPI    NursingNotes were reviewed. REVIEW OF SYSTEMS    (2-9 systems for level 4, 10 or more for level 5)     Review of Systems   Unable to perform ROS: Dementia            PAST MEDICALHISTORY     Past Medical History:   Diagnosis Date    Alcohol abuse     Cerebellar ataxia ([facility] [address]     Dementia     Depression     Hypertension     Muscle weakness     Pacemaker     Polycythemia          SURGICAL HISTORY     No past surgical history on file. CURRENT MEDICATIONS     Discharge Medication List as of 5/17/2019  1:45 AM      CONTINUE these medications which have NOT CHANGED    Details   HYDROcodone-acetaminophen (NORCO) 7.5-325 MG per tablet Take 1 tablet by mouth every 6 hours as needed for Pain. Yogi Hawkins Historical Med      LORazepam (ATIVAN) 0.5 MG tablet Take 0.5 mg by mouth every 6 hours as needed for Anxiety. Yogi Hawkins Historical Med      acetaminophen (TYLENOL) 325 MG tablet Take 650 mg by mouth every 6 hours as needed for PainHistorical Med      potassium chloride 10 MEQ/100ML Infuse 10 mEq intravenously onceHistorical Med      vitamin B-1 (THIAMINE) 100 MG tablet Take 100 mg by mouth dailyHistorical Med      tiotropium (SPIRIVA) 18 MCG inhalation capsule Inhale 18 mcg into the lungs dailyHistorical Med      diltiazem (DILACOR XR) 180 MG extended release capsule Take 180 mg by mouth dailyHistorical Med      buPROPion (WELLBUTRIN SR) 150 MG extended release tablet Take 150 mg by mouth 2 times dailyHistorical Med      polyethylene glycol (GLYCOLAX) packet Take 17 g by mouth daily as needed for ConstipationHistorical Med      pravastatin (PRAVACHOL) 20 MG tablet Take 20 mg by mouth dailyHistorical Med      divalproex (DEPAKOTE SPRINKLE) 125 MG capsule Take 125 mg by mouth 2 times dailyHistorical Med             ALLERGIES     Patient has no known allergies. FAMILY HISTORY     No family history on file. SOCIAL HISTORY       Social History     Socioeconomic History    Marital status:      Spouse name: Not on file    Number of children: Not on file    Years of education: Not on file    Highest education level: Not on file   Occupational History    Not on file   Social Needs    Financial resource strain: Not on file    Food insecurity:     Worry: Not on file     Inability: Not on file    Transportation needs:     Medical: Not on file     Non-medical: Not on file   Tobacco Use    Smoking status: Former Smoker    Smokeless tobacco: Never Used   Substance and Sexual Activity    Alcohol use:  Yes    Drug use: No    Sexual activity: Not on file   Lifestyle    Physical activity:     Days per week: Not on file     Minutes per session: Not on file    Stress: Not on file   Relationships    Social connections:     Talks on phone: Not on file     Gets together: Not on file     Attends [employer] service: Not on file     Active member of club or organization: Not on file     Attends meetings of clubs or organizations: Not on file     Relationship status: Not on file    Intimate partner violence:     Fear of current or ex partner: Not on file     Emotionally abused: Not on file     Physically abused: Not on file     Forced sexual activity: Not on file   Other Topics Concern    Not on file   Social History Narrative    Not on file       SCREENINGS             PHYSICAL EXAM    (up to 7 for level 4, 8 or more for level 5)     ED Triage Vitals [05/16/19 2043]   BP Temp Temp Source Pulse Resp SpO2 Height Weight   93/65 98.2 °F (36.8 °C) Oral 71 16 93 % 5' 9\" (1.753 m) 180 lb (81.6 kg)       Physical Exam   Constitutional: He appears well-developed and well-nourished. HENT:   Head: Normocephalic. Head is with contusion. Right Ear: External ear normal.   Left Ear: External ear normal.   Mouth/Throat: Oropharynx is clear and moist.   Very small contusion as depicted   Eyes: Pupils are equal, round, and reactive to light. Conjunctivae and EOM are normal.   Neck: Normal range of motion. Neck supple. No spinous process tenderness present. No tracheal deviation and normal range of motion present. Cardiovascular: Normal rate, regular rhythm and normal heart sounds. No murmur heard. Pulmonary/Chest: Breath sounds normal. He has no wheezes. He has no rales. Abdominal: Soft. He exhibits no mass. There is no tenderness. Musculoskeletal: Normal range of motion. He exhibits no edema. Arms:  2 superficial skin tears to the right proximal forearm region, have ranged all 4 extremities and no obvious deformity or pain on exam   Neurological: He is alert. He exhibits normal muscle tone. GCS eye subscore is 4. GCS verbal subscore is 4. GCS motor subscore is 6. Awake alert moving all extremities pleasantly confused alert to person and place suspect this is similar to baseline   Skin: Skin is warm and dry. Vitals reviewed.       DIAGNOSTIC RESULTS     EKG: All EKG's areinterpreted by the Emergency Department Physician who either signs or Co-signs this chart in the absence of a cardiologist.    105, sinus with bigeminy non diagnostic ekg    RADIOLOGY:  Non-plain film images such as CT, Ultrasound and MRI are read by the radiologist. Plain radiographic images are visualized and preliminarily interpreted bythe emergency physician with the below findings:          CT Head WO Contrast   Final Result   1. No acute intracranial findings. 2. Similar chronic findings as above. Signed by Dr Ramon Hernandes on 5/16/2019 11:52 PM      XR ELBOW RIGHT (MIN 3 VIEWS)   Final Result   No acute bony finding. Signed by Dr Ramon Hernandes on 5/16/2019 11:41 PM              LABS:  Labs Reviewed   CBC WITH AUTO DIFFERENTIAL - Abnormal; Notable for the following components:       Result Value    RBC 4.45 (*)     .0 (*)     MCH 31.9 (*)     MCHC 31.3 (*)     All other components within normal limits   COMPREHENSIVE METABOLIC PANEL - Abnormal; Notable for the following components:    Anion Gap 5 (*)     Glucose 114 (*)     Total Protein 5.4 (*)     Alb 3.2 (*)     All other components within normal limits   VALPROIC ACID LEVEL, TOTAL - Abnormal; Notable for the following components:    Valproic Acid Lvl 17.7 (*)     All other components within normal limits   URINE RT REFLEX TO CULTURE - Abnormal; Notable for the following components:    Color, UA ORANGE (*)     Clarity, UA TURBID (*)     Protein,  (*)     Nitrite, Urine POSITIVE (*)     Leukocyte Esterase, Urine LARGE (*)     All other components within normal limits   MICROSCOPIC URINALYSIS - Abnormal; Notable for the following components:    WBC,  (*)     All other components within normal limits   URINE CULTURE       All other labs were within normal range or not returned as of this dictation.     EMERGENCY DEPARTMENT COURSE and DIFFERENTIAL DIAGNOSIS/MDM:   Vitals:    Vitals:    05/16/19 2043   BP: 93/65   Pulse: 71   Resp: 16   Temp: 98.2 °F (36.8 °C)   TempSrc: Oral   SpO2: 93%   Weight: 180 lb (81.6 kg)   Height: 5' 9\" (1.753 m)       MDM  Number of Diagnoses or Management Options Amount and/or Complexity of Data Reviewed  Clinical lab tests: ordered and reviewed  Tests in the radiology section of CPT®: ordered and reviewed  Independent visualization of images, tracings, or specimens: yes      Unwitnessed fall at NH, pt with minor trauma, does have UTI, mild confusion dementia at baseline, afebrile and labs reassuring, feel his UTI can be treated as outpatient, will DC back to facility      CONSULTS:  None    PROCEDURES:  Unless otherwise noted below, none     Procedures    FINAL IMPRESSION      1. Closed head injury, initial encounter    2.  Acute cystitis without hematuria          DISPOSITION/PLAN   DISPOSITION        PATIENT REFERRED TO:  Ramona Lora MD  [address] [place] [address],[address]  [phone]    Schedule an appointment as soon as possible for a visit in 1 week        DISCHARGE MEDICATIONS:  Discharge Medication List as of 5/17/2019  1:45 AM      START taking these medications    Details   cephALEXin (KEFLEX) 500 MG capsule Take 1 capsule by mouth 4 times daily for 7 days, Disp-28 capsule, R-0Print                (Please note that portions of this note were completed with a voice recognition program.  Efforts were made to edit thedictations but occasionally words are mis-transcribed.)    Annmarie Hills MD (electronically signed)  Attending Emergency Physician        Bing Shipley MD  05/17/19 5118

## 2019-05-18 LAB
EKG P AXIS: 33 DEGREES
EKG P-R INTERVAL: 190 MS
EKG Q-T INTERVAL: 336 MS
EKG QRS DURATION: 92 MS
EKG QTC CALCULATION (BAZETT): 371 MS
EKG T AXIS: 72 DEGREES

## 2019-05-19 LAB
ORGANISM: ABNORMAL
URINE CULTURE, ROUTINE: ABNORMAL
URINE CULTURE, ROUTINE: ABNORMAL

## 2019-06-05 ENCOUNTER — HOSPITAL ENCOUNTER (INPATIENT)
Age: 74
LOS: 4 days | Discharge: HOME | DRG: 481 | End: 2019-06-09
Attending: EMERGENCY MEDICINE | Admitting: INTERNAL MEDICINE
Payer: MEDICARE

## 2019-06-05 ENCOUNTER — APPOINTMENT (OUTPATIENT)
Dept: CT IMAGING | Age: 74
DRG: 481 | End: 2019-06-05
Payer: MEDICARE

## 2019-06-05 ENCOUNTER — ANESTHESIA (OUTPATIENT)
Dept: OPERATING ROOM | Age: 74
DRG: 481 | End: 2019-06-05
Payer: MEDICARE

## 2019-06-05 ENCOUNTER — ANESTHESIA EVENT (OUTPATIENT)
Dept: OPERATING ROOM | Age: 74
DRG: 481 | End: 2019-06-05
Payer: MEDICARE

## 2019-06-05 ENCOUNTER — APPOINTMENT (OUTPATIENT)
Dept: GENERAL RADIOLOGY | Age: 74
DRG: 481 | End: 2019-06-05
Payer: MEDICARE

## 2019-06-05 VITALS
RESPIRATION RATE: 13 BRPM | OXYGEN SATURATION: 86 % | DIASTOLIC BLOOD PRESSURE: 82 MMHG | SYSTOLIC BLOOD PRESSURE: 134 MMHG

## 2019-06-05 DIAGNOSIS — S51.011A SKIN TEAR OF RIGHT ELBOW WITHOUT COMPLICATION, INITIAL ENCOUNTER: ICD-10-CM
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
## 2019-06-05 DIAGNOSIS — N39.0 URINARY TRACT INFECTION WITHOUT HEMATURIA, SITE UNSPECIFIED: ICD-10-CM

## 2019-06-05 DIAGNOSIS — M80.08XA CHRONIC VERTEBRAL FRACTURE DUE TO OSTEOPOROSIS (HCC): ICD-10-CM

## 2019-06-05 DIAGNOSIS — S72.144A CLOSED NONDISPLACED INTERTROCHANTERIC FRACTURE OF RIGHT FEMUR, INITIAL ENCOUNTER (HCC): ICD-10-CM

## 2019-06-05 DIAGNOSIS — S09.90XA INJURY OF HEAD, INITIAL ENCOUNTER: Primary | ICD-10-CM

## 2019-06-05 DIAGNOSIS — T14.8XXA HEMATOMA: ICD-10-CM

## 2019-06-05 PROBLEM — S72.114D: Status: ACTIVE | Noted: 2019-06-05

## 2019-06-05 LAB
ABO/RH: NORMAL
ALBUMIN SERPL-MCNC: 3.7 G/DL (ref 3.5–5.2)
ALP BLD-CCNC: 133 U/L (ref 40–130)
ALT SERPL-CCNC: 7 U/L (ref 5–41)
ANION GAP SERPL CALCULATED.3IONS-SCNC: 7 MMOL/L (ref 7–19)
ANTIBODY SCREEN: NORMAL
APTT: 28.2 SEC (ref 26–36.2)
AST SERPL-CCNC: 11 U/L (ref 5–40)
BACTERIA: NEGATIVE /HPF
BASOPHILS ABSOLUTE: 0 K/UL (ref 0–0.2)
BASOPHILS RELATIVE PERCENT: 0.5 % (ref 0–1)
BILIRUB SERPL-MCNC: 0.4 MG/DL (ref 0.2–1.2)
BILIRUBIN URINE: NEGATIVE
BLOOD, URINE: NEGATIVE
BUN BLDV-MCNC: 18 MG/DL (ref 8–23)
CALCIUM SERPL-MCNC: 10.4 MG/DL (ref 8.8–10.2)
CHLORIDE BLD-SCNC: 112 MMOL/L (ref 98–111)
CLARITY: CLEAR
CO2: 27 MMOL/L (ref 22–29)
COLOR: ABNORMAL
CREAT SERPL-MCNC: 0.9 MG/DL (ref 0.5–1.2)
EOSINOPHILS ABSOLUTE: 0.2 K/UL (ref 0–0.6)
EOSINOPHILS RELATIVE PERCENT: 2.4 % (ref 0–5)
EPITHELIAL CELLS, UA: 1 /HPF (ref 0–5)
GFR NON-AFRICAN AMERICAN: >60
GLUCOSE BLD-MCNC: 87 MG/DL (ref 74–109)
GLUCOSE URINE: NEGATIVE MG/DL
HCT VFR BLD CALC: 51.6 % (ref 42–52)
HEMOGLOBIN: 16 G/DL (ref 14–18)
HYALINE CASTS: 1 /HPF (ref 0–8)
INR BLD: 1.02 (ref 0.88–1.18)
KETONES, URINE: NEGATIVE MG/DL
LEUKOCYTE ESTERASE, URINE: ABNORMAL
LYMPHOCYTES ABSOLUTE: 2.8 K/UL (ref 1.1–4.5)
LYMPHOCYTES RELATIVE PERCENT: 35.4 % (ref 20–40)
MCH RBC QN AUTO: 31.3 PG (ref 27–31)
MCHC RBC AUTO-ENTMCNC: 31 G/DL (ref 33–37)
MCV RBC AUTO: 100.8 FL (ref 80–94)
MONOCYTES ABSOLUTE: 0.5 K/UL (ref 0–0.9)
MONOCYTES RELATIVE PERCENT: 6 % (ref 0–10)
NEUTROPHILS ABSOLUTE: 4.4 K/UL (ref 1.5–7.5)
NEUTROPHILS RELATIVE PERCENT: 55.2 % (ref 50–65)
NITRITE, URINE: POSITIVE
PDW BLD-RTO: 14.5 % (ref 11.5–14.5)
PH UA: 6 (ref 5–8)
PLATELET # BLD: 182 K/UL (ref 130–400)
PMV BLD AUTO: 10.3 FL (ref 9.4–12.4)
POTASSIUM SERPL-SCNC: 4.5 MMOL/L (ref 3.5–5)
PROTEIN UA: NEGATIVE MG/DL
PROTHROMBIN TIME: 12.8 SEC (ref 12–14.6)
RBC # BLD: 5.12 M/UL (ref 4.7–6.1)
RBC UA: 1 /HPF (ref 0–4)
SODIUM BLD-SCNC: 146 MMOL/L (ref 136–145)
SPECIFIC GRAVITY UA: 1.02 (ref 1–1.03)
TOTAL CK: 23 U/L (ref 39–308)
TOTAL PROTEIN: 6.5 G/DL (ref 6.6–8.7)
TROPONIN: <0.01 NG/ML (ref 0–0.03)
URINE REFLEX TO CULTURE: YES
UROBILINOGEN, URINE: 1 E.U./DL
VALPROIC ACID LEVEL: 12.1 UG/ML (ref 50–100)
WBC # BLD: 8 K/UL (ref 4.8–10.8)
WBC UA: 2 /HPF (ref 0–5)

## 2019-06-05 PROCEDURE — 2580000003 HC RX 258: Performed by: EMERGENCY MEDICINE

## 2019-06-05 PROCEDURE — 72125 CT NECK SPINE W/O DYE: CPT

## 2019-06-05 PROCEDURE — 2580000003 HC RX 258: Performed by: HOSPITALIST

## 2019-06-05 PROCEDURE — 36415 COLL VENOUS BLD VENIPUNCTURE: CPT

## 2019-06-05 PROCEDURE — 99285 EMERGENCY DEPT VISIT HI MDM: CPT

## 2019-06-05 PROCEDURE — 73090 X-RAY EXAM OF FOREARM: CPT

## 2019-06-05 PROCEDURE — C1769 GUIDE WIRE: HCPCS | Performed by: ORTHOPAEDIC SURGERY

## 2019-06-05 PROCEDURE — 73700 CT LOWER EXTREMITY W/O DYE: CPT

## 2019-06-05 PROCEDURE — 1210000000 HC MED SURG R&B

## 2019-06-05 PROCEDURE — 72170 X-RAY EXAM OF PELVIS: CPT

## 2019-06-05 PROCEDURE — 51798 US URINE CAPACITY MEASURE: CPT

## 2019-06-05 PROCEDURE — 73502 X-RAY EXAM HIP UNI 2-3 VIEWS: CPT

## 2019-06-05 PROCEDURE — 2720000010 HC SURG SUPPLY STERILE: Performed by: ORTHOPAEDIC SURGERY

## 2019-06-05 PROCEDURE — 3600000014 HC SURGERY LEVEL 4 ADDTL 15MIN: Performed by: ORTHOPAEDIC SURGERY

## 2019-06-05 PROCEDURE — 6360000002 HC RX W HCPCS: Performed by: EMERGENCY MEDICINE

## 2019-06-05 PROCEDURE — 85610 PROTHROMBIN TIME: CPT

## 2019-06-05 PROCEDURE — 2500000003 HC RX 250 WO HCPCS: Performed by: ANESTHESIOLOGY

## 2019-06-05 PROCEDURE — 3700000001 HC ADD 15 MINUTES (ANESTHESIA): Performed by: ORTHOPAEDIC SURGERY

## 2019-06-05 PROCEDURE — 81001 URINALYSIS AUTO W/SCOPE: CPT

## 2019-06-05 PROCEDURE — 3209999900 FLUORO FOR SURGICAL PROCEDURES

## 2019-06-05 PROCEDURE — 87086 URINE CULTURE/COLONY COUNT: CPT

## 2019-06-05 PROCEDURE — 3600000004 HC SURGERY LEVEL 4 BASE: Performed by: ORTHOPAEDIC SURGERY

## 2019-06-05 PROCEDURE — 86900 BLOOD TYPING SEROLOGIC ABO: CPT

## 2019-06-05 PROCEDURE — 2780000010 HC IMPLANT OTHER: Performed by: ORTHOPAEDIC SURGERY

## 2019-06-05 PROCEDURE — 99222 1ST HOSP IP/OBS MODERATE 55: CPT | Performed by: HOSPITALIST

## 2019-06-05 PROCEDURE — 0QH636Z INSERTION OF INTRAMEDULLARY INTERNAL FIXATION DEVICE INTO RIGHT UPPER FEMUR, PERCUTANEOUS APPROACH: ICD-10-PCS | Performed by: ORTHOPAEDIC SURGERY

## 2019-06-05 PROCEDURE — 85730 THROMBOPLASTIN TIME PARTIAL: CPT

## 2019-06-05 PROCEDURE — 86901 BLOOD TYPING SEROLOGIC RH(D): CPT

## 2019-06-05 PROCEDURE — 85025 COMPLETE CBC W/AUTO DIFF WBC: CPT

## 2019-06-05 PROCEDURE — 82550 ASSAY OF CK (CPK): CPT

## 2019-06-05 PROCEDURE — 6370000000 HC RX 637 (ALT 250 FOR IP): Performed by: ORTHOPAEDIC SURGERY

## 2019-06-05 PROCEDURE — 2709999900 HC NON-CHARGEABLE SUPPLY: Performed by: ORTHOPAEDIC SURGERY

## 2019-06-05 PROCEDURE — 71045 X-RAY EXAM CHEST 1 VIEW: CPT

## 2019-06-05 PROCEDURE — 96365 THER/PROPH/DIAG IV INF INIT: CPT

## 2019-06-05 PROCEDURE — 6360000002 HC RX W HCPCS: Performed by: HOSPITALIST

## 2019-06-05 PROCEDURE — 80164 ASSAY DIPROPYLACETIC ACD TOT: CPT

## 2019-06-05 PROCEDURE — 80053 COMPREHEN METABOLIC PANEL: CPT

## 2019-06-05 PROCEDURE — 72131 CT LUMBAR SPINE W/O DYE: CPT

## 2019-06-05 PROCEDURE — 2580000003 HC RX 258: Performed by: ANESTHESIOLOGY

## 2019-06-05 PROCEDURE — C1713 ANCHOR/SCREW BN/BN,TIS/BN: HCPCS | Performed by: ORTHOPAEDIC SURGERY

## 2019-06-05 PROCEDURE — 3700000000 HC ANESTHESIA ATTENDED CARE: Performed by: ORTHOPAEDIC SURGERY

## 2019-06-05 PROCEDURE — 96375 TX/PRO/DX INJ NEW DRUG ADDON: CPT

## 2019-06-05 PROCEDURE — 70450 CT HEAD/BRAIN W/O DYE: CPT

## 2019-06-05 PROCEDURE — 99285 EMERGENCY DEPT VISIT HI MDM: CPT | Performed by: EMERGENCY MEDICINE

## 2019-06-05 PROCEDURE — 7100000000 HC PACU RECOVERY - FIRST 15 MIN: Performed by: ORTHOPAEDIC SURGERY

## 2019-06-05 PROCEDURE — 86850 RBC ANTIBODY SCREEN: CPT

## 2019-06-05 PROCEDURE — 90471 IMMUNIZATION ADMIN: CPT | Performed by: EMERGENCY MEDICINE

## 2019-06-05 PROCEDURE — 84484 ASSAY OF TROPONIN QUANT: CPT

## 2019-06-05 PROCEDURE — 93005 ELECTROCARDIOGRAM TRACING: CPT

## 2019-06-05 PROCEDURE — 7100000001 HC PACU RECOVERY - ADDTL 15 MIN: Performed by: ORTHOPAEDIC SURGERY

## 2019-06-05 PROCEDURE — 2580000003 HC RX 258: Performed by: ORTHOPAEDIC SURGERY

## 2019-06-05 PROCEDURE — 6360000002 HC RX W HCPCS: Performed by: ANESTHESIOLOGY

## 2019-06-05 PROCEDURE — 73630 X-RAY EXAM OF FOOT: CPT

## 2019-06-05 PROCEDURE — 73080 X-RAY EXAM OF ELBOW: CPT

## 2019-06-05 PROCEDURE — 90715 TDAP VACCINE 7 YRS/> IM: CPT | Performed by: EMERGENCY MEDICINE

## 2019-06-05 PROCEDURE — 72192 CT PELVIS W/O DYE: CPT

## 2019-06-05 PROCEDURE — 72128 CT CHEST SPINE W/O DYE: CPT

## 2019-06-05 DEVICE — IMPLANTABLE DEVICE: Type: IMPLANTABLE DEVICE | Site: FEMUR | Status: FUNCTIONAL

## 2019-06-05 DEVICE — SCREW BNE L38MM DIA5MM TIB LT GRN TI ST CANN LOK FULL THRD: Type: IMPLANTABLE DEVICE | Site: FEMUR | Status: FUNCTIONAL

## 2019-06-05 RX ORDER — LIDOCAINE HYDROCHLORIDE 10 MG/ML
INJECTION, SOLUTION EPIDURAL; INFILTRATION; INTRACAUDAL; PERINEURAL PRN
Status: DISCONTINUED | OUTPATIENT
Start: 2019-06-05 | End: 2019-06-05 | Stop reason: SDUPTHER

## 2019-06-05 RX ORDER — KETAMINE HYDROCHLORIDE 100 MG/ML
INJECTION, SOLUTION INTRAMUSCULAR; INTRAVENOUS PRN
Status: DISCONTINUED | OUTPATIENT
Start: 2019-06-05 | End: 2019-06-05 | Stop reason: SDUPTHER

## 2019-06-05 RX ORDER — BUPROPION HYDROCHLORIDE 150 MG/1
150 TABLET, EXTENDED RELEASE ORAL 2 TIMES DAILY
Status: DISCONTINUED | OUTPATIENT
Start: 2019-06-05 | End: 2019-06-09 | Stop reason: HOSPADM

## 2019-06-05 RX ORDER — ONDANSETRON 2 MG/ML
4 INJECTION INTRAMUSCULAR; INTRAVENOUS EVERY 6 HOURS PRN
Status: DISCONTINUED | OUTPATIENT
Start: 2019-06-05 | End: 2019-06-09 | Stop reason: HOSPADM

## 2019-06-05 RX ORDER — LIDOCAINE HYDROCHLORIDE 10 MG/ML
1 INJECTION, SOLUTION EPIDURAL; INFILTRATION; INTRACAUDAL; PERINEURAL
Status: DISCONTINUED | OUTPATIENT
Start: 2019-06-05 | End: 2019-06-05 | Stop reason: HOSPADM

## 2019-06-05 RX ORDER — SODIUM CHLORIDE 0.9 % (FLUSH) 0.9 %
10 SYRINGE (ML) INJECTION EVERY 12 HOURS SCHEDULED
Status: DISCONTINUED | OUTPATIENT
Start: 2019-06-05 | End: 2019-06-05 | Stop reason: HOSPADM

## 2019-06-05 RX ORDER — DIVALPROEX SODIUM 125 MG/1
125 CAPSULE, COATED PELLETS ORAL 2 TIMES DAILY
Status: DISCONTINUED | OUTPATIENT
Start: 2019-06-05 | End: 2019-06-09 | Stop reason: HOSPADM

## 2019-06-05 RX ORDER — DILTIAZEM HYDROCHLORIDE 180 MG/1
180 CAPSULE, COATED, EXTENDED RELEASE ORAL DAILY
Status: DISCONTINUED | OUTPATIENT
Start: 2019-06-05 | End: 2019-06-09 | Stop reason: HOSPADM

## 2019-06-05 RX ORDER — SODIUM CHLORIDE 9 MG/ML
INJECTION, SOLUTION INTRAVENOUS CONTINUOUS
Status: DISCONTINUED | OUTPATIENT
Start: 2019-06-05 | End: 2019-06-09 | Stop reason: HOSPADM

## 2019-06-05 RX ORDER — SODIUM CHLORIDE 0.9 % (FLUSH) 0.9 %
10 SYRINGE (ML) INJECTION EVERY 12 HOURS SCHEDULED
Status: DISCONTINUED | OUTPATIENT
Start: 2019-06-05 | End: 2019-06-09 | Stop reason: HOSPADM

## 2019-06-05 RX ORDER — BISACODYL 10 MG
10 SUPPOSITORY, RECTAL RECTAL DAILY PRN
Status: DISCONTINUED | OUTPATIENT
Start: 2019-06-05 | End: 2019-06-09 | Stop reason: HOSPADM

## 2019-06-05 RX ORDER — DOCUSATE SODIUM 100 MG/1
100 CAPSULE, LIQUID FILLED ORAL 2 TIMES DAILY
Status: DISCONTINUED | OUTPATIENT
Start: 2019-06-05 | End: 2019-06-09 | Stop reason: HOSPADM

## 2019-06-05 RX ORDER — PRAVASTATIN SODIUM 20 MG
20 TABLET ORAL DAILY
Status: DISCONTINUED | OUTPATIENT
Start: 2019-06-06 | End: 2019-06-09 | Stop reason: HOSPADM

## 2019-06-05 RX ORDER — MIDAZOLAM HYDROCHLORIDE 1 MG/ML
2 INJECTION INTRAMUSCULAR; INTRAVENOUS
Status: DISCONTINUED | OUTPATIENT
Start: 2019-06-05 | End: 2019-06-05 | Stop reason: HOSPADM

## 2019-06-05 RX ORDER — SODIUM CHLORIDE 0.9 % (FLUSH) 0.9 %
10 SYRINGE (ML) INJECTION PRN
Status: DISCONTINUED | OUTPATIENT
Start: 2019-06-05 | End: 2019-06-09 | Stop reason: HOSPADM

## 2019-06-05 RX ORDER — OXYCODONE HYDROCHLORIDE AND ACETAMINOPHEN 5; 325 MG/1; MG/1
2 TABLET ORAL EVERY 4 HOURS PRN
Status: DISCONTINUED | OUTPATIENT
Start: 2019-06-05 | End: 2019-06-09 | Stop reason: HOSPADM

## 2019-06-05 RX ORDER — FENTANYL CITRATE 50 UG/ML
25 INJECTION, SOLUTION INTRAMUSCULAR; INTRAVENOUS
Status: DISCONTINUED | OUTPATIENT
Start: 2019-06-05 | End: 2019-06-05 | Stop reason: HOSPADM

## 2019-06-05 RX ORDER — MORPHINE SULFATE 2 MG/ML
2 INJECTION, SOLUTION INTRAMUSCULAR; INTRAVENOUS ONCE
Status: COMPLETED | OUTPATIENT
Start: 2019-06-05 | End: 2019-06-05

## 2019-06-05 RX ORDER — FENTANYL CITRATE 50 UG/ML
50 INJECTION, SOLUTION INTRAMUSCULAR; INTRAVENOUS
Status: DISCONTINUED | OUTPATIENT
Start: 2019-06-05 | End: 2019-06-05 | Stop reason: HOSPADM

## 2019-06-05 RX ORDER — OXYCODONE HYDROCHLORIDE AND ACETAMINOPHEN 5; 325 MG/1; MG/1
1 TABLET ORAL EVERY 4 HOURS PRN
Status: DISCONTINUED | OUTPATIENT
Start: 2019-06-05 | End: 2019-06-09 | Stop reason: HOSPADM

## 2019-06-05 RX ORDER — LORAZEPAM 0.5 MG/1
0.5 TABLET ORAL EVERY 6 HOURS PRN
Status: DISCONTINUED | OUTPATIENT
Start: 2019-06-05 | End: 2019-06-09 | Stop reason: HOSPADM

## 2019-06-05 RX ORDER — MORPHINE SULFATE 2 MG/ML
2 INJECTION, SOLUTION INTRAMUSCULAR; INTRAVENOUS
Status: DISCONTINUED | OUTPATIENT
Start: 2019-06-05 | End: 2019-06-09 | Stop reason: HOSPADM

## 2019-06-05 RX ORDER — POLYETHYLENE GLYCOL 3350 17 G/17G
17 POWDER, FOR SOLUTION ORAL DAILY PRN
Status: DISCONTINUED | OUTPATIENT
Start: 2019-06-05 | End: 2019-06-09 | Stop reason: HOSPADM

## 2019-06-05 RX ORDER — FENTANYL CITRATE 50 UG/ML
INJECTION, SOLUTION INTRAMUSCULAR; INTRAVENOUS PRN
Status: DISCONTINUED | OUTPATIENT
Start: 2019-06-05 | End: 2019-06-05 | Stop reason: SDUPTHER

## 2019-06-05 RX ORDER — MORPHINE SULFATE 2 MG/ML
2 INJECTION, SOLUTION INTRAMUSCULAR; INTRAVENOUS ONCE
Status: DISCONTINUED | OUTPATIENT
Start: 2019-06-05 | End: 2019-06-05

## 2019-06-05 RX ORDER — SODIUM CHLORIDE, SODIUM LACTATE, POTASSIUM CHLORIDE, CALCIUM CHLORIDE 600; 310; 30; 20 MG/100ML; MG/100ML; MG/100ML; MG/100ML
INJECTION, SOLUTION INTRAVENOUS CONTINUOUS
Status: DISCONTINUED | OUTPATIENT
Start: 2019-06-05 | End: 2019-06-05

## 2019-06-05 RX ORDER — SODIUM CHLORIDE 0.9 % (FLUSH) 0.9 %
10 SYRINGE (ML) INJECTION PRN
Status: DISCONTINUED | OUTPATIENT
Start: 2019-06-05 | End: 2019-06-05 | Stop reason: HOSPADM

## 2019-06-05 RX ORDER — THIAMINE MONONITRATE (VIT B1) 100 MG
100 TABLET ORAL DAILY
Status: DISCONTINUED | OUTPATIENT
Start: 2019-06-06 | End: 2019-06-09 | Stop reason: HOSPADM

## 2019-06-05 RX ORDER — ROCURONIUM BROMIDE 10 MG/ML
INJECTION, SOLUTION INTRAVENOUS PRN
Status: DISCONTINUED | OUTPATIENT
Start: 2019-06-05 | End: 2019-06-05 | Stop reason: SDUPTHER

## 2019-06-05 RX ORDER — PROPOFOL 10 MG/ML
INJECTION, EMULSION INTRAVENOUS PRN
Status: DISCONTINUED | OUTPATIENT
Start: 2019-06-05 | End: 2019-06-05 | Stop reason: SDUPTHER

## 2019-06-05 RX ORDER — ACETAMINOPHEN 325 MG/1
650 TABLET ORAL EVERY 4 HOURS PRN
Status: DISCONTINUED | OUTPATIENT
Start: 2019-06-05 | End: 2019-06-09 | Stop reason: HOSPADM

## 2019-06-05 RX ADMIN — FENTANYL CITRATE 25 MCG: 50 INJECTION INTRAMUSCULAR; INTRAVENOUS at 16:40

## 2019-06-05 RX ADMIN — SODIUM CHLORIDE, POTASSIUM CHLORIDE, SODIUM LACTATE AND CALCIUM CHLORIDE: 600; 310; 30; 20 INJECTION, SOLUTION INTRAVENOUS at 18:28

## 2019-06-05 RX ADMIN — DIVALPROEX SODIUM 125 MG: 125 CAPSULE, COATED PELLETS ORAL at 20:05

## 2019-06-05 RX ADMIN — Medication 30 MG: at 17:09

## 2019-06-05 RX ADMIN — OXYCODONE HYDROCHLORIDE AND ACETAMINOPHEN 2 TABLET: 5; 325 TABLET ORAL at 20:05

## 2019-06-05 RX ADMIN — DOCUSATE SODIUM 100 MG: 100 CAPSULE, LIQUID FILLED ORAL at 20:05

## 2019-06-05 RX ADMIN — PHENYLEPHRINE HYDROCHLORIDE 100 MCG: 10 INJECTION INTRAVENOUS at 16:39

## 2019-06-05 RX ADMIN — Medication 10 ML: at 20:06

## 2019-06-05 RX ADMIN — ROCURONIUM BROMIDE 50 MG: 10 INJECTION INTRAVENOUS at 16:38

## 2019-06-05 RX ADMIN — FENTANYL CITRATE 25 MCG: 50 INJECTION INTRAMUSCULAR; INTRAVENOUS at 17:10

## 2019-06-05 RX ADMIN — CEFTRIAXONE 1 G: 1 INJECTION, POWDER, FOR SOLUTION INTRAMUSCULAR; INTRAVENOUS at 12:03

## 2019-06-05 RX ADMIN — SUGAMMADEX 300 MG: 100 INJECTION, SOLUTION INTRAVENOUS at 17:34

## 2019-06-05 RX ADMIN — Medication 2 G: at 16:52

## 2019-06-05 RX ADMIN — MORPHINE SULFATE 2 MG: 2 INJECTION, SOLUTION INTRAMUSCULAR; INTRAVENOUS at 08:20

## 2019-06-05 RX ADMIN — PHENYLEPHRINE HYDROCHLORIDE 100 MCG: 10 INJECTION INTRAVENOUS at 16:45

## 2019-06-05 RX ADMIN — PHENYLEPHRINE HYDROCHLORIDE 100 MCG: 10 INJECTION INTRAVENOUS at 16:49

## 2019-06-05 RX ADMIN — HYDROMORPHONE HYDROCHLORIDE 1 MG: 1 INJECTION, SOLUTION INTRAMUSCULAR; INTRAVENOUS; SUBCUTANEOUS at 17:16

## 2019-06-05 RX ADMIN — PHENYLEPHRINE HYDROCHLORIDE 100 MCG: 10 INJECTION INTRAVENOUS at 16:56

## 2019-06-05 RX ADMIN — SODIUM CHLORIDE: 9 INJECTION, SOLUTION INTRAVENOUS at 14:33

## 2019-06-05 RX ADMIN — MORPHINE SULFATE 2 MG: 2 INJECTION, SOLUTION INTRAMUSCULAR; INTRAVENOUS at 14:33

## 2019-06-05 RX ADMIN — HYDROMORPHONE HYDROCHLORIDE 1 MG: 1 INJECTION, SOLUTION INTRAMUSCULAR; INTRAVENOUS; SUBCUTANEOUS at 17:22

## 2019-06-05 RX ADMIN — TETANUS TOXOID, REDUCED DIPHTHERIA TOXOID AND ACELLULAR PERTUSSIS VACCINE, ADSORBED 0.5 ML: 5; 2.5; 8; 8; 2.5 SUSPENSION INTRAMUSCULAR at 07:44

## 2019-06-05 RX ADMIN — LIDOCAINE HYDROCHLORIDE 50 MG: 10 INJECTION, SOLUTION EPIDURAL; INFILTRATION; INTRACAUDAL; PERINEURAL at 16:38

## 2019-06-05 RX ADMIN — PROPOFOL 100 MG: 10 INJECTION, EMULSION INTRAVENOUS at 16:38

## 2019-06-05 ASSESSMENT — PAIN SCALES - GENERAL
PAINLEVEL_OUTOF10: 8
PAINLEVEL_OUTOF10: 8
PAINLEVEL_OUTOF10: 7
PAINLEVEL_OUTOF10: 4
PAINLEVEL_OUTOF10: 7
PAINLEVEL_OUTOF10: 4
PAINLEVEL_OUTOF10: 2

## 2019-06-05 ASSESSMENT — ENCOUNTER SYMPTOMS
SORE THROAT: 0
NAUSEA: 0
RHINORRHEA: 0
ABDOMINAL PAIN: 0
DIARRHEA: 0
BACK PAIN: 1
VOMITING: 0
SHORTNESS OF BREATH: 0

## 2019-06-05 ASSESSMENT — PAIN SCALES - WONG BAKER
WONGBAKER_NUMERICALRESPONSE: 0

## 2019-06-05 NOTE — ANESTHESIA PRE PROCEDURE
diltiazem (CARDIZEM CD) extended release capsule 180 mg  180 mg Oral Daily Neto Garcia MD        [facility]] divalproex (DEPAKOTE SPRINKLE) capsule 125 mg  125 mg Oral BID Neto Garcia MD        [facility]] cefTRIAXone (ROCEPHIN) 1 g in sterile water 10 mL IV syringe  1 g Intravenous Q24H Neto Garcia MD        [facility]] 0.9 % sodium chloride infusion   Intravenous Continuous Neto Garcia MD 75 mL/hr at 06/05/19 1433         Allergies:  No Known Allergies    Problem List:    Patient Active Problem List   Diagnosis Code    Closed nondisp fx of greater trochanter of right femur w/routine heal S72.114D       Past Medical History:        Diagnosis Date    Alcohol abuse     Cerebellar ataxia ([facility] [address]     Dementia     Depression     Hypertension     Muscle weakness     Pacemaker     Polycythemia        Past Surgical History:  History reviewed. No pertinent surgical history. Social History:    Social History     Tobacco Use    Smoking status: Former Smoker    Smokeless tobacco: Never Used   Substance Use Topics    Alcohol use: Yes                                Counseling given: Not Answered      Vital Signs (Current):   Vitals:    06/05/19 1026 06/05/19 1130 06/05/19 1230 06/05/19 1329   BP: 132/80 130/88 120/80 101/74   Pulse: 74 68 75 54   Resp: 16 16 16 18   Temp:    98.6 °F (37 °C)   TempSrc:    Temporal   SpO2: 94% 94% 94% 91%   Weight:                                                  BP Readings from Last 3 Encounters:   06/05/19 101/74   05/16/19 93/65   06/10/18 106/69       NPO Status:                                                                                 BMI:   Wt Readings from Last 3 Encounters:   06/05/19 180 lb (81.6 kg)   05/16/19 180 lb (81.6 kg)   06/09/18 180 lb (81.6 kg)     Body mass index is 26.58 kg/m².     CBC:   Lab Results   Component Value Date    WBC 8.0 06/05/2019    RBC 5.12 06/05/2019    HGB 16.0 06/05/2019    HCT 51.6 (Decadron/Zofran Intraop)  Induction: intravenous. BIS  MIPS: Postoperative opioids intended and Prophylactic antiemetics administered. Anesthetic plan and risks discussed with patient.                       Phoenix Ugarte MD   6/5/2019

## 2019-06-05 NOTE — ANESTHESIA POSTPROCEDURE EVALUATION
Department of Anesthesiology  Postprocedure Note    Patient: Gamal Graham  MRN: [id number]  YOB: 1945  Date of evaluation: 6/5/2019  Time:  5:48 PM     Procedure Summary     Date:  06/05/19 Room / Location:  [facility] OR  / [facility] OR    Anesthesia Start:  1634 Anesthesia Stop:      Procedure:  RIGHT SHORT TFN (Right ) Diagnosis:  (CLOSED NONDISP FRACTURE OF GREATER TROCHANTER OF RIGHT FEMUR)    Surgeon:  Roxie Barrios MD Responsible Provider:  Pop Whitten MD    Anesthesia Type:  general ASA Status:  3          Anesthesia Type: general    Lincoln Phase I: Lincoln Score: 9    Lincoln Phase II:      Last vitals: Reviewed and per EMR flowsheets.        Anesthesia Post Evaluation    Patient location during evaluation: PACU  Patient participation: complete - patient participated  Level of consciousness: awake and confused  Pain score: 0  Airway patency: patent  Nausea & Vomiting: no nausea and no vomiting  Complications: no  Cardiovascular status: hemodynamically stable  Respiratory status: room air  Hydration status: euvolemic  Comments: T 99 /72 P 83

## 2019-06-05 NOTE — BRIEF OP NOTE
Brief Postoperative Note  ______________________________________________________________    Patient: Thad Bell  YOB: 1945  MRN: [id number]  Date of Procedure: 6/5/2019    Pre-Op Diagnosis: CLOSED NONDISP FRACTURE OF GREATER TROCHANTER OF RIGHT FEMUR    Post-Op Diagnosis: Same       Procedure(s):  RIGHT SHORT TFN    Anesthesia: General    Surgeon(s):  Philip Koch MD    Assistant: None    Estimated Blood Loss (mL): less than 50     Complications: None    Specimens:   * No specimens in log *    Implants:  Implant Name Type Inv.  Item Serial No.  Lot No. LRB No. Used   NAIL TIB TI-BENJAMIN TFNA 170MM ST Screw/Plate/Nail/Vignesh NAIL TIB TI-BENJAMIN TFNA 170MM ST  SYNTHES [id number] Right 1   IMPL BLADE HELICAL [id number] ST Screw/Plate/Nail/Vignesh IMPL BLADE HELICAL 20OF ST  [employer] [id number] Right 1   SCREW LK W/ T25 STARDRIVE [id number] Screw/Plate/Nail/Vignesh SCREW LK W/ T25 STARDRIVE [id number]  SYNTHES  Right 1         Drains: * No LDAs found *    Findings: Nondisplaced right intertrochanteric femur fracture    Philip Koch MD  Date: 6/5/2019  Time: 5:37 PM

## 2019-06-05 NOTE — ED NOTES
Spoke with [facility].  Patient is able to walk with assistance x 1 and patient is incon     Maeve Bryant RN  06/05/19 9204

## 2019-06-05 NOTE — OP NOTE
Patient Name: Zhang Blanton  MRN: [id number]  : 1945    DATE of SURGERY: 2019    SURGEON: Rolando Carcamo MD    ASSISTANT: NONE     PREOPERATIVE DIAGNOSIS: Acute traumatic nondisplaced intertrochanteric fracture of the Right femur, initial encounter for closed fracture    POSTOPERATIVE DIAGNOSIS: Acute traumatic nondisplaced intertrochanteric fracture of the Right femur, initial encounter for closed fracture    PROCEDURE PERFORMED: Cephalomedullary Nailing Right closed displaced Intertrochanteric hip fracture      IMPLANTS: Synthes TFN size 11 mm diameter    ANESTHESIA USED: General endotrachial anesthesia    OPERATIVE INDICATIONS: 66 y.o. male status post fall with the above named diagnosis. Surgical indications include fracture displacement, stabilization of fracture, and mobilization of the patient. Risks include, but are not limited to, anesthesia, bleeding, infection, pain, damage to local structures, need for further surgery, malunion, nonunion, fracture displacement, failure of hardware, intraoperative death. Risks, benefits, and alternative were discussed and the patient wishes to proceed with surgery. ESTIMATED BLOOD LOSS: < 50 mL    DRAINS: none     COMPLICATIONS: none    SPECIMENS: none    FINDINGS: see op note    PROCEDURE in DETAIL:  The patient was seen in the preoperative holding room, the informed consent was reviewed and signed, and the correct operative extremity marked with the patients agreement. The patient was transported to the operating room, where a timeout was performed identifying the correct patient and operative site. Perioperative antibiotics were administered prior to incision. Once anesthetized, both feet were placed into well-padded boots and the patient was positioned on the fracture table. Traction and internal rotation were applied to the operative extremity and the fracture was noted to remain nondisplaced. A sterile prep and drape was then performed.     A guidepin was placed at the tip of the greater trochanter on the AP plane and in line with the intramedullary canal on the lateral view. The wire was advanced to the level of the lesser trochanter followed by introduction of the starting reamer. The nail of choice was then placed into the intramedullary canal.  Utilizing the attachment jig, a guidepin was then placed into the central aspect of the femoral head on both the AP and lateral views. Length was measured for the spiral blade and the path of the screw was drilled to the appropriate depth. The spiral blade was placed without complication and the set screw was placed proximally, loosened a 1/4 turn to allow for compression of the fracture. Again utilizing the attachment jig, a distal interlocking screw was placed into the static portion of the nail gaining excellent purchase. C-arm images were used in multiple planes showing adequate alignment of the fracture without hardware complication. Incisions were irrigated, closed in layers, and the skin was sealed with a Dermabond adhesive skin dressing. A sterile dressing was applied, the patient awakened, transported the recovery room in stable condition.     POSTOPERATIVE PLAN:  1) TTWB RLE x 6 weeks  2) DVT prophylaxis x 6 weeks  3) PT/OT     Electronically signed by Joy Ferguson MD on 6/5/2019 at 4:15 PM

## 2019-06-05 NOTE — ED PROVIDER NOTES
650 mg by mouth every 6 hours as needed for Pain    BUPROPION (WELLBUTRIN SR) 150 MG EXTENDED RELEASE TABLET    Take 150 mg by mouth 2 times daily    DILTIAZEM (DILACOR XR) 180 MG EXTENDED RELEASE CAPSULE    Take 180 mg by mouth daily    DIVALPROEX (DEPAKOTE SPRINKLE) 125 MG CAPSULE    Take 125 mg by mouth 2 times daily    HYDROCODONE-ACETAMINOPHEN (NORCO) 7.5-325 MG PER TABLET    Take 1 tablet by mouth every 6 hours as needed for Pain. Suezanne Lenin LORAZEPAM (ATIVAN) 0.5 MG TABLET    Take 0.5 mg by mouth every 6 hours as needed for Anxiety. Suezanne Lenin POLYETHYLENE GLYCOL (GLYCOLAX) PACKET    Take 17 g by mouth daily as needed for Constipation    POTASSIUM CHLORIDE 10 MEQ/100ML    Infuse 10 mEq intravenously once    PRAVASTATIN (PRAVACHOL) 20 MG TABLET    Take 20 mg by mouth daily    TIOTROPIUM (SPIRIVA) 18 MCG INHALATION CAPSULE    Inhale 18 mcg into the lungs daily    VITAMIN B-1 (THIAMINE) 100 MG TABLET    Take 100 mg by mouth daily       ALLERGIES     Patient has no known allergies. FAMILY HISTORY     History reviewed. No pertinent family history. SOCIAL HISTORY       Social History     Socioeconomic History    Marital status:      Spouse name: None    Number of children: None    Years of education: None    Highest education level: None   Occupational History    None   Social Needs    Financial resource strain: None    Food insecurity:     Worry: None     Inability: None    Transportation needs:     Medical: None     Non-medical: None   Tobacco Use    Smoking status: Former Smoker    Smokeless tobacco: Never Used   Substance and Sexual Activity    Alcohol use:  Yes    Drug use: No    Sexual activity: None   Lifestyle    Physical activity:     Days per week: None     Minutes per session: None    Stress: None   Relationships    Social connections:     Talks on phone: None     Gets together: None     Attends Anabaptist service: None     Active member of club or organization: None     Attends meetings of clubs or organizations: None     Relationship status: None    Intimate partner violence:     Fear of current or ex partner: None     Emotionally abused: None     Physically abused: None     Forced sexual activity: None   Other Topics Concern    None   Social History Narrative    None       SCREENINGS    [place]oret[place] Coma Scale  Eye Opening: Spontaneous  Best Verbal Response: Oriented  Best Motor Response: Obeys commands  [place] Coma Scale Score: 15        PHYSICAL EXAM    (up to 7 for level 4, 8 or more for level 5)     ED Triage Vitals [06/05/19 0545]   BP Temp Temp Source Pulse Resp SpO2 Height Weight   137/89 97.9 °F (36.6 °C) Oral 72 20 95 % -- 180 lb (81.6 kg)       Physical Exam   Constitutional: He is oriented to person, place, and time. He appears well-developed and well-nourished. No distress. HENT:   Head: Normocephalic. Hematoma right scalp, laceration right temporal region   Eyes: Pupils are equal, round, and reactive to light. Neck:   +pain   Cardiovascular: Normal rate, regular rhythm, normal heart sounds and intact distal pulses. Pulmonary/Chest: Effort normal and breath sounds normal. No respiratory distress. He exhibits no tenderness. Abdominal: Soft. Bowel sounds are normal. He exhibits no distension. There is no tenderness. Musculoskeletal: Normal range of motion. He exhibits tenderness. He exhibits no edema. Skin tear over right elbow and right wrist. +midline pain over thoracic and lumbar spine. Denies LUE pain or bilateral lower extremity pain   Neurological: He is alert and oriented to person, place, and time. No cranial nerve deficit. He exhibits normal muscle tone. Coordination normal.   Skin: Skin is warm and dry. No rash noted. He is not diaphoretic. Psychiatric: He has a normal mood and affect. His behavior is normal.   Nursing note and vitals reviewed.       DIAGNOSTIC RESULTS     EKG: All EKG's are interpreted by the Emergency Department Physician who either signs or Co-signs this chart in the absence of a cardiologist.      RADIOLOGY:   Non-plain film images such as CT, Ultrasound and MRI are read by the radiologist. [place] Wood images are visualized and preliminarily interpreted by the emergency physician with the below findings:    Right elbow: negative  Right forearm:negative  Pelvis:negative      Interpretation per the Radiologist below, if available at the time of this note:    CT Head WO Contrast    (Results Pending)   CT Cervical Spine WO Contrast    (Results Pending)   CT Thoracic Spine WO Contrast    (Results Pending)   CT Lumbar Spine WO Contrast    (Results Pending)   XR RADIUS ULNA RIGHT (2 VIEWS)    (Results Pending)   XR ELBOW RIGHT (MIN 3 VIEWS)    (Results Pending)   XR PELVIS (1-2 VIEWS)    (Results Pending)   XR CHEST PORTABLE    (Results Pending)         ED BEDSIDE ULTRASOUND:   Performed by ED Physician - none    LABS:  Labs Reviewed   CBC WITH AUTO DIFFERENTIAL - Abnormal; Notable for the following components:       Result Value    .8 (*)     MCH 31.3 (*)     MCHC 31.0 (*)     All other components within normal limits   COMPREHENSIVE METABOLIC PANEL - Abnormal; Notable for the following components:    Sodium 146 (*)     Chloride 112 (*)     Calcium 10.4 (*)     Total Protein 6.5 (*)     Alkaline Phosphatase 133 (*)     All other components within normal limits   CK - Abnormal; Notable for the following components: Total CK 23 (*)     All other components within normal limits   PROTIME-INR   APTT   TROPONIN   URINE RT REFLEX TO CULTURE       All other labs were within normal range or not returned as of this dictation. EMERGENCY DEPARTMENT COURSE and DIFFERENTIALDIAGNOSIS/MDM:   Vitals:    Vitals:    06/05/19 0545 06/05/19 0603   BP: 137/89 122/88   Pulse: 72 75   Resp: 20 19   Temp: 97.9 °F (36.6 °C)    TempSrc: Oral    SpO2: 95% 95%   Weight: 180 lb (81.6 kg)        MDM  TODD DR Nicholson end of shift pending images. CONSULTS:  None    PROCEDURES:  Unless otherwise notedbelow, none     Procedures    FINAL IMPRESSION     1. Injury of head, initial encounter    2. Hematoma    3.  Skin tear of right elbow without complication, initial encounter          DISPOSITION/PLAN   DISPOSITION        PATIENT REFERRED TO:  @FUP@    DISCHARGE MEDICATIONS:  New Prescriptions    No medications on file          (Please note that portions of this note were completed with a voice recognition program.  Efforts were made to edit the dictations butoccasionally words are mis-transcribed.)    Susy Burrell MD (electronically signed)  AttendingEmergency Physician         Susy Burrell MD  06/05/19 2530

## 2019-06-05 NOTE — H&P
Arrived at 1.25 pm History and Physical    Patient Name:  Anatoly Roberts    :  1945    Chief Complaint:   Fall     History of Present Illness:   Anatoly Roberts presents to [facility] after a fall today. He complained of pain in his right hip with weight bearing. Pain was 10 out 10, sharp. Rest was making pain better. He was diagnosed with   Greater trochanter fx and taken to or today. Currently has no complains of pain or any complains . Past Medical History:   has a past medical history of Alcohol abuse, Cerebellar ataxia ([address] Dementia, Depression, Hypertension, Muscle weakness, Pacemaker, and Polycythemia. Surgical History:   has a past surgical history that includes Femur fracture surgery (Right, 2019). Social History:   reports that he has quit smoking. He has never used smokeless tobacco. He reports that he drinks alcohol. He reports that he does not use drugs. Family History:  Unknown to the pt    Medications:  Prior to Admission medications    Medication Sig Start Date End Date Taking? Authorizing Provider   HYDROcodone-acetaminophen (NORCO) 7.5-325 MG per tablet Take 1 tablet by mouth every 6 hours as needed for Pain. .   Yes Historical Provider, MD   LORazepam (ATIVAN) 0.5 MG tablet Take 0.5 mg by mouth every 6 hours as needed for Anxiety. .   Yes Historical Provider, MD   acetaminophen (TYLENOL) 325 MG tablet Take 650 mg by mouth every 6 hours as needed for Pain   Yes Historical Provider, MD   potassium chloride 10 MEQ/100ML Infuse 10 mEq intravenously once   Yes Historical Provider, MD   vitamin B-1 (THIAMINE) 100 MG tablet Take 100 mg by mouth daily   Yes Historical Provider, MD   tiotropium (SPIRIVA) 18 MCG inhalation capsule Inhale 18 mcg into the lungs daily   Yes Historical Provider, MD   diltiazem (DILACOR XR) 180 MG extended release capsule Take 180 mg by mouth daily   Yes Historical Provider, MD   buPROPion (WELLBUTRIN SR) 150 MG extended release tablet Take 150 mg by mouth 2 times daily   Yes Historical Provider, MD   polyethylene glycol (GLYCOLAX) packet Take 17 g by mouth daily as needed for Constipation   Yes Historical Provider, MD   pravastatin (PRAVACHOL) 20 MG tablet Take 20 mg by mouth daily   Yes Historical Provider, MD   divalproex (DEPAKOTE SPRINKLE) 125 MG capsule Take 125 mg by mouth 2 times daily   Yes Historical Provider, MD       Allergies:  Patient has no known allergies. Review of Systems:   · Constitutional: there has been no unanticipated weight loss. There's been no change in energy level, sleep pattern, or activity level. · Eyes: No visual changes or diplopia. No scleral icterus. · ENT: No Headaches, hearing loss or vertigo. No mouth sores or sore throat. · Cardiovascular: No chest pain, palpitations or loss of consciousness. No pleuritic pain or phlebitis. No orthopnea, PND or peripheral edema. · Respiratory: No cough or wheezing, no sputum production. No hemoptysis. No dyspnea     · Gastrointestinal: No abdominal pain, appetite loss, blood in stools. No change in bowel habits. No N/V. No blood per rectum. · Genitourinary: No dysuria, trouble voiding, or hematuria. · Musculoskeletal:  Pain right hip   · Integumentary: No rash or pruritis. · Neurological: No headache, diplopia, change in muscle strength, numbness or tingling. No change in gait, balance, coordination. · Psychiatric: No anxiety, or depression. · Endocrine: No temperature intolerance. No excessive thirst, fluid intake, or urination. No tremor. · Hematologic/Lymphatic: No abnormal bruising or bleeding, blood clots or swollen lymph nodes. · Allergic/Immunologic: No nasal congestion or hives. Physical Examination:    Vital Signs: /87   Pulse 81   Temp 97.5 °F (36.4 °C) (Temporal)   Resp 18   Wt 180 lb (81.6 kg)   SpO2 92%   BMI 26.58 kg/m²   General appearance: Well preserved, mesomorphic body habitus, alert, no distress.   Skin: Skin color, texture, turgor

## 2019-06-05 NOTE — ED NOTES
Bed: 04  Expected date:   Expected time:   Means of arrival:   Comments:  mya Finney [facility]  06/05/19 5584

## 2019-06-05 NOTE — ED NOTES
Changed patient brief. Patient yelled out in pain and stated that his right hip and foot hurt.   is aware     Jazzy Waddell RN  06/05/19 1078

## 2019-06-05 NOTE — ED NOTES
Attempted to get pt to sit on the side of the bed and stand up to test strength - per MD order. Pt refused, yelling \"I can't\" , \"leave me alone\". Pt refused to move in the bed. When assisted to move, pt is yelling.      Rachel Olsen RN  06/05/19 9625

## 2019-06-05 NOTE — CONSULTS
Orthopaedic Inpatient Consultation    NAME:  Makenzie Luna   : 1945  MRN: [id number]    2019  5:43 AM    Requesting Physician: Rebecca Rodriguez COMPLAINT:  right hip pain following fall    HISTORY OF PRESENT ILLNESS:   The patient is a 66 y.o. male nursing home resident who experienced a mechanical fall onto his right side today. He has had persistent pain with difficulty weightbearing since that time. Pain is located in the right groin and rated a 3/5, constant, dull, worse with movement, better with rest and medication. There are no associated symptoms. Past Medical History:        Diagnosis Date    Alcohol abuse     [address] ataxia ([facility] [address]     Dementia     Depression     Hypertension     Muscle weakness     Pacemaker     Polycythemia        Past Surgical History:    History reviewed. No pertinent surgical history. Current Medications:   Prior to Admission medications    Medication Sig Start Date End Date Taking? Authorizing Provider   HYDROcodone-acetaminophen (NORCO) 7.5-325 MG per tablet Take 1 tablet by mouth every 6 hours as needed for Pain. .   Yes Historical Provider, MD   LORazepam (ATIVAN) 0.5 MG tablet Take 0.5 mg by mouth every 6 hours as needed for Anxiety. .   Yes Historical Provider, MD   acetaminophen (TYLENOL) 325 MG tablet Take 650 mg by mouth every 6 hours as needed for Pain   Yes Historical Provider, MD   potassium chloride 10 MEQ/100ML Infuse 10 mEq intravenously once   Yes Historical Provider, MD   vitamin B-1 (THIAMINE) 100 MG tablet Take 100 mg by mouth daily   Yes Historical Provider, MD   tiotropium (SPIRIVA) 18 MCG inhalation capsule Inhale 18 mcg into the lungs daily   Yes Historical Provider, MD   diltiazem (DILACOR XR) 180 MG extended release capsule Take 180 mg by mouth daily   Yes Historical Provider, MD   buPROPion (WELLBUTRIN SR) 150 MG extended release tablet Take 150 mg by mouth 2 times daily   Yes Historical Provider, MD   polyethylene glycol [facility]) packet Take 17 g by mouth daily as needed for Constipation   Yes Historical Provider, MD   pravastatin (PRAVACHOL) 20 MG tablet Take 20 mg by mouth daily   Yes Historical Provider, MD   divalproex (DEPAKOTE SPRINKLE) 125 MG capsule Take 125 mg by mouth 2 times daily   Yes Historical Provider, MD       Allergies:  Patient has no known allergies. Social History:   Social History     Socioeconomic History    Marital status:      Spouse name: Not on file    Number of children: Not on file    Years of education: Not on file    Highest education level: Not on file   Occupational History    Not on file   Social Needs    Financial resource strain: Not on file    Food insecurity:     Worry: Not on file     Inability: Not on file    Transportation needs:     Medical: Not on file     Non-medical: Not on file   Tobacco Use    Smoking status: Former Smoker    Smokeless tobacco: Never Used   Substance and Sexual Activity    Alcohol use: Yes    Drug use: No    Sexual activity: Not on file   Lifestyle    Physical activity:     Days per week: Not on file     Minutes per session: Not on file    Stress: Not on file   Relationships    Social connections:     Talks on phone: Not on file     Gets together: Not on file     Attends Rastafari service: Not on file     Active member of club or organization: Not on file     Attends meetings of clubs or organizations: Not on file     Relationship status: Not on file    Intimate partner violence:     Fear of current or ex partner: Not on file     Emotionally abused: Not on file     Physically abused: Not on file     Forced sexual activity: Not on file   Other Topics Concern    Not on file   Social History Narrative    Not on file       Family History:   History reviewed. No pertinent family history.     REVIEW OF SYSTEMS:  14 point review of systems has been reviewed from the patient's emergency room visit, reviewed with the patient on today's date with no new changes. PHYSICAL EXAM:      Physical Examination:  Vitals:   Vitals:    06/05/19 1026 06/05/19 1130 06/05/19 1230 06/05/19 1329   BP: 132/80 130/88 120/80 101/74   Pulse: 74 68 75 54   Resp: 16 16 16 18   Temp:    98.6 °F (37 °C)   TempSrc:    Temporal   SpO2: 94% 94% 94% 91%   Weight:         General:  Appears stated age, no distress. Orientation:  Alert and oriented to person and circumstance. Mood and Affect:  Cooperative and pleasant. Gait:  Resting comfortably in bed. Cardiovascular:  Symmetric 1-2 plus pulses in upper and lower extremities. Lymph:  No cervical or inguinal lymphadenopathy noted. Sensation:  Grossly intact to light touch. DTR:  Normal, no pathologic reflexes. Coordination/balance:  Normal    Musculoskeletal:  Right upper extremity exam:  There is no tenderness to palpation about the shoulder, elbow, wrist or hand. Unrestricted full function motion is present. Stability is normal with provocative tests, 5/5 strength, and skin is normal.      Left upper extremity exam:  There is no tenderness to palpation about the shoulder, elbow, wrist or hand. Unrestricted full function motion is present. Stability is normal with provocative tests, 5/5 strength, and skin is normal.     Right lower extremity exam:  There is no tenderness to palpation about the knee, ankle or foot, but there is obvious pain and discomfort about his right hip and groin. He has pain with any attempted active or passive range of motion of his right hip. The surrounding muscle compartments are soft and compressible and the overlying skin is intact. Left lower extremity exam:  There is no tenderness to palpation about the hip, knee, ankle or foot. Unrestricted full function motion is present.   Stability is normal with provocative tests, 5/5 strength, and skin is normal.      DATA:    CBC with Differential:    Lab Results   Component Value Date    WBC 8.0 06/05/2019    RBC 5.12 06/05/2019    HGB 16.0 06/05/2019    HCT 51.6 06/05/2019    HCT 53.5 01/27/2011     06/05/2019     01/27/2011    .8 06/05/2019    MCH 31.3 06/05/2019    MCHC 31.0 06/05/2019    RDW 14.5 06/05/2019    LYMPHOPCT 35.4 06/05/2019    MONOPCT 6.0 06/05/2019    EOSPCT 0.3 01/27/2011    BASOPCT 0.5 06/05/2019    MONOSABS 0.50 06/05/2019    LYMPHSABS 2.8 06/05/2019    EOSABS 0.20 06/05/2019    BASOSABS 0.00 06/05/2019     CMP:    Lab Results   Component Value Date     06/05/2019     01/27/2011    K 4.5 06/05/2019    K 4.3 01/27/2011     06/05/2019     01/27/2011    CO2 27 06/05/2019    BUN 18 06/05/2019    CREATININE 0.9 06/05/2019    CREATININE 0.8 01/27/2011    LABGLOM >60 06/05/2019    GLUCOSE 87 06/05/2019    PROT 6.5 06/05/2019    PROT 7.0 01/27/2011    CALCIUM 10.4 06/05/2019    BILITOT 0.4 06/05/2019    ALKPHOS 133 06/05/2019    ALKPHOS 98 01/27/2011    AST 11 06/05/2019    ALT 7 06/05/2019     BMP:    Lab Results   Component Value Date     06/05/2019     01/27/2011    K 4.5 06/05/2019    K 4.3 01/27/2011     06/05/2019     01/27/2011    CO2 27 06/05/2019    BUN 18 06/05/2019    CREATININE 0.9 06/05/2019    CREATININE 0.8 01/27/2011    CALCIUM 10.4 06/05/2019    LABGLOM >60 06/05/2019    GLUCOSE 87 06/05/2019         Radiology: I have reviewed the radiology images listed below and agree with the findings of the interpreting radiologist(s). Xr Pelvis (1-2 Views)    Result Date: 6/5/2019  EXAMINATION: XR PELVIS (1-2 VIEWS) 6/5/2019 7:18 AM HISTORY: AP PELVIS radiograph 6/5/2019 5:00 AM HISTORY: Patient fell COMPARISON: None FINDINGS: There is no evidence of fracture or dislocation. The S-I joints and hip joints are intact. The soft tissues are grossly unremarkable. 1. Negative Pelvis. Signed by Dr Caitlyn March on 6/5/2019 7:18 AM    Xr Elbow Right (min 3 Views)    Result Date: 6/5/2019  History: Fall with pain Right elbow: 3 views the right elbow are obtained.  There hemorrhage, mass, or midline shift. There are advanced periventricular and subcortical white matter changes, a nonspecific finding most often seen as sequela of chronic microvascular ischemia. There is no evidence of acute territorial infarct. The ventricles appear normal in configuration. Basilar cisterns are patent. Posterior fossa appears grossly normal. The calvarium is intact. The visualized paranasal sinuses and left mastoid air cells appear clear. Fluid is noted in the right mastoid air cells inferiorly. Impression: Right frontal scalp hematoma. No acute intracranial findings. Signed by Dr Anjelica Andrews on 6/5/2019 7:52 AM    Ct Cervical Spine Wo Contrast    Result Date: 6/5/2019  CT cervical spine 6/5/2019 5:00 AM HISTORY: Fall with back pain TECHNIQUE: Axial images of the cervical spine were obtained without IV contrast. Coronal and sagittal reformatted images are reconstructed and reviewed. COMPARISON: None. DLP: 556 mGy cm Automated exposure control was utilized to minimize patient radiation dose. FINDINGS: There is reversal of normal cervical lordosis. Is chronic loss of height of the C5-C7 vertebra with moderate endplate spurring at each of these levels. There is a mild right convexity of the cervical curvature. There is uncinate process hypertrophy beginning at C4 extending through C7. There is moderate facet osteoarthropathy. There is no acute cervical vertebral fracture. Degenerative changes create mild cervical spinal canal stenosis and mild to moderate neural foramen narrowing. There are carotid artery calcifications. Emphysematous changes are seen within the lung apices. 1. Moderate to advanced degenerative changes of the cervical spine with reversal of normal cervical lordosis and chronic appearing loss of height of C5-C7. No acute cervical vertebral fracture identified.  Signed by Dr Esme Jacobson on 6/5/2019 7:08 AM    Ct Thoracic Spine Wo Contrast    Result Date: 6/5/2019  CT thoracic spine 6/5/2019 5:00 AM HISTORY: Fall with back pain TECHNIQUE: Axial images of the thoracic spine were obtained without IV contrast. Coronal and sagittal reformatted images are reconstructed and reviewed. COMPARISON: None. DLP: 1597 mGy cm Automated exposure control was utilized to minimize patient radiation dose. FINDINGS: There is diffuse osteopenia. There are compression deformities involving the superior endplates of T2, T3, N58. There is chronic appearing loss of height of the T11, T12, L1, and L2 vertebral bodies. There is a rotoscoliosis of the thoracic spine with greatest curvature to the right within the mid thoracic region. There is moderate endplate spurring. No paravertebral hematoma is identified. There is a cardiac pacer device. There is cardiomegaly. There are scattered thoracic aortic calcification. There are granulomatous calcifications within the chest. There is dependent basilar atelectasis. Emphysematous changes of the lungs are noted. 1. Osteopenia with degenerative change and rotoscoliosis of the thoracic spine. There are multiple compression deformities involving the thoracic vertebra described in detail above. There are no prior studies to document chronicity of the findings. Radiographically, findings appear to be subacute/chronic. No convincing acute thoracic vertebral fracture is localized. There is no prominent thoracic spinal canal stenosis. Comments: If the thoracic back pain persists, follow-up outpatient nuclear medicine bone scan could be performed to assess for any increased uptake at the site of compression deformities if the patient's cardiac pacer device is not MRI compatible.  Signed by Dr Wayne Torres on 6/5/2019 7:16 AM    Ct Lumbar Spine Wo Contrast    Result Date: 6/5/2019  CT lumbar spine 6/5/2019 5:00 AM HISTORY: Fall with back pain TECHNIQUE: Axial images of the lumbar spine were obtained without IV contrast. Coronal and sagittal reformatted images are reconstructed and reviewed. COMPARISON: None. DLP: 958 mGy cm Automated exposure control was utilized to minimize patient radiation dose. FINDINGS: There is diffuse osteopenia. There are compression deformity of the T12, L1, L2, L3, and L4 vertebra. Loss of height is greatest at the L3 level approximately 75% loss of height. There are bridging anterior osteophytes from the L2-L4 level. There is no significant retropulsion at any level. There is moderate facet osteoarthropathy. Old L1 transverse process fractures are considered with old left L2 transverse process fracture suspected. No paravertebral hematoma is identified. There is diffuse vascular calcification. Small cortical renal atrophy. 1. Osteopenia with compression deformities of the T12-L4 vertebra with no prior studies for comparison. Compression deformities radiographically appear to be subacute or chronic with no convincing acute lumbar vertebral fracture localized. No paravertebral hematoma. Mild to moderate canal stenosis suspected at the L4/5 level from the degenerative chronic change. Mild neural foramen narrowing considered bilaterally. Signed by Dr Zechariah Pink on 6/5/2019 7:22 AM    Ct Pelvis Wo Contrast Additional Contrast? None    Result Date: 6/5/2019  EXAMINATION:  CT PELVIS WO CONTRAST  6/5/2019 11:06 AM HISTORY: Right hip and pelvic pain post fall COMPARISON: Right hip/pelvis radiographs performed today TECHNIQUE: Radiation dose equals  mGy cm. AUTOMATED EXPOSURE CONTROL dose reduction technique was implemented. Thin section axial images were obtained. 2-D sagittal coronal reconstructions were generated. FINDINGS: The femoral heads are imaged appropriately within the acetabulum. There is a focal osteochondral defect/deformity involving the right femoral head superiorly and centrally. There is mild cortical depression with associated cystic changes and sclerosis most likely chronic in nature. The osteochondral defect measures approximately 8 mm. Cortical depression of nearly 1 millimeter is observed. There is deformity of the greater trochanter and an old fracture is suspected. Definite acute fracture line is not appreciated. There is a small cortical density/defect defect along the anterior femoral neck head junction again changes from old trauma considered. Correlate with patient history and presentation. There are osteoarthritic changes involving the left hip joint with narrowing of the joint space superiorly and laterally with subchondral cystic changes along the acetabulum osteophyte formation. Pelvic bones are maintained without acute fractures. Atherosclerotic aortoiliac and femoral calcifications are observed. There are fat-containing inguinal hernias. Prostate gland is generous. Diverticulosis of the colon is observed without acute diverticulitis. 1. Deformity of the right femoral neck/greater trochanter suggesting old trauma. Correlate with patient history and presentation. 2. 1 cm osteochondral defect involving the right femoral head. 3. Degenerative changes left hip joint. 4. No definite CT evidence of acute bony injury. Signed by Dr Richardson Covarrubias on 6/5/2019 11:22 AM    Xr Chest Portable    Result Date: 6/5/2019  EXAMINATION: XR CHEST PORTABLE 6/5/2019 7:17 AM HISTORY: XR CHEST PORTABLE 6/5/2019 5:00 AM HISTORY: Altered mental status patient fell COMPARISON: None. FINDINGS: The lungs are clear. The cardiac silhouette is moderately enlarged. Pacing device projects over the soft tissues the left chest.. The osseous structures and surrounding soft tissues demonstrate no acute abnormality. 1. Moderate cardiomegaly no acute cardiopulmonary process. Signed by Dr Mj Cutler on 6/5/2019 7:17 AM    Ct Femur Right Wo Contrast    Result Date: 6/5/2019  CT femur 6/5/2019 9:15 AM HISTORY: Fall with right leg pain; unwilling to bear weight on right leg.  TECHNIQUE: Axial images of the femur were obtained without IV contrast. Coronal and sagittal reformatted images are reconstructed and reviewed. COMPARISON: None. DLP: 686 mGy cm Automated exposure control was utilized to minimize patient radiation dose. FINDINGS: There is a lucency extending from the right greater trochanter into the intertrochanteric region concerning for nondisplaced fracture. This is best seen on the coronal reconstructed image #49. No extension of the fracture line through the lesser trochanter cortex is appreciated. There is irregularity of the cortex of the greater trochanter. The femoral head is preserved. There is mild to moderate hip joint space narrowing. The mid and distal femur remain intact. Fatty-containing right inguinal hernia is noted. There are vascular calcifications. 1. Findings are suspicious for a nondisplaced fracture of the right proximal femur within the intratrochanteric region as described above. Signed by Dr Reema Awad on 6/5/2019 11:16 AM    Ct Tibia Fibula Right Wo Contrast    Result Date: 6/5/2019  EXAMINATION: CT TIBIA FIBULA RIGHT WO CONTRAST 6/5/2019 11:07 AM HISTORY: Fall, right lower surgery pain, unable to weight Comparison: None Technique: Sequential imaging was performed from the knee through the ankle without the use of IV contrast. Sagittal and coronal reformations were made from the original source data and reviewed. Automated exposure control was utilized for radiation dose reduction. Radiation dose:  mGy-cm Findings: There is normal bony alignment at the knee and ankle. There is no evidence of acute fracture. No cortical irregularity is identified. Evaluation of the soft tissues is limited due to bony algorithm acquisition. No intramuscular hematoma is appreciated. No soft tissue swelling is seen. No significant joint effusion is identified. Impression: No evidence of acute osseous injury in the visualized left lower extremity.  Signed by Dr Brice Magallon on 6/5/2019 11:13 AM    Ct Foot Right Wo Contrast    Result Date: 6/5/2019  EXAMINATION: CT FOOT RIGHT WO CONTRAST 6/5/2019 11:16 AM HISTORY: Unable to bear weight, diffuse right lower extremity pain after fall Comparison: None Technique: Sequential imaging was performed through the right foot without the use of IV contrast. Sagittal and coronal reformations were made from the original source data and reviewed. Automated exposure control was utilized for radiation dose reduction. Radiation dose: DLP 1243 mGy-cm Findings: There is normal bony alignment at the ankle. Tibiotalar joint appears grossly normal. Talocalcaneal articulation appears grossly normal. No acute fracture is appreciated. The patient is somewhat osteopenic. No focal soft tissue abnormality is identified. There appears to be mild diffuse soft tissue edema. Evaluation of the soft tissues is limited due to bony algorithm acquisition. No focal hematoma is appreciated. Impression: No evidence of acute osseous injury in the right foot. Signed by Dr Tg Maxwell on 6/5/2019 11:19 AM    Xr Hip 2-3 Vw W Pelvis Right    Result Date: 6/5/2019  EXAMINATION: XR HIP 2-3 VW W PELVIS RIGHT 6/5/2019 8:48 AM HISTORY: Jobie Heady right hip pain AP and lateral views of the right hip are obtained. The femoral head and neck are intact. The hip joint space is well maintained. Negative AP and lateral right hip.  Signed by Dr Amos Primrose on 6/5/2019 8:49 AM    --------------------------------------------------------------------------------------------------------------------    Assessment: Nondisplaced right intertrochanteric femur fracture    Plan:  1) to OR for Trochanteric entry femoral hip nailing on the right nondisplaced intertrochanteric femur fracture - we discussed risks, benefits, and alternatives and patient wishes to proceed  2) Admit postop for pain control, PT/OT  3) TTWB RLE x 6 weeks     Electronically signed by Mariaelena Espino MD on 6/5/2019 at 4:09 PM

## 2019-06-05 NOTE — ED PROVIDER NOTES
[address] [facility]  eMERGENCYdEPARTMENT eNCOUnter      Pt Name: Pepito Spence  MRN: [id number]  [place] 1945  Date of evaluation: 6/5/2019  Huber Lebron MD    CHIEF COMPLAINT       Chief Complaint   Patient presents with   [facility] assumed from Dr. Shelli Vickers. Patient is a 22-year-old male who suffered a fall at the nursing home. Patient has dementia. He is confused which is baseline for him. Unknown what caused the fall. No reports of syncope. No focal discomfort. He complains of pain all over from his fall. No focal pain anywhere. Images pending. PHYSICAL EXAM    (up to 7 for level 4, 8 or more for level 5)     ED Triage Vitals [06/05/19 0545]   BP Temp Temp Source Pulse Resp SpO2 Height Weight   137/89 97.9 °F (36.6 °C) Oral 72 20 95 % -- 180 lb (81.6 kg)       Physical Exam   Constitutional: He appears well-developed. No distress. HENT:   Head: Normocephalic. Eyes: Pupils are equal, round, and reactive to light. No scleral icterus. Neck: Normal range of motion. Neck supple. No JVD present. No spinous process tenderness present. Cardiovascular: Normal rate, regular rhythm, normal heart sounds and intact distal pulses. Pulmonary/Chest: Effort normal and breath sounds normal. No respiratory distress. Abdominal: Soft. He exhibits no distension. There is no tenderness. Musculoskeletal: He exhibits no edema or deformity. Right hip: He exhibits no tenderness, no swelling and no deformity. Right knee: No tenderness found. Right ankle: No tenderness. Arms:       Right upper leg: He exhibits no tenderness, no bony tenderness, no swelling, no edema and no deformity. Right lower leg: He exhibits no tenderness. Right foot: There is normal range of motion, no tenderness and no deformity. Neurological: He is alert. Pleasantly confused which is baseline. Moving all 4 extremities equally. Skin: Skin is warm and dry.    Psychiatric: He has a normal mood and affect. His behavior is normal.   Vitals reviewed. DIAGNOSTIC RESULTS     EKG: All EKG's are interpreted by the Emergency Department Physician who either signs orCo-signs this chart in the absence of a cardiologist.    Intermittently paced rhythm with underlying A. fib. Left anterior fascicular block. I do not see evidence of acute ischemia compared to prior EKG. Reviewed records through care everywhere from prior admissions to [facility]. Patient has chronic history of A. fib. Not on anticoagulation because of fall risk. RADIOLOGY:   Non-plain film images such as CT, Ultrasound and MRI are read by the radiologist. Plain radiographic images are visualized and preliminarily interpreted by the emergency physician with the below findings:      CT FOOT RIGHT WO CONTRAST   Final Result   Impression:   No evidence of acute osseous injury in the right foot. Signed by Dr Madai Mosley on 6/5/2019 11:19 AM      CT TIBIA FIBULA RIGHT WO CONTRAST   Final Result   Impression:   No evidence of acute osseous injury in the visualized left lower   extremity. Signed by Dr Madai Moslye on 6/5/2019 11:13 AM      CT FEMUR RIGHT WO CONTRAST   Final Result   1. Findings are suspicious for a nondisplaced fracture of the right   proximal femur within the intratrochanteric region as described above. Signed by Dr Radha Gutierrez on 6/5/2019 11:16 AM      CT PELVIS WO CONTRAST Additional Contrast? None   Final Result   1. Deformity of the right femoral neck/greater trochanter suggesting   old trauma. Correlate with patient history and presentation. 2. 1 cm osteochondral defect involving the right femoral head. 3. Degenerative changes left hip joint. 4. No definite CT evidence of acute bony injury. Signed by Dr Heriberto Kemp on 6/5/2019 11:22 AM      XR FOOT RIGHT (MIN 3 VIEWS)   Final Result   1. No acute osseous abnormality.    2. Osteoporosis   Signed by Dr Heriberto Kemp on 6/5/2019 8:51 AM      XR HIP 2-3 VW W PELVIS RIGHT   Final Result   Negative AP and lateral right hip. Signed by Dr Mj Cutler on 6/5/2019 8:49 AM      XR RADIUS ULNA RIGHT (2 VIEWS)   Final Result   1. Osteopenia with chronic arthritic changes of the right wrist. No   acute bony pathology of the forearm. Signed by Dr Flower Hernandez on 6/5/2019 7:29 AM      XR ELBOW RIGHT (MIN 3 VIEWS)   Final Result   1. Osteopenia with no acute bony pathology the right elbow. Signed by Dr Flower Hernandez on 6/5/2019 7:29 AM      XR PELVIS (1-2 VIEWS)   Final Result   1. Negative Pelvis. Signed by Dr Mj Cutler on 6/5/2019 7:18 AM      XR CHEST PORTABLE   Final Result   1. Moderate cardiomegaly no acute cardiopulmonary process. Signed by Dr Mj Cutler on 6/5/2019 7:17 AM      CT Lumbar Spine WO Contrast   Final Result   1. Osteopenia with compression deformities of the T12-L4 vertebra with   no prior studies for comparison. Compression deformities   radiographically appear to be subacute or chronic with no convincing   acute lumbar vertebral fracture localized. No paravertebral hematoma. Mild to moderate canal stenosis suspected at the L4/5 level from the   degenerative chronic change. Mild neural foramen narrowing considered   bilaterally. Signed by Dr Flower Hernandez on 6/5/2019 7:22 AM      CT Thoracic Spine WO Contrast   Final Result   1. Osteopenia with degenerative change and rotoscoliosis of the   thoracic spine. There are multiple compression deformities involving   the thoracic vertebra described in detail above. There are no prior   studies to document chronicity of the findings. Radiographically,   findings appear to be subacute/chronic. No convincing acute thoracic   vertebral fracture is localized. There is no prominent thoracic spinal   canal stenosis.    Comments: If the thoracic back pain persists, follow-up outpatient   nuclear medicine bone scan could be performed to assess for any   increased uptake at the site of compression deformities if the   patient's cardiac pacer device is not MRI compatible. Signed by Dr Lore Klein on 6/5/2019 7:16 AM      CT Cervical Spine WO Contrast   Final Result   1. Moderate to advanced degenerative changes of the cervical spine   with reversal of normal cervical lordosis and chronic appearing loss   of height of C5-C7. No acute cervical vertebral fracture identified. Signed by Dr Lore Klein on 6/5/2019 7:08 AM      CT Head WO Contrast   Final Result   Impression:   Right frontal scalp hematoma. No acute intracranial findings. Signed by Dr Mayelin Quiroga on 6/5/2019 7:52 AM          LABS:  Labs Reviewed   CBC WITH AUTO DIFFERENTIAL - Abnormal; Notable for the following components:       Result Value    .8 (*)     MCH 31.3 (*)     MCHC 31.0 (*)     All other components within normal limits   COMPREHENSIVE METABOLIC PANEL - Abnormal; Notable for the following components:    Sodium 146 (*)     Chloride 112 (*)     Calcium 10.4 (*)     Total Protein 6.5 (*)     Alkaline Phosphatase 133 (*)     All other components within normal limits   URINE RT REFLEX TO CULTURE - Abnormal; Notable for the following components:    Color, UA ORANGE (*)     Nitrite, Urine POSITIVE (*)     Leukocyte Esterase, Urine SMALL (*)     All other components within normal limits   CK - Abnormal; Notable for the following components: Total CK 23 (*)     All other components within normal limits   VALPROIC ACID LEVEL, TOTAL - Abnormal; Notable for the following components:    Valproic Acid Lvl 12.1 (*)     All other components within normal limits   URINE CULTURE   PROTIME-INR   APTT   TROPONIN   MICROSCOPIC URINALYSIS       All other labs were within normal range or not returned as of this dictation.     EMERGENCY DEPARTMENT COURSE and DIFFERENTIALDIAGNOSIS/MDM:   Vitals:    Vitals:    06/05/19 0730 06/05/19 0820 06/05/19 0930 06/05/19 1026   BP: 113/88 120/82 124/88 132/80   Pulse: 77 70 76 74   Resp: 16 16 16 16   Temp:       TempSrc:       SpO2: 94% 98% 98% 94%   Weight:           MDM  Patient resting comfortably on repeat exam. Several fractures seen in his T and L-spine but these are likely chronic. He said he hurts all over. Denies any focal pain anywhere. I palpated along his spine and he has no focal tenderness anywhere. Tried to ambulate patient and he said he has pain in his right hip and right foot. I reexamined patient and he does not seem to have any significant tenderness anywhere in his pelvis, hips, legs or feet but we will x-ray his right hip and right foot. No fractures noted on x-rays and right hip and right foot. Patient still refusing to walk. Tells me his entire right leg hurts. Patient refusing to walk. Threatened to punch me. I called and spoke with his nurse at the nursing home, [place]. She said behavior is baseline for him but she said he normally walks. He is refusing to do so here. We'll obtain CT to rule out nondisplaced fracture. Fracture seen and right intratrochanteric region. Ortho consult put in. Call placed to hospitalist.    Urine nitrite positive. Doubt UTI because no bacteria but will cover with antibiotics just in case. Case discussed with hospitalist who is agreeable plan of care and admission. CONSULTS:  IP CONSULT TO ORTHOPEDIC SURGERY    PROCEDURES:  Unlessotherwise noted below, none      Procedures    FINAL IMPRESSION      1. Injury of head, initial encounter    2. Hematoma    3. Skin tear of right elbow without complication, initial encounter    4. Fall, initial encounter    5. Chronic vertebral fracture due to osteoporosis (HCC)    6. Closed nondisplaced intertrochanteric fracture of right femur, initial encounter ([facility] [address]    7.  Possible Urinary tract infection without hematuria, site unspecified          DISPOSITION/PLAN   DISPOSITION     PATIENT REFERRED TO:  [facility] EMERGENCY DEPT  Joaquin Snowden  [phone]    As needed, If symptoms worsen    Saritha Tamayo MD  [address] [facility],[address]  [phone]    Schedule an appointment as soon as possible for a visit         DISCHARGE MEDICATIONS:  New Prescriptions    No medications on file          (Please note that portions of this note were completed with a voice recognition program.  Efforts were made to edit the dictations but occasionally words are mis-transcribed.)    Rodney Harrington MD (electronically signed)  Attending Emergency Physician            Rodney Harrington MD  06/05/19 0264

## 2019-06-06 PROBLEM — I10 HTN (HYPERTENSION): Status: ACTIVE | Noted: 2019-06-06

## 2019-06-06 PROBLEM — F03.90 DEMENTIA (HCC): Status: ACTIVE | Noted: 2019-06-06

## 2019-06-06 LAB
ANION GAP SERPL CALCULATED.3IONS-SCNC: 8 MMOL/L (ref 7–19)
BUN BLDV-MCNC: 19 MG/DL (ref 8–23)
CALCIUM SERPL-MCNC: 9.5 MG/DL (ref 8.8–10.2)
CHLORIDE BLD-SCNC: 111 MMOL/L (ref 98–111)
CO2: 23 MMOL/L (ref 22–29)
CREAT SERPL-MCNC: 0.8 MG/DL (ref 0.5–1.2)
EKG P AXIS: NORMAL DEGREES
EKG P-R INTERVAL: NORMAL MS
EKG Q-T INTERVAL: 352 MS
EKG QRS DURATION: 114 MS
EKG QTC CALCULATION (BAZETT): 381 MS
EKG T AXIS: -75 DEGREES
GFR NON-AFRICAN AMERICAN: >60
GLUCOSE BLD-MCNC: 145 MG/DL (ref 74–109)
HCT VFR BLD CALC: 44.3 % (ref 42–52)
HEMOGLOBIN: 13.9 G/DL (ref 14–18)
LACTIC ACID: 2.9 MMOL/L (ref 0.5–1.9)
LACTIC ACID: 3 MMOL/L (ref 0.5–1.9)
POTASSIUM SERPL-SCNC: 4.8 MMOL/L (ref 3.5–5)
SODIUM BLD-SCNC: 142 MMOL/L (ref 136–145)

## 2019-06-06 PROCEDURE — 99232 SBSQ HOSP IP/OBS MODERATE 35: CPT | Performed by: INTERNAL MEDICINE

## 2019-06-06 PROCEDURE — 6360000002 HC RX W HCPCS: Performed by: ORTHOPAEDIC SURGERY

## 2019-06-06 PROCEDURE — 6370000000 HC RX 637 (ALT 250 FOR IP): Performed by: HOSPITALIST

## 2019-06-06 PROCEDURE — 2580000003 HC RX 258: Performed by: ORTHOPAEDIC SURGERY

## 2019-06-06 PROCEDURE — 97165 OT EVAL LOW COMPLEX 30 MIN: CPT

## 2019-06-06 PROCEDURE — 97116 GAIT TRAINING THERAPY: CPT

## 2019-06-06 PROCEDURE — 6370000000 HC RX 637 (ALT 250 FOR IP): Performed by: ORTHOPAEDIC SURGERY

## 2019-06-06 PROCEDURE — 97530 THERAPEUTIC ACTIVITIES: CPT

## 2019-06-06 PROCEDURE — 80048 BASIC METABOLIC PNL TOTAL CA: CPT

## 2019-06-06 PROCEDURE — 97162 PT EVAL MOD COMPLEX 30 MIN: CPT

## 2019-06-06 PROCEDURE — 85018 HEMOGLOBIN: CPT

## 2019-06-06 PROCEDURE — 36415 COLL VENOUS BLD VENIPUNCTURE: CPT

## 2019-06-06 PROCEDURE — 83605 ASSAY OF LACTIC ACID: CPT

## 2019-06-06 PROCEDURE — 85014 HEMATOCRIT: CPT

## 2019-06-06 PROCEDURE — 1210000000 HC MED SURG R&B

## 2019-06-06 PROCEDURE — 2580000003 HC RX 258: Performed by: INTERNAL MEDICINE

## 2019-06-06 RX ORDER — SODIUM CHLORIDE, SODIUM LACTATE, POTASSIUM CHLORIDE, AND CALCIUM CHLORIDE .6; .31; .03; .02 G/100ML; G/100ML; G/100ML; G/100ML
500 INJECTION, SOLUTION INTRAVENOUS ONCE
Status: COMPLETED | OUTPATIENT
Start: 2019-06-06 | End: 2019-06-06

## 2019-06-06 RX ADMIN — RIVAROXABAN 10 MG: 10 TABLET, FILM COATED ORAL at 02:00

## 2019-06-06 RX ADMIN — SODIUM CHLORIDE, POTASSIUM CHLORIDE, SODIUM LACTATE AND CALCIUM CHLORIDE 500 ML: 600; 310; 30; 20 INJECTION, SOLUTION INTRAVENOUS at 14:53

## 2019-06-06 RX ADMIN — DIVALPROEX SODIUM 125 MG: 125 CAPSULE, COATED PELLETS ORAL at 21:32

## 2019-06-06 RX ADMIN — Medication 2 G: at 02:00

## 2019-06-06 RX ADMIN — OXYCODONE HYDROCHLORIDE AND ACETAMINOPHEN 1 TABLET: 5; 325 TABLET ORAL at 08:04

## 2019-06-06 RX ADMIN — BISACODYL 5 MG: 5 TABLET, COATED ORAL at 07:45

## 2019-06-06 RX ADMIN — PRAVASTATIN SODIUM 20 MG: 20 TABLET ORAL at 07:45

## 2019-06-06 RX ADMIN — CEFTRIAXONE 1 G: 1 INJECTION, POWDER, FOR SOLUTION INTRAMUSCULAR; INTRAVENOUS at 12:25

## 2019-06-06 RX ADMIN — BUPROPION HYDROCHLORIDE 150 MG: 150 TABLET, EXTENDED RELEASE ORAL at 21:32

## 2019-06-06 RX ADMIN — DOCUSATE SODIUM 100 MG: 100 CAPSULE, LIQUID FILLED ORAL at 21:33

## 2019-06-06 RX ADMIN — DILTIAZEM HYDROCHLORIDE 180 MG: 180 CAPSULE, COATED, EXTENDED RELEASE ORAL at 07:45

## 2019-06-06 RX ADMIN — Medication 100 MG: at 07:45

## 2019-06-06 RX ADMIN — RIVAROXABAN 10 MG: 10 TABLET, FILM COATED ORAL at 17:53

## 2019-06-06 RX ADMIN — DOCUSATE SODIUM 100 MG: 100 CAPSULE, LIQUID FILLED ORAL at 07:45

## 2019-06-06 RX ADMIN — TIOTROPIUM BROMIDE INHALATION SPRAY 2 PUFF: 3.12 SPRAY, METERED RESPIRATORY (INHALATION) at 07:45

## 2019-06-06 RX ADMIN — SODIUM CHLORIDE: 9 INJECTION, SOLUTION INTRAVENOUS at 10:31

## 2019-06-06 RX ADMIN — DIVALPROEX SODIUM 125 MG: 125 CAPSULE, COATED PELLETS ORAL at 07:45

## 2019-06-06 RX ADMIN — Medication 2 G: at 07:44

## 2019-06-06 RX ADMIN — BUPROPION HYDROCHLORIDE 150 MG: 150 TABLET, EXTENDED RELEASE ORAL at 07:45

## 2019-06-06 ASSESSMENT — PAIN DESCRIPTION - DIRECTION: RADIATING_TOWARDS: DOWN LEG

## 2019-06-06 ASSESSMENT — PAIN DESCRIPTION - ORIENTATION
ORIENTATION: RIGHT
ORIENTATION: RIGHT

## 2019-06-06 ASSESSMENT — PAIN DESCRIPTION - PAIN TYPE
TYPE: SURGICAL PAIN
TYPE: SURGICAL PAIN

## 2019-06-06 ASSESSMENT — PAIN SCALES - GENERAL
PAINLEVEL_OUTOF10: 3
PAINLEVEL_OUTOF10: 6

## 2019-06-06 ASSESSMENT — PAIN DESCRIPTION - PROGRESSION: CLINICAL_PROGRESSION: NOT CHANGED

## 2019-06-06 ASSESSMENT — PAIN - FUNCTIONAL ASSESSMENT: PAIN_FUNCTIONAL_ASSESSMENT: ACTIVITIES ARE NOT PREVENTED

## 2019-06-06 ASSESSMENT — PAIN DESCRIPTION - DESCRIPTORS: DESCRIPTORS: ACHING;DULL

## 2019-06-06 ASSESSMENT — PAIN DESCRIPTION - LOCATION
LOCATION: HIP
LOCATION: HIP

## 2019-06-06 ASSESSMENT — PAIN DESCRIPTION - FREQUENCY: FREQUENCY: INTERMITTENT

## 2019-06-06 ASSESSMENT — PAIN DESCRIPTION - ONSET: ONSET: ON-GOING

## 2019-06-06 ASSESSMENT — PAIN SCALES - WONG BAKER: WONGBAKER_NUMERICALRESPONSE: 4

## 2019-06-06 NOTE — PROGRESS NOTES
Orthopedic Surgery Progress Note    Qing Guerrero  6/6/2019      Subjective:     Systemic or Specific Complaints: No Complaints    Pain mild    Objective:     Patient Vitals for the past 24 hrs:   BP Temp Temp src Pulse Resp SpO2   06/06/19 1851 (!) 73/48 97.7 °F (36.5 °C) Temporal 51 16 --   06/06/19 1715 90/68 -- -- -- -- --   06/06/19 1419 (!) 79/54 98.5 °F (36.9 °C) Temporal 77 16 93 %   06/06/19 1114 88/68 -- -- -- -- --   06/06/19 1107 86/65 97.1 °F (36.2 °C) Temporal 82 16 92 %   06/06/19 1001 -- -- -- -- -- 93 %   06/06/19 0633 91/62 96.5 °F (35.8 °C) Temporal 80 16 90 %   06/06/19 0205 105/79 97.3 °F (36.3 °C) Temporal 80 16 95 %   06/05/19 2222 101/73 96.9 °F (36.1 °C) Temporal 82 18 94 %   06/05/19 2053 115/79 97.6 °F (36.4 °C) Temporal 54 16 95 %   06/05/19 1941 120/87 97.5 °F (36.4 °C) Temporal 81 18 92 %   06/05/19 1909 115/86 97.4 °F (36.3 °C) Temporal 78 16 92 %       right lower  General: alert, appears stated age and cooperative   Wound: clean, dry, intact             Dressing: clean, dry, and intact   Extremity: Distal NVI           DVT Exam: No evidence of DVT seen on physical exam.                   Data Review:  Recent Labs     06/05/19  0605 06/06/19 0041   HGB 16.0 13.9*     Recent Labs     06/06/19 0041      K 4.8   CREATININE 0.8       Assessment:     POD# 1 s/p R cephalomedullary nailing of a nondisplaced right intertrochanteric femur fracture. Pt. Currently septic.     Plan:      1:  DVT prophylaxis, ICE, elevate  2:  Pain control  3:  Physical therapy/Occupational therapy  4:  Anticipate discharge once placement arranged and once medically stable and if pain well controlled  5: Toe touch weight bearing LLE       Electronically signed by Paulette Glover MD on 6/6/2019 at 6:56 PM

## 2019-06-06 NOTE — PROGRESS NOTES
Hospitalist Progress Note  6/6/2019 9:08 AM  Subjective:   Admit Date: 6/5/2019  PCP: Tiffany Putnam MD    Chief Complaint: Fall    Subjective: Patient with R hip pain but states well controlled on current regimen. Denies CP or SOB. Cumulative Hospital History:    Brian Harrison presents to [facility] after a fall today. He complained of pain in his right hip with weight bearing. Pain was 10 out 10, sharp. Rest was making pain better. He was diagnosed with   Greater trochanter fx and taken to or today. Currently has no complains of pain or any complains . 06/06: Pain controlled. VTE ppx on xarelto. PT/OT. CM working on placement. Appreciate ortho recs. ROS: 14 point review of systems is negative except as specifically addressed above. Dietary Nutrition Supplements: Standard High Calorie Oral Supplement  DIET CARDIAC;   No intake or output data in the 24 hours ending 06/06/19 0908  Medications:   sodium chloride 75 mL/hr at 06/05/19 1433     Current Facility-Administered Medications   Medication Dose Route Frequency Provider Last Rate Last Dose    morphine (PF) injection 2 mg  2 mg Intravenous Q3H PRN Ginny Mitchell MD   2 mg at 06/05/19 1433    diltiazem (CARDIZEM CD) extended release capsule 180 mg  180 mg Oral Daily Ginny Mitchell MD   180 mg at 06/06/19 0745    divalproex (DEPAKOTE SPRINKLE) capsule 125 mg  125 mg Oral BID Ginny Mitchell MD   125 mg at 06/06/19 0745    cefTRIAXone (ROCEPHIN) 1 g in sterile water 10 mL IV syringe  1 g Intravenous Q24H Ginny Mitchell MD        0.9 % sodium chloride infusion   Intravenous Continuous Ginny Mitchell MD 75 mL/hr at 06/05/19 1433      sodium chloride flush 0.9 % injection 10 mL  10 mL Intravenous 2 times per day Ginny Mitchell MD   10 mL at 06/05/19 2006    sodium chloride flush 0.9 % injection 10 mL  10 mL Intravenous PRN Ginny Mitchell MD        docusate sodium (COLACE) capsule 100 mg  100 mg Oral BID Ginny Mitchell MD   100 mg at 06/06/19 0745    145*   CALCIUM 10.4* 9.5     No results for input(s): MG, PHOS in the last 72 hours. Recent Labs     06/05/19 0605   AST 11   ALT 7   BILITOT 0.4   ALKPHOS 133*     ABGs:No results for input(s): PHART, ZGE1GPG, PO2ART, XSO7SXV, BEART, HGBAE, T7RPEKPX, CARBOXHGBART, 02THERAPY in the last 72 hours. HgBA1c: No results for input(s): LABA1C in the last 72 hours. FLP:    Lab Results   Component Value Date    TRIG 49 04/20/2018    HDL 48 04/20/2018    LDLCALC 67 04/20/2018     TSH:  No results found for: TSH  Troponin T:   Recent Labs     06/05/19 0605   TROPONINI <0.01     INR:   Recent Labs     06/05/19 0605   INR 1.02       Objective:   Vitals: BP 91/62   Pulse 80   Temp 96.5 °F (35.8 °C) (Temporal)   Resp 16   Wt 180 lb (81.6 kg)   SpO2 90%   BMI 26.58 kg/m²   24HR INTAKE/OUTPUT:  No intake or output data in the 24 hours ending 06/06/19 0908  General appearance: alert and cooperative with exam  HEENT: atraumatic, eyes with clear conjunctiva and normal lids  Lungs: no increased work of breathing, diminished breath sounds bilaterally  Heart: regular rate and rhythm and S1, S2 normal  Abdomen: soft, non-tender; bowel sounds normal; no masses,  no organomegaly  Extremities: extremities normal, atraumatic, no cyanosis or edema  Neurologic: No focal neurologic deficits, normal sensation,  affect and mood appropriate. Skin: no rashes, nodules. Assessment and Plan:   Principal Problem:    Closed nondisp fx of greater trochanter of right femur w/routine heal  Active Problems:    Dementia    HTN (hypertension)  Resolved Problems:    * No resolved hospital problems. *    Pain controlled. VTE ppx on xarelto. PT/OT. CM working on placement. Appreciate ortho recs.     Advance Directive: Full Code    DVT prophylaxis: xarelto    Discharge planning: likely rehab in 1-2 days      Radha Burroughs MD  [facility]

## 2019-06-06 NOTE — CARE COORDINATION
Pt is from [facility] and has a bed hold at the facility. Pt may DC back to facility once medically cleared.    [facility]  [address] signed by Leah Espino on 6/6/2019 at 12:08 PM

## 2019-06-06 NOTE — CARE COORDINATION
Pt is from [facility]. SW is attempting to find out if Pt has a bed hold at the facility.    [facility]  [address] signed by Farhad Santiago on 6/6/2019 at 11:45 AM

## 2019-06-06 NOTE — PROGRESS NOTES
Occupational Therapy   Occupational Therapy Initial Assessment  Date: 2019   Patient Name: Talia Vazquez  MRN: [id number]     : 1945    Date of Service: 2019    Discharge Recommendations:          Assessment   Performance deficits / Impairments: Decreased cognition;Decreased functional mobility ; Decreased ADL status; Decreased balance;Decreased endurance  Assessment: Will progress as tolerated. Decreased cognition impacting ADLs and ability to maintain TTWB  Treatment Diagnosis: R femur fx. Prognosis: Good  Decision Making: Low Complexity  REQUIRES OT FOLLOW UP: Yes  Activity Tolerance  Activity Tolerance: Patient Tolerated treatment well           Patient Diagnosis(es): The primary encounter diagnosis was Injury of head, initial encounter. Diagnoses of Hematoma, Skin tear of right elbow without complication, initial encounter, Fall, initial encounter, Chronic vertebral fracture due to osteoporosis [facility]), Closed nondisplaced intertrochanteric fracture of right femur, initial encounter ([facility] [address] and Possible Urinary tract infection without hematuria, site unspecified were also pertinent to this visit. has a past medical history of Alcohol abuse, Cerebellar ataxia ([facility] [address] Dementia, Depression, Hypertension, Muscle weakness, Pacemaker, and Polycythemia. has a past surgical history that includes Femur fracture surgery (Right, 2019). Treatment Diagnosis: R femur fx.        Restrictions  Restrictions/Precautions  Restrictions/Precautions: Weight Bearing  Lower Extremity Weight Bearing Restrictions  Right Lower Extremity Weight Bearing: Toe Touch Weight Bearing    Subjective   General  Chart Reviewed: Yes  Patient assessed for rehabilitation services?: Yes  Family / Caregiver Present: No  Diagnosis: R femur fx. , short TFN  Pain Assessment  Pain Assessment: Faces  Pain Level: 3  Melgar-Baker Pain Rating: Hurts little more  Pain Type: Surgical pain  Pain Location: Hip  Pain Orientation: Right  Social/Functional History  Social/Functional History  Type of Home: Facility(SNF)  Additional Comments: Pt. confused and unsure of PLOF, where he was at, etc.       Objective   Vision: Within Functional Limits  Hearing: Within functional limits    Orientation  Overall Orientation Status: Impaired  Orientation Level: Oriented to person;Disoriented to place; Disoriented to time;Disoriented to situation        ADL  Feeding: Supervision  Grooming: Minimal assistance  UE Bathing: Moderate assistance  LE Bathing: Dependent/Total  UE Dressing: Moderate assistance  LE Dressing: Dependent/Total        Bed mobility  Supine to Sit: Contact guard assistance  Transfers  Stand Step Transfers: Moderate assistance  Sit to stand:  Moderate assistance  Transfer Comments: Pt. appeared to be putting more than TTWB on RLE     Cognition  Overall Cognitive Status: Exceptions  Arousal/Alertness: Inconsistent responses to stimuli  Following Commands: Inconsistently follows commands  Attention Span: Difficulty dividing attention  Memory: Decreased short term memory;Decreased long term memory  Insights: Not aware of deficits  Initiation: Requires cues for all  Sequencing: Requires cues for all                 LUE AROM (degrees)  LUE AROM : WFL  RUE AROM (degrees)  RUE AROM : WFL  LUE Strength  Gross LUE Strength: WFL  RUE Strength  Gross RUE Strength: WFL                   Plan   Plan  Times per week: 3-5x/week  Times per day: Daily    G-Code     OutComes Score                                                  AM-PAC Score             Goals  Short term goals  Time Frame for Short term goals: 1 week  Short term goal 1: Elizabeth with toilet tfers to [facility] with TTWB RLE  Short term goal 2: Elizabeth with sit-stand  with TTWB RLE  Short term goal 3: Tolerate standing 30 seconds to assist with ADLs  Long term goals  Long term goal 1: Return to PLOF       Therapy Time   Individual Concurrent Group Co-treatment   Time In           Time Out Carolann, OTR/L

## 2019-06-06 NOTE — PROGRESS NOTES
Therapy  SpO2: 93 %  O2 Device: Nasal cannula  O2 Flow Rate (L/min): 4 L/min       Orientation  Orientation  Overall Orientation Status: Impaired  Orientation Level: Oriented to person;Disoriented to place; Disoriented to time(SLIGHTLY OFF ON BIRTHDAY BUT CAN STATE NAME)  Social/Functional History  Social/Functional History  Type of Home: Facility(SNF)  Additional Comments: Pt. confused and unsure of PLOF, where he was at, etc.  Cognition        Objective          AROM RLE (degrees)  RLE General AROM: DECREASED OVERALL DUE TO DISCOMFORT  AROM LLE (degrees)  LLE AROM : WFL  Strength RLE  Comment: GROSSLY 3/5        Bed mobility  Supine to Sit: Minimal assistance  Transfers  Sit to Stand:  Moderate Assistance  Bed to Chair: Moderate assistance(RW)  Comment: VC'S THROUGHOUT FOR DIRECTION AND SAFETY- Pt UNABLE TO COMPLY FULLY WITH TTWB RLE  Ambulation  Ambulation?: No     Balance  Sitting - Dynamic: Good;-  Standing - Dynamic: Fair;-        Plan   Plan  Times per week: AT LEAST 6-7  Current Treatment Recommendations: Strengthening, ROM, Balance Training, Functional Mobility Training, Transfer Training, Patient/Caregiver Education & Training, Safety Education & Training  Safety Devices  Type of devices: Left in chair, Call light within reach(NO CHAIR ALARM FOUND, NSG NOTIFIED)    G-Code       OutComes Score                                                  AM-PAC Score             Goals  Short term goals  Time Frame for Short term goals: 14 DAYS  Short term goal 1: BED MOB SBA  Short term goal 2: SIT TO STAND CGA WHILE MAINTAINING TTWB RLE  Short term goal 3: BED TO CHAIR CGA (STANDING VS LATERAL)       Therapy Time   Individual Concurrent Group Co-treatment   Time In           Time Out           Minutes                   Naresh Reece PT

## 2019-06-07 PROBLEM — S72.144A CLOSED NONDISPLACED INTERTROCHANTERIC FRACTURE OF RIGHT FEMUR (HCC): Status: ACTIVE | Noted: 2019-06-07

## 2019-06-07 LAB
ANION GAP SERPL CALCULATED.3IONS-SCNC: 8 MMOL/L (ref 7–19)
BASOPHILS ABSOLUTE: 0 K/UL (ref 0–0.2)
BASOPHILS RELATIVE PERCENT: 0.1 % (ref 0–1)
BUN BLDV-MCNC: 22 MG/DL (ref 8–23)
CALCIUM SERPL-MCNC: 9.6 MG/DL (ref 8.8–10.2)
CHLORIDE BLD-SCNC: 112 MMOL/L (ref 98–111)
CO2: 23 MMOL/L (ref 22–29)
CREAT SERPL-MCNC: 0.8 MG/DL (ref 0.5–1.2)
EOSINOPHILS ABSOLUTE: 0 K/UL (ref 0–0.6)
EOSINOPHILS RELATIVE PERCENT: 0.1 % (ref 0–5)
GFR NON-AFRICAN AMERICAN: >60
GLUCOSE BLD-MCNC: 131 MG/DL (ref 74–109)
HCT VFR BLD CALC: 40.7 % (ref 42–52)
HEMOGLOBIN: 13 G/DL (ref 14–18)
LACTIC ACID: 2.9 MMOL/L (ref 0.5–1.9)
LYMPHOCYTES ABSOLUTE: 2 K/UL (ref 1.1–4.5)
LYMPHOCYTES RELATIVE PERCENT: 12.8 % (ref 20–40)
MCH RBC QN AUTO: 31.9 PG (ref 27–31)
MCHC RBC AUTO-ENTMCNC: 31.9 G/DL (ref 33–37)
MCV RBC AUTO: 99.8 FL (ref 80–94)
MONOCYTES ABSOLUTE: 1.2 K/UL (ref 0–0.9)
MONOCYTES RELATIVE PERCENT: 7.6 % (ref 0–10)
NEUTROPHILS ABSOLUTE: 12.1 K/UL (ref 1.5–7.5)
NEUTROPHILS RELATIVE PERCENT: 78.6 % (ref 50–65)
PDW BLD-RTO: 14.4 % (ref 11.5–14.5)
PLATELET # BLD: 151 K/UL (ref 130–400)
PMV BLD AUTO: 11.5 FL (ref 9.4–12.4)
POTASSIUM REFLEX MAGNESIUM: 4.2 MMOL/L (ref 3.5–5)
RBC # BLD: 4.08 M/UL (ref 4.7–6.1)
SODIUM BLD-SCNC: 143 MMOL/L (ref 136–145)
URINE CULTURE, ROUTINE: NORMAL
WBC # BLD: 15.4 K/UL (ref 4.8–10.8)

## 2019-06-07 PROCEDURE — 97535 SELF CARE MNGMENT TRAINING: CPT

## 2019-06-07 PROCEDURE — 80048 BASIC METABOLIC PNL TOTAL CA: CPT

## 2019-06-07 PROCEDURE — 97116 GAIT TRAINING THERAPY: CPT

## 2019-06-07 PROCEDURE — 6370000000 HC RX 637 (ALT 250 FOR IP): Performed by: HOSPITALIST

## 2019-06-07 PROCEDURE — 99232 SBSQ HOSP IP/OBS MODERATE 35: CPT | Performed by: INTERNAL MEDICINE

## 2019-06-07 PROCEDURE — 83605 ASSAY OF LACTIC ACID: CPT

## 2019-06-07 PROCEDURE — 2580000003 HC RX 258: Performed by: INTERNAL MEDICINE

## 2019-06-07 PROCEDURE — 97530 THERAPEUTIC ACTIVITIES: CPT

## 2019-06-07 PROCEDURE — 85025 COMPLETE CBC W/AUTO DIFF WBC: CPT

## 2019-06-07 PROCEDURE — 1210000000 HC MED SURG R&B

## 2019-06-07 PROCEDURE — 2700000000 HC OXYGEN THERAPY PER DAY

## 2019-06-07 PROCEDURE — 6370000000 HC RX 637 (ALT 250 FOR IP): Performed by: ORTHOPAEDIC SURGERY

## 2019-06-07 PROCEDURE — 36415 COLL VENOUS BLD VENIPUNCTURE: CPT

## 2019-06-07 PROCEDURE — 6360000002 HC RX W HCPCS: Performed by: ORTHOPAEDIC SURGERY

## 2019-06-07 PROCEDURE — 2580000003 HC RX 258: Performed by: ORTHOPAEDIC SURGERY

## 2019-06-07 RX ORDER — PSEUDOEPHEDRINE HCL 30 MG
100 TABLET ORAL 2 TIMES DAILY
Qty: 60 CAPSULE | Refills: 1 | Status: SHIPPED | OUTPATIENT
Start: 2019-06-07

## 2019-06-07 RX ORDER — ONDANSETRON 4 MG/1
4 TABLET, FILM COATED ORAL DAILY PRN
Qty: 20 TABLET | Refills: 0 | Status: SHIPPED | OUTPATIENT
Start: 2019-06-07

## 2019-06-07 RX ORDER — HYDROCODONE BITARTRATE AND ACETAMINOPHEN 7.5; 325 MG/1; MG/1
1 TABLET ORAL EVERY 6 HOURS PRN
Qty: 60 TABLET | Refills: 0 | Status: SHIPPED | OUTPATIENT
Start: 2019-06-07 | End: 2019-06-14

## 2019-06-07 RX ADMIN — BUPROPION HYDROCHLORIDE 150 MG: 150 TABLET, EXTENDED RELEASE ORAL at 21:44

## 2019-06-07 RX ADMIN — CEFTRIAXONE 1 G: 1 INJECTION, POWDER, FOR SOLUTION INTRAMUSCULAR; INTRAVENOUS at 13:45

## 2019-06-07 RX ADMIN — LORAZEPAM 0.5 MG: 0.5 TABLET ORAL at 21:44

## 2019-06-07 RX ADMIN — Medication 100 MG: at 09:42

## 2019-06-07 RX ADMIN — TIOTROPIUM BROMIDE INHALATION SPRAY 2 PUFF: 3.12 SPRAY, METERED RESPIRATORY (INHALATION) at 09:42

## 2019-06-07 RX ADMIN — BUPROPION HYDROCHLORIDE 150 MG: 150 TABLET, EXTENDED RELEASE ORAL at 09:42

## 2019-06-07 RX ADMIN — DIVALPROEX SODIUM 125 MG: 125 CAPSULE, COATED PELLETS ORAL at 09:42

## 2019-06-07 RX ADMIN — SODIUM CHLORIDE: 9 INJECTION, SOLUTION INTRAVENOUS at 21:43

## 2019-06-07 RX ADMIN — DOCUSATE SODIUM 100 MG: 100 CAPSULE, LIQUID FILLED ORAL at 21:44

## 2019-06-07 RX ADMIN — DOCUSATE SODIUM 100 MG: 100 CAPSULE, LIQUID FILLED ORAL at 09:42

## 2019-06-07 RX ADMIN — DILTIAZEM HYDROCHLORIDE 180 MG: 180 CAPSULE, COATED, EXTENDED RELEASE ORAL at 09:42

## 2019-06-07 RX ADMIN — PRAVASTATIN SODIUM 20 MG: 20 TABLET ORAL at 09:42

## 2019-06-07 RX ADMIN — BISACODYL 5 MG: 5 TABLET, COATED ORAL at 09:42

## 2019-06-07 RX ADMIN — DIVALPROEX SODIUM 125 MG: 125 CAPSULE, COATED PELLETS ORAL at 21:44

## 2019-06-07 RX ADMIN — RIVAROXABAN 10 MG: 10 TABLET, FILM COATED ORAL at 21:50

## 2019-06-07 ASSESSMENT — PAIN DESCRIPTION - PAIN TYPE: TYPE: SURGICAL PAIN

## 2019-06-07 ASSESSMENT — PAIN DESCRIPTION - DESCRIPTORS: DESCRIPTORS: ACHING

## 2019-06-07 ASSESSMENT — PAIN DESCRIPTION - LOCATION: LOCATION: HIP

## 2019-06-07 ASSESSMENT — PAIN SCALES - GENERAL: PAINLEVEL_OUTOF10: 3

## 2019-06-07 ASSESSMENT — PAIN DESCRIPTION - ORIENTATION: ORIENTATION: RIGHT

## 2019-06-07 NOTE — PROGRESS NOTES
Pain: Yes   Orientation     Objective    ADL  Toileting: Moderate assistance        Balance  Sitting Balance: Stand by assistance  Bed mobility  Rolling to Left: Moderate assistance  Rolling to Right: Moderate assistance  Supine to Sit: Moderate assistance;2 Person assistance  Sit to Supine: Modified independent;2 Person assistance  Transfers  Stand Step Transfers: Moderate assistance;2 Person assistance  Sit to stand:  Moderate assistance  Stand to sit: Moderate assistance  Transfer Comments: Pt. appeared to be putting more than TTWB on RLE                                                                 Plan   Plan  Times per week: 3-5x/week  Times per day: Daily  G-Code     OutComes Score                                                  AM-PAC Score             Goals  Short term goals  Time Frame for Short term goals: 1 week  Short term goal 1: Elizabeth with toilet tfers to [facility] with TTWB RLE  Short term goal 2: Elizabeth with sit-stand  with TTWB RLE  Short term goal 3: Tolerate standing 30 seconds to assist with ADLs  Long term goals  Long term goal 1: Return to PLOF       Therapy Time   Individual Concurrent Group Co-treatment   Time In           Time Out           Minutes                   KRISTEL Shanks

## 2019-06-07 NOTE — PROGRESS NOTES
Physical Therapy  Daily Treatment Note    Pebbles Salinas  [id number]  6/7/2019      AMBULATION     Distance: 3 feet     Device   [] No device [x] Rolling Walker [] Walker [] [employer] [] Hand Held     Assistance    [] Independent [] Modified Independent [] Standby / Supervision   [] Minimum  [x] Moderate x2  [] Maximum   [] CGA    TRANSFERS     Bed to chair   [] Independent [] Modified Independent [] Standby / Supervision [] CGA   [] Minimum  [x] Moderate x2   [] Maximum      BED MOBILITY     Supine to sit   [] Independent [] Modified Independent [] Standby / Supervision [] CGA   [] Minimum  [x] Moderate x2   [] Maximum        Roll    [x] Left  [x] Right      [] Independent [] Modified Independent [] Standby / Supervision    [] Minimum  [x] Moderate x2   [] Maximum      Scoot    [] Side to side  [] Up and down     [] Independent [] Modified Independent [] Standby / Supervision   [] Minimum  [] Moderate   [] Maximum      COMMENT: Assisted patient back to bed, difficulty maintaining TTWB right LE, unable to fully comply despite multiple cues. Assisted with cleanup and brief/ gown change once back to bed. Left patient with all needs in reach.                Electronically signed by Shoaib Jarrett PTA on 6/7/2019 at 3:06 PM

## 2019-06-07 NOTE — PROGRESS NOTES
Orthopedic Surgery Progress Note    Js Lopez  6/7/2019      Subjective:     Systemic or Specific Complaints: No Complaints    Pain mild    Objective:     Patient Vitals for the past 24 hrs:   BP Temp Temp src Pulse Resp SpO2   06/07/19 1207 93/63 97.9 °F (36.6 °C) Temporal 77 14 95 %   06/07/19 1108 (!) 82/59 98.1 °F (36.7 °C) Temporal 78 14 97 %   06/07/19 0712 93/71 97.2 °F (36.2 °C) Temporal 78 14 91 %   06/07/19 0210 100/72 97.7 °F (36.5 °C) Temporal 79 16 92 %   06/07/19 0029 (!) 82/56 -- -- -- -- --   06/06/19 2201 (!) 85/58 97.2 °F (36.2 °C) Temporal 53 16 93 %   06/06/19 1851 (!) 73/48 97.7 °F (36.5 °C) Temporal 51 16 --   06/06/19 1715 90/68 -- -- -- -- --   06/06/19 1419 (!) 79/54 98.5 °F (36.9 °C) Temporal 77 16 93 %       right lower  General: alert, appears stated age and cooperative   Wound: clean, dry, intact             Dressing: clean, dry, and intact   Extremity: Distal NVI           DVT Exam: No evidence of DVT seen on physical exam.                   Data Review:  Recent Labs     06/06/19  0041 06/07/19  0207   HGB 13.9* 13.0*     Recent Labs     06/07/19  0207      K 4.2   CREATININE 0.8       Assessment:     POD# 2 s/p R cephalomedullary nailing of a nondisplaced right intertrochanteric femur fracture    Plan:      1:  DVT prophylaxis, ICE, elevate  2:  Pain control  3:  Physical therapy/Occupational therapy  4:  Anticipate discharge tomorrow once placement arranged and once medically stable and if pain well controlled  5: Toe touch weight bearing LLE       Electronically signed by Joy Ferguson MD on 6/7/2019 at 1:09 PM

## 2019-06-07 NOTE — PROGRESS NOTES
Occupational Therapy  Occupational Therapy  Daily Treatment Note    Bronson Juarez  [id number]  6/7/2019          ADL's     Toileting   [] Independent [] Modified Independent [] Standby / Supervision [] CGA   [] Minimum  [] Moderate   [] Maximum       Dressing    [] Upper Body [] Lower Body      [] Independent [] Modified Independent [] Standby / Supervision [] CGA   [] Minimum  [] Moderate   [] Maximum       Bathing   [] Independent [] Modified Independent [] Standby / Supervision [] CGA   [] Minimum  [] Moderate   [] Maximum      FUNCTIONAL MOBILITY     Assistance    [] Independent [] Modified Independent [] Standby / Supervision [] CGA   [] Minimum  [] Moderate   [] Maximum      BALANCE     Standing   [] Independent [] Modified Independent [] Standby / Supervision [] CGA   [] Minimum  [] Moderate   [] Maximum       Sitting   [] Independent [] Modified Independent [] Standby / Supervision [] CGA   [] Minimum  [] Moderate   [] Maximum      TRANSFERS     Toilet    [] Independent [] Modified Independent [] Standby / Supervision [] CGA   [] Minimum  [] Moderate   [] Maximum       Bed to chair   [] Independent [] Modified Independent [] Standby / Supervision [] CGA   [] Minimum  [] Moderate   [] Maximum        Stand to sit  [] Independent [] Modified Independent [] Standby / Supervision [] CGA   [] Minimum  [] Moderate   [] Maximum       Sit to Stand  [] Independent [] Modified Independent [] Standby / Supervision [] CGA   [] Minimum  [] Moderate   [] Maximum      BED MOBILITY     Supine to sit   [] Independent [] Modified Independent [] Standby / Supervision [] CGA   [] Minimum  [] Moderate   [] Maximum         Sit to Supine   [] Independent [] Modified Independent [] Standby / Supervision [] CGA   [] Minimum  [] Moderate   [] Maximum      Roll    [] Left  [] Right      [] Independent [] Modified Independent [] Standby / Supervision    [] Minimum  [] Moderate   [] Maximum      Scoot    [] Side to side  [] Up and down     [] Independent [] Modified Independent [] Standby / Supervision   [] Minimum  [] Moderate   [] Maximum      COMMENT:                                                     Occupational Therapy      Electronically signed by KRISTEL Wharton on 6/7/2019 at 3:50 PM

## 2019-06-07 NOTE — PROGRESS NOTES
Hospitalist Progress Note  6/7/2019 2:16 PM  Subjective:   Admit Date: 6/5/2019  PCP: Lamar Guillen MD    Chief Complaint: Fall    Subjective: Patient states still some pain, but no SOB or CP. No N/V, dizzines or lightheadedness    Cumulative Hospital History:    Qing Guerrero presents to [facility] after a fall today. He complained of pain in his right hip with weight bearing. Pain was 10 out 10, sharp. Rest was making pain better. He was diagnosed with   Greater trochanter fx and taken to or today. Currently has no complains of pain or any complains . 06/06: Pain controlled. VTE ppx on xarelto. PT/OT. CM working on placement. Appreciate ortho recs. 06/07: BP low yesterday afternoon, received IVF bolus. Remains on maintenance fluids. Patient asymptomatic from low BP. Pain meds temporarily on hold. Recheck LA. Plan to discharge tomorrow if BP remains stable. ROS: 14 point review of systems is negative except as specifically addressed above. Dietary Nutrition Supplements: Standard High Calorie Oral Supplement  DIET CARDIAC;     Intake/Output Summary (Last 24 hours) at 6/7/2019 1416  Last data filed at 6/7/2019 0940  Gross per 24 hour   Intake 810 ml   Output --   Net 810 ml     Medications:   sodium chloride 75 mL/hr at 06/07/19 0941     Current Facility-Administered Medications   Medication Dose Route Frequency Provider Last Rate Last Dose    morphine (PF) injection 2 mg  2 mg Intravenous Q3H PRN Paulette Glover MD   2 mg at 06/05/19 1433    diltiazem (CARDIZEM CD) extended release capsule 180 mg  180 mg Oral Daily Paulette Glover MD   180 mg at 06/07/19 0942    divalproex (DEPAKOTE SPRINKLE) capsule 125 mg  125 mg Oral BID Paulette Glover MD   125 mg at 06/07/19 9143    cefTRIAXone (ROCEPHIN) 1 g in sterile water 10 mL IV syringe  1 g Intravenous Q24H Paulette Glover MD   1 g at 06/07/19 1345    0.9 % sodium chloride infusion   Intravenous Continuous Eduardo Serrano MD 75 mL/hr at 06/07/19 1742      sodium chloride flush 0.9 % injection 10 mL  10 mL Intravenous 2 times per day Paulette Glover MD   10 mL at 06/05/19 2006    sodium chloride flush 0.9 % injection 10 mL  10 mL Intravenous PRN Paulette Glover MD        docusate sodium (COLACE) capsule 100 mg  100 mg Oral BID Paulette Glover MD   100 mg at 06/07/19 0942    ondansetron (ZOFRAN) injection 4 mg  4 mg Intravenous Q6H PRN Paulette Glover MD        acetaminophen (TYLENOL) tablet 650 mg  650 mg Oral Q4H PRN Paulette Glover MD        oxyCODONE-acetaminophen (PERCOCET) 5-325 MG per tablet 1 tablet  1 tablet Oral Q4H PRN Paulette Glover MD   1 tablet at 06/06/19 0804    Or    oxyCODONE-acetaminophen (PERCOCET) 5-325 MG per tablet 2 tablet  2 tablet Oral Q4H PRN Paulette Glover MD   2 tablet at 06/05/19 2005    HYDROmorphone (DILAUDID) injection 0.25 mg  0.25 mg Intravenous Q3H PRN Paulette Glover MD        Or    HYDROmorphone (DILAUDID) injection 0.5 mg  0.5 mg Intravenous Q3H PRN Paulette Glover MD        bisacodyl (DULCOLAX) EC tablet 5 mg  5 mg Oral Daily Paulette Glover MD   5 mg at 06/07/19 1295    polyethylene glycol (GLYCOLAX) packet 17 g  17 g Oral Daily PRN Paulette Glover MD        bisacodyl (DULCOLAX) suppository 10 mg  10 mg Rectal Daily PRN Paulette Glover MD        rivaroxaban Keysha Pata) tablet 10 mg  10 mg Oral Daily Paulette Glover MD   10 mg at 06/06/19 1753    buPROPion [facility]) extended release tablet 150 mg  150 mg Oral BID Yessenia Jarquin MD   150 mg at 06/07/19 6160    LORazepam (ATIVAN) tablet 0.5 mg  0.5 mg Oral Q6H PRN Yessenia Jarquin MD        pravastatin (PRAVACHOL) tablet 20 mg  20 mg Oral Daily Yessenia Jarquin MD   20 mg at 06/07/19 0942    tiotropium (SPIRIVA RESPIMAT) 2.5 MCG/ACT inhaler 2 puff  2 puff Inhalation Daily Yessenia Jarquin MD   2 puff at 06/07/19 8601    vitamin B-1 (THIAMINE) tablet 100 mg  100 mg Oral Daily Yessenia Jarquin MD   100 mg at 06/07/19 3906        Labs:     Recent Labs

## 2019-06-08 LAB
ANION GAP SERPL CALCULATED.3IONS-SCNC: 9 MMOL/L (ref 7–19)
BASOPHILS ABSOLUTE: 0 K/UL (ref 0–0.2)
BASOPHILS RELATIVE PERCENT: 0.2 % (ref 0–1)
BUN BLDV-MCNC: 17 MG/DL (ref 8–23)
CALCIUM SERPL-MCNC: 9.2 MG/DL (ref 8.8–10.2)
CHLORIDE BLD-SCNC: 112 MMOL/L (ref 98–111)
CO2: 24 MMOL/L (ref 22–29)
CREAT SERPL-MCNC: 0.7 MG/DL (ref 0.5–1.2)
EOSINOPHILS ABSOLUTE: 0.1 K/UL (ref 0–0.6)
EOSINOPHILS RELATIVE PERCENT: 1.1 % (ref 0–5)
GFR NON-AFRICAN AMERICAN: >60
GLUCOSE BLD-MCNC: 90 MG/DL (ref 74–109)
HCT VFR BLD CALC: 38.5 % (ref 42–52)
HEMOGLOBIN: 12.1 G/DL (ref 14–18)
LYMPHOCYTES ABSOLUTE: 2.3 K/UL (ref 1.1–4.5)
LYMPHOCYTES RELATIVE PERCENT: 21.2 % (ref 20–40)
MCH RBC QN AUTO: 31.6 PG (ref 27–31)
MCHC RBC AUTO-ENTMCNC: 31.4 G/DL (ref 33–37)
MCV RBC AUTO: 100.5 FL (ref 80–94)
MONOCYTES ABSOLUTE: 0.9 K/UL (ref 0–0.9)
MONOCYTES RELATIVE PERCENT: 8.2 % (ref 0–10)
NEUTROPHILS ABSOLUTE: 7.6 K/UL (ref 1.5–7.5)
NEUTROPHILS RELATIVE PERCENT: 68.7 % (ref 50–65)
PDW BLD-RTO: 14.6 % (ref 11.5–14.5)
PLATELET # BLD: 147 K/UL (ref 130–400)
PMV BLD AUTO: 11.3 FL (ref 9.4–12.4)
POTASSIUM REFLEX MAGNESIUM: 3.8 MMOL/L (ref 3.5–5)
RBC # BLD: 3.83 M/UL (ref 4.7–6.1)
SODIUM BLD-SCNC: 145 MMOL/L (ref 136–145)
WBC # BLD: 11 K/UL (ref 4.8–10.8)

## 2019-06-08 PROCEDURE — 6360000002 HC RX W HCPCS: Performed by: ORTHOPAEDIC SURGERY

## 2019-06-08 PROCEDURE — 1210000000 HC MED SURG R&B

## 2019-06-08 PROCEDURE — 80048 BASIC METABOLIC PNL TOTAL CA: CPT

## 2019-06-08 PROCEDURE — 36415 COLL VENOUS BLD VENIPUNCTURE: CPT

## 2019-06-08 PROCEDURE — 6370000000 HC RX 637 (ALT 250 FOR IP): Performed by: HOSPITALIST

## 2019-06-08 PROCEDURE — 2700000000 HC OXYGEN THERAPY PER DAY

## 2019-06-08 PROCEDURE — 6370000000 HC RX 637 (ALT 250 FOR IP): Performed by: ORTHOPAEDIC SURGERY

## 2019-06-08 PROCEDURE — 99221 1ST HOSP IP/OBS SF/LOW 40: CPT | Performed by: INTERNAL MEDICINE

## 2019-06-08 PROCEDURE — 2580000003 HC RX 258: Performed by: ORTHOPAEDIC SURGERY

## 2019-06-08 PROCEDURE — 85025 COMPLETE CBC W/AUTO DIFF WBC: CPT

## 2019-06-08 PROCEDURE — 99232 SBSQ HOSP IP/OBS MODERATE 35: CPT | Performed by: INTERNAL MEDICINE

## 2019-06-08 RX ADMIN — PRAVASTATIN SODIUM 20 MG: 20 TABLET ORAL at 11:21

## 2019-06-08 RX ADMIN — RIVAROXABAN 10 MG: 10 TABLET, FILM COATED ORAL at 17:24

## 2019-06-08 RX ADMIN — DIVALPROEX SODIUM 125 MG: 125 CAPSULE, COATED PELLETS ORAL at 21:49

## 2019-06-08 RX ADMIN — BISACODYL 5 MG: 5 TABLET, COATED ORAL at 11:19

## 2019-06-08 RX ADMIN — DOCUSATE SODIUM 100 MG: 100 CAPSULE, LIQUID FILLED ORAL at 21:50

## 2019-06-08 RX ADMIN — DILTIAZEM HYDROCHLORIDE 180 MG: 180 CAPSULE, COATED, EXTENDED RELEASE ORAL at 11:20

## 2019-06-08 RX ADMIN — Medication 100 MG: at 11:22

## 2019-06-08 RX ADMIN — Medication 10 ML: at 11:21

## 2019-06-08 RX ADMIN — DIVALPROEX SODIUM 125 MG: 125 CAPSULE, COATED PELLETS ORAL at 11:20

## 2019-06-08 RX ADMIN — BUPROPION HYDROCHLORIDE 150 MG: 150 TABLET, EXTENDED RELEASE ORAL at 21:49

## 2019-06-08 RX ADMIN — OXYCODONE HYDROCHLORIDE AND ACETAMINOPHEN 1 TABLET: 5; 325 TABLET ORAL at 17:24

## 2019-06-08 RX ADMIN — BUPROPION HYDROCHLORIDE 150 MG: 150 TABLET, EXTENDED RELEASE ORAL at 11:19

## 2019-06-08 RX ADMIN — DOCUSATE SODIUM 100 MG: 100 CAPSULE, LIQUID FILLED ORAL at 11:20

## 2019-06-08 RX ADMIN — CEFTRIAXONE 1 G: 1 INJECTION, POWDER, FOR SOLUTION INTRAMUSCULAR; INTRAVENOUS at 11:12

## 2019-06-08 RX ADMIN — OXYCODONE HYDROCHLORIDE AND ACETAMINOPHEN 2 TABLET: 5; 325 TABLET ORAL at 21:49

## 2019-06-08 ASSESSMENT — PAIN SCALES - GENERAL
PAINLEVEL_OUTOF10: 3
PAINLEVEL_OUTOF10: 3
PAINLEVEL_OUTOF10: 4
PAINLEVEL_OUTOF10: 7

## 2019-06-08 NOTE — PROGRESS NOTES
PRN Dalton Raya MD   2 mg at 06/05/19 1433    diltiazem (CARDIZEM CD) extended release capsule 180 mg  180 mg Oral Daily Dalton Raya MD   180 mg at 06/07/19 0942    divalproex (DEPAKOTE SPRINKLE) capsule 125 mg  125 mg Oral BID Dalton Raya MD   125 mg at 06/07/19 2144    cefTRIAXone (ROCEPHIN) 1 g in sterile water 10 mL IV syringe  1 g Intravenous Q24H Dalton Raya MD   1 g at 06/07/19 1345    0.9 % sodium chloride infusion   Intravenous Continuous Sonali Wick MD 75 mL/hr at 06/07/19 2143      sodium chloride flush 0.9 % injection 10 mL  10 mL Intravenous 2 times per day Dalton Raya MD   10 mL at 06/05/19 2006    sodium chloride flush 0.9 % injection 10 mL  10 mL Intravenous PRN Dalton Raya MD        docusate sodium (COLACE) capsule 100 mg  100 mg Oral BID Dalton Raya MD   100 mg at 06/08/19 0930    ondansetron (ZOFRAN) injection 4 mg  4 mg Intravenous Q6H PRN Dalton Raya MD        acetaminophen (TYLENOL) tablet 650 mg  650 mg Oral Q4H PRN Dalton Raya MD        oxyCODONE-acetaminophen (PERCOCET) 5-325 MG per tablet 1 tablet  1 tablet Oral Q4H PRN Dalton Raya MD   1 tablet at 06/06/19 0804    Or    oxyCODONE-acetaminophen (PERCOCET) 5-325 MG per tablet 2 tablet  2 tablet Oral Q4H PRN Dalton Raya MD   2 tablet at 06/05/19 2005    HYDROmorphone (DILAUDID) injection 0.25 mg  0.25 mg Intravenous Q3H PRN Dalton Raya MD        Or    HYDROmorphone (DILAUDID) injection 0.5 mg  0.5 mg Intravenous Q3H PRN Dalton Raya MD        bisacodyl (DULCOLAX) EC tablet 5 mg  5 mg Oral Daily Dalton Raya MD   5 mg at 06/07/19 7692    polyethylene glycol (GLYCOLAX) packet 17 g  17 g Oral Daily PRN Dalton Raya MD        bisacodyl (DULCOLAX) suppository 10 mg  10 mg Rectal Daily PRN Dalton Raya MD        rivaroxaban Yin Enriquez) tablet 10 mg  10 mg Oral Daily Dalton Raya MD   10 mg at 06/07/19 2150    buPROPion [facility]) extended release tablet 150 mg  150 mg Oral BID Roger Lord MD   150 mg at 06/07/19 2144    LORazepam (ATIVAN) tablet 0.5 mg  0.5 mg Oral Q6H PRN Devante Evans MD   0.5 mg at 06/07/19 2144    pravastatin (PRAVACHOL) tablet 20 mg  20 mg Oral Daily Devante Evans MD   20 mg at 06/07/19 0942    tiotropium (SPIRIVA RESPIMAT) 2.5 MCG/ACT inhaler 2 puff  2 puff Inhalation Daily Devante Evans MD   2 puff at 06/07/19 2194    vitamin B-1 (THIAMINE) tablet 100 mg  100 mg Oral Daily Devante Evans MD   100 mg at 06/07/19 1530        Labs:     Recent Labs     06/06/19  0041 06/07/19 0207 06/08/19 0229   WBC  --  15.4* 11.0*   RBC  --  4.08* 3.83*   HGB 13.9* 13.0* 12.1*   HCT 44.3 40.7* 38.5*   MCV  --  99.8* 100.5*   MCH  --  31.9* 31.6*   MCHC  --  31.9* 31.4*   PLT  --  151 147     Recent Labs     06/06/19  0041 06/07/19  0207 06/08/19 0229    143 145   K 4.8 4.2 3.8   ANIONGAP 8 8 9    112* 112*   CO2 23 23 24   BUN 19 22 17   CREATININE 0.8 0.8 0.7   GLUCOSE 145* 131* 90   CALCIUM 9.5 9.6 9.2     No results for input(s): MG, PHOS in the last 72 hours. No results for input(s): AST, ALT, ALB, BILITOT, ALKPHOS, ALB in the last 72 hours. ABGs:No results for input(s): PHART, MRX4WOL, PO2ART, HCV9ETD, BEART, HGBAE, S9QHZBVO, CARBOXHGBART, 02THERAPY in the last 72 hours. HgBA1c: No results for input(s): LABA1C in the last 72 hours. FLP:    Lab Results   Component Value Date    TRIG 49 04/20/2018    HDL 48 04/20/2018    LDLCALC 67 04/20/2018     TSH:  No results found for: TSH  Troponin T:   No results for input(s): TROPONINI in the last 72 hours. INR:   No results for input(s): INR in the last 72 hours.     Objective:   Vitals: /79   Pulse 100   Temp 97.8 °F (36.6 °C) (Temporal)   Resp 18   Wt 180 lb (81.6 kg)   SpO2 91%   BMI 26.58 kg/m²   24HR INTAKE/OUTPUT:      Intake/Output Summary (Last 24 hours) at 6/8/2019 1039  Last data filed at 6/8/2019 0937  Gross per 24 hour   Intake 1080 ml   Output --   Net 1080 ml     General appearance: alert and cooperative with exam  HEENT: atraumatic, eyes with clear conjunctiva and normal lids  Lungs: no increased work of breathing, diminished breath sounds bilaterally  Heart: regular rate and rhythm and S1, S2 normal  Abdomen: soft, non-tender; bowel sounds normal; no masses,  no organomegaly  Extremities: extremities normal, atraumatic, no cyanosis or edema  Neurologic: No focal neurologic deficits, normal sensation,  affect and mood appropriate. Skin: no rashes, nodules. Assessment and Plan:   Principal Problem:    Closed nondisp fx of greater trochanter of right femur w/routine heal  Active Problems:    Dementia    HTN (hypertension)    Closed nondisplaced intertrochanteric fracture of right femur (HCC)  Resolved Problems:    * No resolved hospital problems. *    Patient refused his medications this AM.  He has been in and out of afib and rate uncontrolled. It is not clear if this is new or not, no history of afib on [facility] paperwork. Patient does not have anticoagulation on home med list.  However, he is on cardizem PO at baseline. Will ask cardiology to evaluate and recommend any medication adjustments, likely not a candidate for long term a/c. Patient does have a DNR on his paper chart from [facility], verified with POA.      Advance Directive: DNR-CC    DVT prophylaxis: xarelto    Discharge planning: likely SNF tomorrow      Susan Gallagher MD  [facility]

## 2019-06-08 NOTE — PROGRESS NOTES
06/08/19 1559   General Comment   Comments Attempt in PM patient needed to go to [facility]. Nursing  requested assist, however , patient agitated and confused would not allow this therapist to assist patient. Patient TR with NSG  placed full weight on  R LE.    Physical Therapy    Electronically signed by Lenore Aviles PTA on 6/8/2019 at 4:02 PM

## 2019-06-08 NOTE — PROGRESS NOTES
Orthopedic Surgery Progress Note    Mercy Mueller  6/8/2019      Subjective:     Systemic or Specific Complaints: No Complaints    Pain mild    Objective:     Patient Vitals for the past 24 hrs:   BP Temp Temp src Pulse Resp SpO2   06/08/19 0259 122/78 97.8 °F (36.6 °C) Temporal 52 18 90 %   06/07/19 2258 110/78 98.3 °F (36.8 °C) Temporal 96 17 95 %   06/07/19 1808 119/81 98.1 °F (36.7 °C) Temporal 78 18 95 %   06/07/19 1357 92/64 97.4 °F (36.3 °C) Temporal 133 16 90 %   06/07/19 1207 93/63 97.9 °F (36.6 °C) Temporal 77 14 95 %   06/07/19 1108 (!) 82/59 98.1 °F (36.7 °C) Temporal 78 14 97 %   06/07/19 0712 93/71 97.2 °F (36.2 °C) Temporal 78 14 91 %       right lower  General: alert, appears stated age and cooperative   Wound: clean, dry, intact             Dressing: clean, dry, and intact   Extremity: Distal NVI           DVT Exam: No evidence of DVT seen on physical exam.                   Data Review:  Recent Labs     06/07/19  0207 06/08/19 0229   HGB 13.0* 12.1*     Recent Labs     06/08/19 0229      K 3.8   CREATININE 0.7       Assessment:     POD# 3 s/p R cephalomedullary nailing of a nondisplaced right intertrochanteric femur fracture    Plan:      1:  DVT prophylaxis, ICE, elevate  2:  Pain control  3:  Physical therapy/Occupational therapy  4:  Anticipate discharge today once placement arranged and once medically stable and if pain well controlled  5: Toe touch weight bearing LLE       Electronically signed by Behzad Chandra MD on 6/8/2019 at 5:14 AM

## 2019-06-08 NOTE — PROGRESS NOTES
Patient refused all meds and refused to eat breakfast. Electronically signed by Abran Mcadams RN on 6/8/2019 at 9:46 AM

## 2019-06-08 NOTE — PROGRESS NOTES
06/08/19 1050   General Comment   Comments No TX per Norm BECKA Alexandre Patient HR high as 140 Patient refusing meds   Physical Therapy    Electronically signed by Tyrone Christy PTA on 6/8/2019 at 10:52 AM

## 2019-06-08 NOTE — CONSULTS
Number of children: Not on file    Years of education: Not on file    Highest education level: Not on file   Occupational History    Not on file   Social Needs    Financial resource strain: Not on file    Food insecurity:     Worry: Not on file     Inability: Not on file    Transportation needs:     Medical: Not on file     Non-medical: Not on file   Tobacco Use    Smoking status: Former Smoker    Smokeless tobacco: Never Used   Substance and Sexual Activity    Alcohol use: Yes    Drug use: No    Sexual activity: Not on file   Lifestyle    Physical activity:     Days per week: Not on file     Minutes per session: Not on file    Stress: Not on file   Relationships    Social connections:     Talks on phone: Not on file     Gets together: Not on file     Attends Oriental orthodox service: Not on file     Active member of club or organization: Not on file     Attends meetings of clubs or organizations: Not on file     Relationship status: Not on file    Intimate partner violence:     Fear of current or ex partner: Not on file     Emotionally abused: Not on file     Physically abused: Not on file     Forced sexual activity: Not on file   Other Topics Concern    Not on file   Social History Narrative    Not on file       Family History:   History reviewed. No pertinent family history. Home Meds:  Prior to Admission medications    Medication Sig Start Date End Date Taking? Authorizing Provider   HYDROcodone-acetaminophen (NORCO) 7.5-325 MG per tablet Take 1 tablet by mouth every 6 hours as needed for Pain for up to 7 days.  6/7/19 6/14/19 Yes Belen Liao MD   rivaroxaban (XARELTO) 10 MG TABS tablet Take 1 tablet by mouth daily for 21 days 6/7/19 6/28/19 Yes Belen Liao MD   docusate sodium (COLACE, DULCOLAX) 100 MG CAPS Take 100 mg by mouth 2 times daily 6/7/19  Yes Belen Liao MD   ondansetron [facility]) 4 MG tablet Take 1 tablet by mouth daily as needed for Nausea or Vomiting 6/7/19  Yes Belen Liao MD LORazepam (ATIVAN) 0.5 MG tablet Take 0.5 mg by mouth every 6 hours as needed for Anxiety. .   Yes Historical Provider, MD   acetaminophen (TYLENOL) 325 MG tablet Take 650 mg by mouth every 6 hours as needed for Pain   Yes Historical Provider, MD   potassium chloride 10 MEQ/100ML Infuse 10 mEq intravenously once   Yes Historical Provider, MD   vitamin B-1 (THIAMINE) 100 MG tablet Take 100 mg by mouth daily   Yes Historical Provider, MD   tiotropium (SPIRIVA) 18 MCG inhalation capsule Inhale 18 mcg into the lungs daily   Yes Historical Provider, MD   diltiazem (DILACOR XR) 180 MG extended release capsule Take 180 mg by mouth daily   Yes Historical Provider, MD   buPROPion (WELLBUTRIN SR) 150 MG extended release tablet Take 150 mg by mouth 2 times daily   Yes Historical Provider, MD   polyethylene glycol (GLYCOLAX) packet Take 17 g by mouth daily as needed for Constipation   Yes Historical Provider, MD   pravastatin (PRAVACHOL) 20 MG tablet Take 20 mg by mouth daily   Yes Historical Provider, MD   divalproex (DEPAKOTE SPRINKLE) 125 MG capsule Take 125 mg by mouth 2 times daily   Yes Historical Provider, MD       Current Meds:   diltiazem  180 mg Oral Daily    divalproex  125 mg Oral BID    cefTRIAXone (ROCEPHIN) IV  1 g Intravenous Q24H    sodium chloride flush  10 mL Intravenous 2 times per day    docusate sodium  100 mg Oral BID    bisacodyl  5 mg Oral Daily    rivaroxaban  10 mg Oral Daily    buPROPion  150 mg Oral BID    pravastatin  20 mg Oral Daily    tiotropium  2 puff Inhalation Daily    vitamin B-1  100 mg Oral Daily       Current Infused Meds:   sodium chloride 75 mL/hr at 06/07/19 2143       Physical Exam:  Vitals:    06/08/19 1431   BP: 103/73   Pulse: 53   Resp: 18   Temp: 98.8 °F (37.1 °C)   SpO2: 92%       Intake/Output Summary (Last 24 hours) at 6/8/2019 1655  Last data filed at 6/8/2019 1604  Gross per 24 hour   Intake 1320 ml   Output 1 ml   Net 1319 ml     Estimated body mass index is 26.58 kg/m² as calculated from the following:    Height as of 5/16/19: 5' 9\" (1.753 m). Weight as of this encounter: 180 lb (81.6 kg). Physical Exam     Patient knows he is in [facility] but refuses to answer on time. Patient refuses physical exam.  Labs:  Recent Labs     06/06/19  0041 06/07/19 0207 06/08/19 0229   WBC  --  15.4* 11.0*   HGB 13.9* 13.0* 12.1*   PLT  --  151 147       Recent Labs     06/06/19  0041 06/07/19 0207 06/08/19 0229    143 145   K 4.8 4.2 3.8    112* 112*   CO2 23 23 24   BUN 19 22 17   CREATININE 0.8 0.8 0.7   LABGLOM >60 >60 >60   CALCIUM 9.5 9.6 9.2       CK, CKMB, Troponin: @LABRCNT (CKTOTAL:3, CKMB:3, TROPONINI:3)@    Last 3 BNP:          IMAGING:  Xr Pelvis (1-2 Views)    Result Date: 6/5/2019  EXAMINATION: XR PELVIS (1-2 VIEWS) 6/5/2019 7:18 AM HISTORY: AP PELVIS radiograph 6/5/2019 5:00 AM HISTORY: Patient fell COMPARISON: None FINDINGS: There is no evidence of fracture or dislocation. The S-I joints and hip joints are intact. The soft tissues are grossly unremarkable. 1. Negative Pelvis. Signed by Dr Sam Aguila on 6/5/2019 7:18 AM    Xr Elbow Right (min 3 Views)    Result Date: 6/5/2019  History: Fall with pain Right elbow: 3 views the right elbow are obtained. There is osteopenia. No fracture or dislocation is identified. Prominent-appearing abnormal soft tissue pathology. No appreciable joint effusion. 1. Osteopenia with no acute bony pathology the right elbow. Signed by Dr Nesha Keen on 6/5/2019 7:29 AM    Xr Elbow Right (min 3 Views)    Result Date: 5/16/2019  EXAM: XR ELBOW RIGHT (MIN 3 VIEWS) -- 5/16/2019 8:00 PM HISTORY: 74 years, Male, fall COMPARISON: 3/14/2017 TECHNIQUE:  3 images. AP, oblique, lateral FINDINGS:  No acute fracture or aggressive bony lesion. Joint space and alignment anatomic. No elbow joint effusion. No radiodense foreign body. No acute bony finding.  Signed by Dr Bryant Perry on 5/16/2019 11:41 ventricles appear normal in configuration. Basilar cisterns are patent. Posterior fossa appears grossly normal. The calvarium is intact. The visualized paranasal sinuses and left mastoid air cells appear clear. Fluid is noted in the right mastoid air cells inferiorly. Impression: Right frontal scalp hematoma. No acute intracranial findings. Signed by Dr Marina Matias on 6/5/2019 7:52 AM    Ct Head Wo Contrast    Result Date: 5/16/2019  EXAM: CT HEAD WO CONTRAST -- 5/16/2019 8:00 PM HISTORY: 74 years, Male, fall COMPARISON: 6/10/2018 DLP: 744 mGy cm. TECHNIQUE: Unenhanced axial CT images obtained from vertex to skull base with coronal and sagittal reformats. FINDINGS: No acute intracranial hemorrhage, midline shift, mass effect or large territory cortical infarct. Similar chronic microvascular changes. Similar lacunar infarct right thalamus. Periventricular and subcortical white matter hypodensities, most likely due to chronic microvascular disease. Ventricles, sulci and basilar cisterns proportionally prominent consistent with volume loss. Thinning of the ocular lenses may be postoperative or age-related. Small density in the left external auditory canal, likely cerumen. Mastoid air cells normally aerated. Paranasal sinuses within normal limits. Calvarium appears intact. Subcutaneous tissues within normal limits. 1. No acute intracranial findings. 2. Similar chronic findings as above. Signed by Dr Graham Mendoza on 5/16/2019 11:52 PM    Ct Cervical Spine Wo Contrast    Result Date: 6/5/2019  CT cervical spine 6/5/2019 5:00 AM HISTORY: Fall with back pain TECHNIQUE: Axial images of the cervical spine were obtained without IV contrast. Coronal and sagittal reformatted images are reconstructed and reviewed. COMPARISON: None. DLP: 556 mGy cm Automated exposure control was utilized to minimize patient radiation dose. FINDINGS: There is reversal of normal cervical lordosis.  Is chronic loss of height of the C5-C7 vertebra with moderate endplate spurring at each of these levels. There is a mild right convexity of the cervical curvature. There is uncinate process hypertrophy beginning at C4 extending through C7. There is moderate facet osteoarthropathy. There is no acute cervical vertebral fracture. Degenerative changes create mild cervical spinal canal stenosis and mild to moderate neural foramen narrowing. There are carotid artery calcifications. Emphysematous changes are seen within the lung apices. 1. Moderate to advanced degenerative changes of the cervical spine with reversal of normal cervical lordosis and chronic appearing loss of height of C5-C7. No acute cervical vertebral fracture identified. Signed by Dr Santana Chavez on 6/5/2019 7:08 AM    Ct Thoracic Spine Wo Contrast    Result Date: 6/5/2019  CT thoracic spine 6/5/2019 5:00 AM HISTORY: Fall with back pain TECHNIQUE: Axial images of the thoracic spine were obtained without IV contrast. Coronal and sagittal reformatted images are reconstructed and reviewed. COMPARISON: None. DLP: 1597 mGy cm Automated exposure control was utilized to minimize patient radiation dose. FINDINGS: There is diffuse osteopenia. There are compression deformities involving the superior endplates of T2, T3, P52. There is chronic appearing loss of height of the T11, T12, L1, and L2 vertebral bodies. There is a rotoscoliosis of the thoracic spine with greatest curvature to the right within the mid thoracic region. There is moderate endplate spurring. No paravertebral hematoma is identified. There is a cardiac pacer device. There is cardiomegaly. There are scattered thoracic aortic calcification. There are granulomatous calcifications within the chest. There is dependent basilar atelectasis. Emphysematous changes of the lungs are noted. 1. Osteopenia with degenerative change and rotoscoliosis of the thoracic spine.  There are multiple compression deformities involving the thoracic vertebra described in detail above. There are no prior studies to document chronicity of the findings. Radiographically, findings appear to be subacute/chronic. No convincing acute thoracic vertebral fracture is localized. There is no prominent thoracic spinal canal stenosis. Comments: If the thoracic back pain persists, follow-up outpatient nuclear medicine bone scan could be performed to assess for any increased uptake at the site of compression deformities if the patient's cardiac pacer device is not MRI compatible. Signed by Dr Regina Tracey on 6/5/2019 7:16 AM    Ct Lumbar Spine Wo Contrast    Result Date: 6/5/2019  CT lumbar spine 6/5/2019 5:00 AM HISTORY: Fall with back pain TECHNIQUE: Axial images of the lumbar spine were obtained without IV contrast. Coronal and sagittal reformatted images are reconstructed and reviewed. COMPARISON: None. DLP: 958 mGy cm Automated exposure control was utilized to minimize patient radiation dose. FINDINGS: There is diffuse osteopenia. There are compression deformity of the T12, L1, L2, L3, and L4 vertebra. Loss of height is greatest at the L3 level approximately 75% loss of height. There are bridging anterior osteophytes from the L2-L4 level. There is no significant retropulsion at any level. There is moderate facet osteoarthropathy. Old L1 transverse process fractures are considered with old left L2 transverse process fracture suspected. No paravertebral hematoma is identified. There is diffuse vascular calcification. Small cortical renal atrophy. 1. Osteopenia with compression deformities of the T12-L4 vertebra with no prior studies for comparison. Compression deformities radiographically appear to be subacute or chronic with no convincing acute lumbar vertebral fracture localized. No paravertebral hematoma. Mild to moderate canal stenosis suspected at the L4/5 level from the degenerative chronic change. Mild neural foramen narrowing considered bilaterally.  Signed by Dr Lore Klein on 6/5/2019 7:22 AM    Ct Pelvis Wo Contrast Additional Contrast? None    Result Date: 6/5/2019  EXAMINATION:  CT PELVIS WO CONTRAST  6/5/2019 11:06 AM HISTORY: Right hip and pelvic pain post fall COMPARISON: Right hip/pelvis radiographs performed today TECHNIQUE: Radiation dose equals  mGy cm. AUTOMATED EXPOSURE CONTROL dose reduction technique was implemented. Thin section axial images were obtained. 2-D sagittal coronal reconstructions were generated. FINDINGS: The femoral heads are imaged appropriately within the acetabulum. There is a focal osteochondral defect/deformity involving the right femoral head superiorly and centrally. There is mild cortical depression with associated cystic changes and sclerosis most likely chronic in nature. The osteochondral defect measures approximately 8 mm. Cortical depression of nearly 1 millimeter is observed. There is deformity of the greater trochanter and an old fracture is suspected. Definite acute fracture line is not appreciated. There is a small cortical density/defect defect along the anterior femoral neck head junction again changes from old trauma considered. Correlate with patient history and presentation. There are osteoarthritic changes involving the left hip joint with narrowing of the joint space superiorly and laterally with subchondral cystic changes along the acetabulum osteophyte formation. Pelvic bones are maintained without acute fractures. Atherosclerotic aortoiliac and femoral calcifications are observed. There are fat-containing inguinal hernias. Prostate gland is generous. Diverticulosis of the colon is observed without acute diverticulitis. 1. Deformity of the right femoral neck/greater trochanter suggesting old trauma. Correlate with patient history and presentation. 2. 1 cm osteochondral defect involving the right femoral head. 3. Degenerative changes left hip joint. 4. No definite CT evidence of acute bony injury.  Signed by Dr Yuan Wharton on 6/5/2019 11:22 AM    Xr Chest Portable    Result Date: 6/5/2019  EXAMINATION: XR CHEST PORTABLE 6/5/2019 7:17 AM HISTORY: XR CHEST PORTABLE 6/5/2019 5:00 AM HISTORY: Altered mental status patient fell COMPARISON: None. FINDINGS: The lungs are clear. The cardiac silhouette is moderately enlarged. Pacing device projects over the soft tissues the left chest.. The osseous structures and surrounding soft tissues demonstrate no acute abnormality. 1. Moderate cardiomegaly no acute cardiopulmonary process. Signed by Dr Pao Isidro on 6/5/2019 7:17 AM    Ct Femur Right Wo Contrast    Result Date: 6/5/2019  CT femur 6/5/2019 9:15 AM HISTORY: Fall with right leg pain; unwilling to bear weight on right leg. TECHNIQUE: Axial images of the femur were obtained without IV contrast. Coronal and sagittal reformatted images are reconstructed and reviewed. COMPARISON: None. DLP: 686 mGy cm Automated exposure control was utilized to minimize patient radiation dose. FINDINGS: There is a lucency extending from the right greater trochanter into the intertrochanteric region concerning for nondisplaced fracture. This is best seen on the coronal reconstructed image #49. No extension of the fracture line through the lesser trochanter cortex is appreciated. There is irregularity of the cortex of the greater trochanter. The femoral head is preserved. There is mild to moderate hip joint space narrowing. The mid and distal femur remain intact. Fatty-containing right inguinal hernia is noted. There are vascular calcifications. 1. Findings are suspicious for a nondisplaced fracture of the right proximal femur within the intratrochanteric region as described above.  Signed by Dr Jossy Albright on 6/5/2019 11:16 AM    Ct Tibia Fibula Right Wo Contrast    Result Date: 6/5/2019  EXAMINATION: CT TIBIA FIBULA RIGHT WO CONTRAST 6/5/2019 11:07 AM HISTORY: Fall, right lower surgery pain, unable to weight Comparison: None Right hip fracture, internal fixation. Dose: [id number] mGym2 Report: 3 intraoperative fluoroscopic spot views were obtained following open reduction and internal fixation of the right hip. Alignment appears anatomic. The images are stored in PACS for review. Signed by Dr Lawyer Cruz. Diego on 6/5/2019 5:49 PM    Xr Hip 2-3 Vw W Pelvis Right    Result Date: 6/5/2019  EXAMINATION: XR HIP 2-3 VW W PELVIS RIGHT 6/5/2019 8:48 AM HISTORY: Aubrey Sara right hip pain AP and lateral views of the right hip are obtained. The femoral head and neck are intact. The hip joint space is well maintained. Negative AP and lateral right hip. Signed by Dr Neville Rondon on 6/5/2019 8:49 AM          Assessment and Plan: This is a 66y.o. year old male with past medical history of prior cardiomyopathy by echo report September 2014 but last echo with preserved LV function June 2018, dementia, fall with right hip fracture, as to evaluate due to irregular rhythm for possible atrial fibrillation. Rhythm consistent with sinus with frequent APCs, bigeminal pattern and PVCs. 1. No requirement for additional anticoagulation. Can continue Xarelto 10 mg daily as part of DVT prophylaxis.       Electronically signed by Raheel Wilkinson MD on 6/8/2019 at 4:55 PM

## 2019-06-09 VITALS
TEMPERATURE: 97.9 F | RESPIRATION RATE: 16 BRPM | SYSTOLIC BLOOD PRESSURE: 98 MMHG | BODY MASS INDEX: 26.58 KG/M2 | HEART RATE: 107 BPM | OXYGEN SATURATION: 94 % | WEIGHT: 180 LBS | DIASTOLIC BLOOD PRESSURE: 67 MMHG

## 2019-06-09 LAB
ANION GAP SERPL CALCULATED.3IONS-SCNC: 8 MMOL/L (ref 7–19)
BASOPHILS ABSOLUTE: 0 K/UL (ref 0–0.2)
BASOPHILS RELATIVE PERCENT: 0.4 % (ref 0–1)
BUN BLDV-MCNC: 12 MG/DL (ref 8–23)
CALCIUM SERPL-MCNC: 9.1 MG/DL (ref 8.8–10.2)
CHLORIDE BLD-SCNC: 108 MMOL/L (ref 98–111)
CO2: 24 MMOL/L (ref 22–29)
CREAT SERPL-MCNC: 0.7 MG/DL (ref 0.5–1.2)
EOSINOPHILS ABSOLUTE: 0.3 K/UL (ref 0–0.6)
EOSINOPHILS RELATIVE PERCENT: 3.9 % (ref 0–5)
GFR NON-AFRICAN AMERICAN: >60
GLUCOSE BLD-MCNC: 113 MG/DL (ref 74–109)
HCT VFR BLD CALC: 36.3 % (ref 42–52)
HEMOGLOBIN: 11.8 G/DL (ref 14–18)
LYMPHOCYTES ABSOLUTE: 2.2 K/UL (ref 1.1–4.5)
LYMPHOCYTES RELATIVE PERCENT: 25.5 % (ref 20–40)
MCH RBC QN AUTO: 32.2 PG (ref 27–31)
MCHC RBC AUTO-ENTMCNC: 32.5 G/DL (ref 33–37)
MCV RBC AUTO: 99.2 FL (ref 80–94)
MONOCYTES ABSOLUTE: 0.7 K/UL (ref 0–0.9)
MONOCYTES RELATIVE PERCENT: 8.7 % (ref 0–10)
NEUTROPHILS ABSOLUTE: 5.2 K/UL (ref 1.5–7.5)
NEUTROPHILS RELATIVE PERCENT: 60.8 % (ref 50–65)
PDW BLD-RTO: 14.3 % (ref 11.5–14.5)
PLATELET # BLD: 149 K/UL (ref 130–400)
PMV BLD AUTO: 11 FL (ref 9.4–12.4)
POTASSIUM REFLEX MAGNESIUM: 3.6 MMOL/L (ref 3.5–5)
RBC # BLD: 3.66 M/UL (ref 4.7–6.1)
SODIUM BLD-SCNC: 140 MMOL/L (ref 136–145)
WBC # BLD: 8.5 K/UL (ref 4.8–10.8)

## 2019-06-09 PROCEDURE — 6370000000 HC RX 637 (ALT 250 FOR IP): Performed by: HOSPITALIST

## 2019-06-09 PROCEDURE — 36415 COLL VENOUS BLD VENIPUNCTURE: CPT

## 2019-06-09 PROCEDURE — 2700000000 HC OXYGEN THERAPY PER DAY

## 2019-06-09 PROCEDURE — 99239 HOSP IP/OBS DSCHRG MGMT >30: CPT | Performed by: INTERNAL MEDICINE

## 2019-06-09 PROCEDURE — 80048 BASIC METABOLIC PNL TOTAL CA: CPT

## 2019-06-09 PROCEDURE — 6370000000 HC RX 637 (ALT 250 FOR IP): Performed by: ORTHOPAEDIC SURGERY

## 2019-06-09 PROCEDURE — 85025 COMPLETE CBC W/AUTO DIFF WBC: CPT

## 2019-06-09 RX ADMIN — BUPROPION HYDROCHLORIDE 150 MG: 150 TABLET, EXTENDED RELEASE ORAL at 09:10

## 2019-06-09 RX ADMIN — DILTIAZEM HYDROCHLORIDE 180 MG: 180 CAPSULE, COATED, EXTENDED RELEASE ORAL at 09:10

## 2019-06-09 RX ADMIN — BISACODYL 5 MG: 5 TABLET, COATED ORAL at 09:10

## 2019-06-09 RX ADMIN — TIOTROPIUM BROMIDE INHALATION SPRAY 2 PUFF: 3.12 SPRAY, METERED RESPIRATORY (INHALATION) at 09:10

## 2019-06-09 RX ADMIN — PRAVASTATIN SODIUM 20 MG: 20 TABLET ORAL at 09:10

## 2019-06-09 RX ADMIN — DIVALPROEX SODIUM 125 MG: 125 CAPSULE, COATED PELLETS ORAL at 09:10

## 2019-06-09 RX ADMIN — Medication 100 MG: at 09:10

## 2019-06-09 RX ADMIN — DOCUSATE SODIUM 100 MG: 100 CAPSULE, LIQUID FILLED ORAL at 09:10

## 2019-06-09 NOTE — PROGRESS NOTES
Patient being discharged back to [facility] today. Patient is alert et confused. Respirations even and unlabored. No distress noted. Will call report.  Electronically signed by Neyda Higuera RN on 6/9/2019 at 11:49 AM

## 2019-06-09 NOTE — DISCHARGE SUMMARY
Discharge Summary    Makenzie Luna  :  1945  MRN:  [id number]    Admit date:  2019  Discharge date:      Admitting Physician:  Ankush Whittaker MD    Advance Directive: [facility]    Consults: cardiology and orthopedic surgery    Primary Care Physician:  Olivia Tidwell MD    Discharge Diagnoses:  Principal Problem:    Closed nondisp fx of greater trochanter of right femur w/routine heal  Active Problems:    Head injury    Dementia    HTN (hypertension)    Closed nondisplaced intertrochanteric fracture of right femur ([address]  Resolved Problems:    * No resolved hospital problems. *      Significant Diagnostic Studies:   Xr Pelvis (1-2 Views)    Result Date: 2019  EXAMINATION: XR PELVIS (1-2 VIEWS) 2019 7:18 AM HISTORY: AP PELVIS radiograph 2019 5:00 AM HISTORY: Patient fell COMPARISON: None FINDINGS: There is no evidence of fracture or dislocation. The S-I joints and hip joints are intact. The soft tissues are grossly unremarkable. 1. Negative Pelvis. Signed by Dr Caitlyn March on 2019 7:18 AM    Xr Elbow Right (min 3 Views)    Result Date: 2019  History: Fall with pain Right elbow: 3 views the right elbow are obtained. There is osteopenia. No fracture or dislocation is identified. Prominent-appearing abnormal soft tissue pathology. No appreciable joint effusion. 1. Osteopenia with no acute bony pathology the right elbow. Signed by Dr Enma Baldwin on 2019 7:29 AM    Xr Radius Ulna Right (2 Views)    Result Date: 2019  History: Fall Right radius and ulna: 2 views right forearm are obtained. There is osteopenia. There is radial positive deviance at the level of the wrist. There is radiocarpal joint space narrowing. Sclerotic appearance of the wrist considered. No acute fracture or dislocation is identified. No significant abnormal soft tissue pathology. 1. Osteopenia with chronic arthritic changes of the right wrist. No acute bony pathology of the forearm.  Signed by Dr Marin Villanueva hJon on 6/5/2019 7:29 AM    Xr Foot Right (min 3 Views)    Result Date: 6/5/2019  EXAMINATION:  XR FOOT RIGHT (MIN 3 VIEWS)  6/5/2019 8:50 AM HISTORY: Right foot pain post fall COMPARISON: No comparison study. TECHNIQUE: 3 views: AP lateral oblique projection imaging FINDINGS: There is osteoporosis. The bony structures are intact. The joint spaces are maintained without fracture or acute osseous abnormality. 1. No acute osseous abnormality. 2. Osteoporosis Signed by Dr Kate Cid on 6/5/2019 8:51 AM    Ct Head Wo Contrast    Result Date: 6/5/2019  EXAMINATION: CT HEAD WO CONTRAST 6/5/2019 7:50 AM HISTORY: Back pain and head injury after fall Comparison: CT head without contrast 5/16/2019 Technique: Sequential imaging was performed from the vertex through the base of the skull without the use of IV contrast. Sagittal and coronal reformations were made from the original source data and reviewed. Automated exposure control was utilized for radiation dose reduction. Radiation dose:  mGy-cm Findings: There is diffuse cerebral and cerebellar atrophy. There is a large right frontal scalp hematoma. There is no evidence of acute intracranial hemorrhage, mass, or midline shift. There are advanced periventricular and subcortical white matter changes, a nonspecific finding most often seen as sequela of chronic microvascular ischemia. There is no evidence of acute territorial infarct. The ventricles appear normal in configuration. Basilar cisterns are patent. Posterior fossa appears grossly normal. The calvarium is intact. The visualized paranasal sinuses and left mastoid air cells appear clear. Fluid is noted in the right mastoid air cells inferiorly. Impression: Right frontal scalp hematoma. No acute intracranial findings.  Signed by Dr Brice Magallon on 6/5/2019 7:52 AM    Ct Cervical Spine Wo Contrast    Result Date: 6/5/2019  CT cervical spine 6/5/2019 5:00 AM HISTORY: Fall with back pain TECHNIQUE: Axial images of the cervical spine were obtained without IV contrast. Coronal and sagittal reformatted images are reconstructed and reviewed. COMPARISON: None. DLP: 556 mGy cm Automated exposure control was utilized to minimize patient radiation dose. FINDINGS: There is reversal of normal cervical lordosis. Is chronic loss of height of the C5-C7 vertebra with moderate endplate spurring at each of these levels. There is a mild right convexity of the cervical curvature. There is uncinate process hypertrophy beginning at C4 extending through C7. There is moderate facet osteoarthropathy. There is no acute cervical vertebral fracture. Degenerative changes create mild cervical spinal canal stenosis and mild to moderate neural foramen narrowing. There are carotid artery calcifications. Emphysematous changes are seen within the lung apices. 1. Moderate to advanced degenerative changes of the cervical spine with reversal of normal cervical lordosis and chronic appearing loss of height of C5-C7. No acute cervical vertebral fracture identified. Signed by Dr Rojelio Amato on 6/5/2019 7:08 AM    Ct Thoracic Spine Wo Contrast    Result Date: 6/5/2019  CT thoracic spine 6/5/2019 5:00 AM HISTORY: Fall with back pain TECHNIQUE: Axial images of the thoracic spine were obtained without IV contrast. Coronal and sagittal reformatted images are reconstructed and reviewed. COMPARISON: None. DLP: 1597 mGy cm Automated exposure control was utilized to minimize patient radiation dose. FINDINGS: There is diffuse osteopenia. There are compression deformities involving the superior endplates of T2, T3, W84. There is chronic appearing loss of height of the T11, T12, L1, and L2 vertebral bodies. There is a rotoscoliosis of the thoracic spine with greatest curvature to the right within the mid thoracic region. There is moderate endplate spurring. No paravertebral hematoma is identified. There is a cardiac pacer device. There is cardiomegaly.  There are scattered thoracic aortic calcification. There are granulomatous calcifications within the chest. There is dependent basilar atelectasis. Emphysematous changes of the lungs are noted. 1. Osteopenia with degenerative change and rotoscoliosis of the thoracic spine. There are multiple compression deformities involving the thoracic vertebra described in detail above. There are no prior studies to document chronicity of the findings. Radiographically, findings appear to be subacute/chronic. No convincing acute thoracic vertebral fracture is localized. There is no prominent thoracic spinal canal stenosis. Comments: If the thoracic back pain persists, follow-up outpatient nuclear medicine bone scan could be performed to assess for any increased uptake at the site of compression deformities if the patient's cardiac pacer device is not MRI compatible. Signed by Dr Nesha Keen on 6/5/2019 7:16 AM    Ct Lumbar Spine Wo Contrast    Result Date: 6/5/2019  CT lumbar spine 6/5/2019 5:00 AM HISTORY: Fall with back pain TECHNIQUE: Axial images of the lumbar spine were obtained without IV contrast. Coronal and sagittal reformatted images are reconstructed and reviewed. COMPARISON: None. DLP: 958 mGy cm Automated exposure control was utilized to minimize patient radiation dose. FINDINGS: There is diffuse osteopenia. There are compression deformity of the T12, L1, L2, L3, and L4 vertebra. Loss of height is greatest at the L3 level approximately 75% loss of height. There are bridging anterior osteophytes from the L2-L4 level. There is no significant retropulsion at any level. There is moderate facet osteoarthropathy. Old L1 transverse process fractures are considered with old left L2 transverse process fracture suspected. No paravertebral hematoma is identified. There is diffuse vascular calcification. Small cortical renal atrophy.     1. Osteopenia with compression deformities of the T12-L4 vertebra with no prior studies for comparison. Compression deformities radiographically appear to be subacute or chronic with no convincing acute lumbar vertebral fracture localized. No paravertebral hematoma. Mild to moderate canal stenosis suspected at the L4/5 level from the degenerative chronic change. Mild neural foramen narrowing considered bilaterally. Signed by Dr Amrit Leroy on 6/5/2019 7:22 AM    Ct Pelvis Wo Contrast Additional Contrast? None    Result Date: 6/5/2019  EXAMINATION:  CT PELVIS WO CONTRAST  6/5/2019 11:06 AM HISTORY: Right hip and pelvic pain post fall COMPARISON: Right hip/pelvis radiographs performed today TECHNIQUE: Radiation dose equals  mGy cm. AUTOMATED EXPOSURE CONTROL dose reduction technique was implemented. Thin section axial images were obtained. 2-D sagittal coronal reconstructions were generated. FINDINGS: The femoral heads are imaged appropriately within the acetabulum. There is a focal osteochondral defect/deformity involving the right femoral head superiorly and centrally. There is mild cortical depression with associated cystic changes and sclerosis most likely chronic in nature. The osteochondral defect measures approximately 8 mm. Cortical depression of nearly 1 millimeter is observed. There is deformity of the greater trochanter and an old fracture is suspected. Definite acute fracture line is not appreciated. There is a small cortical density/defect defect along the anterior femoral neck head junction again changes from old trauma considered. Correlate with patient history and presentation. There are osteoarthritic changes involving the left hip joint with narrowing of the joint space superiorly and laterally with subchondral cystic changes along the acetabulum osteophyte formation. Pelvic bones are maintained without acute fractures. Atherosclerotic aortoiliac and femoral calcifications are observed. There are fat-containing inguinal hernias. Prostate gland is generous.  Diverticulosis of the the right proximal femur within the intratrochanteric region as described above. Signed by Dr Carmencita Ludwig on 6/5/2019 11:16 AM    Ct Tibia Fibula Right Wo Contrast    Result Date: 6/5/2019  EXAMINATION: CT TIBIA FIBULA RIGHT WO CONTRAST 6/5/2019 11:07 AM HISTORY: Fall, right lower surgery pain, unable to weight Comparison: None Technique: Sequential imaging was performed from the knee through the ankle without the use of IV contrast. Sagittal and coronal reformations were made from the original source data and reviewed. Automated exposure control was utilized for radiation dose reduction. Radiation dose:  mGy-cm Findings: There is normal bony alignment at the knee and ankle. There is no evidence of acute fracture. No cortical irregularity is identified. Evaluation of the soft tissues is limited due to bony algorithm acquisition. No intramuscular hematoma is appreciated. No soft tissue swelling is seen. No significant joint effusion is identified. Impression: No evidence of acute osseous injury in the visualized left lower extremity. Signed by Dr Cornelius Castellanos on 6/5/2019 11:13 AM    Ct Foot Right Wo Contrast    Result Date: 6/5/2019  EXAMINATION: CT FOOT RIGHT WO CONTRAST 6/5/2019 11:16 AM HISTORY: Unable to bear weight, diffuse right lower extremity pain after fall Comparison: None Technique: Sequential imaging was performed through the right foot without the use of IV contrast. Sagittal and coronal reformations were made from the original source data and reviewed. Automated exposure control was utilized for radiation dose reduction. Radiation dose: DLP 1243 mGy-cm Findings: There is normal bony alignment at the ankle. Tibiotalar joint appears grossly normal. Talocalcaneal articulation appears grossly normal. No acute fracture is appreciated. The patient is somewhat osteopenic. No focal soft tissue abnormality is identified. There appears to be mild diffuse soft tissue edema.  Evaluation of the soft tissues is limited due to bony algorithm acquisition. No focal hematoma is appreciated. Impression: No evidence of acute osseous injury in the right foot. Signed by Dr Ana Goodman on 6/5/2019 11:19 AM    Xr Hip 2-3 Vw W Pelvis Right    Result Date: 6/5/2019  EXAMINATION: XR HIP 2-3 VW W PELVIS RIGHT 6/5/2019 5:48 PM HISTORY: Right hip fracture, internal fixation. Dose: 1.74406 mGym2 Report: 3 intraoperative fluoroscopic spot views were obtained following open reduction and internal fixation of the right hip. Alignment appears anatomic. The images are stored in PACS for review. Signed by Dr Vinay Carcamo. Diego on 6/5/2019 5:49 PM    Xr Hip 2-3 Vw W Pelvis Right    Result Date: 6/5/2019  EXAMINATION: XR HIP 2-3 VW W PELVIS RIGHT 6/5/2019 8:48 AM HISTORY: Kaley Sinhala right hip pain AP and lateral views of the right hip are obtained. The femoral head and neck are intact. The hip joint space is well maintained. Negative AP and lateral right hip. Signed by Dr Maya Mathis on 6/5/2019 8:49 AM      Pertinent Labs:   CBC:   Recent Labs     06/07/19  0207 06/08/19  0229 06/09/19  0048   WBC 15.4* 11.0* 8.5   HGB 13.0* 12.1* 11.8*    147 149     BMP:    Recent Labs     06/07/19  0207 06/08/19  0229 06/09/19  0040    145 140   K 4.2 3.8 3.6   * 112* 108   CO2 23 24 24   BUN 22 17 12   CREATININE 0.8 0.7 0.7   GLUCOSE 131* 90 113*     INR: No results for input(s): INR in the last 72 hours. Lipids: No results for input(s): CHOL, HDL in the last 72 hours. Invalid input(s): LDLCALCU  ABGs:No results for input(s): PHART, DAH1ZIC, PO2ART, ACO5ZAZ, BEART, HGBAE, R2SCRFZI, CARBOXHGBART, 02THERAPY in the last 72 hours. HgBA1c:  No results for input(s): LABA1C in the last 72 hours. Procedures: R cephalomedullary nailing of a nondisplaced right intertrochanteric femur fracture    Hospital Course:    Mich PALAK Plascencia presents to [facility] after a fall today.  He complained of pain in his right hip with weight bearing. Pain was 10 out 10, sharp. Rest was making pain better. He was diagnosed with   Greater trochanter fx and taken to or today. Currently has no complains of pain or any complains .      06/06: Pain controlled. VTE ppx on xarelto. PT/OT. CM working on placement. Appreciate ortho recs.     06/07: BP low yesterday afternoon, received IVF bolus. Remains on maintenance fluids. Patient asymptomatic from low BP. Pain meds temporarily on hold. Recheck LA. Plan to discharge tomorrow if BP remains stable.      06/08: Patient refused his medications this AM.  He has been in and out of afib and rate uncontrolled. It is not clear if this is new or not, no history of afib on [facility] paperwork. Patient does not have anticoagulation on home med list.  However, he is on cardizem PO at baseline. Will ask cardiology to evaluate and recommend any medication adjustments, likely not a candidate for long term a/c.       Patient does have a DNR on his paper chart from [facility], verified with POA. 06/09: Patient seen and examined. VS stable. Labs stable. No afib seen, arrhythmia with APCs, bigeminy and PVCs. No additional anticoagulation per cardio. Will discharge back to NH in stable condition. Physical Exam:  Vital Signs: /87   Pulse 59   Temp 98.1 °F (36.7 °C) (Temporal)   Resp 16   Wt 180 lb (81.6 kg)   SpO2 97%   BMI 26.58 kg/m²   General appearance: alert and cooperative with exam  HEENT: atraumatic, eyes with clear conjunctiva and normal lids  Lungs: no increased work of breathing, diminished breath sounds bilaterally  Heart: regular rate and rhythm and S1, S2 normal  Abdomen: soft, non-tender; bowel sounds normal; no masses,  no organomegaly  Extremities: extremities normal, atraumatic, no cyanosis or edema  Neurologic: No focal neurologic deficits, normal sensation,  affect and mood appropriate. Skin: no rashes, nodules.         Discharge Medications:       [place] [facility]   Home Medication Instructions [facility]:[id number]    Printed on:06/09/19 1003   Medication Information                      acetaminophen (TYLENOL) 325 MG tablet  Take 650 mg by mouth every 6 hours as needed for Pain             buPROPion (WELLBUTRIN SR) 150 MG extended release tablet  Take 150 mg by mouth 2 times daily             diltiazem (DILACOR XR) 180 MG extended release capsule  Take 180 mg by mouth daily             divalproex (DEPAKOTE SPRINKLE) 125 MG capsule  Take 125 mg by mouth 2 times daily             docusate sodium (COLACE, DULCOLAX) 100 MG CAPS  Take 100 mg by mouth 2 times daily             HYDROcodone-acetaminophen (NORCO) 7.5-325 MG per tablet  Take 1 tablet by mouth every 6 hours as needed for Pain for up to 7 days. LORazepam (ATIVAN) 0.5 MG tablet  Take 0.5 mg by mouth every 6 hours as needed for Anxiety. .             ondansetron (ZOFRAN) 4 MG tablet  Take 1 tablet by mouth daily as needed for Nausea or Vomiting             polyethylene glycol (GLYCOLAX) packet  Take 17 g by mouth daily as needed for Constipation             potassium chloride 10 MEQ/100ML  Infuse 10 mEq intravenously once             pravastatin (PRAVACHOL) 20 MG tablet  Take 20 mg by mouth daily             rivaroxaban (XARELTO) 10 MG TABS tablet  Take 1 tablet by mouth daily for 21 days             tiotropium (SPIRIVA) 18 MCG inhalation capsule  Inhale 18 mcg into the lungs daily             vitamin B-1 (THIAMINE) 100 MG tablet  Take 100 mg by mouth daily                 Discharge Instructions: Follow up with Olivia Tidwell MD in 3 days. Take medications as directed. Resume activity as tolerated. Diet: Dietary Nutrition Supplements: Standard High Calorie Oral Supplement  DIET CARDIAC; Disposition: Patient is medically stable and will be discharged to NH. Time spent on discharge 35 minutes.     Signed:  Yessenia Pelletier MD

## 2019-06-09 NOTE — PROGRESS NOTES
Called [facility] to make aware patient will be discharged today.  Electronically signed by Ashlyn Diaz RN on 6/9/2019 at 1:43 PM

## 2019-06-09 NOTE — PROGRESS NOTES
Report called to La Villita breeze, LPN at [facility].  Electronically signed by Iraj Cerna RN on 6/9/2019 at 12:58 PM

## 2019-09-24 ENCOUNTER — TELEPHONE (OUTPATIENT)
Dept: UROLOGY | Facility: CLINIC | Age: 74
End: 2019-09-24

## 2019-09-24 NOTE — TELEPHONE ENCOUNTER
Spoke with Nurse from nursing home about patient needing to have his PSA level checked before his appt on 9/25/2019.  Nurse was unaware of appt and said she is checking to see if transportation will be available, if not they will have to reschedule him.

## 2019-09-24 NOTE — TELEPHONE ENCOUNTER
Called back to let nurse know my calendar was showing the wrong day, so the patient's appt isn't until Oct 16th.

## 2019-10-13 ENCOUNTER — RESULTS ENCOUNTER (OUTPATIENT)
Dept: UROLOGY | Facility: CLINIC | Age: 74
End: 2019-10-13

## 2019-10-13 DIAGNOSIS — R97.20 ELEVATED PROSTATE SPECIFIC ANTIGEN (PSA): ICD-10-CM

## 2019-10-15 ENCOUNTER — TELEPHONE (OUTPATIENT)
Dept: UROLOGY | Facility: CLINIC | Age: 74
End: 2019-10-15

## 2019-10-15 DIAGNOSIS — R97.20 ELEVATED PROSTATE SPECIFIC ANTIGEN (PSA): Primary | ICD-10-CM

## 2019-10-15 NOTE — TELEPHONE ENCOUNTER
They called back and informed me PSA has not been done. I reschd him and confirmed new appt info with Monica at the nursing home. I informed her he needed a PSA FREE AND TOTAL, it needed to be done about 1 week prior to appt. She confirmed understanding.

## 2019-10-15 NOTE — TELEPHONE ENCOUNTER
Called and spoke with nursing home, [facility]. They could not see a PSA was done, she wanted to check with her supervisor and call me back.     If he has not had his PSA done he will need to be reschd from [facility].

## 2019-10-28 ENCOUNTER — LAB (OUTPATIENT)
Dept: LAB | Facility: HOSPITAL | Age: 74
End: 2019-10-28

## 2019-10-28 ENCOUNTER — OFFICE VISIT (OUTPATIENT)
Dept: UROLOGY | Facility: CLINIC | Age: 74
End: 2019-10-28

## 2019-10-28 VITALS
OXYGEN SATURATION: 98 % | HEART RATE: 67 BPM | BODY MASS INDEX: 28.14 KG/M2 | HEIGHT: 69 IN | SYSTOLIC BLOOD PRESSURE: 108 MMHG | DIASTOLIC BLOOD PRESSURE: 78 MMHG | TEMPERATURE: 98.3 F | RESPIRATION RATE: 20 BRPM | WEIGHT: 190 LBS

## 2019-10-28 DIAGNOSIS — R97.20 ELEVATED PROSTATE SPECIFIC ANTIGEN (PSA): ICD-10-CM

## 2019-10-28 DIAGNOSIS — R97.20 ELEVATED PROSTATE SPECIFIC ANTIGEN (PSA): Primary | ICD-10-CM

## 2019-10-28 LAB
BILIRUB BLD-MCNC: NEGATIVE MG/DL
CLARITY, POC: CLEAR
COLOR UR: YELLOW
GLUCOSE UR STRIP-MCNC: NEGATIVE MG/DL
KETONES UR QL: NEGATIVE
LEUKOCYTE EST, POC: NEGATIVE
NITRITE UR-MCNC: NEGATIVE MG/ML
PH UR: 5.5 [PH] (ref 5–8)
PROT UR STRIP-MCNC: NEGATIVE MG/DL
RBC # UR STRIP: NEGATIVE /UL
SP GR UR: 1.02 (ref 1–1.03)
UROBILINOGEN UR QL: NORMAL

## 2019-10-28 PROCEDURE — 36415 COLL VENOUS BLD VENIPUNCTURE: CPT

## 2019-10-28 PROCEDURE — 99212 OFFICE O/P EST SF 10 MIN: CPT | Performed by: NURSE PRACTITIONER

## 2019-10-28 PROCEDURE — 81001 URINALYSIS AUTO W/SCOPE: CPT | Performed by: NURSE PRACTITIONER

## 2019-10-28 PROCEDURE — 84153 ASSAY OF PSA TOTAL: CPT | Performed by: NURSE PRACTITIONER

## 2019-10-28 RX ORDER — ALBUTEROL SULFATE 2.5 MG/3ML
SOLUTION RESPIRATORY (INHALATION)
Status: ON HOLD | COMMUNITY
End: 2020-12-07

## 2019-10-28 RX ORDER — NICOTINE 21 MG/24HR
1 PATCH, TRANSDERMAL 24 HOURS TRANSDERMAL EVERY 24 HOURS
Status: ON HOLD | COMMUNITY
End: 2020-12-07

## 2019-10-28 RX ORDER — LORAZEPAM 0.5 MG/1
0.5 TABLET ORAL 2 TIMES DAILY
COMMUNITY
End: 2021-08-02 | Stop reason: HOSPADM

## 2019-10-28 RX ORDER — PSEUDOEPHEDRINE HCL 30 MG
100 TABLET ORAL 2 TIMES DAILY
COMMUNITY
Start: 2019-06-07 | End: 2020-12-10 | Stop reason: HOSPADM

## 2019-10-28 RX ORDER — PANTOPRAZOLE SODIUM 40 MG/1
40 TABLET, DELAYED RELEASE ORAL DAILY
Status: ON HOLD | COMMUNITY
Start: 2018-07-03 | End: 2020-12-07

## 2019-10-28 RX ORDER — ONDANSETRON 4 MG/1
4 TABLET, FILM COATED ORAL
Status: ON HOLD | COMMUNITY
Start: 2019-06-07 | End: 2020-12-07

## 2019-10-28 RX ORDER — BUPROPION HYDROCHLORIDE 150 MG/1
150 TABLET, EXTENDED RELEASE ORAL
Status: ON HOLD | COMMUNITY
End: 2020-12-07

## 2019-10-28 NOTE — PROGRESS NOTES
Mr. Abdalla is 74 y.o. male    Chief Complaint   Patient presents with   • Follow-up     no PSA done, no complaints       History of Present Illness   Patient presents for follow-up of elevated PSA.  He has no personal history of prostate cancer.  His previous PSA values are 5.4 and 4.1.  He denies any significant change in his urinary symptoms.  Patient is difficult to obtain history from given his history of dementia.    The following portions of the patient's history were reviewed and updated as appropriate: allergies, current medications, past family history, past medical history, past social history, past surgical history and problem list.    Review of Systems   Unable to perform ROS: Dementia         Current Outpatient Medications:   •  acetaminophen (TYLENOL) 325 MG tablet, Take 2 tablets by mouth Every 6 (Six) Hours As Needed for mild pain (1-3)., Disp: , Rfl: 0  •  albuterol (PROVENTIL) (2.5 MG/3ML) 0.083% nebulizer solution, Inhale., Disp: , Rfl:   •  bisoprolol (ZEBeta) 10 MG tablet, Take 10 mg by mouth Daily., Disp: , Rfl:   •  buPROPion SR (WELLBUTRIN SR) 150 MG 12 hr tablet, Take 150 mg by mouth., Disp: , Rfl:   •  diclofenac (VOLTAREN) 50 MG EC tablet, Take 1 tablet by mouth 2 (Two) Times a Day., Disp: 14 tablet, Rfl: 0  •  diltiaZEM CD (CARDIZEM CD) 180 MG 24 hr capsule, Take 1 capsule by mouth Daily., Disp: , Rfl:   •  divalproex (DEPAKOTE) 125 MG DR tablet, Take 125 mg by mouth 3 (Three) Times a Day., Disp: , Rfl:   •  docusate sodium 100 MG capsule, Take 100 mg by mouth., Disp: , Rfl:   •  donepezil (ARICEPT) 5 MG tablet, Take 5 mg by mouth Every Night., Disp: , Rfl:   •  escitalopram (LEXAPRO) 20 MG tablet, Take 20 mg by mouth Daily., Disp: , Rfl:   •  LORazepam (ATIVAN) 0.5 MG tablet, Take 0.5 mg by mouth., Disp: , Rfl:   •  nicotine (NICODERM CQ) 21 MG/24HR patch, Place 1 patch on the skin as directed by provider Daily., Disp: , Rfl:   •  ondansetron (ZOFRAN) 4 MG tablet, Take 4 mg by mouth.,  Disp: , Rfl:   •  pantoprazole (PROTONIX) 40 MG EC tablet, Take 40 mg by mouth Daily., Disp: , Rfl:   •  phenazopyridine (PYRIDIUM) 100 MG tablet, Take 100 mg by mouth 3 (Three) Times a Day As Needed for bladder spasms., Disp: , Rfl:   •  potassium chloride (K-DUR,KLOR-CON) 20 MEQ CR tablet, Take 20 mEq by mouth 2 (Two) Times a Day., Disp: , Rfl:   •  pravastatin (PRAVACHOL) 20 MG tablet, Take 20 mg by mouth Daily., Disp: , Rfl:   •  QUEtiapine (SEROquel) 25 MG tablet, Take 25 mg by mouth Every Night., Disp: , Rfl:   •  thiamine (VITAMIN B1) 100 MG tablet, Take 1 tablet by mouth Daily., Disp: , Rfl:   •  tiotropium (SPIRIVA) 18 MCG per inhalation capsule, Place 1 capsule into inhaler and inhale Daily., Disp: , Rfl:     Past Medical History:   Diagnosis Date   • A-fib (CMS/HCC)    • Alcohol abuse    • Anxiety    • COPD (chronic obstructive pulmonary disease) (CMS/HCC)    • COPD (chronic obstructive pulmonary disease) (CMS/HCC)    • Hypotension    • Lung disease    • Malaise and fatigue    • Neurological disease    • Polycythemia    • SOB (shortness of breath)    • Brina-Parkinson-White syndrome        Past Surgical History:   Procedure Laterality Date   • KNEE SURGERY     • PACEMAKER IMPLANTATION      [facility]    • WRIST SURGERY         Social History     Socioeconomic History   • Marital status:      Spouse name: Not on file   • Number of children: Not on file   • Years of education: Not on file   • Highest education level: Not on file   Tobacco Use   • Smoking status: Heavy Tobacco Smoker     Packs/day: 2.00     Years: 60.00     Pack years: 120.00   • Smokeless tobacco: Never Used   Substance and Sexual Activity   • Alcohol use: Yes     Alcohol/week: 16.8 oz     Types: 21 Cans of beer, 7 Shots of liquor per week     Comment: 2-3 BEERS, 1 GIN DAILY   • Drug use: No   • Sexual activity: No       Family History   Problem Relation Age of Onset   • No Known Problems Mother    • Cancer Father    • Heart disease  "Father    • COPD Sister        Objective    /78 (BP Location: Left arm, Patient Position: Sitting)   Pulse 67   Temp 98.3 °F (36.8 °C)   Resp 20   Ht 175.3 cm (69\")   Wt 86.2 kg (190 lb)   SpO2 98%   BMI 28.06 kg/m²     Physical Exam   Constitutional: He appears well-developed and well-nourished.   HENT:   Head: Normocephalic.   Pulmonary/Chest: Effort normal. No respiratory distress.   Abdominal: He exhibits no distension.   Neurological: He is alert.   Skin: Skin is warm and dry.   Psychiatric: He has a normal mood and affect. His behavior is normal.   Vitals reviewed.        Patient's Body mass index is 28.06 kg/m². BMI is above normal parameters. Recommendations include: educational material.      Admission on 02/12/2017, Discharged on 02/15/2017   No results displayed because visit has over 200 results.          Results for orders placed or performed in visit on 10/28/19   POC Urinalysis Dipstick, Multipro   Result Value Ref Range    Color Yellow Yellow, Straw, Dark Yellow, [place]    Clarity, UA Clear Clear    Glucose, UA Negative Negative, 1000 mg/dL (3+) mg/dL    Bilirubin Negative Negative    Ketones, UA Negative Negative    Specific Gravity  1.020 1.005 - 1.030    Blood, UA Negative Negative    pH, Urine 5.5 5.0 - 8.0    Protein, POC Negative Negative mg/dL    Urobilinogen, UA Normal Normal    Nitrite, UA Negative Negative    Leukocytes Negative Negative          Assessment/Plan   Assessment and Plan    Ezequiel was seen today for follow-up.    Diagnoses and all orders for this visit:    Elevated prostate specific antigen (PSA)  -     POC Urinalysis Dipstick, Multipro      Patient presents for follow-up of elevated PSA.  He had previously seen Dr. Rosario who recommended one last check of his PSA to ensure it had not significantly elevated.  Unfortunately, the patient did not obtain this lab result prior to this visit.  I have instructed him and his caregiver to report to our outpatient lab " today.    I reiterated that although I cannot guarantee the patient does not have prostate cancer, he would not be a good candidate for any kind of surgical procedure, including a prostate biopsy.  If his PSA is not elevated greater than 10, I will likely have the patient follow-up on as-needed basis.

## 2019-10-29 LAB — PSA SERPL-MCNC: 3.96 NG/ML (ref 0–4)

## 2019-11-15 ENCOUNTER — APPOINTMENT (OUTPATIENT)
Dept: CT IMAGING | Age: 74
End: 2019-11-15
Payer: MEDICARE

## 2019-11-15 ENCOUNTER — HOSPITAL ENCOUNTER (EMERGENCY)
Age: 74
Discharge: HOME | End: 2019-11-15
Attending: EMERGENCY MEDICINE
Payer: MEDICARE

## 2019-11-15 VITALS
BODY MASS INDEX: 23.7 KG/M2 | RESPIRATION RATE: 16 BRPM | HEART RATE: 75 BPM | HEIGHT: 69 IN | DIASTOLIC BLOOD PRESSURE: 89 MMHG | TEMPERATURE: 98.9 F | WEIGHT: 160 LBS | SYSTOLIC BLOOD PRESSURE: 116 MMHG | OXYGEN SATURATION: 93 %

## 2019-11-15 DIAGNOSIS — S09.90XA CLOSED HEAD INJURY, INITIAL ENCOUNTER: Primary | ICD-10-CM
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
## 2019-11-15 PROCEDURE — 70450 CT HEAD/BRAIN W/O DYE: CPT

## 2019-11-15 PROCEDURE — 99284 EMERGENCY DEPT VISIT MOD MDM: CPT

## 2019-11-15 PROCEDURE — 72125 CT NECK SPINE W/O DYE: CPT

## 2019-11-15 ASSESSMENT — ENCOUNTER SYMPTOMS
APNEA: 0
SHORTNESS OF BREATH: 0
BLOOD IN STOOL: 0
EYE DISCHARGE: 0
NAUSEA: 0
CHOKING: 0
FACIAL SWELLING: 0
CONSTIPATION: 0
DIARRHEA: 0
SINUS PRESSURE: 0
VOICE CHANGE: 0
SORE THROAT: 0

## 2020-12-04 ENCOUNTER — APPOINTMENT (OUTPATIENT)
Dept: CT IMAGING | Facility: HOSPITAL | Age: 75
End: 2020-12-04

## 2020-12-04 ENCOUNTER — APPOINTMENT (OUTPATIENT)
Dept: GENERAL RADIOLOGY | Facility: HOSPITAL | Age: 75
End: 2020-12-04

## 2020-12-04 ENCOUNTER — HOSPITAL ENCOUNTER (INPATIENT)
Facility: HOSPITAL | Age: 75
LOS: 6 days | Discharge: NURSING, REHAB OR LONG-TERM CARE | End: 2020-12-10
Attending: EMERGENCY MEDICINE | Admitting: FAMILY MEDICINE

## 2020-12-04 DIAGNOSIS — N39.0 ACUTE UTI: ICD-10-CM

## 2020-12-04 DIAGNOSIS — R97.20 ELEVATED PROSTATE SPECIFIC ANTIGEN (PSA): ICD-10-CM

## 2020-12-04 DIAGNOSIS — Z78.9 IMPAIRED MOBILITY AND ADLS: ICD-10-CM

## 2020-12-04 DIAGNOSIS — Z74.09 IMPAIRED MOBILITY AND ADLS: ICD-10-CM

## 2020-12-04 DIAGNOSIS — D75.1 POLYCYTHEMIA: ICD-10-CM

## 2020-12-04 DIAGNOSIS — I48.20 CHRONIC ATRIAL FIBRILLATION WITH RVR (HCC): ICD-10-CM

## 2020-12-04 DIAGNOSIS — R13.12 OROPHARYNGEAL DYSPHAGIA: ICD-10-CM

## 2020-12-04 DIAGNOSIS — U07.1 COVID-19: Primary | ICD-10-CM

## 2020-12-04 DIAGNOSIS — R29.6 MULTIPLE FALLS: ICD-10-CM

## 2020-12-04 DIAGNOSIS — I48.20 CHRONIC ATRIAL FIBRILLATION (HCC): ICD-10-CM

## 2020-12-04 DIAGNOSIS — F10.10 ALCOHOL ABUSE: ICD-10-CM

## 2020-12-04 DIAGNOSIS — Z74.09 IMPAIRED MOBILITY: ICD-10-CM

## 2020-12-04 DIAGNOSIS — J44.9 CHRONIC OBSTRUCTIVE PULMONARY DISEASE, UNSPECIFIED COPD TYPE (HCC): ICD-10-CM

## 2020-12-04 LAB
ABO GROUP BLD: NORMAL
ALBUMIN SERPL-MCNC: 3.3 G/DL (ref 3.5–5.2)
ALBUMIN/GLOB SERPL: 1.1 G/DL
ALP SERPL-CCNC: 97 U/L (ref 39–117)
ALT SERPL W P-5'-P-CCNC: 29 U/L (ref 1–41)
AMORPH URATE CRY URNS QL MICRO: ABNORMAL /HPF
ANION GAP SERPL CALCULATED.3IONS-SCNC: 8 MMOL/L (ref 5–15)
ARTERIAL PATENCY WRIST A: POSITIVE
AST SERPL-CCNC: 44 U/L (ref 1–40)
ATMOSPHERIC PRESS: 752 MMHG
BACTERIA UR QL AUTO: ABNORMAL /HPF
BASE EXCESS BLDA CALC-SCNC: -4.4 MMOL/L (ref 0–2)
BASOPHILS # BLD AUTO: 0.01 10*3/MM3 (ref 0–0.2)
BASOPHILS NFR BLD AUTO: 0.1 % (ref 0–1.5)
BDY SITE: ABNORMAL
BILIRUB SERPL-MCNC: 0.3 MG/DL (ref 0–1.2)
BILIRUB UR QL STRIP: NEGATIVE
BLD GP AB SCN SERPL QL: NEGATIVE
BODY TEMPERATURE: 37 C
BUN SERPL-MCNC: 35 MG/DL (ref 8–23)
BUN/CREAT SERPL: 30.7 (ref 7–25)
CALCIUM SPEC-SCNC: 10.6 MG/DL (ref 8.6–10.5)
CHLORIDE SERPL-SCNC: 118 MMOL/L (ref 98–107)
CLARITY UR: ABNORMAL
CO2 SERPL-SCNC: 19 MMOL/L (ref 22–29)
COLOR UR: ABNORMAL
CREAT SERPL-MCNC: 1.14 MG/DL (ref 0.76–1.27)
CRP SERPL-MCNC: 4.23 MG/DL (ref 0–0.5)
D DIMER PPP FEU-MCNC: 1.55 MG/L (FEU) (ref 0–0.5)
D-LACTATE SERPL-SCNC: 1.4 MMOL/L (ref 0.5–2)
D-LACTATE SERPL-SCNC: 3.5 MMOL/L (ref 0.5–2)
DEPRECATED RDW RBC AUTO: 52.6 FL (ref 37–54)
EOSINOPHIL # BLD AUTO: 0 10*3/MM3 (ref 0–0.4)
EOSINOPHIL NFR BLD AUTO: 0 % (ref 0.3–6.2)
ERYTHROCYTE [DISTWIDTH] IN BLOOD BY AUTOMATED COUNT: 14.5 % (ref 12.3–15.4)
FERRITIN SERPL-MCNC: 483.8 NG/ML (ref 30–400)
GAS FLOW AIRWAY: 15 LPM
GFR SERPL CREATININE-BSD FRML MDRD: 62 ML/MIN/1.73
GLOBULIN UR ELPH-MCNC: 2.9 GM/DL
GLUCOSE SERPL-MCNC: 133 MG/DL (ref 65–99)
GLUCOSE UR STRIP-MCNC: NEGATIVE MG/DL
HCO3 BLDA-SCNC: 19.8 MMOL/L (ref 20–26)
HCT VFR BLD AUTO: 47 % (ref 37.5–51)
HGB BLD-MCNC: 15.3 G/DL (ref 13–17.7)
HGB UR QL STRIP.AUTO: ABNORMAL
HYALINE CASTS UR QL AUTO: ABNORMAL /LPF
INHALED O2 CONCENTRATION: 100 %
KETONES UR QL STRIP: NEGATIVE
LACTATE HOLD SPECIMEN: NORMAL
LDH SERPL-CCNC: 161 U/L (ref 135–225)
LEUKOCYTE ESTERASE UR QL STRIP.AUTO: ABNORMAL
LYMPHOCYTES # BLD AUTO: 1.54 10*3/MM3 (ref 0.7–3.1)
LYMPHOCYTES NFR BLD AUTO: 14.9 % (ref 19.6–45.3)
Lab: ABNORMAL
MCH RBC QN AUTO: 31.7 PG (ref 26.6–33)
MCHC RBC AUTO-ENTMCNC: 32.6 G/DL (ref 31.5–35.7)
MCV RBC AUTO: 97.5 FL (ref 79–97)
MODALITY: ABNORMAL
MONOCYTES # BLD AUTO: 0.26 10*3/MM3 (ref 0.1–0.9)
MONOCYTES NFR BLD AUTO: 2.5 % (ref 5–12)
NEUTROPHILS NFR BLD AUTO: 8.48 10*3/MM3 (ref 1.7–7)
NEUTROPHILS NFR BLD AUTO: 82 % (ref 42.7–76)
NITRITE UR QL STRIP: NEGATIVE
NT-PROBNP SERPL-MCNC: 239.4 PG/ML (ref 0–1800)
PCO2 BLDA: 33.7 MM HG (ref 35–45)
PCO2 TEMP ADJ BLD: 33.7 MM HG (ref 35–45)
PH BLDA: 7.38 PH UNITS (ref 7.35–7.45)
PH UR STRIP.AUTO: 5.5 [PH] (ref 5–8)
PH, TEMP CORRECTED: 7.38 PH UNITS (ref 7.35–7.45)
PLATELET # BLD AUTO: 121 10*3/MM3 (ref 140–450)
PMV BLD AUTO: 11.6 FL (ref 6–12)
PO2 BLDA: 73.5 MM HG (ref 83–108)
PO2 TEMP ADJ BLD: 73.5 MM HG (ref 83–108)
POTASSIUM SERPL-SCNC: 4.8 MMOL/L (ref 3.5–5.2)
PROCALCITONIN SERPL-MCNC: 0.22 NG/ML (ref 0–0.25)
PROT SERPL-MCNC: 6.2 G/DL (ref 6–8.5)
PROT UR QL STRIP: NEGATIVE
RBC # BLD AUTO: 4.82 10*6/MM3 (ref 4.14–5.8)
RBC # UR: ABNORMAL /HPF
REF LAB TEST METHOD: ABNORMAL
RH BLD: POSITIVE
SAO2 % BLDCOA: 95.7 % (ref 94–99)
SARS-COV-2 RDRP RESP QL NAA+PROBE: DETECTED
SODIUM SERPL-SCNC: 145 MMOL/L (ref 136–145)
SP GR UR STRIP: 1.02 (ref 1–1.03)
SQUAMOUS #/AREA URNS HPF: ABNORMAL /HPF
T&S EXPIRATION DATE: NORMAL
UROBILINOGEN UR QL STRIP: ABNORMAL
VENTILATOR MODE: ABNORMAL
WBC # BLD AUTO: 10.34 10*3/MM3 (ref 3.4–10.8)
WBC UR QL AUTO: ABNORMAL /HPF

## 2020-12-04 PROCEDURE — 71045 X-RAY EXAM CHEST 1 VIEW: CPT

## 2020-12-04 PROCEDURE — C1751 CATH, INF, PER/CENT/MIDLINE: HCPCS

## 2020-12-04 PROCEDURE — 85379 FIBRIN DEGRADATION QUANT: CPT | Performed by: EMERGENCY MEDICINE

## 2020-12-04 PROCEDURE — 84145 PROCALCITONIN (PCT): CPT | Performed by: EMERGENCY MEDICINE

## 2020-12-04 PROCEDURE — 87086 URINE CULTURE/COLONY COUNT: CPT | Performed by: EMERGENCY MEDICINE

## 2020-12-04 PROCEDURE — 93010 ELECTROCARDIOGRAM REPORT: CPT | Performed by: INTERNAL MEDICINE

## 2020-12-04 PROCEDURE — XW033E5 INTRODUCTION OF REMDESIVIR ANTI-INFECTIVE INTO PERIPHERAL VEIN, PERCUTANEOUS APPROACH, NEW TECHNOLOGY GROUP 5: ICD-10-PCS | Performed by: FAMILY MEDICINE

## 2020-12-04 PROCEDURE — 80053 COMPREHEN METABOLIC PANEL: CPT | Performed by: EMERGENCY MEDICINE

## 2020-12-04 PROCEDURE — 82728 ASSAY OF FERRITIN: CPT | Performed by: EMERGENCY MEDICINE

## 2020-12-04 PROCEDURE — 83605 ASSAY OF LACTIC ACID: CPT | Performed by: EMERGENCY MEDICINE

## 2020-12-04 PROCEDURE — 83615 LACTATE (LD) (LDH) ENZYME: CPT | Performed by: EMERGENCY MEDICINE

## 2020-12-04 PROCEDURE — 87147 CULTURE TYPE IMMUNOLOGIC: CPT | Performed by: EMERGENCY MEDICINE

## 2020-12-04 PROCEDURE — 86900 BLOOD TYPING SEROLOGIC ABO: CPT | Performed by: EMERGENCY MEDICINE

## 2020-12-04 PROCEDURE — 87635 SARS-COV-2 COVID-19 AMP PRB: CPT | Performed by: EMERGENCY MEDICINE

## 2020-12-04 PROCEDURE — 0 IOPAMIDOL PER 1 ML: Performed by: INTERNAL MEDICINE

## 2020-12-04 PROCEDURE — 93005 ELECTROCARDIOGRAM TRACING: CPT | Performed by: EMERGENCY MEDICINE

## 2020-12-04 PROCEDURE — 36600 WITHDRAWAL OF ARTERIAL BLOOD: CPT

## 2020-12-04 PROCEDURE — P9612 CATHETERIZE FOR URINE SPEC: HCPCS

## 2020-12-04 PROCEDURE — 86140 C-REACTIVE PROTEIN: CPT | Performed by: EMERGENCY MEDICINE

## 2020-12-04 PROCEDURE — 83880 ASSAY OF NATRIURETIC PEPTIDE: CPT | Performed by: EMERGENCY MEDICINE

## 2020-12-04 PROCEDURE — 87040 BLOOD CULTURE FOR BACTERIA: CPT | Performed by: EMERGENCY MEDICINE

## 2020-12-04 PROCEDURE — 86850 RBC ANTIBODY SCREEN: CPT | Performed by: EMERGENCY MEDICINE

## 2020-12-04 PROCEDURE — 82803 BLOOD GASES ANY COMBINATION: CPT

## 2020-12-04 PROCEDURE — 87186 SC STD MICRODIL/AGAR DIL: CPT | Performed by: EMERGENCY MEDICINE

## 2020-12-04 PROCEDURE — 86901 BLOOD TYPING SEROLOGIC RH(D): CPT | Performed by: EMERGENCY MEDICINE

## 2020-12-04 PROCEDURE — 81001 URINALYSIS AUTO W/SCOPE: CPT | Performed by: EMERGENCY MEDICINE

## 2020-12-04 PROCEDURE — 71275 CT ANGIOGRAPHY CHEST: CPT

## 2020-12-04 PROCEDURE — 85025 COMPLETE CBC W/AUTO DIFF WBC: CPT | Performed by: EMERGENCY MEDICINE

## 2020-12-04 PROCEDURE — 25010000002 CEFTRIAXONE PER 250 MG: Performed by: EMERGENCY MEDICINE

## 2020-12-04 PROCEDURE — 87150 DNA/RNA AMPLIFIED PROBE: CPT | Performed by: EMERGENCY MEDICINE

## 2020-12-04 PROCEDURE — 99285 EMERGENCY DEPT VISIT HI MDM: CPT

## 2020-12-04 RX ORDER — FAMOTIDINE 20 MG/1
20 TABLET, FILM COATED ORAL DAILY
Status: ON HOLD | COMMUNITY
End: 2021-07-27

## 2020-12-04 RX ORDER — SODIUM CHLORIDE, SODIUM LACTATE, POTASSIUM CHLORIDE, CALCIUM CHLORIDE 600; 310; 30; 20 MG/100ML; MG/100ML; MG/100ML; MG/100ML
100 INJECTION, SOLUTION INTRAVENOUS CONTINUOUS
Status: DISCONTINUED | OUTPATIENT
Start: 2020-12-04 | End: 2020-12-05

## 2020-12-04 RX ORDER — DIVALPROEX SODIUM 250 MG/1
250 TABLET, EXTENDED RELEASE ORAL 2 TIMES DAILY
Status: ON HOLD | COMMUNITY
End: 2020-12-07

## 2020-12-04 RX ORDER — SODIUM CHLORIDE 0.9 % (FLUSH) 0.9 %
10 SYRINGE (ML) INJECTION AS NEEDED
Status: DISCONTINUED | OUTPATIENT
Start: 2020-12-04 | End: 2020-12-11 | Stop reason: HOSPADM

## 2020-12-04 RX ORDER — MIRTAZAPINE 7.5 MG/1
7.5 TABLET, FILM COATED ORAL NIGHTLY
COMMUNITY

## 2020-12-04 RX ORDER — ASCORBIC ACID 500 MG
500 TABLET ORAL DAILY
COMMUNITY

## 2020-12-04 RX ORDER — ONDANSETRON 2 MG/ML
4 INJECTION INTRAMUSCULAR; INTRAVENOUS EVERY 6 HOURS PRN
Status: DISCONTINUED | OUTPATIENT
Start: 2020-12-04 | End: 2020-12-11 | Stop reason: HOSPADM

## 2020-12-04 RX ORDER — MULTIPLE VITAMINS W/ MINERALS TAB 9MG-400MCG
1 TAB ORAL DAILY
COMMUNITY

## 2020-12-04 RX ORDER — TAMSULOSIN HYDROCHLORIDE 0.4 MG/1
1 CAPSULE ORAL DAILY
COMMUNITY

## 2020-12-04 RX ORDER — NICOTINE 21 MG/24HR
1 PATCH, TRANSDERMAL 24 HOURS TRANSDERMAL
Status: DISCONTINUED | OUTPATIENT
Start: 2020-12-04 | End: 2020-12-11 | Stop reason: HOSPADM

## 2020-12-04 RX ORDER — MELATONIN
2000 DAILY
Status: ON HOLD | COMMUNITY
End: 2021-07-27

## 2020-12-04 RX ORDER — POLYETHYLENE GLYCOL 3350 17 G/17G
17 POWDER, FOR SOLUTION ORAL DAILY PRN
Status: ON HOLD | COMMUNITY
End: 2020-12-07

## 2020-12-04 RX ORDER — DEXAMETHASONE 6 MG/1
6 TABLET ORAL DAILY
Status: ON HOLD | COMMUNITY
End: 2020-12-07

## 2020-12-04 RX ORDER — SODIUM CHLORIDE 0.9 % (FLUSH) 0.9 %
10 SYRINGE (ML) INJECTION EVERY 12 HOURS SCHEDULED
Status: DISCONTINUED | OUTPATIENT
Start: 2020-12-04 | End: 2020-12-11 | Stop reason: HOSPADM

## 2020-12-04 RX ADMIN — ACETAMINOPHEN 650 MG: 325 SUPPOSITORY RECTAL at 17:15

## 2020-12-04 RX ADMIN — NOREPINEPHRINE BITARTRATE 0.04 MCG/KG/MIN: 1 INJECTION INTRAVENOUS at 23:56

## 2020-12-04 RX ADMIN — CEFTRIAXONE SODIUM 1 G: 1 INJECTION, POWDER, FOR SOLUTION INTRAMUSCULAR; INTRAVENOUS at 17:36

## 2020-12-04 RX ADMIN — IOPAMIDOL 100 ML: 755 INJECTION, SOLUTION INTRAVENOUS at 21:22

## 2020-12-04 RX ADMIN — SODIUM CHLORIDE 1000 ML: 9 INJECTION, SOLUTION INTRAVENOUS at 18:20

## 2020-12-04 RX ADMIN — SODIUM CHLORIDE 1000 ML: 9 INJECTION, SOLUTION INTRAVENOUS at 20:07

## 2020-12-04 RX ADMIN — SODIUM CHLORIDE, POTASSIUM CHLORIDE, SODIUM LACTATE AND CALCIUM CHLORIDE 100 ML/HR: 600; 310; 30; 20 INJECTION, SOLUTION INTRAVENOUS at 23:26

## 2020-12-04 NOTE — ED PROVIDER NOTES
Subjective   Patient is a 75-year-old male who presents to the ER with shortness of air.  Patient is a nursing home resident and [facility] nursing home called for shortness of air.  Patient has been nonverbal since they picked the patient up.  He was 74% on a nonrebreather in route.  No further HPI could be obtained due to altered mental status.          Review of Systems   Unable to perform ROS: Patient nonverbal   Respiratory: Positive for shortness of breath.        Past Medical History:   Diagnosis Date   • A-fib (CMS/[facility])    • Alcohol abuse    • Anxiety    • COPD (chronic obstructive pulmonary disease) (CMS/HCC)    • COPD (chronic obstructive pulmonary disease) (CMS/[facility])    • Hypotension    • Lung disease    • Malaise and fatigue    • Neurological disease    • Polycythemia    • SOB (shortness of breath)    • Brina-Parkinson-White syndrome        No Known Allergies    Past Surgical History:   Procedure Laterality Date   • KNEE SURGERY     • PACEMAKER IMPLANTATION      [facility]    • WRIST SURGERY         Family History   Problem Relation Age of Onset   • No Known Problems Mother    • Cancer Father    • Heart disease Father    • COPD Sister        Social History     Socioeconomic History   • Marital status:      Spouse name: Not on file   • Number of children: Not on file   • Years of education: Not on file   • Highest education level: Not on file   Tobacco Use   • Smoking status: Heavy Tobacco Smoker     Packs/day: 2.00     Years: 60.00     Pack years: 120.00   • Smokeless tobacco: Never Used   Substance and Sexual Activity   • Alcohol use: Yes     Alcohol/week: 28.0 standard drinks     Types: 21 Cans of beer, 7 Shots of liquor per week     Comment: 2-3 BEERS, 1 GIN DAILY   • Drug use: No   • Sexual activity: Never           Objective   Physical Exam  Vitals signs and nursing note reviewed.   Constitutional:       Appearance: He is well-developed.   HENT:      Head: Normocephalic and atraumatic.   Eyes:       Conjunctiva/sclera: Conjunctivae normal.      Pupils: Pupils are equal, round, and reactive to light.   Neck:      Musculoskeletal: Normal range of motion.   Cardiovascular:      Rate and Rhythm: Regular rhythm. Tachycardia present.      Heart sounds: Normal heart sounds.   Pulmonary:      Effort: Pulmonary effort is normal. Tachypnea present.      Breath sounds: Wheezing and rhonchi present.   Abdominal:      Palpations: Abdomen is soft.      Tenderness: There is no abdominal tenderness.   Musculoskeletal: Normal range of motion.         General: No deformity.   Skin:     General: Skin is warm.      Coloration: Skin is mottled.   Neurological:      Mental Status: He is alert.      Comments: Nonverbal, moves all extremities   Psychiatric:         Behavior: Behavior normal.         Procedures           ED Course      ECG: Atrial fibrillation with a rate of 137 with RVR, left anterior fascicular block, T wave inversion in the lateral leads    Patient was placed on a nonrebreather and sats were in upper 90s.    Lab Results (last 24 hours)     Procedure Component Value Units Date/Time    Blood Gas, Arterial - [[id number]]  (Abnormal) Collected: 12/04/20 1654    Specimen: Arterial Blood Updated: 12/04/20 1659     Site Left Brachial     Leighton's Test Positive     pH, Arterial 7.377 pH units      pCO2, Arterial 33.7 mm Hg      Comment: 84 Value below reference range        pO2, Arterial 73.5 mm Hg      Comment: 84 Value below reference range        HCO3, Arterial 19.8 mmol/L      Comment: 84 Value below reference range        Base Excess, Arterial -4.4 mmol/L      Comment: 84 Value below reference range        O2 Saturation, Arterial 95.7 %      Temperature 37.0 C      Barometric Pressure for Blood Gas 752 mmHg      Modality NRB     FIO2 100 %      Flow Rate 15.0 lpm      Ventilator Mode NA     Collected by [id number]     Comment: Meter: [id number]     :  [id number]        pCO2, Temperature Corrected 33.7 mm Hg       pH, Temp Corrected 7.377 pH Units      pO2, Temperature Corrected 73.5 mm Hg     CBC & Differential [[id number]]  (Abnormal) Collected: 12/04/20 1700    Specimen: Blood Updated: 12/04/20 1722    Narrative:      The following orders were created for panel order CBC & Differential.  Procedure                               Abnormality         Status                     ---------                               -----------         ------                     CBC Auto Differential[[id number]]        Abnormal            Final result                 Please view results for these tests on the individual orders.    D-dimer, Quantitative [[id number]]  (Abnormal) Collected: 12/04/20 1700    Specimen: Blood Updated: 12/04/20 1729     D-Dimer, Quantitative 1.55 mg/L (FEU)     Narrative:      Reference Range is 0-0.50 mg/L FEU. However, results <0.50 mg/L FEU tends to rule out DVT or PE. Results >0.50 mg/L FEU are not useful in predicting absence or presence of DVT or PE.      Lactic Acid, Plasma [[id number]]  (Abnormal) Collected: 12/04/20 1700    Specimen: Blood Updated: 12/04/20 1737     Lactate 3.5 mmol/L     Blood Culture - Blood, Arm, Right [[id number]] Collected: 12/04/20 1700    Specimen: Blood from Arm, Right Updated: 12/04/20 1716    CBC Auto Differential [[id number]]  (Abnormal) Collected: 12/04/20 1700    Specimen: Blood Updated: 12/04/20 1722     WBC 10.34 10*3/mm3      RBC 4.82 10*6/mm3      Hemoglobin 15.3 g/dL      Hematocrit 47.0 %      MCV 97.5 fL      MCH 31.7 pg      MCHC 32.6 g/dL      RDW 14.5 %      RDW-SD 52.6 fl      MPV 11.6 fL      Platelets 121 10*3/mm3      Neutrophil % 82.0 %      Lymphocyte % 14.9 %      Monocyte % 2.5 %      Eosinophil % 0.0 %      Basophil % 0.1 %      Neutrophils, Absolute 8.48 10*3/mm3      Lymphocytes, Absolute 1.54 10*3/mm3      Monocytes, Absolute 0.26 10*3/mm3      Eosinophils, Absolute 0.00 10*3/mm3      Basophils, Absolute 0.01 10*3/mm3     Lactic Acid, Reflex Timer (This will  reflex a repeat order 3-3:15 hours after ordered.) [[id number]] Collected: 12/04/20 1700    Specimen: Blood Updated: 12/04/20 1737    Blood Culture - Blood, Arm, Left [[id number]] Collected: 12/04/20 1701    Specimen: Blood from Arm, Left Updated: 12/04/20 1716    COVID PRE-OP / PRE-PROCEDURE SCREENING ORDER (NO ISOLATION) - Swab, Nasopharynx [[id number]]  (Abnormal) Collected: 12/04/20 1701    Specimen: Swab from Nasopharynx Updated: 12/04/20 1813    Narrative:      The following orders were created for panel order COVID PRE-OP / PRE-PROCEDURE SCREENING ORDER (NO ISOLATION) - Swab, Nasopharynx.  Procedure                               Abnormality         Status                     ---------                               -----------         ------                     COVID-19, [facility]OTT IN-HOUS...[[id number]]  Abnormal            Final result                 Please view results for these tests on the individual orders.    COVID-19, [facility]BBOTT IN-HOUSE,NP Swab (NO TRANSPORT MEDIA) 2 HR TAT - Swab, Nasopharynx [[id number]]  (Abnormal) Collected: 12/04/20 1701    Specimen: Swab from Nasopharynx Updated: 12/04/20 1813     COVID19 Detected    Narrative:      Fact sheet for providers: https://www.fda.gov/media/353088/download     Fact sheet for patients: https://www.fda.gov/media/705306/download    Urinalysis With Culture If Indicated - Urine, Catheter In/Out [[id number]]  (Abnormal) Collected: 12/04/20 1711    Specimen: Urine, Catheter In/Out Updated: 12/04/20 1724     Color, UA Dark Yellow     Appearance, UA Cloudy     pH, UA 5.5     Specific Gravity, UA 1.020     Glucose, UA Negative     Ketones, UA Negative     Bilirubin, UA Negative     Blood, UA Trace     Protein, UA Negative     Leuk Esterase, UA Large (3+)     Nitrite, UA Negative     Urobilinogen, UA 1.0 E.U./dL    Urinalysis, Microscopic Only - Urine, Catheter In/Out [[id number]]  (Abnormal) Collected: 12/04/20 1711    Specimen: Urine, Catheter In/Out Updated: 12/04/20 1739      RBC, UA 0-2 /HPF      WBC, UA 31-50 /HPF      Bacteria, UA Trace /HPF      Squamous Epithelial Cells, UA None Seen /HPF      Hyaline Casts, UA None Seen /LPF      Amorphous Crystals, UA Small/1+ /HPF      Methodology Manual Light Microscopy    Urine Culture - Urine, Urine, Catheter In/Out [[id number]] Collected: 12/04/20 1711    Specimen: Urine, Catheter In/Out Updated: 12/04/20 1739    Comprehensive Metabolic Panel [[id number]]  (Abnormal) Collected: 12/04/20 1725    Specimen: Blood Updated: 12/04/20 1752     Glucose 133 mg/dL      BUN 35 mg/dL      Creatinine 1.14 mg/dL      Sodium 145 mmol/L      Potassium 4.8 mmol/L      Chloride 118 mmol/L      CO2 19.0 mmol/L      Calcium 10.6 mg/dL      Total Protein 6.2 g/dL      Albumin 3.30 g/dL      ALT (SGPT) 29 U/L      AST (SGOT) 44 U/L      Alkaline Phosphatase 97 U/L      Total Bilirubin 0.3 mg/dL      eGFR Non African Amer 62 mL/min/1.73      Globulin 2.9 gm/dL      A/G Ratio 1.1 g/dL      BUN/Creatinine Ratio 30.7     Anion Gap 8.0 mmol/L     Narrative:      GFR Normal >60  Chronic Kidney Disease <60  Kidney Failure <15      Lactate Dehydrogenase [[id number]]  (Normal) Collected: 12/04/20 1725    Specimen: Blood Updated: 12/04/20 1751      U/L     Procalcitonin [[id number]]  (Normal) Collected: 12/04/20 1725    Specimen: Blood Updated: 12/04/20 1756     Procalcitonin 0.22 ng/mL     Narrative:      As a Marker for Sepsis (Non-Neonates):   1. <0.5 ng/mL represents a low risk of severe sepsis and/or septic shock.  1. >2 ng/mL represents a high risk of severe sepsis and/or septic shock.    As a Marker for Lower Respiratory Tract Infections that require antibiotic therapy:  PCT on Admission     Antibiotic Therapy             6-12 Hrs later  > 0.5                Strongly Recommended            >0.25 - <0.5         Recommended  0.1 - 0.25           Discouraged                   Remeasure/reassess PCT  <0.1                 Strongly Discouraged           "Remeasure/reassess PCT      As 28 day mortality risk marker: \"Change in Procalcitonin Result\" (> 80 % or <=80 %) if Day 0 (or Day 1) and Day 4 values are available. Refer to http://www.[facility]-pct-calculator.com/   Change in PCT <=80 %   A decrease of PCT levels below or equal to 80 % defines a positive change in PCT test result representing a higher risk for 28-day all-cause mortality of patients diagnosed with severe sepsis or septic shock.  Change in PCT > 80 %   A decrease of PCT levels of more than 80 % defines a negative change in PCT result representing a lower risk for 28-day all-cause mortality of patients diagnosed with severe sepsis or septic shock.                Results may be falsely decreased if patient taking Biotin.     C-reactive Protein [[id number]]  (Abnormal) Collected: 12/04/20 1725    Specimen: Blood Updated: 12/04/20 1752     C-Reactive Protein 4.23 mg/dL     Ferritin [[id number]]  (Abnormal) Collected: 12/04/20 1725    Specimen: Blood Updated: 12/04/20 1756     Ferritin 483.80 ng/mL     Narrative:      Results may be falsely decreased if patient taking Biotin.      BNP [[id number]]  (Normal) Collected: 12/04/20 1725    Specimen: Blood Updated: 12/04/20 1748     proBNP 239.4 pg/mL     Narrative:      Among patients with dyspnea, NT-proBNP is highly sensitive for the detection of acute congestive heart failure. In addition NT-proBNP of <300 pg/ml effectively rules out acute congestive heart failure with 99% negative predictive value.    Results may be falsely decreased if patient taking Biotin.          XR Chest 1 View   Final Result   1. Patchy infiltrates bilaterally may represent pulmonary edema or   pneumonia.   2. Cardiomegaly.   3. Gastric distention.           This report was finalized on 12/04/2020 17:40 by Dr. Jett Gore MD.      CT Angiogram Chest    (Results Pending)       Labs showed an elevated D-dimer, lactate, CRP and ferritin.  Also showed a UTI and patient was Covid positive.  " Also showed a decreased PO2.  Chest x-ray showed patchy infiltrates bilaterally that could be pulmonary edema or pneumonia.  Also showed cardiomegaly.  CT scan of the chest was ordered.  Results pending.  Patient was admitted to the hospitalist service for Covid,  chronic atrial fibrillation and UTI.  Patient was given Rocephin.                                     [facility]    Final diagnoses:   COVID-19   Acute UTI   Chronic atrial fibrillation (CMS/[facility])            Yissel Oneal MD  12/04/20 [id number]

## 2020-12-05 LAB
ALBUMIN SERPL-MCNC: 2.5 G/DL (ref 3.5–5.2)
ALBUMIN/GLOB SERPL: 0.9 G/DL
ALP SERPL-CCNC: 86 U/L (ref 39–117)
ALT SERPL W P-5'-P-CCNC: 22 U/L (ref 1–41)
ANION GAP SERPL CALCULATED.3IONS-SCNC: 7 MMOL/L (ref 5–15)
ARTERIAL PATENCY WRIST A: POSITIVE
AST SERPL-CCNC: 35 U/L (ref 1–40)
ATMOSPHERIC PRESS: 754 MMHG
BACTERIA BLD CULT: ABNORMAL
BACTERIA SPEC AEROBE CULT: NO GROWTH
BASE EXCESS BLDA CALC-SCNC: -3.3 MMOL/L (ref 0–2)
BDY SITE: ABNORMAL
BILIRUB SERPL-MCNC: 0.3 MG/DL (ref 0–1.2)
BODY TEMPERATURE: 37 C
BOTTLE TYPE: ABNORMAL
BUN SERPL-MCNC: 25 MG/DL (ref 8–23)
BUN/CREAT SERPL: 23.4 (ref 7–25)
BURR CELLS BLD QL SMEAR: ABNORMAL
CALCIUM SPEC-SCNC: 10.1 MG/DL (ref 8.6–10.5)
CHLORIDE SERPL-SCNC: 121 MMOL/L (ref 98–107)
CLUMPED PLATELETS: PRESENT
CO2 SERPL-SCNC: 22 MMOL/L (ref 22–29)
CREAT SERPL-MCNC: 1.07 MG/DL (ref 0.76–1.27)
DEPRECATED RDW RBC AUTO: 53.4 FL (ref 37–54)
EOSINOPHIL # BLD MANUAL: 0.19 10*3/MM3 (ref 0–0.4)
EOSINOPHIL NFR BLD MANUAL: 1 % (ref 0.3–6.2)
ERYTHROCYTE [DISTWIDTH] IN BLOOD BY AUTOMATED COUNT: 14.5 % (ref 12.3–15.4)
GAS FLOW AIRWAY: 15 LPM
GFR SERPL CREATININE-BSD FRML MDRD: 67 ML/MIN/1.73
GLOBULIN UR ELPH-MCNC: 2.7 GM/DL
GLUCOSE SERPL-MCNC: 101 MG/DL (ref 65–99)
HCO3 BLDA-SCNC: 20.9 MMOL/L (ref 20–26)
HCT VFR BLD AUTO: 40.1 % (ref 37.5–51)
HGB BLD-MCNC: 12.5 G/DL (ref 13–17.7)
INHALED O2 CONCENTRATION: 100 %
LYMPHOCYTES # BLD MANUAL: 1.67 10*3/MM3 (ref 0.7–3.1)
LYMPHOCYTES NFR BLD MANUAL: 3 % (ref 5–12)
LYMPHOCYTES NFR BLD MANUAL: 9 % (ref 19.6–45.3)
Lab: ABNORMAL
MAGNESIUM SERPL-MCNC: 1.8 MG/DL (ref 1.6–2.4)
MCH RBC QN AUTO: 31.2 PG (ref 26.6–33)
MCHC RBC AUTO-ENTMCNC: 31.2 G/DL (ref 31.5–35.7)
MCV RBC AUTO: 100 FL (ref 79–97)
MODALITY: ABNORMAL
MONOCYTES # BLD AUTO: 0.56 10*3/MM3 (ref 0.1–0.9)
NEUTROPHILS # BLD AUTO: 15.95 10*3/MM3 (ref 1.7–7)
NEUTROPHILS NFR BLD MANUAL: 80 % (ref 42.7–76)
NEUTS BAND NFR BLD MANUAL: 6 % (ref 0–5)
PCO2 BLDA: 34.4 MM HG (ref 35–45)
PCO2 TEMP ADJ BLD: 34.4 MM HG (ref 35–45)
PH BLDA: 7.39 PH UNITS (ref 7.35–7.45)
PH, TEMP CORRECTED: 7.39 PH UNITS (ref 7.35–7.45)
PHOSPHATE SERPL-MCNC: 2.7 MG/DL (ref 2.5–4.5)
PLATELET # BLD AUTO: 103 10*3/MM3 (ref 140–450)
PMV BLD AUTO: 11.6 FL (ref 6–12)
PO2 BLDA: 71.3 MM HG (ref 83–108)
PO2 TEMP ADJ BLD: 71.3 MM HG (ref 83–108)
POIKILOCYTOSIS BLD QL SMEAR: ABNORMAL
POTASSIUM SERPL-SCNC: 4.3 MMOL/L (ref 3.5–5.2)
PROT SERPL-MCNC: 5.2 G/DL (ref 6–8.5)
QT INTERVAL: 266 MS
QTC INTERVAL: 401 MS
RBC # BLD AUTO: 4.01 10*6/MM3 (ref 4.14–5.8)
SAO2 % BLDCOA: 95.5 % (ref 94–99)
SMALL PLATELETS BLD QL SMEAR: ABNORMAL
SODIUM SERPL-SCNC: 150 MMOL/L (ref 136–145)
TROPONIN T SERPL-MCNC: <0.01 NG/ML (ref 0–0.03)
VARIANT LYMPHS NFR BLD MANUAL: 1 % (ref 0–5)
VENTILATOR MODE: ABNORMAL
WBC # BLD AUTO: 18.55 10*3/MM3 (ref 3.4–10.8)
WBC MORPH BLD: NORMAL

## 2020-12-05 PROCEDURE — 83735 ASSAY OF MAGNESIUM: CPT | Performed by: INTERNAL MEDICINE

## 2020-12-05 PROCEDURE — 80053 COMPREHEN METABOLIC PANEL: CPT | Performed by: INTERNAL MEDICINE

## 2020-12-05 PROCEDURE — 84100 ASSAY OF PHOSPHORUS: CPT | Performed by: INTERNAL MEDICINE

## 2020-12-05 PROCEDURE — 63710000001 DEXAMETHASONE PER 0.25 MG: Performed by: FAMILY MEDICINE

## 2020-12-05 PROCEDURE — 94799 UNLISTED PULMONARY SVC/PX: CPT

## 2020-12-05 PROCEDURE — 25010000002 DEXAMETHASONE PER 1 MG: Performed by: FAMILY MEDICINE

## 2020-12-05 PROCEDURE — 93005 ELECTROCARDIOGRAM TRACING: CPT | Performed by: INTERNAL MEDICINE

## 2020-12-05 PROCEDURE — 85025 COMPLETE CBC W/AUTO DIFF WBC: CPT | Performed by: INTERNAL MEDICINE

## 2020-12-05 PROCEDURE — 36600 WITHDRAWAL OF ARTERIAL BLOOD: CPT

## 2020-12-05 PROCEDURE — 92610 EVALUATE SWALLOWING FUNCTION: CPT

## 2020-12-05 PROCEDURE — 85007 BL SMEAR W/DIFF WBC COUNT: CPT | Performed by: INTERNAL MEDICINE

## 2020-12-05 PROCEDURE — 84484 ASSAY OF TROPONIN QUANT: CPT | Performed by: INTERNAL MEDICINE

## 2020-12-05 PROCEDURE — 82803 BLOOD GASES ANY COMBINATION: CPT

## 2020-12-05 PROCEDURE — 25010000002 CEFTRIAXONE PER 250 MG: Performed by: INTERNAL MEDICINE

## 2020-12-05 RX ORDER — DEXAMETHASONE 4 MG/1
6 TABLET ORAL
Status: DISCONTINUED | OUTPATIENT
Start: 2020-12-05 | End: 2020-12-05

## 2020-12-05 RX ORDER — DEXTROSE MONOHYDRATE 50 MG/ML
50 INJECTION, SOLUTION INTRAVENOUS CONTINUOUS
Status: DISCONTINUED | OUTPATIENT
Start: 2020-12-05 | End: 2020-12-06

## 2020-12-05 RX ORDER — DEXAMETHASONE SODIUM PHOSPHATE 4 MG/ML
6 INJECTION, SOLUTION INTRA-ARTICULAR; INTRALESIONAL; INTRAMUSCULAR; INTRAVENOUS; SOFT TISSUE DAILY
Status: DISCONTINUED | OUTPATIENT
Start: 2020-12-05 | End: 2020-12-06

## 2020-12-05 RX ADMIN — SODIUM CHLORIDE, PRESERVATIVE FREE 10 ML: 5 INJECTION INTRAVENOUS at 20:45

## 2020-12-05 RX ADMIN — REMDESIVIR 200 MG: 100 INJECTION, POWDER, LYOPHILIZED, FOR SOLUTION INTRAVENOUS at 08:46

## 2020-12-05 RX ADMIN — CEFTRIAXONE SODIUM 1 G: 1 INJECTION, POWDER, FOR SOLUTION INTRAMUSCULAR; INTRAVENOUS at 18:26

## 2020-12-05 RX ADMIN — NICOTINE 1 PATCH: 21 PATCH, EXTENDED RELEASE TRANSDERMAL at 20:52

## 2020-12-05 RX ADMIN — SODIUM CHLORIDE, POTASSIUM CHLORIDE, SODIUM LACTATE AND CALCIUM CHLORIDE 500 ML: 600; 310; 30; 20 INJECTION, SOLUTION INTRAVENOUS at 13:00

## 2020-12-05 RX ADMIN — DEXTROSE MONOHYDRATE 50 ML/HR: 50 INJECTION, SOLUTION INTRAVENOUS at 09:03

## 2020-12-05 RX ADMIN — SODIUM CHLORIDE, POTASSIUM CHLORIDE, SODIUM LACTATE AND CALCIUM CHLORIDE 500 ML: 600; 310; 30; 20 INJECTION, SOLUTION INTRAVENOUS at 09:03

## 2020-12-05 RX ADMIN — DEXAMETHASONE SODIUM PHOSPHATE 6 MG: 4 INJECTION, SOLUTION INTRAMUSCULAR; INTRAVENOUS at 09:21

## 2020-12-05 RX ADMIN — NICOTINE 1 PATCH: 21 PATCH, EXTENDED RELEASE TRANSDERMAL at 00:02

## 2020-12-05 NOTE — ED NOTES
Changed patients bed to hospital inpatient bed, changed patients brief and repositioned     Harley Flores, RN  12/05/20 [id number]

## 2020-12-05 NOTE — PLAN OF CARE
Goal Outcome Evaluation:  Plan of Care Reviewed With: patient     Outcome Summary: Clinical bedside swallow evaluation completed.Full range of consistencies except mechanical soft solids were presented. Pt had a wet vocal quality 1x following nectar. He had and immediate coughing episode following a thin liquid trial. He had decreased rotary chew and prolonged mastication of the regular solid. Recommend starting a pured diet and honey thick liquids. Meds crushed in applesauce. Ok for ice chips only between meals. SLP will follow and treat.

## 2020-12-05 NOTE — H&P
[facility]  HISTORY AND PHYSICAL    Date of Admission: 12/4/2020  Primary Care Physician: Shane Banks MD    Subjective     Chief Complaint: Dyspnea    History of Present Illness  75-year-old man who has baseline dementia.  He is a resident in a nursing home.  It appears by his home med review, that he has been diagnosed with Covid in the past.  Uncertain of the timeframe.  Patient is unable to give me any details.  When asked him if you feel sick, he says no.  There is no family present.  Nursing tells me that his granddaughter sought guardianship of him today, and is going to bring in the paperwork to the hospital later during the admission.  Patient is a hypoxic, and requiring 15 L of oxygen to maintain a saturation above 95%.  The patient has an ankle bracelet on his left ankle.  Again per nursing, no stated that the patient's O2 saturation was 68% in the nursing facility.  The patient's last ER visit, per epic was on 11/15/2019.        Review of Systems   Hypoxia  Otherwise complete ROS reviewed and negative except as mentioned in the HPI.    Past Medical History:   Past Medical History:   Diagnosis Date   • A-fib (CMS/HCC)    • Alcohol abuse    • Anxiety    • COPD (chronic obstructive pulmonary disease) (CMS/HCC)    • COPD (chronic obstructive pulmonary disease) (CMS/HCC)    • Hypotension    • Lung disease    • Malaise and fatigue    • Neurological disease    • Polycythemia    • SOB (shortness of breath)    • Brina-Parkinson-White syndrome      Past Surgical History:  Past Surgical History:   Procedure Laterality Date   • KNEE SURGERY     • PACEMAKER IMPLANTATION      [facility]    • WRIST SURGERY       Social History:  reports that he has been smoking. He has a 120.00 pack-year smoking history. He has never used smokeless tobacco. He reports current alcohol use of about 28.0 standard drinks of alcohol per week. He reports that he does not use  drugs.    Family History: family history includes COPD in his sister; Cancer in his father; Heart disease in his father; No Known Problems in his mother.       Allergies:  No Known Allergies  Medications:  Prior to Admission medications    Medication Sig Start Date End Date Taking? Authorizing Provider   acetaminophen (TYLENOL) 325 MG tablet Take 2 tablets by mouth Every 6 (Six) Hours As Needed for mild pain (1-3). 2/15/17  Yes Andrea Cote MD   albuterol (PROVENTIL) (2.5 MG/3ML) 0.083% nebulizer solution Inhale.   Yes Valerio Rizo MD   aspirin 325 MG tablet Take 325 mg by mouth Daily.   Yes Valerio Rizo MD   buPROPion SR (WELLBUTRIN SR) 150 MG 12 hr tablet Take 150 mg by mouth.   Yes Valerio Rizo MD   Cholecalciferol (vitamin D3) 125 MCG (5000 UT) capsule capsule Take 2,000 Units by mouth Daily.   Yes Valerio Rizo MD   dexamethasone (DECADRON) 6 MG tablet Take 6 mg by mouth Daily.   Yes Valerio Rizo MD   diltiaZEM CD (CARDIZEM CD) 180 MG 24 hr capsule Take 1 capsule by mouth Daily.  Patient taking differently: Take 120 mg by mouth Daily. 2/15/17  Yes Andrea Cote MD   divalproex (DEPAKOTE) 250 MG 24 hr tablet Take 250 mg by mouth Daily.   Yes Valerio Rizo MD   docusate sodium 100 MG capsule Take 100 mg by mouth. 6/7/19  Yes Valerio Rizo MD   famotidine (PEPCID) 20 MG tablet Take 20 mg by mouth 2 (Two) Times a Day.   Yes Valerio Rizo MD   LORazepam (ATIVAN) 0.5 MG tablet Take 0.5 mg by mouth.   Yes Valerio Rizo MD   mirtazapine (REMERON) 15 MG tablet Take 15 mg by mouth Every Night.   Yes Valerio Rizo MD   multivitamin with minerals tablet tablet Take 1 tablet by mouth Daily.   Yes Valerio Rizo MD   polyethylene glycol (MiraLax) 17 g packet Take 17 g by mouth Daily As Needed.   Yes Valerio Rizo MD   potassium chloride (K-DUR,KLOR-CON) 20 MEQ CR tablet Take 20 mEq by mouth 2 (Two) Times a Day.  "  Yes Valerio Rizo MD   pravastatin (PRAVACHOL) 20 MG tablet Take 20 mg by mouth Daily.   Yes Valerio Rizo MD   tamsulosin (FLOMAX) 0.4 MG capsule 24 hr capsule Take 1 capsule by mouth Daily.   Yes Valerio Rizo MD   tiotropium (SPIRIVA) 18 MCG per inhalation capsule Place 1 capsule into inhaler and inhale Daily.   Yes Valerio Rizo MD   vitamin C (ASCORBIC ACID) 500 MG tablet Take 500 mg by mouth Daily.   Yes Valerio Rizo MD   Zinc Sulfate  (50 Zn) MG tablet controlled-release Take  by mouth.   Yes Valerio Rizo MD   bisoprolol (ZEBeta) 10 MG tablet Take 10 mg by mouth Daily.    Valerio Rizo MD   diclofenac (VOLTAREN) 50 MG EC tablet Take 1 tablet by mouth 2 (Two) Times a Day. 9/24/17   Mendez Bennett PA-C   divalproex (DEPAKOTE) 125 MG DR tablet Take 125 mg by mouth 3 (Three) Times a Day.    Valerio Rizo MD   donepezil (ARICEPT) 5 MG tablet Take 5 mg by mouth Every Night.    Valerio Rizo MD   escitalopram (LEXAPRO) 20 MG tablet Take 20 mg by mouth Daily.    Valerio Rizo MD   nicotine (NICODERM CQ) 21 MG/24HR patch Place 1 patch on the skin as directed by provider Daily.    Valerio Rizo MD   ondansetron (ZOFRAN) 4 MG tablet Take 4 mg by mouth. 6/7/19   Valerio Rizo MD   pantoprazole (PROTONIX) 40 MG EC tablet Take 40 mg by mouth Daily. 7/3/18   Valerio Rizo MD   phenazopyridine (PYRIDIUM) 100 MG tablet Take 100 mg by mouth 3 (Three) Times a Day As Needed for bladder spasms.    Valerio Rizo MD   QUEtiapine (SEROquel) 25 MG tablet Take 25 mg by mouth Every Night.    Valerio Rizo MD   thiamine (VITAMIN B1) 100 MG tablet Take 1 tablet by mouth Daily. 2/15/17   Andrea Cote MD     Objective     Vital Signs: BP (!) 89/58   Pulse 104   Temp (!) 101.5 °F (38.6 °C) (Rectal)   Resp (!) 32   Ht 175.3 cm (69\")   Wt 68.8 kg (151 lb 11.2 oz)   SpO2 95%   BMI 22.40 kg/m² "   Physical Exam  Vitals signs reviewed.   Constitutional:       Appearance: He is ill-appearing.   HENT:      Head: Normocephalic and atraumatic.      Nose: Nose normal. No rhinorrhea.      Mouth/Throat:      Mouth: Mucous membranes are dry.      Pharynx: No oropharyngeal exudate.   Eyes:      Extraocular Movements: Extraocular movements intact.      Conjunctiva/sclera: Conjunctivae normal.   Neck:      Musculoskeletal: Normal range of motion and neck supple.   Cardiovascular:      Rate and Rhythm: Regular rhythm. Tachycardia present.      Heart sounds: Normal heart sounds.   Pulmonary:      Effort: Respiratory distress present.      Breath sounds: Normal breath sounds.   Musculoskeletal:         General: No swelling or tenderness.      Comments: Slumped to the right and makes no effort to right himself.    Skin:     General: Skin is warm and dry.   Neurological:      Mental Status: He is alert.      Cranial Nerves: No cranial nerve deficit.   Psychiatric:      Comments: Flat.  During conversation, the patient does not make any full sentences.  He only answers yes no to questions.       Results Reviewed:  Lab Results (last 24 hours)     Procedure Component Value Units Date/Time    COVID PRE-OP / PRE-PROCEDURE SCREENING ORDER (NO ISOLATION) - Swab, Nasopharynx [[id number]]  (Abnormal) Collected: 12/04/20 1701    Specimen: Swab from Nasopharynx Updated: 12/04/20 1813    Narrative:      The following orders were created for panel order COVID PRE-OP / PRE-PROCEDURE SCREENING ORDER (NO ISOLATION) - Swab, Nasopharynx.  Procedure                               Abnormality         Status                     ---------                               -----------         ------                     COVID-19, ABBOTT IN-HOUS...[[id number]]  Abnormal            Final result                 Please view results for these tests on the individual orders.    COVID-19, ABBOTT IN-HOUSE,NP Swab (NO TRANSPORT MEDIA) 2 HR TAT - Swab, Nasopharynx  "[[id number]]  (Abnormal) Collected: 12/04/20 1701    Specimen: Swab from Nasopharynx Updated: 12/04/20 1813     COVID19 Detected    Narrative:      Fact sheet for providers: https://www.fda.gov/media/249106/download     Fact sheet for patients: https://www.fda.gov/media/401993/download    Procalcitonin [[id number]]  (Normal) Collected: 12/04/20 1725    Specimen: Blood Updated: 12/04/20 1756     Procalcitonin 0.22 ng/mL     Narrative:      As a Marker for Sepsis (Non-Neonates):   1. <0.5 ng/mL represents a low risk of severe sepsis and/or septic shock.  1. >2 ng/mL represents a high risk of severe sepsis and/or septic shock.    As a Marker for Lower Respiratory Tract Infections that require antibiotic therapy:  PCT on Admission     Antibiotic Therapy             6-12 Hrs later  > 0.5                Strongly Recommended            >0.25 - <0.5         Recommended  0.1 - 0.25           Discouraged                   Remeasure/reassess PCT  <0.1                 Strongly Discouraged          Remeasure/reassess PCT      As 28 day mortality risk marker: \"Change in Procalcitonin Result\" (> 80 % or <=80 %) if Day 0 (or Day 1) and Day 4 values are available. Refer to http://www.[employer]-pct-calculator.com/   Change in PCT <=80 %   A decrease of PCT levels below or equal to 80 % defines a positive change in PCT test result representing a higher risk for 28-day all-cause mortality of patients diagnosed with severe sepsis or septic shock.  Change in PCT > 80 %   A decrease of PCT levels of more than 80 % defines a negative change in PCT result representing a lower risk for 28-day all-cause mortality of patients diagnosed with severe sepsis or septic shock.                Results may be falsely decreased if patient taking Biotin.     Ferritin [[id number]]  (Abnormal) Collected: 12/04/20 1725    Specimen: Blood Updated: 12/04/20 1756     Ferritin 483.80 ng/mL     Narrative:      Results may be falsely decreased if patient taking " Biotin.      Comprehensive Metabolic Panel [[id number]]  (Abnormal) Collected: 12/04/20 1725    Specimen: Blood Updated: 12/04/20 1752     Glucose 133 mg/dL      BUN 35 mg/dL      Creatinine 1.14 mg/dL      Sodium 145 mmol/L      Potassium 4.8 mmol/L      Chloride 118 mmol/L      CO2 19.0 mmol/L      Calcium 10.6 mg/dL      Total Protein 6.2 g/dL      Albumin 3.30 g/dL      ALT (SGPT) 29 U/L      AST (SGOT) 44 U/L      Alkaline Phosphatase 97 U/L      Total Bilirubin 0.3 mg/dL      eGFR Non African Amer 62 mL/min/1.73      Globulin 2.9 gm/dL      A/G Ratio 1.1 g/dL      BUN/Creatinine Ratio 30.7     Anion Gap 8.0 mmol/L     Narrative:      GFR Normal >60  Chronic Kidney Disease <60  Kidney Failure <15      C-reactive Protein [[id number]]  (Abnormal) Collected: 12/04/20 1725    Specimen: Blood Updated: 12/04/20 1752     C-Reactive Protein 4.23 mg/dL     Lactate Dehydrogenase [[id number]]  (Normal) Collected: 12/04/20 1725    Specimen: Blood Updated: 12/04/20 1751      U/L     BNP [[id number]]  (Normal) Collected: 12/04/20 1725    Specimen: Blood Updated: 12/04/20 1748     proBNP 239.4 pg/mL     Narrative:      Among patients with dyspnea, NT-proBNP is highly sensitive for the detection of acute congestive heart failure. In addition NT-proBNP of <300 pg/ml effectively rules out acute congestive heart failure with 99% negative predictive value.    Results may be falsely decreased if patient taking Biotin.      Urinalysis, Microscopic Only - Urine, Catheter In/Out [[id number]]  (Abnormal) Collected: 12/04/20 1711    Specimen: Urine, Catheter In/Out Updated: 12/04/20 1739     RBC, UA 0-2 /HPF      WBC, UA 31-50 /HPF      Bacteria, UA Trace /HPF      Squamous Epithelial Cells, UA None Seen /HPF      Hyaline Casts, UA None Seen /LPF      Amorphous Crystals, UA Small/1+ /HPF      Methodology Manual Light Microscopy    Urine Culture - Urine, Urine, Catheter In/Out [[id number]] Collected: 12/04/20 1711    Specimen: Urine,  Catheter In/Out Updated: 12/04/20 1739    Lactic Acid, Plasma [[id number]]  (Abnormal) Collected: 12/04/20 1700    Specimen: Blood Updated: 12/04/20 1737     Lactate 3.5 mmol/L     Lactic Acid, Reflex Timer (This will reflex a repeat order 3-3:15 hours after ordered.) [[id number]] Collected: 12/04/20 1700    Specimen: Blood Updated: 12/04/20 1737    D-dimer, Quantitative [[id number]]  (Abnormal) Collected: 12/04/20 1700    Specimen: Blood Updated: 12/04/20 1729     D-Dimer, Quantitative 1.55 mg/L (FEU)     Narrative:      Reference Range is 0-0.50 mg/L FEU. However, results <0.50 mg/L FEU tends to rule out DVT or PE. Results >0.50 mg/L FEU are not useful in predicting absence or presence of DVT or PE.      Urinalysis With Culture If Indicated - Urine, Catheter In/Out [[id number]]  (Abnormal) Collected: 12/04/20 1711    Specimen: Urine, Catheter In/Out Updated: 12/04/20 1724     Color, UA Dark Yellow     Appearance, UA Cloudy     pH, UA 5.5     Specific Gravity, UA 1.020     Glucose, UA Negative     Ketones, UA Negative     Bilirubin, UA Negative     Blood, UA Trace     Protein, UA Negative     Leuk Esterase, UA Large (3+)     Nitrite, UA Negative     Urobilinogen, UA 1.0 E.U./dL    CBC & Differential [[id number]]  (Abnormal) Collected: 12/04/20 1700    Specimen: Blood Updated: 12/04/20 1722    Narrative:      The following orders were created for panel order CBC & Differential.  Procedure                               Abnormality         Status                     ---------                               -----------         ------                     CBC Auto Differential[[id number]]        Abnormal            Final result                 Please view results for these tests on the individual orders.    CBC Auto Differential [[id number]]  (Abnormal) Collected: 12/04/20 1700    Specimen: Blood Updated: 12/04/20 1722     WBC 10.34 10*3/mm3      RBC 4.82 10*6/mm3      Hemoglobin 15.3 g/dL      Hematocrit 47.0 %      MCV 97.5 fL       MCH 31.7 pg      MCHC 32.6 g/dL      RDW 14.5 %      RDW-SD 52.6 fl      MPV 11.6 fL      Platelets 121 10*3/mm3      Neutrophil % 82.0 %      Lymphocyte % 14.9 %      Monocyte % 2.5 %      Eosinophil % 0.0 %      Basophil % 0.1 %      Neutrophils, Absolute 8.48 10*3/mm3      Lymphocytes, Absolute 1.54 10*3/mm3      Monocytes, Absolute 0.26 10*3/mm3      Eosinophils, Absolute 0.00 10*3/mm3      Basophils, Absolute 0.01 10*3/mm3     Blood Culture - Blood, Arm, Right [[id number]] Collected: 12/04/20 1700    Specimen: Blood from Arm, Right Updated: 12/04/20 1716    Blood Culture - Blood, Arm, Left [[id number]] Collected: 12/04/20 1701    Specimen: Blood from Arm, Left Updated: 12/04/20 1716    Blood Gas, Arterial - [[id number]]  (Abnormal) Collected: 12/04/20 1654    Specimen: Arterial Blood Updated: 12/04/20 1659     Site Left Brachial     Leighton's Test Positive     pH, Arterial 7.377 pH units      pCO2, Arterial 33.7 mm Hg      Comment: 84 Value below reference range        pO2, Arterial 73.5 mm Hg      Comment: 84 Value below reference range        HCO3, Arterial 19.8 mmol/L      Comment: 84 Value below reference range        Base Excess, Arterial -4.4 mmol/L      Comment: 84 Value below reference range        O2 Saturation, Arterial 95.7 %      Temperature 37.0 C      Barometric Pressure for Blood Gas 752 mmHg      Modality NRB     FIO2 100 %      Flow Rate 15.0 lpm      Ventilator Mode NA     Collected by [id number]     Comment: Meter: [id number]     :  [id number]        pCO2, Temperature Corrected 33.7 mm Hg      pH, Temp Corrected 7.377 pH Units      pO2, Temperature Corrected 73.5 mm Hg         Imaging Results (Last 24 Hours)     Procedure Component Value Units Date/Time    XR Chest 1 View [[id number]] Collected: 12/04/20 1740     Updated: 12/04/20 1743    Narrative:      EXAMINATION:  XR CHEST 1 VW-  12/4/2020 5:33 PM CST     HISTORY: Shortness of air.     COMPARISON: 2/12/2017.     FINDINGS:  There  are mild patchy infiltrates bilaterally. There is  cardiomegaly. There is a pacemaker on the left. There is mild gastric  distention.       Impression:      1. Patchy infiltrates bilaterally may represent pulmonary edema or  pneumonia.  2. Cardiomegaly.  3. Gastric distention.        This report was finalized on 12/04/2020 17:40 by Dr. Jett Gore MD.        I have personally reviewed and interpreted the radiology studies and ECG obtained at time of admission.     Assessment / Plan     Assessment:   Active Hospital Problems    Diagnosis   • COVID-19     Impression:  1. Acute hypoxic respiratory failure due to COVID-19  2. UTI  3. Dementia  4. Hypotension   5. Hypercalcemia  6. A fib with WPW    Plan:   1. Labs in the am  2. CT chest with contrast  3. IVF  4. BP and 02 support as needed  5. Resume home medications  6. Rocephin  7. Skin and fall precautions.        Code Status: Full, grand-daughter is supposed to bring in guardian paperwork     I discussed the patient's findings and my recommendations with the staff    Estimated length of stay 3-4 days although I have concerns that this could be a terminal admission    Patient seen and examined by me on 12/4/20    Electronically signed by Boston Fontana DO, 12/04/20, 19:57 CST.

## 2020-12-05 NOTE — ED PROVIDER NOTES
Subjective   History of Present Illness    Review of Systems    Past Medical History:   Diagnosis Date   • A-fib (CMS/[facility])    • Alcohol abuse    • Anxiety    • COPD (chronic obstructive pulmonary disease) (CMS/[facility])    • COPD (chronic obstructive pulmonary disease) (CMS/[facility])    • Hypotension    • Lung disease    • Malaise and fatigue    • Neurological disease    • Polycythemia    • SOB (shortness of breath)    • Brina-Parkinson-White syndrome        No Known Allergies    Past Surgical History:   Procedure Laterality Date   • KNEE SURGERY     • PACEMAKER IMPLANTATION      [facility]    • WRIST SURGERY         Family History   Problem Relation Age of Onset   • No Known Problems Mother    • Cancer Father    • Heart disease Father    • COPD Sister        Social History     Socioeconomic History   • Marital status:      Spouse name: Not on file   • Number of children: Not on file   • Years of education: Not on file   • Highest education level: Not on file   Tobacco Use   • Smoking status: Heavy Tobacco Smoker     Packs/day: 2.00     Years: 60.00     Pack years: 120.00   • Smokeless tobacco: Never Used   Substance and Sexual Activity   • Alcohol use: Yes     Alcohol/week: 28.0 standard drinks     Types: 21 Cans of beer, 7 Shots of liquor per week     Comment: 2-3 BEERS, 1 GIN DAILY   • Drug use: No   • Sexual activity: Never           Objective   Physical Exam    Central Line At Bedside    Date/Time: 12/4/2020 7:59 PM  Performed by: Laz Joshi Jr., MD  Authorized by: Boston Fontana DO     Consent:     Consent obtained:  Emergent situation    Risks discussed:  Arterial puncture, incorrect placement, bleeding, infection and nerve damage    Alternatives discussed:  No treatment  Pre-procedure details:     Hand hygiene: Hand hygiene performed prior to insertion      Sterile barrier technique: All elements of maximal sterile technique followed      Skin preparation:  2% chlorhexidine    Skin preparation agent:  Skin preparation agent completely dried prior to procedure    Procedure details:     Location:  R femoral    Site selection rationale:  Patient body positioning due to underlying disease    Patient position:  Flat    Procedural supplies:  Triple lumen    Catheter size:  7.5 Fr    Landmarks identified: yes      Ultrasound guidance: no      Number of attempts:  1    Successful placement: yes    Post-procedure details:     Post-procedure:  Line sutured and dressing applied    Assessment:  Blood return through all ports and free fluid flow    Patient tolerance of procedure:  Tolerated well, no immediate complications               ED Course                                           MDM    Final diagnoses:   COVID-19   Acute UTI   Chronic atrial fibrillation (CMS/HCC)            Laz Joshi Jr., MD  12/04/20 2000

## 2020-12-05 NOTE — THERAPY EVALUATION
Acute Care - Speech Language Pathology   Swallow Initial Evaluation [facility]     Patient Name: Ezequiel Abdalla  : 1945  MRN: [id number]  Today's Date: 2020               Admit Date: 2020  Clinical bedside swallow evaluation completed.Full range of consistencies except mechanical soft solids were presented. Pt had a wet vocal quality 1x following nectar. He had and immediate coughing episode following a thin liquid trial. He had decreased rotary chew and prolonged mastication of the regular solid. Recommend starting a pured diet and honey thick liquids. Meds crushed in applesauce. Ok for ice chips only between meals. SLP will follow and treat.  Kameron Ness CCC-SLP 2020 15:50 CST    Visit Dx:     ICD-10-CM ICD-9-CM   1. COVID-19  U07.1 079.89   2. Acute UTI  N39.0 599.0   3. Chronic atrial fibrillation (CMS/HCC)  I48.20 427.31   4. Oropharyngeal dysphagia  R13.12 787.22     Patient Active Problem List   Diagnosis   • Polycythemia   • COPD (chronic obstructive pulmonary disease) (CMS/HCC)   • Alcohol abuse   • Chronic atrial fibrillation with RVR (CMS/HCC)   • Multiple falls   • Elevated prostate specific antigen (PSA)   • COVID-19     Past Medical History:   Diagnosis Date   • A-fib (CMS/HCC)    • Alcohol abuse    • Anxiety    • COPD (chronic obstructive pulmonary disease) (CMS/HCC)    • COPD (chronic obstructive pulmonary disease) (CMS/HCC)    • Hypotension    • Lung disease    • Malaise and fatigue    • Neurological disease    • Polycythemia    • SOB (shortness of breath)    • Brina-Parkinson-White syndrome      Past Surgical History:   Procedure Laterality Date   • KNEE SURGERY     • PACEMAKER IMPLANTATION      [place]    • WRIST SURGERY          SWALLOW EVALUATION (last 72 hours)      SLP Adult Swallow Evaluation     Row Name 20 1336                   Rehab Evaluation    Document Type  evaluation  -MB        Subjective Information  no complaints  -MB        Patient Observations   alert;cooperative  -MB           General Information    Patient Profile Reviewed  yes  -MB        Pertinent History Of Current Problem  COVID, acute respiratory failure, septic shock, acute bacerial cystitis, HTN, COPD, a-fib, alcohol abuse, tobacco abuse, hypernatremia.  -MB        Current Method of Nutrition  regular textures;thin liquids  -MB        Precautions/Limitations, Vision  WFL;for purposes of eval  -MB        Precautions/Limitations, Hearing  hearing impairment, bilaterally  -MB        Prior Level of Function-Communication  unknown  -MB        Prior Level of Function-Swallowing  unknown  -MB        Plans/Goals Discussed with  patient  -MB        Barriers to Rehab  hearing deficit  -MB        Patient's Goals for Discharge  patient did not state  -MB           Pain    Additional Documentation  Pain Scale: FACES Pre/Post-Treatment (Group)  -MB           Pain Scale: FACES Pre/Post-Treatment    Pain: FACES Scale, Pretreatment  2-->hurts little bit  -MB           Oral Motor Structure and Function    Dentition Assessment  missing teeth  -MB        Secretion Management  dried secretions in oral cavity  -MB        Mucosal Quality  dry;sticky  -MB           Oral Musculature and Cranial Nerve Assessment    Oral Motor General Assessment  mandibular impairment  -MB        Mandibular Impairment Detail, Cranial Nerve V (Trigeminal)  CN5: motor impairment;reduced mandibular ROM;reduced strength bilaterally  -MB           General Eating/Swallowing Observations    Respiratory Support Currently in Use  nasal cannula  -MB        Eating/Swallowing Skills  fed by SLP  -MB        Positioning During Eating  upright in bed  -MB        Utensils Used  spoon;straw  -MB        Consistencies Trialed  regular textures;thin liquids;nectar/syrup-thick liquids;honey-thick liquids;pudding thick  -MB           Clinical Swallow Eval    Oral Prep Phase  impaired  -MB        Oral Transit  WFL  -MB        Oral Residue  WFL  -MB         Pharyngeal Phase  suspected pharyngeal impairment  -MB        Esophageal Phase  unremarkable  -MB        Clinical Swallow Evaluation Summary  Full range of consistencies except mechanical soft solids were presented. Pt had a wet vocal quality 1x following nectar. He had and immediate coughing episode following a thin liquid trial. He had decreased rotary chew and prolonged mastication of the regular solid.   -MB           Oral Prep Concerns    Oral Prep Concerns  prolonged mastication;poor rotary chew  -MB        Prolonged Mastication  regular consistencies  -MB        Poor Rotary Chew  regular consistencies  -MB           Pharyngeal Phase Concerns    Pharyngeal Phase Concerns  wet vocal quality;cough  -MB        Wet Vocal Quality  nectar  -MB        Cough  thin  -MB           Clinical Impression    SLP Swallowing Diagnosis  mild-moderate;oral dysphagia;moderate;pharyngeal dysphagia  -MB        Functional Impact  risk of aspiration/pneumonia;risk of malnutrition;risk of dehydration  -MB        Rehab Potential/Prognosis, Swallowing  adequate, monitor progress closely  -MB        Swallow Criteria for Skilled Therapeutic Interventions Met  demonstrates skilled criteria  -MB           Recommendations    Therapy Frequency (Swallow)  3 days per week  -MB        Predicted Duration Therapy Intervention (Days)  until discharge  -MB        SLP Diet Recommendation  puree;honey thick liquids;ice chips between meals after oral care, with supervision  -MB        Recommended Precautions and Strategies  upright posture during/after eating;small bites of food and sips of liquid;alternate between small bites of food and sips of liquid  -MB        Oral Care Recommendations  Oral Care BID/PRN  -MB        SLP Rec. for Method of Medication Administration  meds crushed;with pudding or applesauce  -MB        Monitor for Signs of Aspiration  yes;cough;gurgly voice;throat clearing;notify SLP if any concerns  -MB        Anticipated Discharge  Disposition (SLP)  unknown  -MB           Swallow Goals (SLP)    Oral Nutrition/Hydration Goal Selection (SLP)  oral nutrition/hydration, SLP goal 1  -MB        Pharyngeal Strengthening Exercise Goal Selection (SLP)  pharyngeal strengthening exercise, SLP goal 1  -MB        Additional Documentation  pharyngeal strengthening exercise goal selection (SLP)  -MB           Oral Nutrition/Hydration Goal 1 (SLP)    Oral Nutrition/Hydration Goal 1, SLP  Pt will tolerate least restrictive diet with no overt s/s of aspiration.   -MB        Time Frame (Oral Nutrition/Hydration Goal 1, SLP)  by discharge  -MB        Barriers (Oral Nutrition/Hydration Goal 1, SLP)  n/a  -MB        Progress/Outcomes (Oral Nutrition/Hydration Goal 1, SLP)  goal ongoing  -MB           Pharyngeal Strengthening Exercise Goal 1 (SLP)    Activity (Pharyngeal Strengthening Goal 1, SLP)  increase timing  -MB        Increase Timing  gustatory stimulation (sour/cold)  -MB        [place]/Accuracy (Pharyngeal Strengthening Goal 1, SLP)  independently (over 90% accuracy)  -MB        Time Frame (Pharyngeal Strengthening Goal 1, SLP)  short term goal (STG);by discharge  -MB        Barriers (Pharyngeal Strengthening Goal 1, SLP)  n/a  -MB        Progress/Outcomes (Pharyngeal Strengthening Goal 1, SLP)  goal ongoing  -MB          User Key  (r) = Recorded By, (t) = Taken By, (c) = Cosigned By    Initials Name Effective Dates    Kameron Bueno, CCC-SLP 08/02/16 -           EDUCATION  The patient has been educated in the following areas:   Dysphagia (Swallowing Impairment).    SLP Recommendation and Plan  SLP Swallowing Diagnosis: mild-moderate, oral dysphagia, moderate, pharyngeal dysphagia  SLP Diet Recommendation: puree, honey thick liquids, ice chips between meals after oral care, with supervision  Recommended Precautions and Strategies: upright posture during/after eating, small bites of food and sips of liquid, alternate between small bites of food  and sips of liquid  SLP Rec. for Method of Medication Administration: meds crushed, with pudding or applesauce     Monitor for Signs of Aspiration: yes, cough, gurgly voice, throat clearing, notify SLP if any concerns     Swallow Criteria for Skilled Therapeutic Interventions Met: demonstrates skilled criteria  Anticipated Discharge Disposition (SLP): unknown  Rehab Potential/Prognosis, Swallowing: adequate, monitor progress closely  Therapy Frequency (Swallow): 3 days per week  Predicted Duration Therapy Intervention (Days): until discharge                         Plan of Care Reviewed With: patient  Outcome Summary: Clinical bedside swallow evaluation completed.Full range of consistencies except mechanical soft solids were presented. Pt had a wet vocal quality 1x following nectar. He had and immediate coughing episode following a thin liquid trial. He had decreased rotary chew and prolonged mastication of the regular solid. Recommend starting a pured diet and honey thick liquids. Meds crushed in applesauce. Ok for ice chips only between meals. SLP will follow and treat.    SLP GOALS     Row Name 12/05/20 8926             Oral Nutrition/Hydration Goal 1 (SLP)    Oral Nutrition/Hydration Goal 1, SLP  Pt will tolerate least restrictive diet with no overt s/s of aspiration.   -MB      Time Frame (Oral Nutrition/Hydration Goal 1, SLP)  by discharge  -MB      Barriers (Oral Nutrition/Hydration Goal 1, SLP)  n/a  -MB      Progress/Outcomes (Oral Nutrition/Hydration Goal 1, SLP)  goal ongoing  -MB         Pharyngeal Strengthening Exercise Goal 1 (SLP)    Activity (Pharyngeal Strengthening Goal 1, SLP)  increase timing  -MB      Increase Timing  gustatory stimulation (sour/cold)  -MB      [place]/Accuracy (Pharyngeal Strengthening Goal 1, SLP)  independently (over 90% accuracy)  -MB      Time Frame (Pharyngeal Strengthening Goal 1, SLP)  short term goal (STG);by discharge  -MB      Barriers (Pharyngeal Strengthening  Goal 1, SLP)  n/a  -MB      Progress/Outcomes (Pharyngeal Strengthening Goal 1, SLP)  goal ongoing  -MB        User Key  (r) = Recorded By, (t) = Taken By, (c) = Cosigned By    Initials Name Provider Type    Kameron Bueno CCC-SLP Speech and Language Pathologist             Time Calculation:   Time Calculation- SLP     Row Name 12/05/20 1549             Time Calculation- SLP    SLP Start Time  1336  -MB      SLP Stop Time  1432  -MB      SLP Time Calculation (min)  56 min  -MB      SLP Received On  12/05/20  -MB      SLP Goal Re-Cert Due Date  12/15/20  -MB        User Key  (r) = Recorded By, (t) = Taken By, (c) = Cosigned By    Initials Name Provider Type    Kameron Bueno CCC-SLP Speech and Language Pathologist          Therapy Charges for Today     Code Description Service Date Service Provider Modifiers Qty    [id number] HC ST EVAL ORAL PHARYNG SWALLOW 4 12/5/2020 Kameron Ness CCC-SLP GN 1               CARLIN Sparks  12/5/2020

## 2020-12-05 NOTE — PROGRESS NOTES
[facility]  INPATIENT PROGRESS NOTE    Patient Name: Ezequiel Abdalla  Date of Admission: 12/4/2020  Today's Date: 12/05/20  Length of Stay: 1  Primary Care Physician: Shane Banks MD    Subjective   Chief Complaint: SOA  HPI   Doing ok.  Confused.  Doesn't feel SOA.  Levophed requirements coming down        Review of Systems   Unable to perform ROS: Mental status change        All pertinent negatives and positives are as above. All other systems have been reviewed and are negative unless otherwise stated.     Objective    Temp:  [101.5 °F (38.6 °C)] 101.5 °F (38.6 °C)  Heart Rate:  [] 111  Resp:  [28-36] 29  BP: ()/(51-83) 120/63  Physical Exam  Vitals signs reviewed.   Constitutional:       Appearance: He is well-developed.      Interventions: Nasal cannula in place.   HENT:      Head: Normocephalic and atraumatic.      Right Ear: External ear normal.      Left Ear: External ear normal.      Nose: Nose normal.   Eyes:      General: No scleral icterus.        Right eye: No discharge.         Left eye: No discharge.      Conjunctiva/sclera: Conjunctivae normal.      Pupils: Pupils are equal, round, and reactive to light.   Neck:      Musculoskeletal: Normal range of motion and neck supple.      Thyroid: No thyromegaly.      Trachea: No tracheal deviation.   Cardiovascular:      Rate and Rhythm: Normal rate and regular rhythm.      Heart sounds: Normal heart sounds. No murmur. No friction rub. No gallop.    Pulmonary:      Effort: Pulmonary effort is normal. No respiratory distress.      Breath sounds: No stridor. Decreased breath sounds and rhonchi present. No wheezing or rales.   Chest:      Chest wall: No tenderness.   Abdominal:      General: Bowel sounds are normal. There is no distension.      Palpations: Abdomen is soft. There is no mass.      Tenderness: There is no abdominal tenderness. There is no guarding or rebound.      Hernia: No hernia  is present.   Musculoskeletal: Normal range of motion.         General: No deformity.   Lymphadenopathy:      Cervical: No cervical adenopathy.   Skin:     General: Skin is warm and dry.      Coloration: Skin is not pale.      Findings: No erythema or rash.   Neurological:      Mental Status: He is alert and oriented to person, place, and time.      Cranial Nerves: No cranial nerve deficit.      Motor: No abnormal muscle tone.      Coordination: Coordination normal.      Deep Tendon Reflexes: Reflexes are normal and symmetric. Reflexes normal.   Psychiatric:         Behavior: Behavior normal.         Thought Content: Thought content normal.         Judgment: Judgment normal.             Results Review:  I have reviewed the labs, radiology results, and diagnostic studies.    Laboratory Data:   Results from last 7 days   Lab Units 12/05/20  0607 12/04/20  1700   WBC 10*3/mm3 18.55* 10.34   HEMOGLOBIN g/dL 12.5* 15.3   HEMATOCRIT % 40.1 47.0   PLATELETS 10*3/mm3 103* 121*        Results from last 7 days   Lab Units 12/05/20  0607 12/04/20  1725   SODIUM mmol/L 150* 145   POTASSIUM mmol/L 4.3 4.8   CHLORIDE mmol/L 121* 118*   CO2 mmol/L 22.0 19.0*   BUN mg/dL 25* 35*   CREATININE mg/dL 1.07 1.14   CALCIUM mg/dL 10.1 10.6*   BILIRUBIN mg/dL 0.3 0.3   ALK PHOS U/L 86 97   ALT (SGPT) U/L 22 29   AST (SGOT) U/L 35 44*   GLUCOSE mg/dL 101* 133*       Culture Data:   Urine Culture   Date Value Ref Range Status   12/04/2020 No growth  Preliminary       Radiology Data:   Imaging Results (Last 24 Hours)     Procedure Component Value Units Date/Time    CT Angiogram Chest [[id number]] Collected: 12/04/20 2134     Updated: 12/04/20 2140    Narrative:      EXAMINATION:  CT ANGIOGRAM CHEST-  12/4/2020 9:18 PM CST     HISTORY: PE suspected, high prob; U07.1-COVID-19; N39.0-Urinary tract  infection, site not specified; I48.20-Chronic atrial fibrillation,  unspecified     COMPARISON : No comparison study.     DLP: 521 mGy-cm. Automated  dosage control was utilized.     TECHNIQUE: CT angio was performed of the chest with contrast. Coronal,  sagittal and 3-D reconstruction were performed.     MEDIASTINUM, HEART AND VASCULAR STRUCTURES: There is atheromatous  disease of the thoracic aorta and coronary arteries. There is  cardiomegaly. There is no CT evidence of pulmonary embolus. A pacemaker  wire is noted.     LUNGS: There is breathing motion artifact. There are interstitial  appearing infiltrates in both lower lobes, the lingula, right upper lobe  and right middle lobe. There is no pleural effusion.     UPPER ABDOMEN: No significant abnormality is seen in the upper abdomen.     BONES: There is scoliosis and degenerative change of the spine.       Impression:      1. No CT evidence of pulmonary embolus.  2. Dilated ascending thoracic aorta measuring 4.2 cm. No dissection.  There is cardiomegaly. There is atheromatous disease of the thoracic  aorta and coronary arteries.  3. Bilateral infiltrates worrisome for Covid infection.     The full report of this exam was immediately signed and available to the  emergency room. The patient is currently in the emergency room.    This report was finalized on 12/04/2020 21:37 by Dr. Jett Gore MD.    XR Chest 1 View [[id number]] Collected: 12/04/20 1740     Updated: 12/04/20 1743    Narrative:      EXAMINATION:  XR CHEST 1 VW-  12/4/2020 5:33 PM CST     HISTORY: Shortness of air.     COMPARISON: 2/12/2017.     FINDINGS:  There are mild patchy infiltrates bilaterally. There is  cardiomegaly. There is a pacemaker on the left. There is mild gastric  distention.       Impression:      1. Patchy infiltrates bilaterally may represent pulmonary edema or  pneumonia.  2. Cardiomegaly.  3. Gastric distention.        This report was finalized on 12/04/2020 17:40 by Dr. Jett Gore MD.          I have reviewed the patient's current medications.     Assessment/Plan     Active Hospital Problems    Diagnosis   • COVID-19      Labs reviewed:  Hypernatremia,     Imaging reviewed:  CXR, CTA Chest    CTA Chest independently interpreted by me:  Bilateral infiltrates    Telemetry reviewed    1.  Septic Shock  -IVF boluses  -IV abx  -blood/urine cultures    2.  COVID-19 infection  -Decadron  -Remdesivir    3.  Acute bacterial cystitis  -IV abx    4.  WPW  -Telemetry    5.  HTN  -hold BP meds given shock    6.  COPD  -Albuterol inhaler    7.  A-fib  -Telemetry    8.  Alcohol abuse  -CIWA    9.  Tobacco abuse  -Cessation counseling    10.  Acute respiratory failure with hypoxia  -Remdesivir  -Decadron    11.  Hypernatremia  -IVF with D5W      Discharge Planning: critically ill  Electronically signed by Brennon Patrick MD, 12/05/20, 14:25 CST.

## 2020-12-05 NOTE — ED NOTES
TOOK PATIENT OFF NONREBREATHER DUE TO O2 SAT CONSISTENTLY 100%, PLACED ON 4L NC WILL WATCH.     Harley Flores, RN  12/05/20 0818

## 2020-12-06 LAB
ALBUMIN SERPL-MCNC: 2.5 G/DL (ref 3.5–5.2)
ALBUMIN/GLOB SERPL: 1 G/DL
ALP SERPL-CCNC: 68 U/L (ref 39–117)
ALT SERPL W P-5'-P-CCNC: 17 U/L (ref 1–41)
ANION GAP SERPL CALCULATED.3IONS-SCNC: 3 MMOL/L (ref 5–15)
AST SERPL-CCNC: 30 U/L (ref 1–40)
BILIRUB SERPL-MCNC: 0.3 MG/DL (ref 0–1.2)
BUN SERPL-MCNC: 18 MG/DL (ref 8–23)
BUN/CREAT SERPL: 28.6 (ref 7–25)
CALCIUM SPEC-SCNC: 10.4 MG/DL (ref 8.6–10.5)
CHLORIDE SERPL-SCNC: 113 MMOL/L (ref 98–107)
CO2 SERPL-SCNC: 27 MMOL/L (ref 22–29)
CREAT SERPL-MCNC: 0.63 MG/DL (ref 0.76–1.27)
GFR SERPL CREATININE-BSD FRML MDRD: 123 ML/MIN/1.73
GLOBULIN UR ELPH-MCNC: 2.6 GM/DL
GLUCOSE SERPL-MCNC: 96 MG/DL (ref 65–99)
POTASSIUM SERPL-SCNC: 3.8 MMOL/L (ref 3.5–5.2)
PROT SERPL-MCNC: 5.1 G/DL (ref 6–8.5)
SODIUM SERPL-SCNC: 143 MMOL/L (ref 136–145)

## 2020-12-06 PROCEDURE — 63710000001 DEXAMETHASONE PER 0.25 MG: Performed by: INTERNAL MEDICINE

## 2020-12-06 PROCEDURE — 25010000002 HALOPERIDOL LACTATE PER 5 MG: Performed by: FAMILY MEDICINE

## 2020-12-06 PROCEDURE — 80053 COMPREHEN METABOLIC PANEL: CPT | Performed by: FAMILY MEDICINE

## 2020-12-06 PROCEDURE — 25010000002 CEFTRIAXONE PER 250 MG: Performed by: INTERNAL MEDICINE

## 2020-12-06 PROCEDURE — 25010000002 HALOPERIDOL LACTATE PER 5 MG: Performed by: INTERNAL MEDICINE

## 2020-12-06 RX ORDER — BUPROPION HYDROCHLORIDE 150 MG/1
150 TABLET, EXTENDED RELEASE ORAL DAILY
Status: DISCONTINUED | OUTPATIENT
Start: 2020-12-06 | End: 2020-12-06 | Stop reason: SDUPTHER

## 2020-12-06 RX ORDER — ZINC SULFATE 50(220)MG
220 CAPSULE ORAL DAILY
Status: DISCONTINUED | OUTPATIENT
Start: 2020-12-06 | End: 2020-12-11 | Stop reason: HOSPADM

## 2020-12-06 RX ORDER — ASCORBIC ACID 500 MG
500 TABLET ORAL DAILY
Status: DISCONTINUED | OUTPATIENT
Start: 2020-12-06 | End: 2020-12-11 | Stop reason: HOSPADM

## 2020-12-06 RX ORDER — HALOPERIDOL 5 MG/ML
2 INJECTION INTRAMUSCULAR EVERY 6 HOURS PRN
Status: DISCONTINUED | OUTPATIENT
Start: 2020-12-06 | End: 2020-12-06

## 2020-12-06 RX ORDER — SODIUM CHLORIDE, SODIUM LACTATE, POTASSIUM CHLORIDE, CALCIUM CHLORIDE 600; 310; 30; 20 MG/100ML; MG/100ML; MG/100ML; MG/100ML
50 INJECTION, SOLUTION INTRAVENOUS CONTINUOUS
Status: DISCONTINUED | OUTPATIENT
Start: 2020-12-06 | End: 2020-12-11 | Stop reason: HOSPADM

## 2020-12-06 RX ORDER — MIRTAZAPINE 15 MG/1
15 TABLET, FILM COATED ORAL NIGHTLY
Status: DISCONTINUED | OUTPATIENT
Start: 2020-12-06 | End: 2020-12-11 | Stop reason: HOSPADM

## 2020-12-06 RX ORDER — POLYETHYLENE GLYCOL 3350 17 G/17G
17 POWDER, FOR SOLUTION ORAL DAILY PRN
Status: DISCONTINUED | OUTPATIENT
Start: 2020-12-06 | End: 2020-12-11 | Stop reason: HOSPADM

## 2020-12-06 RX ORDER — HALOPERIDOL 5 MG/ML
2 INJECTION INTRAMUSCULAR ONCE
Status: COMPLETED | OUTPATIENT
Start: 2020-12-06 | End: 2020-12-06

## 2020-12-06 RX ORDER — TERAZOSIN 1 MG/1
1 CAPSULE ORAL NIGHTLY
Status: DISCONTINUED | OUTPATIENT
Start: 2020-12-06 | End: 2020-12-11 | Stop reason: HOSPADM

## 2020-12-06 RX ORDER — HALOPERIDOL 5 MG/ML
2 INJECTION INTRAMUSCULAR EVERY 6 HOURS PRN
Status: DISPENSED | OUTPATIENT
Start: 2020-12-06 | End: 2020-12-07

## 2020-12-06 RX ORDER — ASPIRIN 325 MG
325 TABLET ORAL DAILY
Status: DISCONTINUED | OUTPATIENT
Start: 2020-12-06 | End: 2020-12-11 | Stop reason: HOSPADM

## 2020-12-06 RX ORDER — POTASSIUM CHLORIDE 750 MG/1
20 CAPSULE, EXTENDED RELEASE ORAL 2 TIMES DAILY WITH MEALS
Status: DISCONTINUED | OUTPATIENT
Start: 2020-12-06 | End: 2020-12-06 | Stop reason: ALTCHOICE

## 2020-12-06 RX ORDER — MULTIPLE VITAMINS W/ MINERALS TAB 9MG-400MCG
1 TAB ORAL DAILY
Status: DISCONTINUED | OUTPATIENT
Start: 2020-12-06 | End: 2020-12-11 | Stop reason: HOSPADM

## 2020-12-06 RX ORDER — PRAVASTATIN SODIUM 20 MG
20 TABLET ORAL DAILY
Status: DISCONTINUED | OUTPATIENT
Start: 2020-12-06 | End: 2020-12-11 | Stop reason: HOSPADM

## 2020-12-06 RX ORDER — DIVALPROEX SODIUM 250 MG/1
250 TABLET, EXTENDED RELEASE ORAL DAILY
Status: DISCONTINUED | OUTPATIENT
Start: 2020-12-06 | End: 2020-12-06 | Stop reason: SDUPTHER

## 2020-12-06 RX ORDER — POTASSIUM CHLORIDE 20MEQ/15ML
20 LIQUID (ML) ORAL 2 TIMES DAILY
Status: DISCONTINUED | OUTPATIENT
Start: 2020-12-06 | End: 2020-12-11 | Stop reason: HOSPADM

## 2020-12-06 RX ORDER — TAMSULOSIN HYDROCHLORIDE 0.4 MG/1
0.4 CAPSULE ORAL DAILY
Status: DISCONTINUED | OUTPATIENT
Start: 2020-12-06 | End: 2020-12-06 | Stop reason: ALTCHOICE

## 2020-12-06 RX ORDER — FAMOTIDINE 20 MG/1
20 TABLET, FILM COATED ORAL
Status: DISCONTINUED | OUTPATIENT
Start: 2020-12-06 | End: 2020-12-11 | Stop reason: HOSPADM

## 2020-12-06 RX ORDER — DILTIAZEM HYDROCHLORIDE 120 MG/1
120 CAPSULE, COATED, EXTENDED RELEASE ORAL
Status: DISCONTINUED | OUTPATIENT
Start: 2020-12-06 | End: 2020-12-06 | Stop reason: ALTCHOICE

## 2020-12-06 RX ORDER — DIVALPROEX SODIUM 125 MG/1
125 CAPSULE, COATED PELLETS ORAL EVERY 12 HOURS SCHEDULED
Status: DISCONTINUED | OUTPATIENT
Start: 2020-12-06 | End: 2020-12-08

## 2020-12-06 RX ORDER — ACETAMINOPHEN 325 MG/1
650 TABLET ORAL EVERY 6 HOURS PRN
Status: DISCONTINUED | OUTPATIENT
Start: 2020-12-06 | End: 2020-12-11 | Stop reason: HOSPADM

## 2020-12-06 RX ORDER — LORAZEPAM 0.5 MG/1
0.5 TABLET ORAL EVERY 6 HOURS PRN
Status: DISCONTINUED | OUTPATIENT
Start: 2020-12-06 | End: 2020-12-11 | Stop reason: HOSPADM

## 2020-12-06 RX ORDER — BUPROPION HYDROCHLORIDE 75 MG/1
75 TABLET ORAL EVERY 12 HOURS SCHEDULED
Status: DISCONTINUED | OUTPATIENT
Start: 2020-12-06 | End: 2020-12-11 | Stop reason: HOSPADM

## 2020-12-06 RX ADMIN — FAMOTIDINE 20 MG: 20 TABLET, FILM COATED ORAL at 17:39

## 2020-12-06 RX ADMIN — REMDESIVIR 100 MG: 100 INJECTION, POWDER, LYOPHILIZED, FOR SOLUTION INTRAVENOUS at 09:05

## 2020-12-06 RX ADMIN — DIVALPROEX SODIUM 125 MG: 125 CAPSULE, COATED PELLETS ORAL at 09:02

## 2020-12-06 RX ADMIN — POTASSIUM CHLORIDE 20 MEQ: 20 SOLUTION ORAL at 09:04

## 2020-12-06 RX ADMIN — BUPROPION HYDROCHLORIDE 75 MG: 75 TABLET, FILM COATED ORAL at 09:02

## 2020-12-06 RX ADMIN — ZINC SULFATE 220 MG (50 MG) CAPSULE 220 MG: CAPSULE at 09:02

## 2020-12-06 RX ADMIN — DEXAMETHASONE 6 MG: 4 TABLET ORAL at 09:02

## 2020-12-06 RX ADMIN — MIRTAZAPINE 15 MG: 15 TABLET, FILM COATED ORAL at 20:52

## 2020-12-06 RX ADMIN — HALOPERIDOL LACTATE 2 MG: 5 INJECTION, SOLUTION INTRAMUSCULAR at 11:39

## 2020-12-06 RX ADMIN — DILTIAZEM HYDROCHLORIDE 30 MG: 30 TABLET, FILM COATED ORAL at 17:39

## 2020-12-06 RX ADMIN — CEFTRIAXONE SODIUM 1 G: 1 INJECTION, POWDER, FOR SOLUTION INTRAMUSCULAR; INTRAVENOUS at 17:38

## 2020-12-06 RX ADMIN — FAMOTIDINE 20 MG: 20 TABLET, FILM COATED ORAL at 09:02

## 2020-12-06 RX ADMIN — LORAZEPAM 0.5 MG: 0.5 TABLET ORAL at 20:51

## 2020-12-06 RX ADMIN — NICOTINE 1 PATCH: 21 PATCH, EXTENDED RELEASE TRANSDERMAL at 20:51

## 2020-12-06 RX ADMIN — DILTIAZEM HYDROCHLORIDE 30 MG: 30 TABLET, FILM COATED ORAL at 23:04

## 2020-12-06 RX ADMIN — SODIUM CHLORIDE, POTASSIUM CHLORIDE, SODIUM LACTATE AND CALCIUM CHLORIDE 50 ML/HR: 600; 310; 30; 20 INJECTION, SOLUTION INTRAVENOUS at 17:38

## 2020-12-06 RX ADMIN — HALOPERIDOL LACTATE 2 MG: 5 INJECTION, SOLUTION INTRAMUSCULAR at 22:28

## 2020-12-06 RX ADMIN — SODIUM CHLORIDE, PRESERVATIVE FREE 10 ML: 5 INJECTION INTRAVENOUS at 09:02

## 2020-12-06 RX ADMIN — ASPIRIN 325 MG: 325 TABLET ORAL at 09:38

## 2020-12-06 RX ADMIN — Medication 1 TABLET: at 09:02

## 2020-12-06 RX ADMIN — PRAVASTATIN SODIUM 20 MG: 20 TABLET ORAL at 09:02

## 2020-12-06 RX ADMIN — OXYCODONE HYDROCHLORIDE AND ACETAMINOPHEN 500 MG: 500 TABLET ORAL at 09:02

## 2020-12-06 RX ADMIN — DILTIAZEM HYDROCHLORIDE 30 MG: 30 TABLET, FILM COATED ORAL at 09:02

## 2020-12-06 RX ADMIN — DEXTROSE MONOHYDRATE 50 ML/HR: 50 INJECTION, SOLUTION INTRAVENOUS at 04:20

## 2020-12-06 NOTE — PLAN OF CARE
Problem: Adult Inpatient Plan of Care  Goal: Plan of Care Review  Outcome: Ongoing, Progressing  Goal: Patient-Specific Goal (Individualized)  Outcome: Ongoing, Not Progressing  Goal: Absence of Hospital-Acquired Illness or Injury  Outcome: Ongoing, Progressing  Goal: Optimal Comfort and Wellbeing  Outcome: Ongoing, Not Progressing  Goal: Readiness for Transition of Care  Outcome: Ongoing, Not Progressing   Goal Outcome Evaluation:  Plan of Care Reviewed With: patient      Patient confused.  Requiring vasopressor support for BP.  Having multiple PVC's, MD's are aware. Has attempted to climb OOB multiple times, bed alarm on and in place.  Patient educated multiple times.

## 2020-12-06 NOTE — PROGRESS NOTES
[facility]  INPATIENT PROGRESS NOTE    Patient Name: Ezequiel Abdalla  Date of Admission: 12/4/2020  Today's Date: 12/06/20  Length of Stay: 2  Primary Care Physician: Shane Banks MD    Subjective   Chief Complaint: SOA  HPI     Doing ok.  Feels a bit better.  Less SOA.  Afebrile.  Oxygen sats good, oxygen requirements down to 4L From 15.      Blood cultures growing coag negative staph in 2/2 sets      Review of Systems   Constitutional: Positive for fatigue. Negative for fever.   HENT: Negative for congestion and ear pain.    Eyes: Negative for redness and visual disturbance.   Respiratory: Positive for cough and shortness of breath. Negative for wheezing.    Cardiovascular: Negative for chest pain and palpitations.   Gastrointestinal: Negative for abdominal pain, diarrhea, nausea and vomiting.   Endocrine: Negative for cold intolerance and heat intolerance.   Genitourinary: Negative for dysuria and frequency.   Musculoskeletal: Negative for arthralgias and back pain.   Skin: Negative for rash and wound.   Neurological: Negative for dizziness and headaches.   Psychiatric/Behavioral: Negative for confusion. The patient is not nervous/anxious.         All pertinent negatives and positives are as above. All other systems have been reviewed and are negative unless otherwise stated.     Objective    Temp:  [97.8 °F (36.6 °C)-98.5 °F (36.9 °C)] 98 °F (36.7 °C)  Heart Rate:  [] 72  Resp:  [16-25] 16  BP: ()/() 98/78  Physical Exam  Vitals signs reviewed.   Constitutional:       Appearance: He is well-developed.      Interventions: Nasal cannula in place.   HENT:      Head: Normocephalic and atraumatic.      Right Ear: External ear normal.      Left Ear: External ear normal.      Nose: Nose normal.   Eyes:      General: No scleral icterus.        Right eye: No discharge.         Left eye: No discharge.      Conjunctiva/sclera: Conjunctivae normal.       Pupils: Pupils are equal, round, and reactive to light.   Neck:      Musculoskeletal: Normal range of motion and neck supple.      Thyroid: No thyromegaly.      Trachea: No tracheal deviation.   Cardiovascular:      Rate and Rhythm: Normal rate and regular rhythm.      Heart sounds: Normal heart sounds. No murmur. No friction rub. No gallop.    Pulmonary:      Effort: Pulmonary effort is normal. No respiratory distress.      Breath sounds: No stridor. Decreased breath sounds and rhonchi present. No wheezing or rales.   Chest:      Chest wall: No tenderness.   Abdominal:      General: Bowel sounds are normal. There is no distension.      Palpations: Abdomen is soft. There is no mass.      Tenderness: There is no abdominal tenderness. There is no guarding or rebound.      Hernia: No hernia is present.   Musculoskeletal: Normal range of motion.         General: No deformity.   Lymphadenopathy:      Cervical: No cervical adenopathy.   Skin:     General: Skin is warm and dry.      Coloration: Skin is not pale.      Findings: No erythema or rash.   Neurological:      Mental Status: He is alert and oriented to person, place, and time.      Cranial Nerves: No cranial nerve deficit.      Motor: No abnormal muscle tone.      Coordination: Coordination normal.      Deep Tendon Reflexes: Reflexes are normal and symmetric. Reflexes normal.   Psychiatric:         Behavior: Behavior normal.         Thought Content: Thought content normal.         Judgment: Judgment normal.             Results Review:  I have reviewed the labs, radiology results, and diagnostic studies.    Laboratory Data:   Results from last 7 days   Lab Units 12/05/20  0607 12/04/20  1700   WBC 10*3/mm3 18.55* 10.34   HEMOGLOBIN g/dL 12.5* 15.3   HEMATOCRIT % 40.1 47.0   PLATELETS 10*3/mm3 103* 121*        Results from last 7 days   Lab Units 12/06/20  0635 12/05/20  0607 12/04/20  1725   SODIUM mmol/L 143 150* 145   POTASSIUM mmol/L 3.8 4.3 4.8   CHLORIDE  mmol/L 113* 121* 118*   CO2 mmol/L 27.0 22.0 19.0*   BUN mg/dL 18 25* 35*   CREATININE mg/dL 0.63* 1.07 1.14   CALCIUM mg/dL 10.4 10.1 10.6*   BILIRUBIN mg/dL 0.3 0.3 0.3   ALK PHOS U/L 68 86 97   ALT (SGPT) U/L 17 22 29   AST (SGOT) U/L 30 35 44*   GLUCOSE mg/dL 96 101* 133*       Culture Data:   Urine Culture   Date Value Ref Range Status   12/04/2020 No growth  Preliminary       Radiology Data:   Imaging Results (Last 24 Hours)     ** No results found for the last 24 hours. **          I have reviewed the patient's current medications.     Assessment/Plan     Active Hospital Problems    Diagnosis   • COVID-19     Labs reviewed:  Hypernatremia,     Telemetry reviewed    Telemetry independently interpreted by  Me:  JIMMIE    1.  Septic Shock  -IVF boluses  -IV abx  -blood/urine cultures  -Off Pressors    2.  COVID-19 infection  -Time frame unclear  -Decadron  -Remdesivir    3.  Acute bacterial cystitis  -IV abx    4.  WPW  -Telemetry    5.  HTN  -hold BP meds given shock    6.  COPD  -Albuterol inhaler    7.  A-fib  -Telemetry    8.  Alcohol abuse  -CIWA    9.  Tobacco abuse  -Cessation counseling    10.  Acute respiratory failure with hypoxia  -Remdesivir  -Decadron    11.  Hypernatremia  -resolved      Discharge Planning: critically ill  Electronically signed by Brennon Patrick MD, 12/06/20, 14:02 CST.

## 2020-12-06 NOTE — NURSING NOTE
Received from MICU per bed. Oriented pt to room staff and equipment. Baby monitoring device in place for pt safety.

## 2020-12-07 LAB
ALBUMIN SERPL-MCNC: 2.5 G/DL (ref 3.5–5.2)
ALBUMIN/GLOB SERPL: 1.1 G/DL
ALP SERPL-CCNC: 60 U/L (ref 39–117)
ALT SERPL W P-5'-P-CCNC: 13 U/L (ref 1–41)
ANION GAP SERPL CALCULATED.3IONS-SCNC: 7 MMOL/L (ref 5–15)
ANISOCYTOSIS BLD QL: ABNORMAL
AST SERPL-CCNC: 27 U/L (ref 1–40)
BACTERIA SPEC AEROBE CULT: ABNORMAL
BILIRUB SERPL-MCNC: 0.3 MG/DL (ref 0–1.2)
BUN SERPL-MCNC: 18 MG/DL (ref 8–23)
BUN/CREAT SERPL: 30.5 (ref 7–25)
BURR CELLS BLD QL SMEAR: ABNORMAL
CALCIUM SPEC-SCNC: 9.8 MG/DL (ref 8.6–10.5)
CHLORIDE SERPL-SCNC: 112 MMOL/L (ref 98–107)
CO2 SERPL-SCNC: 24 MMOL/L (ref 22–29)
CREAT SERPL-MCNC: 0.59 MG/DL (ref 0.76–1.27)
DEPRECATED RDW RBC AUTO: 49.1 FL (ref 37–54)
ERYTHROCYTE [DISTWIDTH] IN BLOOD BY AUTOMATED COUNT: 14 % (ref 12.3–15.4)
GFR SERPL CREATININE-BSD FRML MDRD: 133 ML/MIN/1.73
GLOBULIN UR ELPH-MCNC: 2.3 GM/DL
GLUCOSE SERPL-MCNC: 85 MG/DL (ref 65–99)
GRAM STN SPEC: ABNORMAL
GRAM STN SPEC: ABNORMAL
HCT VFR BLD AUTO: 36.6 % (ref 37.5–51)
HGB BLD-MCNC: 12 G/DL (ref 13–17.7)
ISOLATED FROM: ABNORMAL
LYMPHOCYTES # BLD MANUAL: 0.58 10*3/MM3 (ref 0.7–3.1)
LYMPHOCYTES NFR BLD MANUAL: 5.1 % (ref 5–12)
LYMPHOCYTES NFR BLD MANUAL: 7.1 % (ref 19.6–45.3)
MCH RBC QN AUTO: 31.2 PG (ref 26.6–33)
MCHC RBC AUTO-ENTMCNC: 32.8 G/DL (ref 31.5–35.7)
MCV RBC AUTO: 95.1 FL (ref 79–97)
MONOCYTES # BLD AUTO: 0.41 10*3/MM3 (ref 0.1–0.9)
NEUTROPHILS # BLD AUTO: 7.11 10*3/MM3 (ref 1.7–7)
NEUTROPHILS NFR BLD MANUAL: 85.7 % (ref 42.7–76)
NEUTS BAND NFR BLD MANUAL: 2 % (ref 0–5)
PLATELET # BLD AUTO: 120 10*3/MM3 (ref 140–450)
PMV BLD AUTO: 11.6 FL (ref 6–12)
POIKILOCYTOSIS BLD QL SMEAR: ABNORMAL
POTASSIUM SERPL-SCNC: 3.5 MMOL/L (ref 3.5–5.2)
PROT SERPL-MCNC: 4.8 G/DL (ref 6–8.5)
RBC # BLD AUTO: 3.85 10*6/MM3 (ref 4.14–5.8)
SMALL PLATELETS BLD QL SMEAR: ABNORMAL
SODIUM SERPL-SCNC: 143 MMOL/L (ref 136–145)
WBC # BLD AUTO: 8.1 10*3/MM3 (ref 3.4–10.8)
WBC MORPH BLD: NORMAL

## 2020-12-07 PROCEDURE — 85025 COMPLETE CBC W/AUTO DIFF WBC: CPT | Performed by: FAMILY MEDICINE

## 2020-12-07 PROCEDURE — 25010000002 HALOPERIDOL LACTATE PER 5 MG: Performed by: INTERNAL MEDICINE

## 2020-12-07 PROCEDURE — 36415 COLL VENOUS BLD VENIPUNCTURE: CPT | Performed by: INTERNAL MEDICINE

## 2020-12-07 PROCEDURE — 80053 COMPREHEN METABOLIC PANEL: CPT | Performed by: INTERNAL MEDICINE

## 2020-12-07 PROCEDURE — 92526 ORAL FUNCTION THERAPY: CPT

## 2020-12-07 PROCEDURE — 63710000001 DEXAMETHASONE PER 0.25 MG: Performed by: INTERNAL MEDICINE

## 2020-12-07 PROCEDURE — 85007 BL SMEAR W/DIFF WBC COUNT: CPT | Performed by: FAMILY MEDICINE

## 2020-12-07 PROCEDURE — 25010000002 VANCOMYCIN PER 500 MG: Performed by: FAMILY MEDICINE

## 2020-12-07 PROCEDURE — 97166 OT EVAL MOD COMPLEX 45 MIN: CPT | Performed by: OCCUPATIONAL THERAPIST

## 2020-12-07 RX ORDER — BUPROPION HYDROCHLORIDE 150 MG/1
150 TABLET ORAL DAILY
COMMUNITY

## 2020-12-07 RX ORDER — DILTIAZEM HYDROCHLORIDE 120 MG/1
120 CAPSULE, COATED, EXTENDED RELEASE ORAL DAILY
Status: ON HOLD | COMMUNITY
End: 2021-07-27

## 2020-12-07 RX ORDER — DIVALPROEX SODIUM 250 MG/1
250 TABLET, DELAYED RELEASE ORAL
COMMUNITY

## 2020-12-07 RX ORDER — VANCOMYCIN HYDROCHLORIDE 1 G/200ML
1000 INJECTION, SOLUTION INTRAVENOUS EVERY 12 HOURS
Status: DISCONTINUED | OUTPATIENT
Start: 2020-12-07 | End: 2020-12-08

## 2020-12-07 RX ORDER — LINEZOLID 600 MG/1
600 TABLET, FILM COATED ORAL EVERY 12 HOURS SCHEDULED
Status: DISCONTINUED | OUTPATIENT
Start: 2020-12-07 | End: 2020-12-07

## 2020-12-07 RX ORDER — MELATONIN
2000 DAILY
Status: DISCONTINUED | OUTPATIENT
Start: 2020-12-07 | End: 2020-12-11 | Stop reason: HOSPADM

## 2020-12-07 RX ADMIN — HALOPERIDOL LACTATE 2 MG: 5 INJECTION, SOLUTION INTRAMUSCULAR at 04:51

## 2020-12-07 RX ADMIN — DEXAMETHASONE 6 MG: 4 TABLET ORAL at 09:34

## 2020-12-07 RX ADMIN — DIVALPROEX SODIUM 125 MG: 125 CAPSULE, COATED PELLETS ORAL at 22:21

## 2020-12-07 RX ADMIN — ASPIRIN 325 MG: 325 TABLET ORAL at 09:33

## 2020-12-07 RX ADMIN — DILTIAZEM HYDROCHLORIDE 30 MG: 30 TABLET, FILM COATED ORAL at 04:57

## 2020-12-07 RX ADMIN — NICOTINE 1 PATCH: 21 PATCH, EXTENDED RELEASE TRANSDERMAL at 22:17

## 2020-12-07 RX ADMIN — FAMOTIDINE 20 MG: 20 TABLET, FILM COATED ORAL at 09:34

## 2020-12-07 RX ADMIN — POTASSIUM CHLORIDE 20 MEQ: 20 SOLUTION ORAL at 09:33

## 2020-12-07 RX ADMIN — REMDESIVIR 100 MG: 100 INJECTION, POWDER, LYOPHILIZED, FOR SOLUTION INTRAVENOUS at 09:42

## 2020-12-07 RX ADMIN — MIRTAZAPINE 15 MG: 15 TABLET, FILM COATED ORAL at 22:21

## 2020-12-07 RX ADMIN — BUPROPION HYDROCHLORIDE 75 MG: 75 TABLET, FILM COATED ORAL at 09:34

## 2020-12-07 RX ADMIN — LORAZEPAM 0.5 MG: 0.5 TABLET ORAL at 15:32

## 2020-12-07 RX ADMIN — VANCOMYCIN HYDROCHLORIDE 1000 MG: 1 INJECTION, SOLUTION INTRAVENOUS at 18:00

## 2020-12-07 RX ADMIN — Medication 1 TABLET: at 09:34

## 2020-12-07 RX ADMIN — FAMOTIDINE 20 MG: 20 TABLET, FILM COATED ORAL at 18:00

## 2020-12-07 RX ADMIN — SODIUM CHLORIDE, POTASSIUM CHLORIDE, SODIUM LACTATE AND CALCIUM CHLORIDE 50 ML/HR: 600; 310; 30; 20 INJECTION, SOLUTION INTRAVENOUS at 22:01

## 2020-12-07 RX ADMIN — PRAVASTATIN SODIUM 20 MG: 20 TABLET ORAL at 09:34

## 2020-12-07 RX ADMIN — DILTIAZEM HYDROCHLORIDE 30 MG: 30 TABLET, FILM COATED ORAL at 18:00

## 2020-12-07 RX ADMIN — OXYCODONE HYDROCHLORIDE AND ACETAMINOPHEN 500 MG: 500 TABLET ORAL at 09:34

## 2020-12-07 RX ADMIN — LORAZEPAM 0.5 MG: 0.5 TABLET ORAL at 04:51

## 2020-12-07 RX ADMIN — ZINC SULFATE 220 MG (50 MG) CAPSULE 220 MG: CAPSULE at 09:34

## 2020-12-07 RX ADMIN — DIVALPROEX SODIUM 125 MG: 125 CAPSULE, COATED PELLETS ORAL at 09:34

## 2020-12-07 NOTE — PROGRESS NOTES
"Pharmacy Dosing Service  Pharmacokinetics  Vancomycin Initial Evaluation  Assessment/Action/Plan:  Loading dose?: 1000mg  Current Order: Vancomycin 1000 mg IVPB every 12 hours  Current end date:12/16/20  Levels: 12/9 @ 0500  Additional antimicrobial agent(s): N/A    Vancomycin dosage initiated based on population pharmacokinetic parameters. Pharmacy will continue to follow daily and adjust dose accordingly.     Subjective:  Ezequiel Abdalla is a 75 y.o. male with a Vancomycin \"Pharmacy to Dose\" consult for the treatment of bacteremia    AUC Model Data:  LHY04rt - 467mg/l hr  Ctrough ss - 14.4mg/l  Pauc - 68%  Pconc - 17%  Tox - 10%    Objective:  Ht: 175.3 cm (69\"); Wt: 69.2 kg (152 lb 9.6 oz)  Estimated Creatinine Clearance: 75.7 mL/min (A) (by C-G formula based on SCr of 0.59 mg/dL (L)).   Creatinine   Date Value Ref Range Status   12/07/2020 0.59 (L) 0.76 - 1.27 mg/dL Final   12/06/2020 0.63 (L) 0.76 - 1.27 mg/dL Final   12/05/2020 1.07 0.76 - 1.27 mg/dL Final   06/06/2019 0.8 0.5 - 1.2 mg/dL Final   06/05/2019 0.9 0.5 - 1.2 mg/dL Final   05/16/2019 1 0.5 - 1.2 mg/dL Final      Lab Results   Component Value Date    WBC 8.10 12/07/2020    WBC 18.55 (H) 12/05/2020    WBC 10.34 12/04/2020      Baseline culture results:  Microbiology Results (last 10 days)       Procedure Component Value - Date/Time    Urine Culture - Urine, Urine, Catheter In/Out [[id number]]  (Normal) Collected: 12/04/20 1711    Lab Status: Final result Specimen: Urine, Catheter In/Out Updated: 12/05/20 1528     Urine Culture No growth    Blood Culture - Blood, Arm, Left [[id number]]  (Abnormal) Collected: 12/04/20 1701    Lab Status: Final result Specimen: Blood from Arm, Left Updated: 12/07/20 0832     Blood Culture Staphylococcus capitis     Isolated from Aerobic Bottle     Gram Stain Aerobic Bottle Gram positive cocci      Anaerobic Bottle Gram positive cocci    Narrative:      Refer to previous blood culture collected on 12/4/20 for MICs    COVID " PRE-OP / PRE-PROCEDURE SCREENING ORDER (NO ISOLATION) - Swab, Nasopharynx [[id number]]  (Abnormal) Collected: 12/04/20 1701    Lab Status: Final result Specimen: Swab from Nasopharynx Updated: 12/04/20 1813    Narrative:      The following orders were created for panel order COVID PRE-OP / PRE-PROCEDURE SCREENING ORDER (NO ISOLATION) - Swab, Nasopharynx.  Procedure                               Abnormality         Status                     ---------                               -----------         ------                     COVID-19, ABBOTT IN-HOUS...[[id number]]  Abnormal            Final result                 Please view results for these tests on the individual orders.    COVID-19, ABBOTT IN-HOUSE,NP Swab (NO TRANSPORT MEDIA) 2 HR TAT - Swab, Nasopharynx [[id number]]  (Abnormal) Collected: 12/04/20 1701    Lab Status: Final result Specimen: Swab from Nasopharynx Updated: 12/04/20 1813     COVID19 Detected    Narrative:      Fact sheet for providers: https://www.fda.gov/media/797555/download     Fact sheet for patients: https://www.fda.gov/media/595080/download    Blood Culture - Blood, Arm, Right [[id number]]  (Abnormal)  (Susceptibility) Collected: 12/04/20 1700    Lab Status: Final result Specimen: Blood from Arm, Right Updated: 12/07/20 0832     Blood Culture Staphylococcus capitis     Isolated from Aerobic and Anaerobic Bottles     Gram Stain Anaerobic Bottle Gram positive cocci in clusters      Aerobic Bottle Gram positive cocci in clusters    Susceptibility        Staphylococcus capitis     DEMARCO     Oxacillin Susceptible     Vancomycin Susceptible                      Blood Culture ID, PCR - Blood, Arm, Right [[id number]]  (Abnormal) Collected: 12/04/20 1700    Lab Status: Edited Result - FINAL Specimen: Blood from Arm, Right Updated: 12/05/20 2042     BCID, PCR Staphylococcus spp, not aureus. Identification by BCID PCR.     BOTTLE TYPE Anaerobic Bottle     Comment: Appended report. These results have been  appended to a previously final verified report.               Wilmar Johansen [facility]  12/07/20 16:27 CST

## 2020-12-07 NOTE — THERAPY TREATMENT NOTE
Acute Care - Speech Language Pathology   Swallow Treatment Note [facility]     Patient Name: Ezequiel Abdalla  : 1945  MRN: [id number]  Today's Date: 2020               Admit Date: 2020  SLP treatment complete. Pt alert and sitting upright in bed. He is noted to be very [place], but he was able to follow a few simple commands and demonstrate orientation x2. He was observed with trials of pureed consistencies and honey thick liquids from his tray. No s/s of distress or overt s/s of aspiration noted. Pt ok to cotninue current diet of pureed foods with honey thick liquids. SLP will continue to follow and treat.  Mo Boyd MS CCC-SLP 2020 13:19 CST    Visit Dx:     ICD-10-CM ICD-9-CM   1. COVID-19  U07.1 079.89   2. Acute UTI  N39.0 599.0   3. Chronic atrial fibrillation (CMS/HCC)  I48.20 427.31   4. Oropharyngeal dysphagia  R13.12 787.22     Patient Active Problem List   Diagnosis   • Polycythemia   • COPD (chronic obstructive pulmonary disease) (CMS/HCC)   • Alcohol abuse   • Chronic atrial fibrillation with RVR (CMS/HCC)   • Multiple falls   • Elevated prostate specific antigen (PSA)   • COVID-19     Past Medical History:   Diagnosis Date   • A-fib (CMS/HCC)    • Alcohol abuse    • Anxiety    • COPD (chronic obstructive pulmonary disease) (CMS/HCC)    • COPD (chronic obstructive pulmonary disease) (CMS/HCC)    • Hypotension    • Lung disease    • Malaise and fatigue    • Neurological disease    • Polycythemia    • SOB (shortness of breath)    • Brina-Parkinson-White syndrome      Past Surgical History:   Procedure Laterality Date   • KNEE SURGERY     • PACEMAKER IMPLANTATION      [facility]    • WRIST SURGERY          SWALLOW EVALUATION (last 72 hours)      SLP Adult Swallow Evaluation     Row Name 20 1025 20 1336                Rehab Evaluation    Document Type  therapy note (daily note)  -CS  evaluation  -MB       Subjective Information  no complaints  -CS  no complaints  -MB        Patient Observations  alert;cooperative;agree to therapy  -CS  alert;cooperative  -MB       Patient/Family/Caregiver Comments/Observations  No complaints  -CS  --          General Information    Patient Profile Reviewed  --  yes  -MB       Pertinent History Of Current Problem  --  COVID, acute respiratory failure, septic shock, acute bacerial cystitis, HTN, COPD, a-fib, alcohol abuse, tobacco abuse, hypernatremia.  -MB       Current Method of Nutrition  --  regular textures;thin liquids  -MB       Precautions/Limitations, Vision  --  WFL;for purposes of eval  -MB       Precautions/Limitations, Hearing  --  hearing impairment, bilaterally  -MB       Prior Level of Function-Communication  --  unknown  -MB       Prior Level of Function-Swallowing  --  unknown  -MB       Plans/Goals Discussed with  --  patient  -MB       Barriers to Rehab  --  hearing deficit  -MB       Patient's Goals for Discharge  --  patient did not state  -MB          Pain    Additional Documentation  Pain Scale: FACES Pre/Post-Treatment (Group)  -CS  Pain Scale: FACES Pre/Post-Treatment (Group)  -MB          Pain Scale: FACES Pre/Post-Treatment    Pain: FACES Scale, Pretreatment  0-->no hurt  -CS  2-->hurts little bit  -MB       Posttreatment Pain Rating  0-->no hurt  -CS  --          Oral Motor Structure and Function    Dentition Assessment  --  missing teeth  -MB       Secretion Management  --  dried secretions in oral cavity  -MB       Mucosal Quality  --  dry;sticky  -MB          Oral Musculature and Cranial Nerve Assessment    Oral Motor General Assessment  --  mandibular impairment  -MB       Mandibular Impairment Detail, Cranial Nerve V (Trigeminal)  --  CN5: motor impairment;reduced mandibular ROM;reduced strength bilaterally  -MB          General Eating/Swallowing Observations    Respiratory Support Currently in Use  --  nasal cannula  -MB       Eating/Swallowing Skills  --  fed by SLP  -MB       Positioning During Eating  --  upright in  bed  -MB       Utensils Used  --  spoon;straw  -MB       Consistencies Trialed  --  regular textures;thin liquids;nectar/syrup-thick liquids;honey-thick liquids;pudding thick  -MB          Clinical Swallow Eval    Oral Prep Phase  --  impaired  -MB       Oral Transit  --  WFL  -MB       Oral Residue  --  WFL  -MB       Pharyngeal Phase  --  suspected pharyngeal impairment  -MB       Esophageal Phase  --  unremarkable  -MB       Clinical Swallow Evaluation Summary  --  Full range of consistencies except mechanical soft solids were presented. Pt had a wet vocal quality 1x following nectar. He had and immediate coughing episode following a thin liquid trial. He had decreased rotary chew and prolonged mastication of the regular solid.   -MB          Oral Prep Concerns    Oral Prep Concerns  --  prolonged mastication;poor rotary chew  -MB       Prolonged Mastication  --  regular consistencies  -MB       Poor Rotary Chew  --  regular consistencies  -MB          Pharyngeal Phase Concerns    Pharyngeal Phase Concerns  --  wet vocal quality;cough  -MB       Wet Vocal Quality  --  nectar  -MB       Cough  --  thin  -MB          Clinical Impression    SLP Swallowing Diagnosis  --  mild-moderate;oral dysphagia;moderate;pharyngeal dysphagia  -MB       Functional Impact  --  risk of aspiration/pneumonia;risk of malnutrition;risk of dehydration  -MB       Rehab Potential/Prognosis, Swallowing  --  adequate, monitor progress closely  -MB       Swallow Criteria for Skilled Therapeutic Interventions Met  --  demonstrates skilled criteria  -MB          Recommendations    Therapy Frequency (Swallow)  --  3 days per week  -MB       Predicted Duration Therapy Intervention (Days)  --  until discharge  -MB       SLP Diet Recommendation  --  puree;honey thick liquids;ice chips between meals after oral care, with supervision  -MB       Recommended Precautions and Strategies  --  upright posture during/after eating;small bites of food and  sips of liquid;alternate between small bites of food and sips of liquid  -MB       Oral Care Recommendations  --  Oral Care BID/PRN  -MB       SLP Rec. for Method of Medication Administration  --  meds crushed;with pudding or applesauce  -MB       Monitor for Signs of Aspiration  --  yes;cough;gurgly voice;throat clearing;notify SLP if any concerns  -MB       Anticipated Discharge Disposition (SLP)  --  unknown  -MB          Swallow Goals (SLP)    Oral Nutrition/Hydration Goal Selection (SLP)  oral nutrition/hydration, SLP goal 1  -CS  oral nutrition/hydration, SLP goal 1  -MB       Pharyngeal Strengthening Exercise Goal Selection (SLP)  pharyngeal strengthening exercise, SLP goal 1  -CS  pharyngeal strengthening exercise, SLP goal 1  -MB       Additional Documentation  pharyngeal strengthening exercise goal selection (SLP)  -CS  pharyngeal strengthening exercise goal selection (SLP)  -MB          Oral Nutrition/Hydration Goal 1 (SLP)    Oral Nutrition/Hydration Goal 1, SLP  Pt will tolerate least restrictive diet with no overt s/s of aspiration.   -CS  Pt will tolerate least restrictive diet with no overt s/s of aspiration.   -MB       Time Frame (Oral Nutrition/Hydration Goal 1, SLP)  by discharge  -CS  by discharge  -MB       Barriers (Oral Nutrition/Hydration Goal 1, SLP)  n/a  -CS  n/a  -MB       Progress/Outcomes (Oral Nutrition/Hydration Goal 1, SLP)  goal ongoing  -CS  goal ongoing  -MB          Pharyngeal Strengthening Exercise Goal 1 (SLP)    Activity (Pharyngeal Strengthening Goal 1, SLP)  increase timing  -CS  increase timing  -MB       Increase Timing  gustatory stimulation (sour/cold)  -CS  gustatory stimulation (sour/cold)  -MB       [place]/Accuracy (Pharyngeal Strengthening Goal 1, SLP)  independently (over 90% accuracy)  -CS  independently (over 90% accuracy)  -MB       Time Frame (Pharyngeal Strengthening Goal 1, SLP)  short term goal (STG);by discharge  -CS  short term goal (STG);by  discharge  -MB       Barriers (Pharyngeal Strengthening Goal 1, SLP)  n/a  -CS  n/a  -MB       Progress/Outcomes (Pharyngeal Strengthening Goal 1, SLP)  goal ongoing  -CS  goal ongoing  -MB         User Key  (r) = Recorded By, (t) = Taken By, (c) = Cosigned By    Initials Name Effective Dates    NEVAEH Ness Judyanthony LAZO, CCC-SLP 08/02/16 -     CS Mo Boyd, MS CCC-SLP 04/03/18 -           EDUCATION  The patient has been educated in the following areas:   Dysphagia (Swallowing Impairment).    SLP Recommendation and Plan                                                             Plan of Care Reviewed With: patient  Progress: no change  Outcome Summary: SLP treatment complete. Pt alert and sitting upright in bed. He is noted to be very [place], but he was able to follow a few simple commands and demonstrate orientation x2. He was observed with trials of pureed consistencies and honey thick liquids from his tray. No s/s of distress or overt s/s of aspiration noted. Pt ok to cotninue current diet of pureed foods with honey thick liquids. SLP will continue to follow and treat.    SLP GOALS     Row Name 12/07/20 1025 12/05/20 1336          Oral Nutrition/Hydration Goal 1 (SLP)    Oral Nutrition/Hydration Goal 1, SLP  Pt will tolerate least restrictive diet with no overt s/s of aspiration.   -CS  Pt will tolerate least restrictive diet with no overt s/s of aspiration.   -MB     Time Frame (Oral Nutrition/Hydration Goal 1, SLP)  by discharge  -CS  by discharge  -MB     Barriers (Oral Nutrition/Hydration Goal 1, SLP)  n/a  -CS  n/a  -MB     Progress/Outcomes (Oral Nutrition/Hydration Goal 1, SLP)  goal ongoing  -CS  goal ongoing  -MB        Pharyngeal Strengthening Exercise Goal 1 (SLP)    Activity (Pharyngeal Strengthening Goal 1, SLP)  increase timing  -CS  increase timing  -MB     Increase Timing  gustatory stimulation (sour/cold)  -CS  gustatory stimulation (sour/cold)  -MB     [place]/Accuracy (Pharyngeal  Strengthening Goal 1, SLP)  independently (over 90% accuracy)  -CS  independently (over 90% accuracy)  -MB     Time Frame (Pharyngeal Strengthening Goal 1, SLP)  short term goal (STG);by discharge  -CS  short term goal (STG);by discharge  -MB     Barriers (Pharyngeal Strengthening Goal 1, SLP)  n/a  -CS  n/a  -MB     Progress/Outcomes (Pharyngeal Strengthening Goal 1, SLP)  goal ongoing  -CS  goal ongoing  -MB       User Key  (r) = Recorded By, (t) = Taken By, (c) = Cosigned By    Initials Name Provider Type    Kameron Bueno, CCC-SLP Speech and Language Pathologist    Mo Atkinson MS CCC-SLP Speech and Language Pathologist             Time Calculation:   Time Calculation- SLP     Row Name 12/07/20 1318             Time Calculation- SLP    SLP Start Time  1025  -CS      SLP Stop Time  1048  -CS      SLP Time Calculation (min)  23 min  -CS      SLP Received On  12/07/20  -        User Key  (r) = Recorded By, (t) = Taken By, (c) = Cosigned By    Initials Name Provider Type     Mo Boyd MS CCC-SLP Speech and Language Pathologist          Therapy Charges for Today     Code Description Service Date Service Provider Modifiers Qty    [id number] HC ST TREATMENT SWALLOW 2 12/7/2020 Mo Boyd MS CCC-SLP GN 1               Mo Boyd MS CCC-[place]  12/7/2020

## 2020-12-07 NOTE — PLAN OF CARE
Goal Outcome Evaluation:  Plan of Care Reviewed With: patient  Progress: no change  Outcome Summary: SLP treatment complete. Pt alert and sitting upright in bed. He is noted to be very [place], but he was able to follow a few simple commands and demonstrate orientation x2. He was observed with trials of pureed consistencies and honey thick liquids from his tray. No s/s of distress or overt s/s of aspiration noted. Pt ok to cotninue current diet of pureed foods with honey thick liquids. SLP will continue to follow and treat.

## 2020-12-07 NOTE — PLAN OF CARE
Goal Outcome Evaluation:  Plan of Care Reviewed With: patient      Pleasantly confused since arriving on unit. Received Haldol in MICU prior to transfer. Able to make needs known. Able to maintain O2 sat greater than 93% with O2 at 2 l/m pnc. CM= afib with frequent PVCs. Afebrile. Video monitor in use for pt safety. Cont to monitor.

## 2020-12-07 NOTE — PLAN OF CARE
Goal Outcome Evaluation:  Plan of Care Reviewed With: patient  Progress: no change  Outcome Summary: No c/o pain. VSS. Currently on 4L O2. Pt. confused through shift and kept trying to get out bed. Last night he was uncooperative and agitated, refusing almost all of his meds and kept yelling at me to hurry up and let him out of here. PRN Haldol order was placed by Dr. Morse and given. PRN ativan also given. Pt. rested comfortably after this. Safety maintained. Will continue to monitor.

## 2020-12-07 NOTE — PLAN OF CARE
Goal Outcome Evaluation:  Plan of Care Reviewed With: patient  Progress: no change  Outcome Summary: OT alexal completed. Pt pleasantly confused, only oriented to self. Thinks he is at a motel, when given an option on year he said it wasn't either one, and pt was unsure why he was here. Pt is [place]. Pt follows simple commands for strength testing 50% of the time with repetition. Pt reports his BUE sensation feels different side to side but unsure how. Pt reports he just can't tell on BLE sensation. Pt was dependent to scoot in bed. Pt refused further bed mobility. Pt expected level of assist dep for LB dressing. Pt refused to attempt to eat but per SLP report pt has difficulty with self feeding. Skilled OT recommended to address adls, functional mobility and ADLs. Recommended d/c SNF.

## 2020-12-07 NOTE — PROGRESS NOTES
[facility] Medicine Services  INPATIENT PROGRESS NOTE    Length of Stay: 3  Date of Admission: 12/4/2020  Primary Care Physician: Shane Banks MD    Subjective   Chief Complaint: Covid-19-pneumonia.  Bacteremia.  Cystitis.    HPI   Patient's oxygen is improving.  Culture came back, switch antibiotic to oxacillin today.  Patient denies any chest pain.  Patient was on 2 L, seems like oxygen is off his face.    Review of Systems   Unable to obtain due to dementia.    All pertinent negatives and positives are as above. All other systems have been reviewed and are negative unless otherwise stated.     Objective    Temp:  [96.2 °F (35.7 °C)-98.8 °F (37.1 °C)] 97 °F (36.1 °C)  Heart Rate:  [] 85  Resp:  [20-22] 20  BP: ()/(62-97) 94/62  No intake or output data in the 24 hours ending 12/07/20 1459  Physical Exam  Vitals signs and nursing note reviewed.   Constitutional:       Appearance: He is well-developed.   HENT:      Head: Normocephalic.   Eyes:      Conjunctiva/sclera: Conjunctivae normal.      Pupils: Pupils are equal, round, and reactive to light.   Neck:      Musculoskeletal: Neck supple.      Vascular: No JVD.   Cardiovascular:      Rate and Rhythm: Normal rate and regular rhythm.      Heart sounds: Normal heart sounds. No murmur. No friction rub. No gallop.    Pulmonary:      Effort: No respiratory distress.      Breath sounds: No wheezing or rales.      Comments: Diminished breath sound bilateral, clear  Chest:      Chest wall: No tenderness.   Abdominal:      General: Bowel sounds are normal. There is no distension.      Palpations: Abdomen is soft.      Tenderness: There is no abdominal tenderness. There is no guarding or rebound.   Musculoskeletal: Normal range of motion.         General: No tenderness or deformity.   Skin:     General: Skin is warm and dry.      Capillary Refill: Capillary refill takes 2 to 3 seconds.      Findings: No rash.    Neurological:      Mental Status: He is alert. He is disoriented.      Cranial Nerves: No cranial nerve deficit.      Motor: Weakness present. No abnormal muscle tone.      Coordination: Coordination abnormal.      Gait: Gait abnormal.      Deep Tendon Reflexes: Reflexes normal.         Results Review:  Lab Results (last 24 hours)     Procedure Component Value Units Date/Time    Blood Culture - Blood, Arm, Left [[id number]]  (Abnormal) Collected: 12/04/20 1701    Specimen: Blood from Arm, Left Updated: 12/07/20 0832     Blood Culture Staphylococcus capitis     Isolated from Aerobic Bottle     Gram Stain Aerobic Bottle Gram positive cocci      Anaerobic Bottle Gram positive cocci    Narrative:      Refer to previous blood culture collected on 12/4/20 for MICs    Blood Culture - Blood, Arm, Right [[id number]]  (Abnormal)  (Susceptibility) Collected: 12/04/20 1700    Specimen: Blood from Arm, Right Updated: 12/07/20 0832     Blood Culture Staphylococcus capitis     Isolated from Aerobic and Anaerobic Bottles     Gram Stain Anaerobic Bottle Gram positive cocci in clusters      Aerobic Bottle Gram positive cocci in clusters    Susceptibility      Staphylococcus capitis     DEMARCO     Oxacillin Susceptible     Vancomycin Susceptible                    Comprehensive Metabolic Panel [[id number]]  (Abnormal) Collected: 12/07/20 0614    Specimen: Blood Updated: 12/07/20 0752     Glucose 85 mg/dL      BUN 18 mg/dL      Creatinine 0.59 mg/dL      Sodium 143 mmol/L      Potassium 3.5 mmol/L      Chloride 112 mmol/L      CO2 24.0 mmol/L      Calcium 9.8 mg/dL      Total Protein 4.8 g/dL      Albumin 2.50 g/dL      ALT (SGPT) 13 U/L      AST (SGOT) 27 U/L      Alkaline Phosphatase 60 U/L      Total Bilirubin 0.3 mg/dL      eGFR Non African Amer 133 mL/min/1.73      Globulin 2.3 gm/dL      A/G Ratio 1.1 g/dL      BUN/Creatinine Ratio 30.5     Anion Gap 7.0 mmol/L     Narrative:      GFR Normal >60  Chronic Kidney Disease <60  Kidney  Failure <15      CBC & Differential [[id number]]  (Abnormal) Collected: 12/07/20 0614    Specimen: Blood Updated: 12/07/20 0710    Narrative:      The following orders were created for panel order CBC & Differential.  Procedure                               Abnormality         Status                     ---------                               -----------         ------                     CBC Auto Differential[[id number]]        Abnormal            Final result                 Please view results for these tests on the individual orders.    CBC Auto Differential [[id number]]  (Abnormal) Collected: 12/07/20 0614    Specimen: Blood Updated: 12/07/20 0710     WBC 8.10 10*3/mm3      RBC 3.85 10*6/mm3      Hemoglobin 12.0 g/dL      Hematocrit 36.6 %      MCV 95.1 fL      MCH 31.2 pg      MCHC 32.8 g/dL      RDW 14.0 %      RDW-SD 49.1 fl      MPV 11.6 fL      Platelets 120 10*3/mm3     Narrative:      ckd    Manual Differential [[id number]]  (Abnormal) Collected: 12/07/20 0614    Specimen: Blood Updated: 12/07/20 0710     Neutrophil % 85.7 %      Lymphocyte % 7.1 %      Monocyte % 5.1 %      Bands %  2.0 %      Neutrophils Absolute 7.11 10*3/mm3      Lymphocytes Absolute 0.58 10*3/mm3      Monocytes Absolute 0.41 10*3/mm3      Anisocytosis Slight/1+     Crenated RBC's Slight/1+     Poikilocytes Slight/1+     WBC Morphology Normal     Platelet Estimate Decreased           Cultures:  Blood Culture   Date Value Ref Range Status   12/04/2020 Staphylococcus capitis (C)  Final   12/04/2020 Staphylococcus capitis (C)  Final     Urine Culture   Date Value Ref Range Status   12/04/2020 No growth  Final       Radiology Data:    Imaging Results (Last 24 Hours)     ** No results found for the last 24 hours. **          No Known Allergies    Scheduled meds:   aspirin, 325 mg, Oral, Daily  buPROPion, 75 mg, Oral, Q12H  cefTRIAXone, 1 g, Intravenous, Q24H  cholecalciferol, 2,000 Units, Oral, Daily  dexamethasone, 6 mg, Oral,  Daily  dilTIAZem, 30 mg, Oral, Q6H  Divalproex Sodium, 125 mg, Oral, Q12H  famotidine, 20 mg, Oral, BID AC  mirtazapine, 15 mg, Oral, Nightly  multivitamin with minerals, 1 tablet, Oral, Daily  nicotine, 1 patch, Transdermal, Q24H  potassium chloride, 20 mEq, Oral, BID  pravastatin, 20 mg, Oral, Daily  remdesivir, 100 mg, Intravenous, Q24H  sodium chloride, 10 mL, Intravenous, Q12H  terazosin, 1 mg, Oral, Nightly  vitamin C, 500 mg, Oral, Daily  zinc sulfate, 220 mg, Oral, Daily        PRN meds:  •  acetaminophen  •  LORazepam  •  ondansetron  •  Pharmacy Consult - Remdesivir  •  polyethylene glycol  •  [COMPLETED] Insert peripheral IV **AND** sodium chloride  •  sodium chloride    Assessment/Plan       COPD (chronic obstructive pulmonary disease) (CMS/[facility])    Alcohol abuse    Chronic atrial fibrillation with RVR (CMS/[facility])    Multiple falls    COVID-19      Plan:    Septic shock/bacteremia/COVID-19 pneumonia/acute respiratory failure with hypoxia/COPD.  Patient off pressor/septic shock resolved.  Timeframe of COVID-19 infections unclear.  Continue Decadron.  Continue remdesivir.  Vitamin C is.  Zinc.  Vitamin D.  Zyvox for now due to culture  Patient is on 2 L.    Acute bacterial cystitis.  DC Rocephin for now.      WPW/atrial fibrillation/hypertension/hyperlipidemia.  Telemetry.  Hold blood pressure due to shock.  Continue aspirin.  Continue Cardizem.  Continue Pravachol.  No anticoagulation due to fall risk.    Hypernatremia.  Resolved.    Hypokalemia.  Resolved.  P.o. potassium.    Reflux.  Pepcid.  Zofran as needed.    Dementia/anxiety/depression.  Haldol as needed.  Continue Wellbutrin.  Continue Depakote.  Remeron.    Alcohol abuse.  Ativan as needed..  Discussed with patient stopping.    Tobacco abuse.  Tobacco counseling.  Discussed patient cutting back and stopping.  Nicotine patch.    Prostate hypertrophy.  Hydration.    Constipation.  MiraLAX as needed.    Nutrition.  Multivitamins.    Nutrition.   Purée/honey thick.    Deconditioning/falling.  OT consult.  PT consult.    Blood culture shows coagulase-negative staph 2 out of 2 sets, Staphylococcus capitis..    Discharge Plannin to 3 days.  Nursing home resident.    Electronically signed by Denny Whipple MD, 20, 2:48 PM CST.

## 2020-12-07 NOTE — THERAPY EVALUATION
Patient Name: Ezequiel Abdalla  : 1945    MRN: [id number]                              Today's Date: 2020       Admit Date: 2020    Visit Dx:     ICD-10-CM ICD-9-CM   1. COVID-19  U07.1 079.89   2. Acute UTI  N39.0 599.0   3. Chronic atrial fibrillation (CMS/HCC)  I48.20 427.31   4. Oropharyngeal dysphagia  R13.12 787.22   5. Impaired mobility and ADLs  Z74.09 V49.89    Z78.9      Patient Active Problem List   Diagnosis   • Polycythemia   • COPD (chronic obstructive pulmonary disease) (CMS/[facility])   • Alcohol abuse   • Chronic atrial fibrillation with RVR (CMS/HCC)   • Multiple falls   • Elevated prostate specific antigen (PSA)   • COVID-19     Past Medical History:   Diagnosis Date   • A-fib (CMS/HCC)    • Alcohol abuse    • Anxiety    • COPD (chronic obstructive pulmonary disease) (CMS/HCC)    • COPD (chronic obstructive pulmonary disease) (CMS/HCC)    • Hypotension    • Lung disease    • Malaise and fatigue    • Neurological disease    • Polycythemia    • SOB (shortness of breath)    • Brina-Parkinson-White syndrome      Past Surgical History:   Procedure Laterality Date   • KNEE SURGERY     • PACEMAKER IMPLANTATION      [place]    • WRIST SURGERY       General Information     Row Name 20 1219          OT Time and Intention    Document Type  evaluation  -MM     Mode of Treatment  occupational therapy  -MM     Row Name 20 1219          General Information    Patient Profile Reviewed  yes  -MM     Prior Level of Function  -- unsure of PLOF 2' cognition, pt reports he walks at baseline  -MM     Existing Precautions/Restrictions  fall;oxygen therapy device and L/min  -MM     Barriers to Rehab  medically complex;previous functional deficit;cognitive status  -MM     Row Name 20 1219          Occupational Profile    Occupational History/Life Experiences (Occupational Profile)  COVID (+), septic shock, acute bacterial cystitis, HTN, alcohol abuse, tobacco abuse, hypernatremia, acute  respiratory failure with hypoxic.  -MM     Row Name 12/07/20 1219          Living Environment    Lives With  facility resident  -MM     Row Name 12/07/20 1219          Stairs Within Home, Primary    Number of Stairs, Within Home, Primary  none  -MM     Stair Railings, Within Home, Primary  none  -MM     Row Name 12/07/20 1219          Cognition    Orientation Status (Cognition)  oriented to;person Think he is at a motel, when given an option on year he said it wasn't either one, and pt was unsure why he was here.  -MM     Row Name 12/07/20 1219          Safety Issues, Functional Mobility    Safety Issues Affecting Function (Mobility)  ability to follow commands;at risk behavior observed;awareness of need for assistance;friction/shear risk;impulsivity;insight into deficits/self-awareness;judgment;problem-solving;safety precaution awareness;safety precautions follow-through/compliance;sequencing abilities  -MM     Impairments Affecting Function (Mobility)  balance;cognition;coordination;endurance/activity tolerance;pain;range of motion (ROM);sensation/sensory awareness;shortness of breath;strength;motor control  -MM     Cognitive Impairments, Mobility Safety/Performance  attention;awareness, need for assistance;impulsivity;insight into deficits/self-awareness;judgment;problem-solving/reasoning;safety precaution awareness;safety precaution follow-through;sequencing abilities  -MM       User Key  (r) = Recorded By, (t) = Taken By, (c) = Cosigned By    Initials Name Provider Type    MM Bryan Nicholas, OTR/L Occupational Therapist        Mobility/ADL's     Row Name 12/07/20 1219          Bed Mobility    Bed Mobility  scooting/bridging  -MM     Scooting/Bridging [place] (Bed Mobility)  dependent (less than 25% patient effort);verbal cues  -     Comment (Bed Mobility)  pt adamantly refused further bed mobility  -MM     Row Name 12/07/20 1219          Transfers    Transfers  sit-stand transfer  -     Row Name  "12/07/20 1219          Activities of Daily Living    BADL Assessment/Intervention  lower body dressing;feeding  -MM     Row Name 12/07/20 1219          Lower Body Dressing Assessment/Training    Comment (Lower Body Dressing)  expected level of assist dependent  -MM     Row Name 12/07/20 1219          Self-Feeding Assessment/Training    Comment (Feeding)  Pt refuses to attempt to eat, per SLP difficulty with self feeding  -MM       User Key  (r) = Recorded By, (t) = Taken By, (c) = Cosigned By    Initials Name Provider Type    Bryan Palma, OTR/L Occupational Therapist        Obj/Interventions     Row Name 12/07/20 1219          Sensory Assessment (Somatosensory)    Sensory Assessment (Somatosensory)  -- Pt reports BUE sensation feels different but doesn't know how. For BLE pt reports he \"just can't tell\"  -MM     Row Name 12/07/20 1219          Range of Motion Comprehensive    General Range of Motion  bilateral upper extremity ROM WNL  -MM     Comment, General Range of Motion  except B shoulders 50% impaired  -MM     Row Name 12/07/20 1219          Strength Comprehensive (MMT)    Comment, General Manual Muscle Testing (MMT) Assessment  BUE 3+/5, except B  3/5  -MM       User Key  (r) = Recorded By, (t) = Taken By, (c) = Cosigned By    Initials Name Provider Type    Bryan Palma, OTR/L Occupational Therapist        Goals/Plan     Row Name 12/07/20 1219          Dressing Goal 1 (OT)    Activity/Device (Dressing Goal 1, OT)  upper body dressing  -MM     [place]/Cues Needed (Dressing Goal 1, OT)  minimum assist (75% or more patient effort);set-up required;verbal cues required  -MM     Time Frame (Dressing Goal 1, OT)  long term goal (LTG);by discharge  -MM     Progress/Outcome (Dressing Goal 1, OT)  goal ongoing  -MM     Row Name 12/07/20 1219          Grooming Goal 1 (OT)    Activity/Device (Grooming Goal 1, OT)  grooming skills, all  -MM     [place] (Grooming Goal 1, OT)  minimum assist " (75% or more patient effort);set-up required;verbal cues required  -MM     Time Frame (Grooming Goal 1, OT)  long term goal (LTG);by discharge  -MM     Progress/Outcome (Grooming Goal 1, OT)  goal ongoing  -MM     Row Name 12/07/20 1219          Self-Feeding Goal 1 (OT)    Activity/Device (Self-Feeding Goal 1, OT)  self-feeding skills, all  -MM     [place] Level/Cues Needed (Self-Feeding Goal 1, OT)  minimum assist (75% or more patient effort);verbal cues required;set-up required  -MM     Time Frame (Self-Feeding Goal 1, OT)  long term goal (LTG);by discharge  -MM     Progress/Outcomes (Self-Feeding Goal 1, OT)  goal ongoing  -MM     Row Name 12/07/20 1219          Therapy Assessment/Plan (OT)    Planned Therapy Interventions (OT)  activity tolerance training;BADL retraining;cognitive/visual perception retraining;functional balance retraining;neuromuscular control/coordination retraining;occupation/activity based interventions;patient/caregiver education/training;ROM/therapeutic exercise;strengthening exercise;transfer/mobility retraining;passive ROM/stretching  -MM       User Key  (r) = Recorded By, (t) = Taken By, (c) = Cosigned By    Initials Name Provider Type    MM Bryan Nicholas, OTR/L Occupational Therapist        Clinical Impression     Row Name 12/07/20 1219          Pain Assessment    Additional Documentation  Pain Scale: Numbers Pre/Post-Treatment (Group)  -MM     Row Name 12/07/20 1219          Pain Scale: Numbers Pre/Post-Treatment    Pretreatment Pain Rating  0/10 - no pain  -MM     Posttreatment Pain Rating  0/10 - no pain  -MM     Row Name 12/07/20 1219          Plan of Care Review    Plan of Care Reviewed With  patient  -MM     Progress  no change  -MM     Outcome Summary  OT eval completed. Pt pleasantly confused, only oriented to self. Thinks he is at a motel, when given an option on year he said it wasn't either one, and pt was unsure why he was here. Pt is [place]. Pt follows simple commands  for strength testing 50% of the time with repetition. Pt reports his BUE sensation feels different side to side but unsure how. Pt reports he just can't tell on BLE sensation. Pt was dependent to scoot in bed. Pt refused further bed mobility. Pt expected level of assist dep for LB dressing. Pt refused to attempt to eat but per SLP report pt has difficulty with self feeding. Skilled OT recommended to address adls, functional mobility and ADLs. Recommended d/c SNF.  -MM     Row Name 12/07/20 1219          Therapy Assessment/Plan (OT)    Patient/Family Therapy Goal Statement (OT)  pt did not state  -MM     Rehab Potential (OT)  good, to achieve stated therapy goals  -MM     Criteria for Skilled Therapeutic Interventions Met (OT)  yes;skilled treatment is necessary  -MM     Therapy Frequency (OT)  5 times/wk  -MM     Predicted Duration of Therapy Intervention (OT)  until d/c  -MM     Row Name 12/07/20 1219          Therapy Plan Review/Discharge Plan (OT)    Anticipated Discharge Disposition (OT)  skilled nursing facility  -MM     Row Name 12/07/20 1219          Vital Signs    O2 Delivery Pre Treatment  supplemental O2  -MM     O2 Delivery Intra Treatment  supplemental O2  -MM     O2 Delivery Post Treatment  supplemental O2  -MM     Pre Patient Position  Supine  -MM     Intra Patient Position  Supine  -MM     Post Patient Position  Supine  -MM     Row Name 12/07/20 1219          Positioning and Restraints    Pre-Treatment Position  in bed  -MM     Post Treatment Position  bed  -MM     In Bed  fowlers;encouraged to call for assist;exit alarm on;call light within reach  -MM       User Key  (r) = Recorded By, (t) = Taken By, (c) = Cosigned By    Initials Name Provider Type    MM Bryan Nicholas, OTR/L Occupational Therapist        Outcome Measures     Row Name 12/07/20 1219          How much help from another is currently needed...    Putting on and taking off regular lower body clothing?  1  -MM     Bathing (including  washing, rinsing, and drying)  1  -MM     Toileting (which includes using toilet bed pan or urinal)  1  -MM     Putting on and taking off regular upper body clothing  2  -MM     Taking care of personal grooming (such as brushing teeth)  2  -MM     Eating meals  2  -MM     AM-PAC 6 Clicks Score (OT)  9  -MM     Row Name 12/07/20 1219          Functional Assessment    Outcome Measure Options  AM-PAC 6 Clicks Daily Activity (OT)  -MM       User Key  (r) = Recorded By, (t) = Taken By, (c) = Cosigned By    Initials Name Provider Type    MM Bryan Nicholas, OTR/L Occupational Therapist        Occupational Therapy Education                 Title: PT OT SLP Therapies (In Progress)     Topic: Occupational Therapy (In Progress)     Point: ADL training (In Progress)     Description:   Instruct learner(s) on proper safety adaptation and remediation techniques during self care or transfers.   Instruct in proper use of assistive devices.              Learning Progress Summary           Patient Acceptance, E, NR,NL by MM at 12/7/2020 1508    Comment: OT role, benefits, POC                   Point: Home exercise program (Not Started)     Description:   Instruct learner(s) on appropriate technique for monitoring, assisting and/or progressing therapeutic exercises/activities.              Learner Progress:  Not documented in this visit.          Point: Precautions (In Progress)     Description:   Instruct learner(s) on prescribed precautions during self-care and functional transfers.              Learning Progress Summary           Patient Acceptance, E, NR,NL by MM at 12/7/2020 1508    Comment: OT role, benefits, POC                   Point: Body mechanics (Not Started)     Description:   Instruct learner(s) on proper positioning and spine alignment during self-care, functional mobility activities and/or exercises.              Learner Progress:  Not documented in this visit.                      User Key     Initials Effective  Dates Name Provider Type Discipline     07/05/20 -  Bryan Nicholas, OTR/L Occupational Therapist OT              OT Recommendation and Plan  Planned Therapy Interventions (OT): activity tolerance training, BADL retraining, cognitive/visual perception retraining, functional balance retraining, neuromuscular control/coordination retraining, occupation/activity based interventions, patient/caregiver education/training, ROM/therapeutic exercise, strengthening exercise, transfer/mobility retraining, passive ROM/stretching  Therapy Frequency (OT): 5 times/wk  Plan of Care Review  Plan of Care Reviewed With: patient  Progress: no change  Outcome Summary: OT eval completed. Pt pleasantly confused, only oriented to self. Thinks he is at a motel, when given an option on year he said it wasn't either one, and pt was unsure why he was here. Pt is [place]. Pt follows simple commands for strength testing 50% of the time with repetition. Pt reports his BUE sensation feels different side to side but unsure how. Pt reports he just can't tell on BLE sensation. Pt was dependent to scoot in bed. Pt refused further bed mobility. Pt expected level of assist dep for LB dressing. Pt refused to attempt to eat but per SLP report pt has difficulty with self feeding. Skilled OT recommended to address adls, functional mobility and ADLs. Recommended d/c SNF.     Time Calculation:   Time Calculation- OT     Row Name 12/07/20 1219             Time Calculation- OT    OT Start Time  1219  -MM      OT Stop Time  1250 +8 min chart review  -MM      OT Time Calculation (min)  31 min  -MM      OT Received On  12/07/20  -MM      OT Goal Re-Cert Due Date  12/17/20  -MM        User Key  (r) = Recorded By, (t) = Taken By, (c) = Cosigned By    Initials Name Provider Type     Bryan Nicholas, OTR/L Occupational Therapist        Therapy Charges for Today     Code Description Service Date Service Provider Modifiers Qty    [id number]  OT EVAL MOD  COMPLEXITY 3 12/7/2020 Bryan Nicholas, OTR/L GO 1               Bryan Nicholas, OTR/L  12/7/2020

## 2020-12-07 NOTE — PROGRESS NOTES
Discharge Planning Assessment  [facility]     Patient Name: Ezequiel Abdalla  MRN: [id number]  Today's Date: 12/7/2020    Admit Date: 12/4/2020    Discharge Needs Assessment     Row Name 12/07/20 0941       Living Environment    Lives With  facility resident  (Pended)     Current Living Arrangements  extended care facility  (Pended)     Quality of Family Relationships  helpful;involved;supportive  (Pended)        Transition Planning    Patient/Family Anticipates Transition to  long-term care facility  (Pended)     Patient/Family Anticipated Services at Transition  skilled nursing  (Pended)        Discharge Needs Assessment    Current Outpatient/Agency/Support Group  skilled nursing facility  (Pended)     Discharge Facility/Level of Care Needs  nursing facility, skilled  (Pended)         Discharge Plan     Row Name 12/07/20 0950       Plan    Plan  [facility]  (Pended)     Plan Comments  Called and spoke with Joselyn at [facility]. Joselyn states that pt has a bedhold at the skilled level. Will follow.  (Pended)         Continued Care and Services - Admitted Since 12/4/2020    Coordination has not been started for this encounter.         Demographic Summary    No documentation.       Functional Status    No documentation.       Psychosocial    No documentation.       Abuse/Neglect    No documentation.       Legal    No documentation.       Substance Abuse    No documentation.       Patient Forms    No documentation.           Joanne Hensley

## 2020-12-08 LAB
ALBUMIN SERPL-MCNC: 2.5 G/DL (ref 3.5–5.2)
ALBUMIN/GLOB SERPL: 0.9 G/DL
ALP SERPL-CCNC: 71 U/L (ref 39–117)
ALT SERPL W P-5'-P-CCNC: 26 U/L (ref 1–41)
ANION GAP SERPL CALCULATED.3IONS-SCNC: 6 MMOL/L (ref 5–15)
AST SERPL-CCNC: 39 U/L (ref 1–40)
BACTERIA SPEC AEROBE CULT: ABNORMAL
BILIRUB SERPL-MCNC: 0.4 MG/DL (ref 0–1.2)
BUN SERPL-MCNC: 17 MG/DL (ref 8–23)
BUN/CREAT SERPL: 33.3 (ref 7–25)
CALCIUM SPEC-SCNC: 9.7 MG/DL (ref 8.6–10.5)
CHLORIDE SERPL-SCNC: 111 MMOL/L (ref 98–107)
CO2 SERPL-SCNC: 25 MMOL/L (ref 22–29)
CREAT SERPL-MCNC: 0.51 MG/DL (ref 0.76–1.27)
DEPRECATED RDW RBC AUTO: 46.8 FL (ref 37–54)
ELLIPTOCYTES BLD QL SMEAR: ABNORMAL
ERYTHROCYTE [DISTWIDTH] IN BLOOD BY AUTOMATED COUNT: 13.8 % (ref 12.3–15.4)
GFR SERPL CREATININE-BSD FRML MDRD: >150 ML/MIN/1.73
GIANT PLATELETS: ABNORMAL
GLOBULIN UR ELPH-MCNC: 2.7 GM/DL
GLUCOSE SERPL-MCNC: 127 MG/DL (ref 65–99)
GRAM STN SPEC: ABNORMAL
GRAM STN SPEC: ABNORMAL
HCT VFR BLD AUTO: 36.2 % (ref 37.5–51)
HGB BLD-MCNC: 12.5 G/DL (ref 13–17.7)
ISOLATED FROM: ABNORMAL
LYMPHOCYTES # BLD MANUAL: 0.86 10*3/MM3 (ref 0.7–3.1)
LYMPHOCYTES NFR BLD MANUAL: 16.8 % (ref 19.6–45.3)
LYMPHOCYTES NFR BLD MANUAL: 7.9 % (ref 5–12)
MCH RBC QN AUTO: 32 PG (ref 26.6–33)
MCHC RBC AUTO-ENTMCNC: 34.5 G/DL (ref 31.5–35.7)
MCV RBC AUTO: 92.6 FL (ref 79–97)
MONOCYTES # BLD AUTO: 0.4 10*3/MM3 (ref 0.1–0.9)
NEUTROPHILS # BLD AUTO: 3.79 10*3/MM3 (ref 1.7–7)
NEUTROPHILS NFR BLD MANUAL: 74.3 % (ref 42.7–76)
PLATELET # BLD AUTO: 149 10*3/MM3 (ref 140–450)
PMV BLD AUTO: 11.3 FL (ref 6–12)
POIKILOCYTOSIS BLD QL SMEAR: ABNORMAL
POTASSIUM SERPL-SCNC: 3.5 MMOL/L (ref 3.5–5.2)
PROT SERPL-MCNC: 5.2 G/DL (ref 6–8.5)
RBC # BLD AUTO: 3.91 10*6/MM3 (ref 4.14–5.8)
SODIUM SERPL-SCNC: 142 MMOL/L (ref 136–145)
VALPROATE SERPL-MCNC: 11.8 MCG/ML (ref 50–125)
VARIANT LYMPHS NFR BLD MANUAL: 1 % (ref 0–5)
WBC # BLD AUTO: 5.1 10*3/MM3 (ref 3.4–10.8)
WBC MORPH BLD: NORMAL

## 2020-12-08 PROCEDURE — 25010000002 VANCOMYCIN PER 500 MG: Performed by: FAMILY MEDICINE

## 2020-12-08 PROCEDURE — 80053 COMPREHEN METABOLIC PANEL: CPT | Performed by: INTERNAL MEDICINE

## 2020-12-08 PROCEDURE — 25010000002 VANCOMYCIN 10 G RECONSTITUTED SOLUTION: Performed by: FAMILY MEDICINE

## 2020-12-08 PROCEDURE — 85025 COMPLETE CBC W/AUTO DIFF WBC: CPT | Performed by: FAMILY MEDICINE

## 2020-12-08 PROCEDURE — 36415 COLL VENOUS BLD VENIPUNCTURE: CPT | Performed by: INTERNAL MEDICINE

## 2020-12-08 PROCEDURE — 80164 ASSAY DIPROPYLACETIC ACD TOT: CPT | Performed by: FAMILY MEDICINE

## 2020-12-08 PROCEDURE — 87040 BLOOD CULTURE FOR BACTERIA: CPT | Performed by: INTERNAL MEDICINE

## 2020-12-08 PROCEDURE — 63710000001 DEXAMETHASONE PER 0.25 MG: Performed by: INTERNAL MEDICINE

## 2020-12-08 PROCEDURE — 94799 UNLISTED PULMONARY SVC/PX: CPT

## 2020-12-08 PROCEDURE — 85007 BL SMEAR W/DIFF WBC COUNT: CPT | Performed by: FAMILY MEDICINE

## 2020-12-08 RX ORDER — DIVALPROEX SODIUM 125 MG/1
250 CAPSULE, COATED PELLETS ORAL EVERY 12 HOURS SCHEDULED
Status: DISCONTINUED | OUTPATIENT
Start: 2020-12-08 | End: 2020-12-11 | Stop reason: HOSPADM

## 2020-12-08 RX ADMIN — POTASSIUM CHLORIDE 20 MEQ: 20 SOLUTION ORAL at 09:09

## 2020-12-08 RX ADMIN — ZINC SULFATE 220 MG (50 MG) CAPSULE 220 MG: CAPSULE at 09:08

## 2020-12-08 RX ADMIN — DIVALPROEX SODIUM 250 MG: 125 CAPSULE, COATED PELLETS ORAL at 20:34

## 2020-12-08 RX ADMIN — BUPROPION HYDROCHLORIDE 75 MG: 75 TABLET, FILM COATED ORAL at 09:08

## 2020-12-08 RX ADMIN — NICOTINE 1 PATCH: 21 PATCH, EXTENDED RELEASE TRANSDERMAL at 20:26

## 2020-12-08 RX ADMIN — MIRTAZAPINE 15 MG: 15 TABLET, FILM COATED ORAL at 20:34

## 2020-12-08 RX ADMIN — CHOLECALCIFEROL (VITAMIN D3) 25 MCG (1,000 UNIT) TABLET 2000 UNITS: TABLET at 09:09

## 2020-12-08 RX ADMIN — BUPROPION HYDROCHLORIDE 75 MG: 75 TABLET, FILM COATED ORAL at 20:34

## 2020-12-08 RX ADMIN — VANCOMYCIN HYDROCHLORIDE 1000 MG: 1 INJECTION, SOLUTION INTRAVENOUS at 09:56

## 2020-12-08 RX ADMIN — Medication 1 TABLET: at 09:08

## 2020-12-08 RX ADMIN — DILTIAZEM HYDROCHLORIDE 30 MG: 30 TABLET, FILM COATED ORAL at 09:09

## 2020-12-08 RX ADMIN — OXYCODONE HYDROCHLORIDE AND ACETAMINOPHEN 500 MG: 500 TABLET ORAL at 09:08

## 2020-12-08 RX ADMIN — VANCOMYCIN HYDROCHLORIDE 750 MG: 10 INJECTION, POWDER, LYOPHILIZED, FOR SOLUTION INTRAVENOUS at 22:23

## 2020-12-08 RX ADMIN — REMDESIVIR 100 MG: 100 INJECTION, POWDER, LYOPHILIZED, FOR SOLUTION INTRAVENOUS at 08:56

## 2020-12-08 RX ADMIN — LORAZEPAM 0.5 MG: 0.5 TABLET ORAL at 20:05

## 2020-12-08 RX ADMIN — FAMOTIDINE 20 MG: 20 TABLET, FILM COATED ORAL at 09:06

## 2020-12-08 RX ADMIN — TERAZOSIN HYDROCHLORIDE 1 MG: 1 CAPSULE ORAL at 20:34

## 2020-12-08 RX ADMIN — DEXAMETHASONE 6 MG: 4 TABLET ORAL at 09:08

## 2020-12-08 RX ADMIN — ASPIRIN 325 MG: 325 TABLET ORAL at 09:08

## 2020-12-08 RX ADMIN — DIVALPROEX SODIUM 125 MG: 125 CAPSULE, COATED PELLETS ORAL at 09:08

## 2020-12-08 RX ADMIN — PRAVASTATIN SODIUM 20 MG: 20 TABLET ORAL at 09:05

## 2020-12-08 RX ADMIN — LORAZEPAM 0.5 MG: 0.5 TABLET ORAL at 04:05

## 2020-12-08 RX ADMIN — DILTIAZEM HYDROCHLORIDE 30 MG: 30 TABLET, FILM COATED ORAL at 04:05

## 2020-12-08 NOTE — PLAN OF CARE
"Goal Outcome Evaluation:  Plan of Care Reviewed With: patient  Progress: no change  Outcome Summary: Patient on RA with sats in the low-mid 90s. Pulled out IV. See previous nursing note. Patient refused half of his meds at the beginning of the shift. Attempted to get out of bed and was yelling \"take me home now\" constantly. PRN ativan given x1. VSS. Safety maintained. Will continue to monitor.  "

## 2020-12-08 NOTE — CONSULTS
INFECTIOUS DISEASES CONSULT NOTE    Patient:  Ezequiel Abdalla 75 y.o. male  ROOM # 271/1  YOB: 1945  MRN: [id number]  [facility]:  [id number]  Admit date: 12/4/2020   Admitting Physician: Denny Whipple MD  Primary Care Physician: Shane Banks MD  REFERRING PROVIDER: Boston Fontana DO    Reason for Consultation: Bacteremia versus contamination.  2 of 2 blood culture sets positive for Staphylococcus capitis.    History of Present Illness/Chief Complaint: 75-year-old man.  Try to get history from him.  He said he did not want to talk to me.  Per nursing he has some dementia.  Try to talk with him using the iPad Zoom device.  I could see him.  He could see me.  He could hear my questions.  He was not interested in offering history.  History is obtained from review of his admit H&P.  He apparently has dementia.  He had apparently developed some dyspnea.  He was apparently hypoxic requiring 15 L of oxygen with a saturation of 95%.  He was brought to the emergency room.  He was admitted for further management.    Current Scheduled Medications:   aspirin, 325 mg, Oral, Daily  buPROPion, 75 mg, Oral, Q12H  cholecalciferol, 2,000 Units, Oral, Daily  dexamethasone, 6 mg, Oral, Daily  dilTIAZem, 30 mg, Oral, Q6H  Divalproex Sodium, 250 mg, Oral, Q12H  famotidine, 20 mg, Oral, BID AC  mirtazapine, 15 mg, Oral, Nightly  multivitamin with minerals, 1 tablet, Oral, Daily  nicotine, 1 patch, Transdermal, Q24H  potassium chloride, 20 mEq, Oral, BID  pravastatin, 20 mg, Oral, Daily  remdesivir, 100 mg, Intravenous, Q24H  sodium chloride, 10 mL, Intravenous, Q12H  terazosin, 1 mg, Oral, Nightly  vancomycin, 1,000 mg, Intravenous, Q12H  vitamin C, 500 mg, Oral, Daily  zinc sulfate, 220 mg, Oral, Daily      Current PRN Medications:  •  acetaminophen  •  LORazepam  •  ondansetron  •  Pharmacy Consult - Remdesivir  •  polyethylene glycol  •  [COMPLETED] Insert peripheral IV **AND** sodium chloride  •  sodium  "chloride    Allergies:  No Known Allergies     Past Medical History: Atrial fibrillation.  Alcohol abuse.  Chronic obstructive pulmonary disease.  Malaise and fatigue.  Brina-Parkinson-White.  Polycythemia.    Past Surgical History: Knee surgery.  Pacemaker implantation.  Wrist surgery.    Social History: Previous tobacco use.  Previous alcohol use.  Currently lives at [facility] per report from nursing.    Family History: Chronic obstructive pulmonary disease.  Cancer.    Exposure History: Unknown but I suspect he has had some Covid exposure in the care facility where he resides.    Review of Systems unobtainable from patient    Vital Signs:  /90 (BP Location: Right leg, Patient Position: Lying)   Pulse 83   Temp 97.1 °F (36.2 °C) (Axillary)   Resp 19   Ht 175.3 cm (69\")   Wt 69.2 kg (152 lb 9.6 oz)   SpO2 94%   BMI 22.54 kg/m²  Temp (24hrs), Av.8 °F (36 °C), Min:96 °F (35.6 °C), Max:97.2 °F (36.2 °C)    Physical Exam  Vital signs - reviewed.  Seen using the iPad Zoom device.  He was not coughing.  He did not appear dyspneic  He was not on oxygen  He was on room air  As outlined in the HPI he would not give history.  The nurse was having spent some time making sure he stayed in bed and did not get out of bed without assistance.    Lab Results:  CBC:   Results from last 7 days   Lab Units 20  0646 20  0614 20  0607 20  1700   WBC 10*3/mm3 5.10 8.10 18.55* 10.34   HEMOGLOBIN g/dL 12.5* 12.0* 12.5* 15.3   HEMATOCRIT % 36.2* 36.6* 40.1 47.0   PLATELETS 10*3/mm3 149 120* 103* 121*   LYMPHOCYTE % % 16.8* 7.1* 9.0*  --    MONOCYTES % % 7.9 5.1 3.0*  --      CMP:   Results from last 7 days   Lab Units 20  0646 20  0614 20  0635 20  0607 20  1725   SODIUM mmol/L 142 143 143 150* 145   POTASSIUM mmol/L 3.5 3.5 3.8 4.3 4.8   CHLORIDE mmol/L 111* 112* 113* 121* 118*   CO2 mmol/L 25.0 24.0 27.0 22.0 19.0*   BUN mg/dL 17 18 18 25* 35* "   CREATININE mg/dL 0.51* 0.59* 0.63* 1.07 1.14   CALCIUM mg/dL 9.7 9.8 10.4 10.1 10.6*   BILIRUBIN mg/dL 0.4 0.3 0.3 0.3 0.3   ALK PHOS U/L 71 60 68 86 97   ALT (SGPT) U/L 26 13 17 22 29   AST (SGOT) U/L 39 27 30 35 44*   GLUCOSE mg/dL 127* 85 96 101* 133*     Blood test on December 4, 2012 was positive    Radiology:     CT angiogram of the chest dated December 4, 2020:  IMPRESSION:  1. No CT evidence of pulmonary embolus.  2. Dilated ascending thoracic aorta measuring 4.2 cm. No dissection.  There is cardiomegaly. There is atheromatous disease of the thoracic  aorta and coronary arteries.  3. Bilateral infiltrates worrisome for Covid infection.     The full report of this exam was immediately signed and available to the  emergency room. The patient is currently in the emergency room.    This report was finalized on 12/04/2020 21:37 by Dr. Jett Gore MD.    Chest x-ray dated December 4, 2020:  IMPRESSION:  1. Patchy infiltrates bilaterally may represent pulmonary edema or  pneumonia.  2. Cardiomegaly.  3. Gastric distention.  This report was finalized on 12/04/2020 17:40 by Dr. Jett Gore MD.    Additional Studies Reviewed:     Impression:   1.  COVID-19 infection-currently on room air with excellent oxygen saturation.  He does not appear to be coughing.  He does not appear to be short of breath.  He is on dexamethasone and remdesivr.  Do not feel he needs additional treatment at present.  2.  Positive blood culture for Staphylococcus capitis-these may be a pathogen given the presence of indwelling pacemaker.    Recommendations:    Continue dexamethasone  Continue remdesivir  He is currently on vancomycin treatment  Will go ahead and repeat blood cultures, but follow-ups may be negative given previous vancomycin treatment  If blood cultures remain positive for Staphylococcus capitis, I would likely confirm pacer lead infection  Will await cultures  Continue to follow    Jovanni Melton MD  12/08/20  16:31  CST

## 2020-12-08 NOTE — PROGRESS NOTES
Continued Stay Note  [facility]     Patient Name: Ezequiel Abdalla  MRN: [id number]  Today's Date: 12/8/2020    Admit Date: 12/4/2020    Discharge Plan     Row Name 12/08/20 1433       Plan    Plan  [facility]    Patient/Family in Agreement with Plan  yes    Final Discharge Disposition Code  03 - skilled nursing facility (SNF)    Final Note  Pt is being discharged back to [facility] today, they can administer needed IV abx there, spoke with STACY Duran.  Pt will be readmitted at SNF level care. Informed guardian- Silvia Parker [phone].  Pt will need to be transported via [facility] ambulance [phone].    [facility] [phone]  Fax [phone]          Discharge Codes    No documentation.             JANETH Reese

## 2020-12-08 NOTE — NURSING NOTE
"2 RNs attempted to start IV. Patient was very uncooperative and started to be hateful. Yelling and cursing at the nurses and pulling his arms away when we were trying to start an IV. Yelled at us and told us to \"get the f out of here\". Attempted x 1 unsuccessfully. No IV access at this point. 0600 vancomycin not given. Deborah Richardson RN   "

## 2020-12-08 NOTE — PLAN OF CARE
"Goal Outcome Evaluation:  Plan of Care Reviewed With: patient  Progress: no change  Outcome Summary: Pt alert with confusion noted, oriented to self only. Pt very easily agitated. Refused breakfast et lunch. Yelled at this nurse \"leave me alone and get out of here.\" VSS. Remains on room air. No cough noted. No apparent SOA. Safety maintained. Will cont to monitor.  "

## 2020-12-08 NOTE — DISCHARGE PLACEMENT REQUEST
"Anai Miranda [phone]  Leelee Rendon (75 y.o. Male)     Date of Birth Social Security Number Address Home Phone MRN    1945  [address] [address] [phone] [id number]    [employer] Marital Status          Jain        Admission Date Admission Type Admitting Provider Attending Provider Department, Room/Bed    12/4/20 Emergency Denny Whipple MD Truong, Khai C, MD [facility] 2C, 271/1    Discharge Date Discharge Disposition Discharge Destination                       Attending Provider: Denny Whipple MD    Allergies: No Known Allergies    Isolation: Enh Drop/Con   Infection: COVID (confirmed) (12/04/20)   Code Status: CPR    Ht: 175.3 cm (69\")   Wt: 69.2 kg (152 lb 9.6 oz)    Admission Cmt: None   Principal Problem: None                Active Insurance as of 12/4/2020     Primary Coverage     Payor Plan Insurance Group Employer/Plan Group    MEDICARE MEDICARE A & B      Payor Plan Address Payor Plan Phone Number Payor Plan Fax Number Effective Dates    [address] [phone]  2/1/2008 - None Entered    [facility] [id number]       Subscriber Name Subscriber Birth Date Member ID       LEELEE RENDON R 1945 [id number]           Secondary Coverage     Payor Plan Insurance Group Employer/Plan Group    [facility] [id number]     Payor Plan Address Payor Plan Phone Number Payor Plan Fax Number Effective Dates    [address]   1/1/2017 - None Entered    [facility] [phone]       Subscriber Name Subscriber Birth Date Member ID       LEELEE RENDON R 1945 [id number]                 Emergency Contacts      (Rel.) Home Phone Work Phone Mobile Phone    ANJANA ANDERSON (GRANDDAUGHTER) (Guardian) -- -- [phone]    Nursing, Home [phone] -- --              "

## 2020-12-08 NOTE — PROGRESS NOTES
[facility] Medicine Services  INPATIENT PROGRESS NOTE    Length of Stay: 4  Date of Admission: 12/4/2020  Primary Care Physician: Shane Banks MD    Subjective   Chief Complaint: Covid-19-pneumonia.  Bacteremia.  Cystitis.    HPI   patient is currently off oxygen.  Vented consult infectious disease for recommendation of antibiotics for possible bacteremia versus contamination.    Review of Systems   Unable to obtain due to dementia.    All pertinent negatives and positives are as above. All other systems have been reviewed and are negative unless otherwise stated.     Objective    Temp:  [96 °F (35.6 °C)-97.2 °F (36.2 °C)] 97.1 °F (36.2 °C)  Heart Rate:  [] 83  Resp:  [19-24] 19  BP: ()/(72-96) 131/90    Intake/Output Summary (Last 24 hours) at 12/8/2020 1423  Last data filed at 12/7/2020 2200  Gross per 24 hour   Intake 1760 ml   Output --   Net 1760 ml     Physical Exam  Vitals signs and nursing note reviewed.   Constitutional:       Appearance: He is well-developed.   HENT:      Head: Normocephalic.   Eyes:      Conjunctiva/sclera: Conjunctivae normal.      Pupils: Pupils are equal, round, and reactive to light.   Neck:      Musculoskeletal: Neck supple.      Vascular: No JVD.   Cardiovascular:      Rate and Rhythm: Normal rate and regular rhythm.      Heart sounds: Normal heart sounds. No murmur. No friction rub. No gallop.    Pulmonary:      Effort: No respiratory distress.      Breath sounds: No wheezing or rales.      Comments: Diminished breath sound bilateral, clear  Chest:      Chest wall: No tenderness.   Abdominal:      General: Bowel sounds are normal. There is no distension.      Palpations: Abdomen is soft.      Tenderness: There is no abdominal tenderness. There is no guarding or rebound.   Musculoskeletal: Normal range of motion.         General: No tenderness or deformity.   Skin:     General: Skin is warm and dry.      Capillary Refill:  Capillary refill takes 2 to 3 seconds.      Findings: No rash.   Neurological:      Mental Status: He is alert. He is disoriented.      Cranial Nerves: No cranial nerve deficit.      Motor: Weakness present. No abnormal muscle tone.      Coordination: Coordination abnormal.      Gait: Gait abnormal.      Deep Tendon Reflexes: Reflexes normal.   Results Review:  Lab Results (last 24 hours)     Procedure Component Value Units Date/Time    Manual Differential [[id number]]  (Abnormal) Collected: 12/08/20 0646    Specimen: Blood Updated: 12/08/20 0743     Neutrophil % 74.3 %      Lymphocyte % 16.8 %      Monocyte % 7.9 %      Atypical Lymphocyte % 1.0 %      Neutrophils Absolute 3.79 10*3/mm3      Lymphocytes Absolute 0.86 10*3/mm3      Monocytes Absolute 0.40 10*3/mm3      Elliptocytes Slight/1+     Poikilocytes Slight/1+     WBC Morphology Normal     Giant Platelets Slight/1+    CBC & Differential [[id number]]  (Abnormal) Collected: 12/08/20 0646    Specimen: Blood Updated: 12/08/20 0743    Narrative:      The following orders were created for panel order CBC & Differential.  Procedure                               Abnormality         Status                     ---------                               -----------         ------                     CBC Auto Differential[[id number]]        Abnormal            Final result                 Please view results for these tests on the individual orders.    CBC Auto Differential [[id number]]  (Abnormal) Collected: 12/08/20 0646    Specimen: Blood Updated: 12/08/20 0743     WBC 5.10 10*3/mm3      RBC 3.91 10*6/mm3      Hemoglobin 12.5 g/dL      Hematocrit 36.2 %      MCV 92.6 fL      MCH 32.0 pg      MCHC 34.5 g/dL      RDW 13.8 %      RDW-SD 46.8 fl      MPV 11.3 fL      Platelets 149 10*3/mm3     Valproic Acid Level, Total [[id number]]  (Abnormal) Collected: 12/08/20 0646    Specimen: Blood Updated: 12/08/20 0740     Valproic Acid 11.8 mcg/mL     Comprehensive Metabolic Panel  [[id number]]  (Abnormal) Collected: 12/08/20 0646    Specimen: Blood Updated: 12/08/20 0738     Glucose 127 mg/dL      BUN 17 mg/dL      Creatinine 0.51 mg/dL      Sodium 142 mmol/L      Potassium 3.5 mmol/L      Comment: Slight hemolysis detected by analyzer. Results may be affected.        Chloride 111 mmol/L      CO2 25.0 mmol/L      Calcium 9.7 mg/dL      Total Protein 5.2 g/dL      Albumin 2.50 g/dL      ALT (SGPT) 26 U/L      AST (SGOT) 39 U/L      Alkaline Phosphatase 71 U/L      Total Bilirubin 0.4 mg/dL      eGFR Non African Amer >150 mL/min/1.73      Globulin 2.7 gm/dL      A/G Ratio 0.9 g/dL      BUN/Creatinine Ratio 33.3     Anion Gap 6.0 mmol/L     Narrative:      GFR Normal >60  Chronic Kidney Disease <60  Kidney Failure <15             Cultures:  Blood Culture   Date Value Ref Range Status   12/04/2020 Staphylococcus capitis (C)  Final   12/04/2020 Staphylococcus capitis (C)  Final     Urine Culture   Date Value Ref Range Status   12/04/2020 No growth  Final       Radiology Data:    Imaging Results (Last 24 Hours)     ** No results found for the last 24 hours. **          No Known Allergies    Scheduled meds:   aspirin, 325 mg, Oral, Daily  buPROPion, 75 mg, Oral, Q12H  cholecalciferol, 2,000 Units, Oral, Daily  dexamethasone, 6 mg, Oral, Daily  dilTIAZem, 30 mg, Oral, Q6H  Divalproex Sodium, 250 mg, Oral, Q12H  famotidine, 20 mg, Oral, BID AC  mirtazapine, 15 mg, Oral, Nightly  multivitamin with minerals, 1 tablet, Oral, Daily  nicotine, 1 patch, Transdermal, Q24H  potassium chloride, 20 mEq, Oral, BID  pravastatin, 20 mg, Oral, Daily  remdesivir, 100 mg, Intravenous, Q24H  sodium chloride, 10 mL, Intravenous, Q12H  terazosin, 1 mg, Oral, Nightly  vancomycin, 1,000 mg, Intravenous, Q12H  vitamin C, 500 mg, Oral, Daily  zinc sulfate, 220 mg, Oral, Daily        PRN meds:  •  acetaminophen  •  LORazepam  •  ondansetron  •  Pharmacy Consult - Remdesivir  •  polyethylene glycol  •  [COMPLETED] Insert  peripheral IV **AND** sodium chloride  •  sodium chloride    Assessment/Plan       COPD (chronic obstructive pulmonary disease) (CMS/[facility])    Alcohol abuse    Chronic atrial fibrillation with RVR (CMS/[facility])    Multiple falls    COVID-19      Plan:  Septic shock/bacteremia/COVID-19 pneumonia/acute respiratory failure with hypoxia/COPD.  Patient is not requiring oxygen. Patient off pressor/septic shock resolved.  Timeframe of COVID-19 infections unclear.  Continue Decadron.  Continue remdesivir.  Vitamin C is.  Zinc.  Vitamin D.    Patient go back to nursing home with vancomycin for 10 days.  Plan for midline.  Consult infectious disease for antibiotic recommendation with a positive blood culture.  Patient is off oxygen.     Acute bacterial cystitis.  DC Rocephin for now.       WPW/atrial fibrillation/hypertension/hyperlipidemia.  Telemetry.  Hold blood pressure due to shock.  Continue aspirin.  Continue Cardizem.  Continue Pravachol.  No anticoagulation due to fall risk.     Hypernatremia.  Resolved.     Hypokalemia.  Resolved.  P.o. potassium.     Reflux.  Pepcid.  Zofran as needed.     Dementia/anxiety/depression.  Haldol as needed.  Continue Wellbutrin.  Continue Depakote.  Remeron.     Alcohol abuse.  Ativan as needed..  Discussed with patient stopping.     Tobacco abuse.  Tobacco counseling.  Discussed patient cutting back and stopping.  Nicotine patch.     Prostate hypertrophy.  Hydration.     Constipation.  MiraLAX as needed.     Nutrition.  Multivitamins.     Nutrition.  Purée/honey thick.     Deconditioning/falling.  OT consult.  PT consult.     Blood culture shows coagulase-negative staph 2 out of 2 sets, Staphylococcus capitis..     Discharge Plannin to 3 days.  Nursing home resident.    Electronically signed by Denny Whipple MD, 20, 2:23 PM CST.

## 2020-12-08 NOTE — PROGRESS NOTES
"Pharmacy Dosing Service  Pharmacokinetics  Vancomycin Follow-up Evaluation    Assessment/Action/Plan:  Vancomycin initiated yesterday for bacteremia. SCr=0.51 (stable). Adjusted Vancomycin dose to 750mg IV Q12h     AUC Model Data:  Regimen: 750 mg every 12 hours   Exposure target: AUC24 (range)400-600 mg/L.hr  AUC24,ss: 564 mg/L.hr  PAUC*: 84 %  Ctrough,ss: 19.1 mg/L  Pconc*: 46 %  Tox.: 16 %    Ordered a Vancomycin trough prior to the AM dose tomorrow (4th). Pharmacy will continue to follow daily and adjust dose accordingly.     Subjective:  Ezequiel Abdalla is a 75 y.o. male currently on Vancomycin 1000 mg IV every 12 hours for the treatment of bacteremia, day 2 of therapy. Current end date: 12-14-20    Objective:  Ht: 175.3 cm (69\"); Wt: 69.2 kg (152 lb 9.6 oz)  Estimated Creatinine Clearance: 75.7 mL/min (A) (by C-G formula based on SCr of 0.51 mg/dL (L)).   Creatinine   Date Value Ref Range Status   12/08/2020 0.51 (L) 0.76 - 1.27 mg/dL Final   12/07/2020 0.59 (L) 0.76 - 1.27 mg/dL Final   12/06/2020 0.63 (L) 0.76 - 1.27 mg/dL Final   06/06/2019 0.8 0.5 - 1.2 mg/dL Final   06/05/2019 0.9 0.5 - 1.2 mg/dL Final   05/16/2019 1 0.5 - 1.2 mg/dL Final      Lab Results   Component Value Date    WBC 5.10 12/08/2020    WBC 8.10 12/07/2020    WBC 18.55 (H) 12/05/2020         Culture Results:  Microbiology Results (last 10 days)       Procedure Component Value - Date/Time    Urine Culture - Urine, Urine, Catheter In/Out [[id number]]  (Normal) Collected: 12/04/20 1711    Lab Status: Final result Specimen: Urine, Catheter In/Out Updated: 12/05/20 1528     Urine Culture No growth    Blood Culture - Blood, Arm, Left [[id number]]  (Abnormal) Collected: 12/04/20 1701    Lab Status: Final result Specimen: Blood from Arm, Left Updated: 12/07/20 0832     Blood Culture Staphylococcus capitis     Isolated from Aerobic Bottle     Gram Stain Aerobic Bottle Gram positive cocci      Anaerobic Bottle Gram positive cocci    Narrative:      " Refer to previous blood culture collected on 12/4/20 for MICs    COVID PRE-OP / PRE-PROCEDURE SCREENING ORDER (NO ISOLATION) - Swab, Nasopharynx [[id number]]  (Abnormal) Collected: 12/04/20 1701    Lab Status: Final result Specimen: Swab from Nasopharynx Updated: 12/04/20 1813    Narrative:      The following orders were created for panel order COVID PRE-OP / PRE-PROCEDURE SCREENING ORDER (NO ISOLATION) - Swab, Nasopharynx.  Procedure                               Abnormality         Status                     ---------                               -----------         ------                     COVID-19, ABBOTT IN-HOUS...[[id number]]  Abnormal            Final result                 Please view results for these tests on the individual orders.    COVID-19, ABBOTT IN-HOUSE,NP Swab (NO TRANSPORT MEDIA) 2 HR TAT - Swab, Nasopharynx [[id number]]  (Abnormal) Collected: 12/04/20 1701    Lab Status: Final result Specimen: Swab from Nasopharynx Updated: 12/04/20 1813     COVID19 Detected    Narrative:      Fact sheet for providers: https://www.fda.gov/media/585493/download     Fact sheet for patients: https://www.fda.gov/media/345698/download    Blood Culture - Blood, Arm, Right [[id number]]  (Abnormal)  (Susceptibility) Collected: 12/04/20 1700    Lab Status: Edited Result - FINAL Specimen: Blood from Arm, Right Updated: 12/08/20 1501     Blood Culture Staphylococcus capitis     Isolated from Aerobic and Anaerobic Bottles     Gram Stain Anaerobic Bottle Gram positive cocci in clusters      Aerobic Bottle Gram positive cocci in clusters    Susceptibility        Staphylococcus capitis     DEMARCO     Ciprofloxacin Resistant (C) [1]      Clindamycin Resistant (C) [1]      Levofloxacin Intermediate (C) [2]      Oxacillin Susceptible     Tetracycline Susceptible (C) [1]      Vancomycin Susceptible              [1]   Appended report. These results have been appended to a previously final verified report.     [2]   Appended report.  These results have been appended to a previously final verified report.  Staphylococcus species may develop resistance during prolonged therapy with quinolones. Isolates that are initially susceptible may become resistant within three to four days after initiation of therapy. Testing of repeat isolates may be warranted.                    Blood Culture ID, PCR - Blood, Arm, Right [[id number]]  (Abnormal) Collected: 12/04/20 1700    Lab Status: Edited Result - FINAL Specimen: Blood from Arm, Right Updated: 12/05/20 2042     BCID, PCR Staphylococcus spp, not aureus. Identification by BCID PCR.     BOTTLE TYPE Anaerobic Bottle     Comment: Appended report. These results have been appended to a previously final verified report.               Mario Luo, ErickaD   12/08/20 17:18 CST

## 2020-12-09 LAB
ALBUMIN SERPL-MCNC: 3.2 G/DL (ref 3.5–5.2)
ALBUMIN/GLOB SERPL: 1.5 G/DL
ALP SERPL-CCNC: 79 U/L (ref 39–117)
ALT SERPL W P-5'-P-CCNC: 24 U/L (ref 1–41)
ANION GAP SERPL CALCULATED.3IONS-SCNC: 9 MMOL/L (ref 5–15)
AST SERPL-CCNC: 27 U/L (ref 1–40)
BASOPHILS # BLD AUTO: 0.03 10*3/MM3 (ref 0–0.2)
BASOPHILS NFR BLD AUTO: 0.3 % (ref 0–1.5)
BILIRUB SERPL-MCNC: 0.6 MG/DL (ref 0–1.2)
BUN SERPL-MCNC: 14 MG/DL (ref 8–23)
BUN/CREAT SERPL: 21.2 (ref 7–25)
CALCIUM SPEC-SCNC: 9.8 MG/DL (ref 8.6–10.5)
CHLORIDE SERPL-SCNC: 110 MMOL/L (ref 98–107)
CO2 SERPL-SCNC: 27 MMOL/L (ref 22–29)
CREAT SERPL-MCNC: 0.66 MG/DL (ref 0.76–1.27)
DEPRECATED RDW RBC AUTO: 47.2 FL (ref 37–54)
EOSINOPHIL # BLD AUTO: 0.01 10*3/MM3 (ref 0–0.4)
EOSINOPHIL NFR BLD AUTO: 0.1 % (ref 0.3–6.2)
ERYTHROCYTE [DISTWIDTH] IN BLOOD BY AUTOMATED COUNT: 13.8 % (ref 12.3–15.4)
GFR SERPL CREATININE-BSD FRML MDRD: 117 ML/MIN/1.73
GLOBULIN UR ELPH-MCNC: 2.2 GM/DL
GLUCOSE SERPL-MCNC: 77 MG/DL (ref 65–99)
HCT VFR BLD AUTO: 40.6 % (ref 37.5–51)
HGB BLD-MCNC: 14.1 G/DL (ref 13–17.7)
IMM GRANULOCYTES # BLD AUTO: 0.14 10*3/MM3 (ref 0–0.05)
IMM GRANULOCYTES NFR BLD AUTO: 1.6 % (ref 0–0.5)
LYMPHOCYTES # BLD AUTO: 3.01 10*3/MM3 (ref 0.7–3.1)
LYMPHOCYTES NFR BLD AUTO: 33.7 % (ref 19.6–45.3)
MCH RBC QN AUTO: 32.3 PG (ref 26.6–33)
MCHC RBC AUTO-ENTMCNC: 34.7 G/DL (ref 31.5–35.7)
MCV RBC AUTO: 92.9 FL (ref 79–97)
MONOCYTES # BLD AUTO: 0.52 10*3/MM3 (ref 0.1–0.9)
MONOCYTES NFR BLD AUTO: 5.8 % (ref 5–12)
NEUTROPHILS NFR BLD AUTO: 5.22 10*3/MM3 (ref 1.7–7)
NEUTROPHILS NFR BLD AUTO: 58.5 % (ref 42.7–76)
NRBC BLD AUTO-RTO: 0 /100 WBC (ref 0–0.2)
PLATELET # BLD AUTO: 210 10*3/MM3 (ref 140–450)
PMV BLD AUTO: 10.4 FL (ref 6–12)
POTASSIUM SERPL-SCNC: 3.2 MMOL/L (ref 3.5–5.2)
PROT SERPL-MCNC: 5.4 G/DL (ref 6–8.5)
RBC # BLD AUTO: 4.37 10*6/MM3 (ref 4.14–5.8)
SODIUM SERPL-SCNC: 146 MMOL/L (ref 136–145)
VANCOMYCIN TROUGH SERPL-MCNC: 14.2 MCG/ML (ref 5–20)
WBC # BLD AUTO: 8.93 10*3/MM3 (ref 3.4–10.8)

## 2020-12-09 PROCEDURE — 25010000002 VANCOMYCIN 10 G RECONSTITUTED SOLUTION: Performed by: FAMILY MEDICINE

## 2020-12-09 PROCEDURE — 36415 COLL VENOUS BLD VENIPUNCTURE: CPT | Performed by: INTERNAL MEDICINE

## 2020-12-09 PROCEDURE — 80202 ASSAY OF VANCOMYCIN: CPT | Performed by: FAMILY MEDICINE

## 2020-12-09 PROCEDURE — 80053 COMPREHEN METABOLIC PANEL: CPT | Performed by: INTERNAL MEDICINE

## 2020-12-09 PROCEDURE — 85025 COMPLETE CBC W/AUTO DIFF WBC: CPT | Performed by: FAMILY MEDICINE

## 2020-12-09 PROCEDURE — 63710000001 DEXAMETHASONE PER 0.25 MG: Performed by: INTERNAL MEDICINE

## 2020-12-09 RX ORDER — POTASSIUM CHLORIDE 750 MG/1
40 CAPSULE, EXTENDED RELEASE ORAL ONCE
Status: COMPLETED | OUTPATIENT
Start: 2020-12-09 | End: 2020-12-09

## 2020-12-09 RX ADMIN — DEXAMETHASONE 6 MG: 4 TABLET ORAL at 09:46

## 2020-12-09 RX ADMIN — DIVALPROEX SODIUM 250 MG: 125 CAPSULE, COATED PELLETS ORAL at 22:45

## 2020-12-09 RX ADMIN — ZINC SULFATE 220 MG (50 MG) CAPSULE 220 MG: CAPSULE at 09:47

## 2020-12-09 RX ADMIN — SODIUM CHLORIDE, PRESERVATIVE FREE 10 ML: 5 INJECTION INTRAVENOUS at 22:48

## 2020-12-09 RX ADMIN — DILTIAZEM HYDROCHLORIDE 30 MG: 30 TABLET, FILM COATED ORAL at 18:53

## 2020-12-09 RX ADMIN — VANCOMYCIN HYDROCHLORIDE 750 MG: 10 INJECTION, POWDER, LYOPHILIZED, FOR SOLUTION INTRAVENOUS at 09:45

## 2020-12-09 RX ADMIN — PRAVASTATIN SODIUM 20 MG: 20 TABLET ORAL at 09:46

## 2020-12-09 RX ADMIN — CHOLECALCIFEROL (VITAMIN D3) 25 MCG (1,000 UNIT) TABLET 2000 UNITS: TABLET at 09:46

## 2020-12-09 RX ADMIN — ASPIRIN 325 MG: 325 TABLET ORAL at 09:46

## 2020-12-09 RX ADMIN — POTASSIUM CHLORIDE 20 MEQ: 20 SOLUTION ORAL at 22:46

## 2020-12-09 RX ADMIN — FAMOTIDINE 20 MG: 20 TABLET, FILM COATED ORAL at 18:53

## 2020-12-09 RX ADMIN — BUPROPION HYDROCHLORIDE 75 MG: 75 TABLET, FILM COATED ORAL at 09:46

## 2020-12-09 RX ADMIN — OXYCODONE HYDROCHLORIDE AND ACETAMINOPHEN 500 MG: 500 TABLET ORAL at 09:46

## 2020-12-09 RX ADMIN — DIVALPROEX SODIUM 250 MG: 125 CAPSULE, COATED PELLETS ORAL at 09:46

## 2020-12-09 RX ADMIN — Medication 1 TABLET: at 09:52

## 2020-12-09 RX ADMIN — BUPROPION HYDROCHLORIDE 75 MG: 75 TABLET, FILM COATED ORAL at 22:45

## 2020-12-09 RX ADMIN — FAMOTIDINE 20 MG: 20 TABLET, FILM COATED ORAL at 09:46

## 2020-12-09 RX ADMIN — POTASSIUM CHLORIDE 40 MEQ: 750 CAPSULE, EXTENDED RELEASE ORAL at 15:47

## 2020-12-09 RX ADMIN — LORAZEPAM 0.5 MG: 0.5 TABLET ORAL at 03:00

## 2020-12-09 RX ADMIN — DILTIAZEM HYDROCHLORIDE 30 MG: 30 TABLET, FILM COATED ORAL at 03:07

## 2020-12-09 RX ADMIN — SODIUM CHLORIDE, POTASSIUM CHLORIDE, SODIUM LACTATE AND CALCIUM CHLORIDE 50 ML/HR: 600; 310; 30; 20 INJECTION, SOLUTION INTRAVENOUS at 09:45

## 2020-12-09 RX ADMIN — POTASSIUM CHLORIDE 20 MEQ: 20 SOLUTION ORAL at 09:48

## 2020-12-09 RX ADMIN — REMDESIVIR 100 MG: 100 INJECTION, POWDER, LYOPHILIZED, FOR SOLUTION INTRAVENOUS at 11:52

## 2020-12-09 RX ADMIN — DILTIAZEM HYDROCHLORIDE 30 MG: 30 TABLET, FILM COATED ORAL at 09:46

## 2020-12-09 NOTE — PROGRESS NOTES
[facility] Medicine Services  INPATIENT PROGRESS NOTE    Length of Stay: 5  Date of Admission: 12/4/2020  Primary Care Physician: Shane Banks MD    Subjective   Chief Complaint: Covid-19-pneumonia.  Bacteremia.  Failure to thrive.    HPI   Patient is currently off oxygen.  Will follow recommendation for infectious disease.    Review of Systems   Unable to obtain due to dementia.    All pertinent negatives and positives are as above. All other systems have been reviewed and are negative unless otherwise stated.     Objective    Temp:  [97 °F (36.1 °C)-98 °F (36.7 °C)] 97.2 °F (36.2 °C)  Heart Rate:  [66-88] 73  Resp:  [15-26] 24  BP: (101-129)/(60-90) 104/80  No intake or output data in the 24 hours ending 12/09/20 1326  Physical Exam  Vitals signs and nursing note reviewed.   Constitutional:       Appearance: He is well-developed.   HENT:      Head: Normocephalic.   Eyes:      Conjunctiva/sclera: Conjunctivae normal.      Pupils: Pupils are equal, round, and reactive to light.   Neck:      Musculoskeletal: Neck supple.      Vascular: No JVD.   Cardiovascular:      Rate and Rhythm: Normal rate and regular rhythm.      Heart sounds: Normal heart sounds. No murmur. No friction rub. No gallop.    Pulmonary:      Effort: No respiratory distress.      Breath sounds: No wheezing or rales.      Comments: Diminished breath sound bilateral, snoring.  Chest:      Chest wall: No tenderness.   Abdominal:      General: Bowel sounds are normal. There is no distension.      Palpations: Abdomen is soft.      Tenderness: There is no abdominal tenderness. There is no guarding or rebound.   Musculoskeletal: Normal range of motion.         General: No tenderness or deformity.   Skin:     General: Skin is warm and dry.      Capillary Refill: Capillary refill takes 2 to 3 seconds.      Findings: No rash.   Neurological:      Mental Status: He is alert. He is disoriented.      Cranial Nerves: No  cranial nerve deficit.      Motor: Weakness present. No abnormal muscle tone.      Coordination: Coordination abnormal.      Gait: Gait abnormal.      Deep Tendon Reflexes: Reflexes normal.   Results Review:  Lab Results (last 24 hours)     Procedure Component Value Units Date/Time    Comprehensive Metabolic Panel [[id number]]  (Abnormal) Collected: 12/09/20 0854    Specimen: Blood Updated: 12/09/20 0958     Glucose 77 mg/dL      BUN 14 mg/dL      Creatinine 0.66 mg/dL      Sodium 146 mmol/L      Potassium 3.2 mmol/L      Chloride 110 mmol/L      CO2 27.0 mmol/L      Calcium 9.8 mg/dL      Total Protein 5.4 g/dL      Albumin 3.20 g/dL      ALT (SGPT) 24 U/L      AST (SGOT) 27 U/L      Alkaline Phosphatase 79 U/L      Total Bilirubin 0.6 mg/dL      eGFR Non African Amer 117 mL/min/1.73      Globulin 2.2 gm/dL      A/G Ratio 1.5 g/dL      BUN/Creatinine Ratio 21.2     Anion Gap 9.0 mmol/L     Narrative:      GFR Normal >60  Chronic Kidney Disease <60  Kidney Failure <15      Vancomycin, Trough Please call results to Pharmacy prior to administering next dose [[id number]]  (Normal) Collected: 12/09/20 0854    Specimen: Blood Updated: 12/09/20 0957     Vancomycin Trough 14.20 mcg/mL     CBC & Differential [[id number]]  (Abnormal) Collected: 12/09/20 0854    Specimen: Blood Updated: 12/09/20 0945    Narrative:      The following orders were created for panel order CBC & Differential.  Procedure                               Abnormality         Status                     ---------                               -----------         ------                     CBC Auto Differential[[id number]]        Abnormal            Final result                 Please view results for these tests on the individual orders.    CBC Auto Differential [[id number]]  (Abnormal) Collected: 12/09/20 0854    Specimen: Blood Updated: 12/09/20 0945     WBC 8.93 10*3/mm3      RBC 4.37 10*6/mm3      Hemoglobin 14.1 g/dL      Hematocrit 40.6 %      MCV 92.9  fL      MCH 32.3 pg      MCHC 34.7 g/dL      RDW 13.8 %      RDW-SD 47.2 fl      MPV 10.4 fL      Platelets 210 10*3/mm3      Neutrophil % 58.5 %      Lymphocyte % 33.7 %      Monocyte % 5.8 %      Eosinophil % 0.1 %      Basophil % 0.3 %      Immature Grans % 1.6 %      Neutrophils, Absolute 5.22 10*3/mm3      Lymphocytes, Absolute 3.01 10*3/mm3      Monocytes, Absolute 0.52 10*3/mm3      Eosinophils, Absolute 0.01 10*3/mm3      Basophils, Absolute 0.03 10*3/mm3      Immature Grans, Absolute 0.14 10*3/mm3      nRBC 0.0 /100 WBC     Blood Culture With PATTIE - Blood, Arm, Right [[id number]] Collected: 12/08/20 1737    Specimen: Blood from Arm, Right Updated: 12/09/20 0600     Blood Culture No growth at less than 24 hours    Blood Culture With PATTIE - Blood, Arm, Left [[id number]] Collected: 12/08/20 1725    Specimen: Blood from Arm, Left Updated: 12/09/20 0600     Blood Culture No growth at less than 24 hours    Blood Culture - Blood, Arm, Right [[id number]]  (Abnormal)  (Susceptibility) Collected: 12/04/20 1700    Specimen: Blood from Arm, Right Updated: 12/08/20 1501     Blood Culture Staphylococcus capitis     Isolated from Aerobic and Anaerobic Bottles     Gram Stain Anaerobic Bottle Gram positive cocci in clusters      Aerobic Bottle Gram positive cocci in clusters    Susceptibility      Staphylococcus capitis     DEMARCO     Ciprofloxacin Resistant (C) [1]      Clindamycin Resistant (C) [1]      Levofloxacin Intermediate (C) [2]      Oxacillin Susceptible     Tetracycline Susceptible (C) [1]      Vancomycin Susceptible            [1]   Appended report. These results have been appended to a previously final verified report.     [2]   Appended report. These results have been appended to a previously final verified report.  Staphylococcus species may develop resistance during prolonged therapy with quinolones. Isolates that are initially susceptible may become resistant within three to four days after initiation of therapy.  Testing of repeat isolates may be warranted.                         Cultures:  Blood Culture   Date Value Ref Range Status   12/08/2020 No growth at less than 24 hours  Preliminary   12/08/2020 No growth at less than 24 hours  Preliminary   12/04/2020 Staphylococcus capitis (C)  Final   12/04/2020 Staphylococcus capitis (C)  Final     Urine Culture   Date Value Ref Range Status   12/04/2020 No growth  Final       Radiology Data:    Imaging Results (Last 24 Hours)     ** No results found for the last 24 hours. **          No Known Allergies    Scheduled meds:   aspirin, 325 mg, Oral, Daily  buPROPion, 75 mg, Oral, Q12H  cholecalciferol, 2,000 Units, Oral, Daily  dexamethasone, 6 mg, Oral, Daily  dilTIAZem, 30 mg, Oral, Q6H  Divalproex Sodium, 250 mg, Oral, Q12H  famotidine, 20 mg, Oral, BID AC  mirtazapine, 15 mg, Oral, Nightly  multivitamin with minerals, 1 tablet, Oral, Daily  nicotine, 1 patch, Transdermal, Q24H  potassium chloride, 20 mEq, Oral, BID  pravastatin, 20 mg, Oral, Daily  sodium chloride, 10 mL, Intravenous, Q12H  terazosin, 1 mg, Oral, Nightly  vancomycin, 750 mg, Intravenous, Q12H  vitamin C, 500 mg, Oral, Daily  zinc sulfate, 220 mg, Oral, Daily        PRN meds:  •  acetaminophen  •  LORazepam  •  ondansetron  •  Pharmacy Consult - Remdesivir  •  polyethylene glycol  •  [COMPLETED] Insert peripheral IV **AND** sodium chloride  •  sodium chloride    Assessment/Plan       COPD (chronic obstructive pulmonary disease) (CMS/[facility])    Alcohol abuse    Chronic atrial fibrillation with RVR (CMS/[facility])    Multiple falls    COVID-19      Plan:  Septic shock/bacteremia/COVID-19 pneumonia/acute respiratory failure with hypoxia/COPD.  Patient is not requiring oxygen. Patient off pressor/septic shock resolved.  Timeframe of COVID-19 infections unclear.  Continue Decadron.  Continue remdesivir.  Vitamin C is.  Zinc.  Vitamin D. .  Consult infectious disease for antibiotic recommendation with a positive blood  culture.  Patient is off oxygen.  Recommendation from infectious disease-stop betamethasone, discontinue vancomycin, repeat blood culture in 7 to 10 days and avoid contamination of bloodstream with coagulase staph negative.     Acute bacterial cystitis.  DC Rocephin for now.       WPW/atrial fibrillation/hypertension/hyperlipidemia.  Telemetry.  Hold blood pressure due to shock.  Continue aspirin.  Continue Cardizem.  Continue Pravachol.  No anticoagulation due to fall risk.     Hypernatremia.  Resolved.     Hypokalemia.  Resolved.  P.o. potassium.  Magnesium-normal.      Reflux.  Pepcid.  Zofran as needed.     Dementia/anxiety/depression.  Haldol as needed.  Continue Wellbutrin.  Continue Depakote.  Remeron.     Alcohol abuse.  Ativan as needed..  Discussed with patient stopping.     Tobacco abuse.  Tobacco counseling.  Discussed patient cutting back and stopping.  Nicotine patch.     Prostate hypertrophy.  Hydration.     Constipation.  MiraLAX as needed.     Nutrition.  Purée/honey thick.  Multivitamins.     Deconditioning/falling.  OT consult.  PT consult.     Blood culture shows coagulase-negative staph 2 out of 2 sets, Staphylococcus capitis.. New blood culture shows no growth less than 24 hours.     Discharge Plannin to 3 days.  Nursing home resident.    Electronically signed by Denny Whipple MD, 20, 1:26 PM CST.

## 2020-12-09 NOTE — PLAN OF CARE
Goal Outcome Evaluation:  Plan of Care Reviewed With: patient  Progress: no change  Outcome Summary: VSS, conts on RA, has been confused but calm and cooperative,  refuses meds at times, but does take them after waiting for a bit, only eating bites of his meals, in/out of sleep today, has been incont of urine

## 2020-12-09 NOTE — SIGNIFICANT NOTE
Continued Stay Note  [facility]     Patient Name: Ezequiel Abdalla  MRN: [id number]  Today's Date: 12/9/2020    Admit Date: 12/4/2020    Discharge Plan     Row Name 12/09/20 1424       Plan    Plan  Spoke with Dr Escobedo's nurse, stated to hold on mid-line placement until further eval. Order for midline now on hold. Nurse made aware and Deborah with vascular team.        Discharge Codes    No documentation.             Viky Jimenes RN

## 2020-12-09 NOTE — PROGRESS NOTES
"Infectious Diseases Progress Note    Patient:  Ezequiel Abdalla  YOB: 1945  MRN: [id number]   Admit date: 12/4/2020   Admitting Physician: Denny Whipple MD  Primary Care Physician: Shane Banks MD    Chief Complaint/Interval History: He looks comfortable.  He is without new symptoms.  He has not had significant fever.  He is hemodynamically stable.  He remains on room air.  He is not coughing.  He does not appear dyspneic.    No intake or output data in the 24 hours ending 12/09/20 1350  Allergies: No Known Allergies  Current Scheduled Medications:   aspirin, 325 mg, Oral, Daily  buPROPion, 75 mg, Oral, Q12H  cholecalciferol, 2,000 Units, Oral, Daily  dexamethasone, 6 mg, Oral, Daily  dilTIAZem, 30 mg, Oral, Q6H  Divalproex Sodium, 250 mg, Oral, Q12H  famotidine, 20 mg, Oral, BID AC  mirtazapine, 15 mg, Oral, Nightly  multivitamin with minerals, 1 tablet, Oral, Daily  nicotine, 1 patch, Transdermal, Q24H  potassium chloride, 20 mEq, Oral, BID  potassium chloride, 40 mEq, Oral, Once  pravastatin, 20 mg, Oral, Daily  sodium chloride, 10 mL, Intravenous, Q12H  terazosin, 1 mg, Oral, Nightly  vancomycin, 750 mg, Intravenous, Q12H  vitamin C, 500 mg, Oral, Daily  zinc sulfate, 220 mg, Oral, Daily      Current PRN Medications:  •  acetaminophen  •  LORazepam  •  ondansetron  •  Pharmacy Consult - Remdesivir  •  polyethylene glycol  •  [COMPLETED] Insert peripheral IV **AND** sodium chloride  •  sodium chloride    Review of Systems See HPI    Vital Signs:  /80 (BP Location: Right arm, Patient Position: Lying)   Pulse 73   Temp 97.2 °F (36.2 °C) (Axillary)   Resp 24   Ht 175.3 cm (69\")   Wt 69.2 kg (152 lb 9.6 oz)   SpO2 96%   BMI 22.54 kg/m²     Physical Exam  Vital signs - reviewed.  Saw him using the [employer] zoom device.  He looked comfortable.  He was not coughing.  He was not in distress.  He was on room air.  Nurse assisting with iPad visit assisted with exam.  She was able to show me his " pacemaker site.  There was no redness, swelling, or fluctuant appearance.  Nurse confirmed no signs of infection.  Extremities without significant edema.  Extremities without splinter hemorrhages  Line/IV (Peripheral) site -Was able to see the IV site using the iPad zoom.  No signs of infection.  Nurse confirms.    Lab Results:  CBC:   Results from last 7 days   Lab Units 12/09/20  0854 12/08/20  0646 12/07/20  0614 12/05/20  0607 12/04/20  1700   WBC 10*3/mm3 8.93 5.10 8.10 18.55* 10.34   HEMOGLOBIN g/dL 14.1 12.5* 12.0* 12.5* 15.3   HEMATOCRIT % 40.6 36.2* 36.6* 40.1 47.0   PLATELETS 10*3/mm3 210 149 120* 103* 121*     BMP:  Results from last 7 days   Lab Units 12/09/20  0854 12/08/20  0646 12/07/20  0614 12/06/20  0635 12/05/20  0607 12/04/20  1725   SODIUM mmol/L 146* 142 143 143 150* 145   POTASSIUM mmol/L 3.2* 3.5 3.5 3.8 4.3 4.8   CHLORIDE mmol/L 110* 111* 112* 113* 121* 118*   CO2 mmol/L 27.0 25.0 24.0 27.0 22.0 19.0*   BUN mg/dL 14 17 18 18 25* 35*   CREATININE mg/dL 0.66* 0.51* 0.59* 0.63* 1.07 1.14   GLUCOSE mg/dL 77 127* 85 96 101* 133*   CALCIUM mg/dL 9.8 9.7 9.8 10.4 10.1 10.6*   ALT (SGPT) U/L 24 26 13 17 22 29     Culture Results:   Blood Culture   Date Value Ref Range Status   12/08/2020 No growth at less than 24 hours  Preliminary   12/08/2020 No growth at less than 24 hours  Preliminary   12/04/2020 Staphylococcus capitis (C)  Final   12/04/2020 Staphylococcus capitis (C)  Final     Urine Culture   Date Value Ref Range Status   12/04/2020 No growth  Final     Radiology: None  Additional Studies Reviewed: None    Impression:   1.  COVID-19 infection.  Seems to be doing well.  He is on room air.  Oxygen saturation currently 98 to 100%.  Feel a short course of dexamethasone should be adequate in terms of Covid management. I contacted [facility].  They did not have a previous positive Covid test on him.  They indicate the first positive Covid test he had was upon admission to the  hospital.  In talking with [facility], I did not get a history that he had been having low-grade fever prior to coming to the hospital.  It sounds like his respiratory distress and fever happened rather abruptly.  2.  Positive blood culture for Staphylococcus capitis-this could be pathogen or contaminant.  If pathogen it would be most likely associated with a pacer lead infection.  I do not think we have enough evidence at this point to justify a lengthy course of IV antibiotic therapy for the possibility of pacer lead infection.  Feel the best approach would be to truncate his course of dexamethasone treatment, discontinuation of vancomycin, and repeat cultures off antibiotic treatment.    Recommendations:   As outlined above I really do not get a history that he was having a lot of low-grade fever to suggest a low-grade bloodstream infection with Staphylococcus capitis.  It sounds like most of the symptoms that brought him to the hospital may have been Covid related and are now improving.  Given his age and underlying mental status issues, feel shorter rather the longer course of dexamethasone would be appropriate.  Feel it would be reasonable to go ahead and stop dexamethasone therapy and see how he does off dexamethasone treatment  Would suggest discontinuing vancomycin treatment  Would recommend blood cultures in 7 to 10 days using perfect technique to avoid any contamination to reassess for the possibility of bloodstream infection with coagulase-negative staph    Jovanni Melton MD

## 2020-12-09 NOTE — PLAN OF CARE
Goal Outcome Evaluation:  Plan of Care Reviewed With: patient  Progress: no change  Outcome Summary: Patient oriented to self. Incontinent. At 2000 patient was very agitated and trying to get out of bed. Cursing and yelling and would not allow assessment or vital signs done. He was pulling at IV tubing and ripping off all his cardiac leads. Patient placed in restraints. Granddaughter called and notified. PRN ativan given at this time. At 0000 patient was asleep and calm and restraints were removed at this time. Patient refused some of his nightly meds. IV vanc infused. VSS. Safety maintained. Will continue to monitor.

## 2020-12-10 VITALS
TEMPERATURE: 97.2 F | SYSTOLIC BLOOD PRESSURE: 121 MMHG | DIASTOLIC BLOOD PRESSURE: 86 MMHG | WEIGHT: 152.6 LBS | RESPIRATION RATE: 24 BRPM | HEART RATE: 69 BPM | HEIGHT: 69 IN | OXYGEN SATURATION: 94 % | BODY MASS INDEX: 22.6 KG/M2

## 2020-12-10 LAB
ALBUMIN SERPL-MCNC: 2.6 G/DL (ref 3.5–5.2)
ALBUMIN/GLOB SERPL: 1 G/DL
ALP SERPL-CCNC: 75 U/L (ref 39–117)
ALT SERPL W P-5'-P-CCNC: 20 U/L (ref 1–41)
ANION GAP SERPL CALCULATED.3IONS-SCNC: 5 MMOL/L (ref 5–15)
AST SERPL-CCNC: 23 U/L (ref 1–40)
BILIRUB SERPL-MCNC: 0.5 MG/DL (ref 0–1.2)
BUN SERPL-MCNC: 13 MG/DL (ref 8–23)
BUN/CREAT SERPL: 22 (ref 7–25)
CALCIUM SPEC-SCNC: 9.1 MG/DL (ref 8.6–10.5)
CHLORIDE SERPL-SCNC: 107 MMOL/L (ref 98–107)
CO2 SERPL-SCNC: 25 MMOL/L (ref 22–29)
CREAT SERPL-MCNC: 0.59 MG/DL (ref 0.76–1.27)
DEPRECATED RDW RBC AUTO: 47.3 FL (ref 37–54)
ERYTHROCYTE [DISTWIDTH] IN BLOOD BY AUTOMATED COUNT: 13.8 % (ref 12.3–15.4)
GFR SERPL CREATININE-BSD FRML MDRD: 133 ML/MIN/1.73
GLOBULIN UR ELPH-MCNC: 2.7 GM/DL
GLUCOSE SERPL-MCNC: 110 MG/DL (ref 65–99)
HCT VFR BLD AUTO: 37.7 % (ref 37.5–51)
HGB BLD-MCNC: 12.6 G/DL (ref 13–17.7)
LYMPHOCYTES # BLD MANUAL: 2.06 10*3/MM3 (ref 0.7–3.1)
LYMPHOCYTES NFR BLD MANUAL: 28.3 % (ref 19.6–45.3)
LYMPHOCYTES NFR BLD MANUAL: 7.1 % (ref 5–12)
MCH RBC QN AUTO: 31 PG (ref 26.6–33)
MCHC RBC AUTO-ENTMCNC: 33.4 G/DL (ref 31.5–35.7)
MCV RBC AUTO: 92.6 FL (ref 79–97)
MONOCYTES # BLD AUTO: 0.52 10*3/MM3 (ref 0.1–0.9)
NEUTROPHILS # BLD AUTO: 4.33 10*3/MM3 (ref 1.7–7)
NEUTROPHILS NFR BLD MANUAL: 59.6 % (ref 42.7–76)
PLASMA CELL PREC NFR BLD MANUAL: 1 % (ref 0–0)
PLAT MORPH BLD: NORMAL
PLATELET # BLD AUTO: 195 10*3/MM3 (ref 140–450)
PMV BLD AUTO: 10.8 FL (ref 6–12)
POIKILOCYTOSIS BLD QL SMEAR: ABNORMAL
POTASSIUM SERPL-SCNC: 3.5 MMOL/L (ref 3.5–5.2)
PROT SERPL-MCNC: 5.3 G/DL (ref 6–8.5)
RBC # BLD AUTO: 4.07 10*6/MM3 (ref 4.14–5.8)
SODIUM SERPL-SCNC: 137 MMOL/L (ref 136–145)
VARIANT LYMPHS NFR BLD MANUAL: 4 % (ref 0–5)
WBC # BLD AUTO: 7.27 10*3/MM3 (ref 3.4–10.8)
WBC MORPH BLD: NORMAL

## 2020-12-10 PROCEDURE — 97162 PT EVAL MOD COMPLEX 30 MIN: CPT | Performed by: PHYSICAL THERAPIST

## 2020-12-10 PROCEDURE — 85025 COMPLETE CBC W/AUTO DIFF WBC: CPT | Performed by: FAMILY MEDICINE

## 2020-12-10 PROCEDURE — 97535 SELF CARE MNGMENT TRAINING: CPT | Performed by: OCCUPATIONAL THERAPIST

## 2020-12-10 PROCEDURE — 80053 COMPREHEN METABOLIC PANEL: CPT | Performed by: INTERNAL MEDICINE

## 2020-12-10 PROCEDURE — 92526 ORAL FUNCTION THERAPY: CPT

## 2020-12-10 PROCEDURE — 85007 BL SMEAR W/DIFF WBC COUNT: CPT | Performed by: FAMILY MEDICINE

## 2020-12-10 RX ORDER — ACETAMINOPHEN 325 MG/1
650 TABLET ORAL EVERY 6 HOURS PRN
Refills: 0
Start: 2020-12-10

## 2020-12-10 RX ORDER — POLYETHYLENE GLYCOL 3350 17 G/17G
17 POWDER, FOR SOLUTION ORAL DAILY PRN
Status: ON HOLD
Start: 2020-12-10 | End: 2022-01-01

## 2020-12-10 RX ADMIN — LORAZEPAM 0.5 MG: 0.5 TABLET ORAL at 03:37

## 2020-12-10 RX ADMIN — ASPIRIN 325 MG: 325 TABLET ORAL at 08:00

## 2020-12-10 RX ADMIN — DILTIAZEM HYDROCHLORIDE 30 MG: 30 TABLET, FILM COATED ORAL at 08:04

## 2020-12-10 RX ADMIN — DIVALPROEX SODIUM 250 MG: 125 CAPSULE, COATED PELLETS ORAL at 08:00

## 2020-12-10 RX ADMIN — PRAVASTATIN SODIUM 20 MG: 20 TABLET ORAL at 08:00

## 2020-12-10 RX ADMIN — SODIUM CHLORIDE, POTASSIUM CHLORIDE, SODIUM LACTATE AND CALCIUM CHLORIDE 50 ML/HR: 600; 310; 30; 20 INJECTION, SOLUTION INTRAVENOUS at 12:07

## 2020-12-10 RX ADMIN — FAMOTIDINE 20 MG: 20 TABLET, FILM COATED ORAL at 07:59

## 2020-12-10 RX ADMIN — CHOLECALCIFEROL (VITAMIN D3) 25 MCG (1,000 UNIT) TABLET 2000 UNITS: TABLET at 08:05

## 2020-12-10 RX ADMIN — BUPROPION HYDROCHLORIDE 75 MG: 75 TABLET, FILM COATED ORAL at 08:00

## 2020-12-10 RX ADMIN — POTASSIUM CHLORIDE 20 MEQ: 20 SOLUTION ORAL at 08:10

## 2020-12-10 RX ADMIN — SODIUM CHLORIDE, PRESERVATIVE FREE 10 ML: 5 INJECTION INTRAVENOUS at 08:05

## 2020-12-10 RX ADMIN — ZINC SULFATE 220 MG (50 MG) CAPSULE 220 MG: CAPSULE at 08:00

## 2020-12-10 RX ADMIN — OXYCODONE HYDROCHLORIDE AND ACETAMINOPHEN 500 MG: 500 TABLET ORAL at 08:05

## 2020-12-10 NOTE — DISCHARGE PLACEMENT REQUEST
"MELVI BUTLER [phone]  Leeele Rendon (75 y.o. Male)     Date of Birth Social Security Number Address Home Phone MRN    1945  [address] [facility] [address] [phone] [id number]    Roman Catholic Marital Status          Hoahaoism        Admission Date Admission Type Admitting Provider Attending Provider Department, Room/Bed    12/4/20 Emergency Denny Whipple MD Truong, Khai C, MD [facility] 2C, 271/1    Discharge Date Discharge Disposition Discharge Destination         Skilled Nursing Facility (DC - External)              Attending Provider: Denny Whipple MD    Allergies: No Known Allergies    Isolation: Enh Drop/Con   Infection: COVID (confirmed) (12/04/20)   Code Status: CPR    Ht: 175.3 cm (69\")   Wt: 69.2 kg (152 lb 9.6 oz)    Admission Cmt: None   Principal Problem: None                Active Insurance as of 12/4/2020     Primary Coverage     Payor Plan Insurance Group Employer/Plan Group    MEDICARE MEDICARE A & B      Payor Plan Address Payor Plan Phone Number Payor Plan Fax Number Effective Dates    [address] [phone]  2/1/2008 - None Entered    [facility] [id number]       Subscriber Name Subscriber Birth Date Member ID       LEELEE RENDON R 1945 [id number]           Secondary Coverage     Payor Plan Insurance Group Employer/Plan Group    [facility] [id number]     Payor Plan Address Payor Plan Phone Number Payor Plan Fax Number Effective Dates    [address]   1/1/2017 - None Entered    [facility] [address]       Subscriber Name Subscriber Birth Date Member ID       LEELEE RENDON R 1945 [id number]                 Emergency Contacts      (Rel.) Home Phone Work Phone Mobile Phone    ANJANA ANDERSON (GRANDDAUGHTER) (Guardian) -- -- [phone]    Nursing, Home [phone] -- --               Discharge Summary      Denny Whipple MD at 12/10/20 1408              [facility] Medicine " Services  DISCHARGE SUMMARY       Date of Admission: 12/4/2020  Date of Discharge:  12/10/2020  Primary Care Physician: Shane Banks MD    Presenting Problem/History of Present Illness:  COVID-19 [U07.1]     Final Discharge Diagnoses:  Active Hospital Problems    Diagnosis   • COVID-19   • COPD (chronic obstructive pulmonary disease) (CMS/[facility])   • Chronic atrial fibrillation with RVR (CMS/[facility])   • Alcohol abuse   • Multiple falls       Consults: Infectious disease.    Pertinent Test Results:   IMPRESSION: Chest x-ray.  1. Patchy infiltrates bilaterally may represent pulmonary edema or  pneumonia.  2. Cardiomegaly.  3. Gastric distention.    IMPRESSION: CTA of the chest.  1. No CT evidence of pulmonary embolus.  2. Dilated ascending thoracic aorta measuring 4.2 cm. No dissection.  There is cardiomegaly. There is atheromatous disease of the thoracic  aorta and coronary arteries.  3. Bilateral infiltrates worrisome for Covid infection.     Chief Complaint on Day of Discharge: none    History of Present Illness on Day of Discharge:   75-year-old man who has baseline dementia.  He is a resident in a nursing home.  It appears by his home med review, that he has been diagnosed with Covid in the past.  Uncertain of the timeframe.  Patient is unable to give me any details.  When asked him if you feel sick, he says no.  There is no family present.  Nursing tells me that his granddaughter sought guardianship of him today, and is going to bring in the paperwork to the hospital later during the admission.  Patient is a hypoxic, and requiring 15 L of oxygen to maintain a saturation above 95%.  The patient has an ankle bracelet on his left ankle.  Again per nursing, no stated that the patient's O2 saturation was 68% in the nursing facility.  The patient's last ER visit, per epic was on 11/15/2019.    Hospital Course:  The patient is a 75 y.o. male who presented to [facility] with septic shock/Covid-19  pneumonia/questionable bacteremia/respiratory failure with hypoxia/COPD/UTI.      Septic shock/bacteremia/COVID-19 pneumonia/acute respiratory failure with hypoxia/COPD.  Patient is not requiring oxygen. Patient off pressor/septic shock resolved.  Timeframe of COVID-19 infections unclear.  Continue Decadron.  Continue remdesivir.  Vitamin C is.  Zinc.  Vitamin D. .  Consult infectious disease for antibiotic recommendation with a positive blood culture.  Patient is off oxygen.  Recommendation from infectious disease-stop betamethasone, discontinue vancomycin, repeat blood culture in 7 to 10 days and avoid contamination of bloodstream with coagulase staph negative.     Acute bacterial cystitis.  DC Rocephin for now.       WPW/atrial fibrillation/hypertension/hyperlipidemia.  Telemetry.  Hold blood pressure due to shock.  Continue aspirin.  Continue Cardizem.  Continue Pravachol.  No anticoagulation due to fall risk.     Hypernatremia.  Resolved.     Hypokalemia.  Resolved.  P.o. potassium.  Magnesium-normal.      Reflux.  Pepcid.  Zofran as needed.     Dementia/anxiety/depression.  Haldol as needed.  Continue Wellbutrin.  Continue Depakote.  Remeron.     Alcohol abuse.  Ativan as needed..  Discussed with patient stopping.     Tobacco abuse.  Tobacco counseling.  Discussed patient cutting back and stopping.  Nicotine patch.     Prostate hypertrophy.  Hydration.     Constipation.  MiraLAX as needed.     Nutrition.  Purée/honey thick.  Multivitamins.     Deconditioning/falling.  OT consult.  PT consult.     Blood culture shows coagulase-negative staph 2 out of 2 sets, Staphylococcus capitis.. New blood culture shows no growth 24 hours.     Vital signs stable, afebrile.  Plan to discharge patient today.  Patient discharged back to nursing home.  Patient need a set of blood culture in 7 days.  Follow-up with nursing home physician soon as able.  Follow recommendation from infectious disease. Repeat blood culture in 7 to 10  "days and avoid contamination of bloodstream with coagulase staph negative.    Condition on Discharge: Stable.    Physical Exam on Discharge:  /86 (BP Location: Right arm, Patient Position: Lying)   Pulse 69   Temp 97.2 °F (36.2 °C) (Axillary)   Resp 24   Ht 175.3 cm (69\")   Wt 69.2 kg (152 lb 9.6 oz)   SpO2 94%   BMI 22.54 kg/m²   Physical Exam  Vitals signs and nursing note reviewed.   Constitutional:       Appearance: He is well-developed.   HENT:      Head: Normocephalic.   Eyes:      Conjunctiva/sclera: Conjunctivae normal.      Pupils: Pupils are equal, round, and reactive to light.   Neck:      Musculoskeletal: Neck supple.      Vascular: No JVD.   Cardiovascular:      Rate and Rhythm: Normal rate and regular rhythm.      Heart sounds: Normal heart sounds. No murmur. No friction rub. No gallop.    Pulmonary:      Effort: No respiratory distress.      Breath sounds: No wheezing or rales.      Comments: Diminished breath sound bilateral, snoring.  Chest:      Chest wall: No tenderness.   Abdominal:      General: Bowel sounds are normal. There is no distension.      Palpations: Abdomen is soft.      Tenderness: There is no abdominal tenderness. There is no guarding or rebound.   Musculoskeletal: Normal range of motion.         General: No tenderness or deformity.   Skin:     General: Skin is warm and dry.      Capillary Refill: Capillary refill takes 2 to 3 seconds.      Findings: No rash.   Neurological:      Mental Status: He is alert. He is disoriented.      Cranial Nerves: No cranial nerve deficit.      Motor: Weakness present. No abnormal muscle tone.      Coordination: Coordination abnormal.      Gait: Gait abnormal.      Deep Tendon Reflexes: Reflexes normal.     Discharge Disposition: Discharge back to nursing home.  Skilled Nursing Facility (DC - External)    Discharge Medications:     Discharge Medications      Changes to Medications      Instructions Start Date   polyethylene glycol 17 g " packet  Commonly known as: MiraLax  What changed: reasons to take this   17 g, Oral, Daily PRN         Continue These Medications      Instructions Start Date   acetaminophen 325 MG tablet  Commonly known as: TYLENOL   650 mg, Oral, Every 6 Hours PRN      aspirin 325 MG EC tablet   325 mg, Oral, 2 times daily      buPROPion  MG 24 hr tablet  Commonly known as: WELLBUTRIN XL   150 mg, Oral, Daily      cholecalciferol 25 MCG (1000 UT) tablet  Commonly known as: VITAMIN D3   2,000 Units, Oral, Daily      dilTIAZem  MG 24 hr capsule  Commonly known as: CARDIZEM CD   120 mg, Oral, Daily      divalproex 250 MG DR tablet  Commonly known as: DEPAKOTE   250 mg, Oral, 2 Times Daily      famotidine 20 MG tablet  Commonly known as: PEPCID   20 mg, Oral, Daily      LORazepam 0.5 MG tablet  Commonly known as: ATIVAN   0.5 mg, Oral, 2 Times Daily      mirtazapine 15 MG tablet  Commonly known as: REMERON   15 mg, Oral, Nightly      multivitamin with minerals tablet tablet   1 tablet, Oral, Daily      pravastatin 20 MG tablet  Commonly known as: PRAVACHOL   20 mg, Oral, Daily      tamsulosin 0.4 MG capsule 24 hr capsule  Commonly known as: FLOMAX   1 capsule, Oral, Daily      thiamine 100 MG tablet  Commonly known as: VITAMIN B-1   100 mg, Oral, Daily      tiotropium 18 MCG per inhalation capsule  Commonly known as: SPIRIVA   1 capsule, Inhalation, Daily - RT      vitamin C 500 MG tablet  Commonly known as: ASCORBIC ACID   500 mg, Oral, Daily      Zinc Sulfate  (50 Zn) MG tablet controlled-release   1 tablet, Oral, Daily         Stop These Medications    docusate sodium 100 MG capsule     potassium chloride 20 MEQ CR tablet  Commonly known as: K-DURKLOR-CON            Discharge Diet:   Diet Instructions     Advance Diet As Tolerated            Activity at Discharge:   Activity Instructions     Activity as Tolerated            Discharge Care Plan/Instructions: Discharge back to nursing home via  ambulance.    Follow-up Appointments:   Follow with nursing home physician as soon as able.    Electronically signed by Denny Whipple MD, 12/10/20, 14:25 CST.    Time: Greater than 30 minutes.        Electronically signed by Denny Whipple MD at 12/10/20 3700

## 2020-12-10 NOTE — DISCHARGE PLACEMENT REQUEST
"  Anai Miranda [phone]  Leelee Rendon LEANDRO (75 y.o. Male)     Date of Birth Social Security Number Address Home Phone MRN    1945  [address] [facility] [address] [phone] [id number]    Taoist Marital Status          Gnosticist        Admission Date Admission Type Admitting Provider Attending Provider Department, Room/Bed    12/4/20 Emergency Denny Whipple MD Truong, Khai C, MD [facility] 2C, 271/1    Discharge Date Discharge Disposition Discharge Destination         Skilled Nursing Facility (DC - External)              Attending Provider: Denny Whipple MD    Allergies: No Known Allergies    Isolation: Enh Drop/Con   Infection: COVID (confirmed) (12/04/20)   Code Status: CPR    Ht: 175.3 cm (69\")   Wt: 69.2 kg (152 lb 9.6 oz)    Admission Cmt: None   Principal Problem: None                Active Insurance as of 12/4/2020     Primary Coverage     Payor Plan Insurance Group Employer/Plan Group    MEDICARE MEDICARE A & B      Payor Plan Address Payor Plan Phone Number Payor Plan Fax Number Effective Dates    [address] [phone]  2/1/2008 - None Entered    [facility] [id number]       Subscriber Name Subscriber Birth Date Member ID       LEELEE RENDON R 1945 [id number]           Secondary Coverage     Payor Plan Insurance Group Employer/Plan Group    [facility] [id number]     Payor Plan Address Payor Plan Phone Number Payor Plan Fax Number Effective Dates    [address]   1/1/2017 - None Entered    [facility] [phone]       Subscriber Name Subscriber Birth Date Member ID       LEELEE RENDON R 1945 [id number]                 Emergency Contacts      (Rel.) Home Phone Work Phone Mobile Phone    ANJANA ANDERSON (GRANDDAUGHTER) (Guardian) -- -- [phone]    Nursing, Home [phone] -- --              "

## 2020-12-10 NOTE — THERAPY EVALUATION
Patient Name: Ezequiel Abdalla  : 1945    MRN: [id number]                              Today's Date: 12/10/2020       Admit Date: 2020    Visit Dx:     ICD-10-CM ICD-9-CM   1. COVID-19  U07.1 079.89   2. Acute UTI  N39.0 599.0   3. Chronic atrial fibrillation (CMS/HCC)  I48.20 427.31   4. Oropharyngeal dysphagia  R13.12 787.22   5. Impaired mobility and ADLs  Z74.09 V49.89    Z78.9    6. Impaired mobility  Z74.09 799.89     Patient Active Problem List   Diagnosis   • Polycythemia   • COPD (chronic obstructive pulmonary disease) (CMS/[facility])   • Alcohol abuse   • Chronic atrial fibrillation with RVR (CMS/[facility])   • Multiple falls   • Elevated prostate specific antigen (PSA)   • COVID-19     Past Medical History:   Diagnosis Date   • A-fib (CMS/[facility])    • Alcohol abuse    • Anxiety    • COPD (chronic obstructive pulmonary disease) (CMS/[facility])    • COPD (chronic obstructive pulmonary disease) (CMS/[facility])    • Hypotension    • Lung disease    • Malaise and fatigue    • Neurological disease    • Polycythemia    • SOB (shortness of breath)    • Brina-Parkinson-White syndrome      Past Surgical History:   Procedure Laterality Date   • KNEE SURGERY     • PACEMAKER IMPLANTATION      [facility]    • WRIST SURGERY       General Information     Row Name 12/10/20 0945          Physical Therapy Time and Intention    Document Type  evaluation  -SB     Mode of Treatment  physical therapy  -SB     Row Name 12/10/20 0945          General Information    Patient Profile Reviewed  yes  -SB     Prior Level of Function  -- unsure due to cognition  -SB     Existing Precautions/Restrictions  fall  -SB     Barriers to Rehab  medically complex;previous functional deficit;cognitive status  -SB     Row Name 12/10/20 0945          Living Environment    Lives With  facility resident  -SB     Row Name 12/10/20 0945          Cognition    Orientation Status (Cognition)  -- responds to Rm but does not answer any orientation questions  -SB     Row Name  12/10/20 0945          Safety Issues, Functional Mobility    Safety Issues Affecting Function (Mobility)  ability to follow commands;awareness of need for assistance;insight into deficits/self-awareness  -SB     Impairments Affecting Function (Mobility)  balance;cognition;coordination;endurance/activity tolerance;pain;range of motion (ROM);shortness of breath;strength;motor control;muscle tone abnormal  -SB     Cognitive Impairments, Mobility Safety/Performance  insight into deficits/self-awareness;judgment;awareness, need for assistance;attention  -SB       User Key  (r) = Recorded By, (t) = Taken By, (c) = Cosigned By    Initials Name Provider Type    Kim Lanza PT DPT Physical Therapist        Mobility     Row Name 12/10/20 0945          Bed Mobility    Bed Mobility  supine-sit;scooting/bridging;sit-supine  -SB     Scooting/Bridging [place] (Bed Mobility)  moderate assist (50% patient effort);2 person assist;verbal cues  -SB     Supine-Sit [place] (Bed Mobility)  moderate assist (50% patient effort);verbal cues  -SB     Sit-Supine [place] (Bed Mobility)  verbal cues;minimum assist (75% patient effort)  -SB     Assistive Device (Bed Mobility)  head of bed elevated;bed rails;draw sheet  -SB     Row Name 12/10/20 0945          Transfers    Comment (Transfers)  attempted sit to stand max Ax2 but patient did not put forth effort therefore unsuccessful  -SB       User Key  (r) = Recorded By, (t) = Taken By, (c) = Cosigned By    Initials Name Provider Type    Kim Lanza, PT DPT Physical Therapist        Obj/Interventions     Row Name 12/10/20 0945          Range of Motion Comprehensive    General Range of Motion  bilateral lower extremity ROM WFL  -SB     Row Name 12/10/20 0945          Strength Comprehensive (MMT)    General Manual Muscle Testing (MMT) Assessment  lower extremity strength deficits identified  -SB     Comment, General Manual Muscle Testing (MMT) Assessment  observed 3/5 BLE  but not formally assessed due to command following  -SB     Row Name 12/10/20 0945          Balance    Balance Assessment  sitting static balance  -SB     Static Sitting Balance  mild impairment min A for sitting balance  -SB     Row Name 12/10/20 0945          Sensory Assessment (Somatosensory)    Sensory Assessment (Somatosensory)  LE sensation intact  -SB       User Key  (r) = Recorded By, (t) = Taken By, (c) = Cosigned By    Initials Name Provider Type    SB Kim Bajwa, PT DPT Physical Therapist        Goals/Plan     Row Name 12/10/20 0945          Bed Mobility Goal 1 (PT)    Activity/Assistive Device (Bed Mobility Goal 1, PT)  rolling to left;rolling to right;sit to supine;supine to sit  -SB     White Level/Cues Needed (Bed Mobility Goal 1, PT)  contact guard assist  -SB     Time Frame (Bed Mobility Goal 1, PT)  long term goal (LTG)  -SB     Progress/Outcomes (Bed Mobility Goal 1, PT)  goal ongoing  -SB     Row Name 12/10/20 0945          Transfer Goal 1 (PT)    Activity/Assistive Device (Transfer Goal 1, PT)  sit-to-stand/stand-to-sit;bed-to-chair/chair-to-bed;walker, rolling  -SB     White Level/Cues Needed (Transfer Goal 1, PT)  2 person assist;moderate assist (50-74% patient effort)  -SB     Time Frame (Transfer Goal 1, PT)  long term goal (LTG)  -SB     Progress/Outcome (Transfer Goal 1, PT)  goal ongoing  -SB     Row Name 12/10/20 0945          Gait Training Goal 1 (PT)    Activity/Assistive Device (Gait Training Goal 1, PT)  gait (walking locomotion);improve balance and speed;increase endurance/gait distance;increase energy conservation;decrease fall risk;walker, rolling  -SB     White Level (Gait Training Goal 1, PT)  minimum assist (75% or more patient effort)  -SB     Distance (Gait Training Goal 1, PT)  10  -SB     Time Frame (Gait Training Goal 1, PT)  long term goal (LTG)  -SB     Progress/Outcome (Gait Training Goal 1, PT)  goal ongoing  -SB       User Key  (r) = Recorded  By, (t) = Taken By, (c) = Cosigned By    Initials Name Provider Type    SB Kim Bajwa, PT DPT Physical Therapist        Clinical Impression     Row Name 12/10/20 0945          Pain    Additional Documentation  Pain Scale: FACES Pre/Post-Treatment (Group)  -SB     Row Name 12/10/20 0945          Pain Scale: Numbers Pre/Post-Treatment    Pain Intervention(s)  Medication (See MAR);Ambulation/increased activity;Repositioned  -SB     Row Name 12/10/20 0945          Pain Scale: FACES Pre/Post-Treatment    Pain: FACES Scale, Pretreatment  0-->no hurt  -SB     Row Name 12/10/20 0945          Plan of Care Review    Plan of Care Reviewed With  patient  -SB     Progress  no change  -SB     Outcome Summary  PT eval completed. Pt responds to Rm, but answers no orientation questions. Pt agreable to therapy, but then did not participate with most activity. Pt verbalizes that he wants a cookie and to leave him alone, but otherwise remains quiet throughout eval. Pt follows approx 50% of one step commands with increased time and repetition of cues. Pt performs sup to sit t/f with mod A x1 and sits EOB with L lateral lean, requiring min A to maintain sitting balance. Attempted sit to stand t/f max Ax2 but patient did not participate, therefore unable to stand. Pt will benefit from skilled PT to improve overall mobility and endurance. Will monitor progress and participation closely. Recommend d/c to SNF.  -SB     Row Name 12/10/20 0945          Therapy Assessment/Plan (PT)    Patient/Family Therapy Goals Statement (PT)  none stated  -SB     Rehab Potential (PT)  fair, will monitor progress closely  -SB     Criteria for Skilled Interventions Met (PT)  yes;meets criteria;skilled treatment is necessary  -SB     Predicted Duration of Therapy Intervention (PT)  until d/c  -SB     Row Name 12/10/20 0945          Vital Signs    Intra Systolic BP Rehab  112  -SB     Intra Treatment Diastolic BP  86  -SB     Posttreatment Heart Rate  (beats/min)  79  -SB     O2 Delivery Pre Treatment  room air  -SB     O2 Delivery Intra Treatment  room air  -SB     Post SpO2 (%)  93  -SB     O2 Delivery Post Treatment  room air  -SB     Pre Patient Position  Supine  -SB     Intra Patient Position  Sitting  -SB     Post Patient Position  Supine  -SB     Row Name 12/10/20 0945          Positioning and Restraints    Pre-Treatment Position  in bed  -SB     Post Treatment Position  bed  -SB     In Bed  supine;call light within reach;encouraged to call for assist;exit alarm on;side rails up x3;fowlers  -SB       User Key  (r) = Recorded By, (t) = Taken By, (c) = Cosigned By    Initials Name Provider Type    Kim Lanza PT DPT Physical Therapist        Outcome Measures     Row Name 12/10/20 0945          How much help from another person do you currently need...    Turning from your back to your side while in flat bed without using bedrails?  3  -SB     Moving from lying on back to sitting on the side of a flat bed without bedrails?  3  -SB     Moving to and from a bed to a chair (including a wheelchair)?  2  -SB     Standing up from a chair using your arms (e.g., wheelchair, bedside chair)?  2  -SB     Climbing 3-5 steps with a railing?  1  -SB     To walk in hospital room?  1  -SB     AM-PAC 6 Clicks Score (PT)  12  -SB     Row Name 12/10/20 0994          Functional Assessment    Outcome Measure Options  AM-PAC 6 Clicks Basic Mobility (PT)  -SB       User Key  (r) = Recorded By, (t) = Taken By, (c) = Cosigned By    Initials Name Provider Type    Kim Lanza PT DPT Physical Therapist        Physical Therapy Education                 Title: PT OT SLP Therapies (In Progress)     Topic: Physical Therapy (In Progress)     Point: Mobility training (In Progress)     Learning Progress Summary           Patient Nonacceptance, E, NL,NR by ROSALES at 12/10/2020 8282    Comment: pt edu on purpose of therapy, benefits of act, POC, d/c plans                   Point: Home  exercise program (Not Started)     Learner Progress:  Not documented in this visit.          Point: Body mechanics (Not Started)     Learner Progress:  Not documented in this visit.          Point: Precautions (In Progress)     Learning Progress Summary           Patient Nonacceptance, E, NL,NR by SB at 12/10/2020 1157    Comment: pt edu on purpose of therapy, benefits of act, POC, d/c plans                               User Key     Initials Effective Dates Name Provider Type Discipline     10/31/19 -  Kim Bajwa, PT DPT Physical Therapist PT              PT Recommendation and Plan  Planned Therapy Interventions (PT): balance training, bed mobility training, gait training, home exercise program, patient/family education, transfer training, strengthening, postural re-education  Plan of Care Reviewed With: patient  Progress: no change  Outcome Summary: PT eval completed. Pt responds to Rm, but answers no orientation questions. Pt agreable to therapy, but then did not participate with most activity. Pt verbalizes that he wants a cookie and to leave him alone, but otherwise remains quiet throughout eval. Pt follows approx 50% of one step commands with increased time and repetition of cues. Pt performs sup to sit t/f with mod A x1 and sits EOB with L lateral lean, requiring min A to maintain sitting balance. Attempted sit to stand t/f max Ax2 but patient did not participate, therefore unable to stand. Pt will benefit from skilled PT to improve overall mobility and endurance. Will monitor progress and participation closely. Recommend d/c to SNF.     Time Calculation:   PT Charges     Row Name 12/10/20 1158             Time Calculation    Start Time  0935  -SB      Stop Time  1036  -SB      Time Calculation (min)  61 min  -SB      PT Received On  12/10/20  -SB      PT Goal Re-Cert Due Date  12/20/20  -SB        User Key  (r) = Recorded By, (t) = Taken By, (c) = Cosigned By    Initials Name Provider Type    SB  Kim Bajwa, PT DPT Physical Therapist        Therapy Charges for Today     Code Description Service Date Service Provider Modifiers Qty    36552929229 HC PT EVAL MOD COMPLEXITY 4 12/10/2020 Kim Bajwa, PT DPT GP 1          PT G-Codes  Outcome Measure Options: AM-PAC 6 Clicks Daily Activity (OT)  AM-PAC 6 Clicks Score (PT): 12  AM-PAC 6 Clicks Score (OT): 10    Summer ELISABETH Bajwa DPT  12/10/2020

## 2020-12-10 NOTE — DISCHARGE SUMMARY
[facility] Medicine Services  DISCHARGE SUMMARY       Date of Admission: 12/4/2020  Date of Discharge:  12/10/2020  Primary Care Physician: Shane Banks MD    Presenting Problem/History of Present Illness:  COVID-19 [U07.1]     Final Discharge Diagnoses:  Active Hospital Problems    Diagnosis   • COVID-19   • COPD (chronic obstructive pulmonary disease) (CMS/[facility])   • Chronic atrial fibrillation with RVR (CMS/[facility])   • Alcohol abuse   • Multiple falls       Consults: Infectious disease.    Pertinent Test Results:   IMPRESSION: Chest x-ray.  1. Patchy infiltrates bilaterally may represent pulmonary edema or  pneumonia.  2. Cardiomegaly.  3. Gastric distention.    IMPRESSION: CTA of the chest.  1. No CT evidence of pulmonary embolus.  2. Dilated ascending thoracic aorta measuring 4.2 cm. No dissection.  There is cardiomegaly. There is atheromatous disease of the thoracic  aorta and coronary arteries.  3. Bilateral infiltrates worrisome for Covid infection.     Chief Complaint on Day of Discharge: none    History of Present Illness on Day of Discharge:   75-year-old man who has baseline dementia.  He is a resident in a nursing home.  It appears by his home med review, that he has been diagnosed with Covid in the past.  Uncertain of the timeframe.  Patient is unable to give me any details.  When asked him if you feel sick, he says no.  There is no family present.  Nursing tells me that his granddaughter sought guardianship of him today, and is going to bring in the paperwork to the hospital later during the admission.  Patient is a hypoxic, and requiring 15 L of oxygen to maintain a saturation above 95%.  The patient has an ankle bracelet on his left ankle.  Again per nursing, no stated that the patient's O2 saturation was 68% in the nursing facility.  The patient's last ER visit, per epic was on 11/15/2019.    Hospital Course:  The patient is a 75 y.o. male who presented to  [facility] with septic shock/Covid-19 pneumonia/questionable bacteremia/respiratory failure with hypoxia/COPD/UTI.      Septic shock/bacteremia/COVID-19 pneumonia/acute respiratory failure with hypoxia/COPD.  Patient is not requiring oxygen. Patient off pressor/septic shock resolved.  Timeframe of COVID-19 infections unclear.  Continue Decadron.  Continue remdesivir.  Vitamin C is.  Zinc.  Vitamin D. .  Consult infectious disease for antibiotic recommendation with a positive blood culture.  Patient is off oxygen.  Recommendation from infectious disease-stop betamethasone, discontinue vancomycin, repeat blood culture in 7 to 10 days and avoid contamination of bloodstream with coagulase staph negative.     Acute bacterial cystitis.  DC Rocephin for now.       WPW/atrial fibrillation/hypertension/hyperlipidemia.  Telemetry.  Hold blood pressure due to shock.  Continue aspirin.  Continue Cardizem.  Continue Pravachol.  No anticoagulation due to fall risk.     Hypernatremia.  Resolved.     Hypokalemia.  Resolved.  P.o. potassium.  Magnesium-normal.      Reflux.  Pepcid.  Zofran as needed.     Dementia/anxiety/depression.  Haldol as needed.  Continue Wellbutrin.  Continue Depakote.  Remeron.     Alcohol abuse.  Ativan as needed..  Discussed with patient stopping.     Tobacco abuse.  Tobacco counseling.  Discussed patient cutting back and stopping.  Nicotine patch.     Prostate hypertrophy.  Hydration.     Constipation.  MiraLAX as needed.     Nutrition.  Purée/honey thick.  Multivitamins.     Deconditioning/falling.  OT consult.  PT consult.     Blood culture shows coagulase-negative staph 2 out of 2 sets, Staphylococcus capitis.. New blood culture shows no growth 24 hours.     Vital signs stable, afebrile.  Plan to discharge patient today.  Patient discharged back to nursing home.  Patient need a set of blood culture in 7 days.  Follow-up with nursing home physician soon as able.  Follow recommendation from  "infectious disease. Repeat blood culture in 7 to 10 days and avoid contamination of bloodstream with coagulase staph negative.    Condition on Discharge: Stable.    Physical Exam on Discharge:  /86 (BP Location: Right arm, Patient Position: Lying)   Pulse 69   Temp 97.2 °F (36.2 °C) (Axillary)   Resp 24   Ht 175.3 cm (69\")   Wt 69.2 kg (152 lb 9.6 oz)   SpO2 94%   BMI 22.54 kg/m²   Physical Exam  Vitals signs and nursing note reviewed.   Constitutional:       Appearance: He is well-developed.   HENT:      Head: Normocephalic.   Eyes:      Conjunctiva/sclera: Conjunctivae normal.      Pupils: Pupils are equal, round, and reactive to light.   Neck:      Musculoskeletal: Neck supple.      Vascular: No JVD.   Cardiovascular:      Rate and Rhythm: Normal rate and regular rhythm.      Heart sounds: Normal heart sounds. No murmur. No friction rub. No gallop.    Pulmonary:      Effort: No respiratory distress.      Breath sounds: No wheezing or rales.      Comments: Diminished breath sound bilateral, snoring.  Chest:      Chest wall: No tenderness.   Abdominal:      General: Bowel sounds are normal. There is no distension.      Palpations: Abdomen is soft.      Tenderness: There is no abdominal tenderness. There is no guarding or rebound.   Musculoskeletal: Normal range of motion.         General: No tenderness or deformity.   Skin:     General: Skin is warm and dry.      Capillary Refill: Capillary refill takes 2 to 3 seconds.      Findings: No rash.   Neurological:      Mental Status: He is alert. He is disoriented.      Cranial Nerves: No cranial nerve deficit.      Motor: Weakness present. No abnormal muscle tone.      Coordination: Coordination abnormal.      Gait: Gait abnormal.      Deep Tendon Reflexes: Reflexes normal.     Discharge Disposition: Discharge back to nursing home.  Skilled Nursing Facility (DC - External)    Discharge Medications:     Discharge Medications      Changes to Medications      " Instructions Start Date   polyethylene glycol 17 g packet  Commonly known as: MiraLax  What changed: reasons to take this   17 g, Oral, Daily PRN         Continue These Medications      Instructions Start Date   acetaminophen 325 MG tablet  Commonly known as: TYLENOL   650 mg, Oral, Every 6 Hours PRN      aspirin 325 MG EC tablet   325 mg, Oral, 2 times daily      buPROPion  MG 24 hr tablet  Commonly known as: WELLBUTRIN XL   150 mg, Oral, Daily      cholecalciferol 25 MCG (1000 UT) tablet  Commonly known as: VITAMIN D3   2,000 Units, Oral, Daily      dilTIAZem  MG 24 hr capsule  Commonly known as: CARDIZEM CD   120 mg, Oral, Daily      divalproex 250 MG DR tablet  Commonly known as: DEPAKOTE   250 mg, Oral, 2 Times Daily      famotidine 20 MG tablet  Commonly known as: PEPCID   20 mg, Oral, Daily      LORazepam 0.5 MG tablet  Commonly known as: ATIVAN   0.5 mg, Oral, 2 Times Daily      mirtazapine 15 MG tablet  Commonly known as: REMERON   15 mg, Oral, Nightly      multivitamin with minerals tablet tablet   1 tablet, Oral, Daily      pravastatin 20 MG tablet  Commonly known as: PRAVACHOL   20 mg, Oral, Daily      tamsulosin 0.4 MG capsule 24 hr capsule  Commonly known as: FLOMAX   1 capsule, Oral, Daily      thiamine 100 MG tablet  Commonly known as: VITAMIN B-1   100 mg, Oral, Daily      tiotropium 18 MCG per inhalation capsule  Commonly known as: SPIRIVA   1 capsule, Inhalation, Daily - RT      vitamin C 500 MG tablet  Commonly known as: ASCORBIC ACID   500 mg, Oral, Daily      Zinc Sulfate  (50 Zn) MG tablet controlled-release   1 tablet, Oral, Daily         Stop These Medications    docusate sodium 100 MG capsule     potassium chloride 20 MEQ CR tablet  Commonly known as: K-DUR,KLOR-CON            Discharge Diet:   Diet Instructions     Advance Diet As Tolerated            Activity at Discharge:   Activity Instructions     Activity as Tolerated            Discharge Care Plan/Instructions:  Discharge back to nursing home via ambulance.    Follow-up Appointments:   Follow with nursing home physician as soon as able.    Electronically signed by Denny Whipple MD, 12/10/20, 14:25 CST.    Time: Greater than 30 minutes.

## 2020-12-10 NOTE — PLAN OF CARE
Goal Outcome Evaluation:  Plan of Care Reviewed With: patient  Progress: no change  Outcome Summary: PT eval completed. Pt responds to Rm, but answers no orientation questions. Pt agreable to therapy, but then did not participate with most activity. Pt verbalizes that he wants a cookie and to leave him alone, but otherwise remains quiet throughout eval. Pt follows approx 50% of one step commands with increased time and repetition of cues. Pt performs sup to sit t/f with mod A x1 and sits EOB with L lateral lean, requiring min A to maintain sitting balance. Attempted sit to stand t/f max Ax2 but patient did not participate, therefore unable to stand. Pt will benefit from skilled PT to improve overall mobility and endurance. Will monitor progress and participation closely. Recommend d/c to SNF.

## 2020-12-10 NOTE — PLAN OF CARE
Goal Outcome Evaluation:  Plan of Care Reviewed With: patient  Progress: no change  Outcome Summary: OT tx completed.  Pt is alert but refused to answer orientation questions. Was initially agreeable to sitting at EOB bed, requiring Min Ax2. Min A to maintain static sitting balance. Attempted sit <> stand t/f, requiring Max Ax2, upon attempt pt demanded to be returned to supine in bed. He was able to return fowlers in bed with Min Ax2. Max A for washing face d/t decreased patient participation. Min A and set-up to bring liquids to mouth. Continue OT POC.

## 2020-12-10 NOTE — THERAPY TREATMENT NOTE
Acute Care - Occupational Therapy Treatment Note  [facility]     Patient Name: Ezequiel Abdalla  : 1945  MRN: [id number]  Today's Date: 12/10/2020             Admit Date: 2020       ICD-10-CM ICD-9-CM   1. COVID-19  U07.1 079.89   2. Acute UTI  N39.0 599.0   3. Chronic atrial fibrillation (CMS/HCC)  I48.20 427.31   4. Oropharyngeal dysphagia  R13.12 787.22   5. Impaired mobility and ADLs  Z74.09 V49.89    Z78.9      Patient Active Problem List   Diagnosis   • Polycythemia   • COPD (chronic obstructive pulmonary disease) (CMS/HCC)   • Alcohol abuse   • Chronic atrial fibrillation with RVR (CMS/HCC)   • Multiple falls   • Elevated prostate specific antigen (PSA)   • COVID-19     Past Medical History:   Diagnosis Date   • A-fib (CMS/HCC)    • Alcohol abuse    • Anxiety    • COPD (chronic obstructive pulmonary disease) (CMS/HCC)    • COPD (chronic obstructive pulmonary disease) (CMS/HCC)    • Hypotension    • Lung disease    • Malaise and fatigue    • Neurological disease    • Polycythemia    • SOB (shortness of breath)    • Brina-Parkinson-White syndrome      Past Surgical History:   Procedure Laterality Date   • KNEE SURGERY     • PACEMAKER IMPLANTATION      [facility]    • WRIST SURGERY            OT ASSESSMENT FLOWSHEET (last 12 hours)      OT Evaluation and Treatment     Row Name 12/10/20 0947                   OT Time and Intention    Subjective Information  no complaints  -JJ        Document Type  therapy note (daily note)  -JJ        Mode of Treatment  occupational therapy  -JJ           General Information    Existing Precautions/Restrictions  fall  -JJ           Pain Assessment    Additional Documentation  Pain Scale: Numbers Pre/Post-Treatment (Group)  -J           Pain Scale: Numbers Pre/Post-Treatment    Pretreatment Pain Rating  0/10 - no pain  -JJ        Posttreatment Pain Rating  0/10 - no pain  -JJ        Pain Intervention(s)  Medication (See MAR);Repositioned;Ambulation/increased activity  -JJ            Bed Mobility    Bed Mobility  supine-sit;sit-supine;scooting/bridging  -JJ        Scooting/Bridging [place] (Bed Mobility)  moderate assist (50% patient effort);2 person assist  -JJ        Supine-Sit [place] (Bed Mobility)  minimum assist (75% patient effort);2 person assist  -JJ        Sit-Supine [place] (Bed Mobility)  minimum assist (75% patient effort)  -J        Bed Mobility, Safety Issues  cognitive deficits limit understanding  -           Transfer Assessment/Treatment    Comment (Transfers)  attempted t/f with Max Ax2, but became very unagreeable and demanded to be returned to supine in bed.   -           Balance    Balance Assessment  sitting static balance  -        Static Sitting Balance  mild impairment;sitting, edge of bed;supported  -        Comment, Balance  Min A for static sitting balance.   -           Activities of Daily Living    BADL Assessment/Intervention  feeding;grooming  -           Grooming Assessment/Training    [place] Level (Grooming)  wash face, hands;minimum assist (75% patient effort);set up;verbal cues  -JJ        Position (Grooming)  -- fowlers in bed  -           Self-Feeding Assessment/Training    [place] Level (Feeding)  liquids to mouth;set up  -JJ        Position (Self-Feeding)  sitting up in bed  -JJ           Wound 12/04/20 1815 Bilateral anterior leg Laceration    Wound - Properties Group Placement Date: 12/04/20  -CF Placement Time: 1815  -CF Side: Bilateral  -CF Orientation: anterior  -CF Location: leg  -CF Primary Wound Type: Laceration  -CF    Retired Wound - Properties Group Date first assessed: 12/04/20  -CF Time first assessed: 1815  -CF Side: Bilateral  -CF Location: leg  -CF Primary Wound Type: Laceration  -CF       Plan of Care Review    Plan of Care Reviewed With  patient  -JJ        Outcome Summary  OT tx completed.  Pt is alert but refused to answer orientation questions. Was initially agreeable to sitting at EOB  bed, requiring Min Ax2. Min A to maintain static sitting balance. Attempted sit <> stand t/f, requiring Max Ax2, upon attempt pt demanded to be returned to supine in bed. He was able to return fowlers in bed with Min Ax2. Max A for washing face d/t decreased patient participation. Min A and set-up to bring liquids to mouth. Continue OT POC.   -JJ           Positioning and Restraints    Pre-Treatment Position  in bed  -JJ        Post Treatment Position  bed  -JJ        In Bed  fowlers;notified nsg;call light within reach;encouraged to call for assist;exit alarm on;side rails up x3  -JJ           Therapy Plan Review/Discharge Plan (OT)    Anticipated Discharge Disposition (OT)  skilled nursing facility  -          User Key  (r) = Recorded By, (t) = Taken By, (c) = Cosigned By    Initials Name Effective Dates    Katalina Vaughan, OTR/L 12/04/20 -     Harley Ray RN 10/14/20 -          Occupational Therapy Education                 Title: PT OT SLP Therapies (In Progress)     Topic: Occupational Therapy (In Progress)     Point: ADL training (In Progress)     Description:   Instruct learner(s) on proper safety adaptation and remediation techniques during self care or transfers.   Instruct in proper use of assistive devices.              Learning Progress Summary           Patient Acceptance, E, NR,NL by MM at 12/7/2020 1508    Comment: OT role, benefits, POC                   Point: Home exercise program (Not Started)     Description:   Instruct learner(s) on appropriate technique for monitoring, assisting and/or progressing therapeutic exercises/activities.              Learner Progress:  Not documented in this visit.          Point: Precautions (In Progress)     Description:   Instruct learner(s) on prescribed precautions during self-care and functional transfers.              Learning Progress Summary           Patient Acceptance, E, NR,NL by MM at 12/7/2020 1508    Comment: OT role, benefits, POC                    Point: Body mechanics (Not Started)     Description:   Instruct learner(s) on proper positioning and spine alignment during self-care, functional mobility activities and/or exercises.              Learner Progress:  Not documented in this visit.                      User Key     Initials Effective Dates Name Provider Type Discipline     07/05/20 -  Bryan Nicholas, OTR/L Occupational Therapist OT                  OT Recommendation and Plan     Plan of Care Review  Plan of Care Reviewed With: patient  Outcome Summary: OT tx completed.  Pt is alert but refused to answer orientation questions. Was initially agreeable to sitting at EOB bed, requiring Min Ax2. Min A to maintain static sitting balance. Attempted sit <> stand t/f, requiring Max Ax2, upon attempt pt demanded to be returned to supine in bed. He was able to return fowlers in bed with Min Ax2. Max A for washing face d/t decreased patient participation. Min A and set-up to bring liquids to mouth. Continue OT POC.   Plan of Care Reviewed With: patient  Outcome Summary: OT tx completed.  Pt is alert but refused to answer orientation questions. Was initially agreeable to sitting at EOB bed, requiring Min Ax2. Min A to maintain static sitting balance. Attempted sit <> stand t/f, requiring Max Ax2, upon attempt pt demanded to be returned to supine in bed. He was able to return fowlers in bed with Min Ax2. Max A for washing face d/t decreased patient participation. Min A and set-up to bring liquids to mouth. Continue OT POC.     Outcome Measures     Row Name 12/10/20 1100             How much help from another is currently needed...    Putting on and taking off regular lower body clothing?  1  -JJ      Bathing (including washing, rinsing, and drying)  1  -JJ      Toileting (which includes using toilet bed pan or urinal)  1  -JJ      Putting on and taking off regular upper body clothing  2  -JJ      Taking care of personal grooming (such as brushing  teeth)  2  -      Eating meals  3  -JJ      AM-PAC 6 Clicks Score (OT)  10  -         Functional Assessment    Outcome Measure Options  AM-PAC 6 Clicks Daily Activity (OT)  -        User Key  (r) = Recorded By, (t) = Taken By, (c) = Cosigned By    Initials Name Provider Type    Katalina Ryan OTR/L Occupational Therapist          Time Calculation:   Time Calculation- OT     Row Name 12/10/20 1152             Time Calculation- OT    OT Start Time  0947  -      OT Stop Time  1040  -      OT Time Calculation (min)  53 min  -      Total Timed Code Minutes- OT  53 minute(s)  -      OT Received On  12/10/20  -        User Key  (r) = Recorded By, (t) = Taken By, (c) = Cosigned By    Initials Name Provider Type    Katalina Ryan OTR/L Occupational Therapist        Therapy Charges for Today     Code Description Service Date Service Provider Modifiers Qty    21529267229 HC OT SELF CARE/MGMT/TRAIN EA 15 MIN 12/10/2020 Katalina Díaz OTR/L GO 4               ERICK Schumacher/ORALIA  12/10/2020

## 2020-12-10 NOTE — PLAN OF CARE
Goal Outcome Evaluation:  Plan of Care Reviewed With: patient  Progress: no change  Outcome Summary: SLP treatment complete. Pt noted to have his dentures in place on this date. He required encouragement to participate, but did complete trials of regular solids, honey thick liquids, and thin liquids. 2x immediate cough with thin liquids noted with questionable wet vocal quality. Increased oral preparation time with regular solids, but with increased time he did eventually clear solids utilizing a liquid wash. Pt ok to upgrade to mechanical soft solids. Honey thick liquids to be continued. SLP will continue to follow and treat.

## 2020-12-10 NOTE — THERAPY TREATMENT NOTE
Acute Care - Speech Language Pathology   Swallow Treatment Note [facility]     Patient Name: Ezequiel Abdalla  : 1945  MRN: [id number]  Today's Date: 12/10/2020               Admit Date: 2020  SLP treatment complete. Pt noted to have his dentures in place on this date. He required encouragement to participate, but did complete trials of regular solids, honey thick liquids, and thin liquids. 2x immediate cough with thin liquids noted with questionable wet vocal quality. Increased oral preparation time with regular solids, but with increased time he did eventually clear solids utilizing a liquid wash. Pt ok to upgrade to mechanical soft solids. Honey thick liquids to be continued. SLP will continue to follow and treat.              Mo Boyd MS CCC-SLP 12/10/2020 12:17 CST      Visit Dx:     ICD-10-CM ICD-9-CM   1. COVID-19  U07.1 079.89   2. Acute UTI  N39.0 599.0   3. Chronic atrial fibrillation (CMS/HCC)  I48.20 427.31   4. Oropharyngeal dysphagia  R13.12 787.22   5. Impaired mobility and ADLs  Z74.09 V49.89    Z78.9    6. Impaired mobility  Z74.09 799.89     Patient Active Problem List   Diagnosis   • Polycythemia   • COPD (chronic obstructive pulmonary disease) (CMS/HCC)   • Alcohol abuse   • Chronic atrial fibrillation with RVR (CMS/HCC)   • Multiple falls   • Elevated prostate specific antigen (PSA)   • COVID-19     Past Medical History:   Diagnosis Date   • A-fib (CMS/HCC)    • Alcohol abuse    • Anxiety    • COPD (chronic obstructive pulmonary disease) (CMS/HCC)    • COPD (chronic obstructive pulmonary disease) (CMS/HCC)    • Hypotension    • Lung disease    • Malaise and fatigue    • Neurological disease    • Polycythemia    • SOB (shortness of breath)    • Brina-Parkinson-White syndrome      Past Surgical History:   Procedure Laterality Date   • KNEE SURGERY     • PACEMAKER IMPLANTATION      [facility]    • WRIST SURGERY          SWALLOW EVALUATION (last 72 hours)      SLP Adult Swallow  Evaluation     Row Name 12/10/20 1010                   Rehab Evaluation    Document Type  evaluation  -CS        Subjective Information  no complaints  -CS        Patient Observations  alert;cooperative;agree to therapy  -CS        Patient/Family/Caregiver Comments/Observations  No family present  -CS           Pain    Additional Documentation  Pain Scale: FACES Pre/Post-Treatment (Group)  -CS           Pain Scale: FACES Pre/Post-Treatment    Pain: FACES Scale, Pretreatment  0-->no hurt  -CS        Posttreatment Pain Rating  0-->no hurt  -CS           Swallow Goals (SLP)    Oral Nutrition/Hydration Goal Selection (SLP)  oral nutrition/hydration, SLP goal 1  -CS        Pharyngeal Strengthening Exercise Goal Selection (SLP)  pharyngeal strengthening exercise, SLP goal 1  -CS        Additional Documentation  pharyngeal strengthening exercise goal selection (SLP)  -CS           Oral Nutrition/Hydration Goal 1 (SLP)    Oral Nutrition/Hydration Goal 1, SLP  Pt will tolerate least restrictive diet with no overt s/s of aspiration.   -CS        Time Frame (Oral Nutrition/Hydration Goal 1, SLP)  by discharge  -CS        Barriers (Oral Nutrition/Hydration Goal 1, SLP)  n/a  -CS        Progress/Outcomes (Oral Nutrition/Hydration Goal 1, SLP)  continuing progress toward goal  -CS           Pharyngeal Strengthening Exercise Goal 1 (SLP)    Activity (Pharyngeal Strengthening Goal 1, SLP)  increase timing  -CS        Increase Timing  gustatory stimulation (sour/cold)  -CS        [place]/Accuracy (Pharyngeal Strengthening Goal 1, SLP)  independently (over 90% accuracy)  -CS        Time Frame (Pharyngeal Strengthening Goal 1, SLP)  short term goal (STG);by discharge  -CS        Barriers (Pharyngeal Strengthening Goal 1, SLP)  n/a  -CS        Progress/Outcomes (Pharyngeal Strengthening Goal 1, SLP)  continuing progress toward goal  -CS          User Key  (r) = Recorded By, (t) = Taken By, (c) = Cosigned By    Initials Name  Effective Dates    CS Mo Boyd, MS CCC-SLP 04/03/18 -           EDUCATION  The patient has been educated in the following areas:   Dysphagia (Swallowing Impairment).    SLP Recommendation and Plan                                                             Plan of Care Reviewed With: patient  Progress: no change  Outcome Summary: SLP treatment complete. Pt noted to have his dentures in place on this date. He required encouragement to participate, but did complete trials of regular solids, honey thick liquids, and thin liquids. 2x immediate cough with thin liquids noted with questionable wet vocal quality. Increased oral preparation time with regular solids, but with increased time he did eventually clear solids utilizing a liquid wash. Pt ok to upgrade to mechanical soft solids. Honey thick liquids to be continued. SLP will continue to follow and treat.    SLP GOALS     Row Name 12/10/20 1010             Oral Nutrition/Hydration Goal 1 (SLP)    Oral Nutrition/Hydration Goal 1, SLP  Pt will tolerate least restrictive diet with no overt s/s of aspiration.   -CS      Time Frame (Oral Nutrition/Hydration Goal 1, SLP)  by discharge  -CS      Barriers (Oral Nutrition/Hydration Goal 1, SLP)  n/a  -CS      Progress/Outcomes (Oral Nutrition/Hydration Goal 1, SLP)  continuing progress toward goal  -CS         Pharyngeal Strengthening Exercise Goal 1 (SLP)    Activity (Pharyngeal Strengthening Goal 1, SLP)  increase timing  -CS      Increase Timing  gustatory stimulation (sour/cold)  -CS      [place]/Accuracy (Pharyngeal Strengthening Goal 1, SLP)  independently (over 90% accuracy)  -CS      Time Frame (Pharyngeal Strengthening Goal 1, SLP)  short term goal (STG);by discharge  -CS      Barriers (Pharyngeal Strengthening Goal 1, SLP)  n/a  -CS      Progress/Outcomes (Pharyngeal Strengthening Goal 1, SLP)  continuing progress toward goal  -CS        User Key  (r) = Recorded By, (t) = Taken By, (c) = Cosigned By     Initials Name Provider Type     Mo Boyd MS CCC-SLP Speech and Language Pathologist             Time Calculation:   Time Calculation- SLP     Row Name 12/10/20 1216             Time Calculation- SLP    SLP Start Time  1010  -      SLP Stop Time  1033  -      SLP Time Calculation (min)  23 min  -      SLP Received On  12/10/20  -        User Key  (r) = Recorded By, (t) = Taken By, (c) = Cosigned By    Initials Name Provider Type     Mo Boyd MS CCC-SLP Speech and Language Pathologist          Therapy Charges for Today     Code Description Service Date Service Provider Modifiers Qty    [id number]  ST TREATMENT SWALLOW 2 12/10/2020 Mo Boyd MS CCC-SLP GN 1               MS CARLIN Evans  12/10/2020

## 2020-12-10 NOTE — PROGRESS NOTES
Continued Stay Note  [facility]     Patient Name: Ezequiel Abdalla  MRN: [id number]  Today's Date: 12/10/2020    Admit Date: 12/4/2020    Discharge Plan     Row Name 12/10/20 1500       Plan    Plan  [facility]    Patient/Family in Agreement with Plan  yes    Final Discharge Disposition Code  03 - skilled nursing facility (SNF)    Final Note  Pt is now discharged to [facility], informed Crystal at facility and left message on Joselyn in admissions VM.  Pt will be admitted at SNF level care. Left message for [facility] [phone]. Pt will travel back via [facility] ambulance [phone].    [facility] [phone]  Fax [phone]        Discharge Codes    No documentation.       Expected Discharge Date and Time     Expected Discharge Date Expected Discharge Time    Dec 10, 2020             JANETH Reese

## 2020-12-10 NOTE — PROGRESS NOTES
Continued Stay Note   [place]     Patient Name: Ezequiel Abdalla  MRN: [id number]  Today's Date: 12/10/2020    Admit Date: 12/4/2020    Discharge Plan     Row Name 12/10/20 0826       Plan    Plan  [facility]    Patient/Family in Agreement with Plan  yes    Plan Comments  Still waiting on final order for return to [facility]. Will follow. [phone]        Discharge Codes    No documentation.             JANETH Reese

## 2020-12-10 NOTE — PLAN OF CARE
Goal Outcome Evaluation:  Plan of Care Reviewed With: patient  Progress: no change    Patient slept until approx. 03:00.  When he woke up, he continued to insist on getting up out of bed.  Patient became agitated, restless, and hostile toward RN and other staff members.  Bed alarm is on.  One dose of ativan given PO. All VSS at this time.  Will continue to monitor.

## 2020-12-11 NOTE — THERAPY DISCHARGE NOTE
Acute Care - Physical Therapy Discharge Summary  [facility]       Patient Name: Ezequiel Abdalla  : 1945  MRN: [id number]    Today's Date: 2020                 Admit Date: 2020      PT Recommendation and Plan    Visit Dx:    ICD-10-CM ICD-9-CM   1. COVID-19  U07.1 079.89   2. Acute UTI  N39.0 599.0   3. Chronic atrial fibrillation (CMS/[facility])  I48.20 427.31   4. Oropharyngeal dysphagia  R13.12 787.22   5. Impaired mobility and ADLs  Z74.09 V49.89    Z78.9    6. Impaired mobility  Z74.09 799.89   7. Chronic obstructive pulmonary disease, unspecified COPD type (CMS/[facility])  J44.9 496   8. Alcohol abuse  F10.10 305.00   9. Chronic atrial fibrillation with RVR (CMS/[facility])  I48.20 427.31   10. Multiple falls  R29.6 V15.88   11. Polycythemia  D75.1 238.4   12. Elevated prostate specific antigen (PSA)  R97.20 790.93       Outcome Measures     Row Name 12/10/20 1100             How much help from another is currently needed...    Putting on and taking off regular lower body clothing?  1  -JJ      Bathing (including washing, rinsing, and drying)  1  -JJ      Toileting (which includes using toilet bed pan or urinal)  1  -JJ      Putting on and taking off regular upper body clothing  2  -JJ      Taking care of personal grooming (such as brushing teeth)  2  -JJ      Eating meals  3  -JJ      AM-PAC 6 Clicks Score (OT)  10  -         Functional Assessment    Outcome Measure Options  AM-PAC 6 Clicks Daily Activity (OT)  -J        User Key  (r) = Recorded By, (t) = Taken By, (c) = Cosigned By    Initials Name Provider Type    Katalina Vaughan OTR/L Occupational Therapist              Rehab Goal Summary     Row Name 20 0731             Bed Mobility Goal 1 (PT)    Activity/Assistive Device (Bed Mobility Goal 1, PT)  rolling to left;rolling to right;sit to supine;supine to sit  -MF      [place] Level/Cues Needed (Bed Mobility Goal 1, PT)  contact guard assist  -MF      Time Frame (Bed Mobility Goal 1, PT)   long term goal (LTG)  -MF      Progress/Outcomes (Bed Mobility Goal 1, PT)  goal not met  -MF         Transfer Goal 1 (PT)    Activity/Assistive Device (Transfer Goal 1, PT)  sit-to-stand/stand-to-sit;bed-to-chair/chair-to-bed;walker, rolling  -MF      [place] Level/Cues Needed (Transfer Goal 1, PT)  2 person assist;moderate assist (50-74% patient effort)  -MF      Time Frame (Transfer Goal 1, PT)  long term goal (LTG)  -MF      Progress/Outcome (Transfer Goal 1, PT)  goal not met  -MF         Gait Training Goal 1 (PT)    Activity/Assistive Device (Gait Training Goal 1, PT)  gait (walking locomotion);improve balance and speed;increase endurance/gait distance;increase energy conservation;decrease fall risk;walker, rolling  -MF      [place] Level (Gait Training Goal 1, PT)  minimum assist (75% or more patient effort)  -MF      Distance (Gait Training Goal 1, PT)  10  -MF      Time Frame (Gait Training Goal 1, PT)  long term goal (LTG)  -MF      Progress/Outcome (Gait Training Goal 1, PT)  goal not met  -MF        User Key  (r) = Recorded By, (t) = Taken By, (c) = Cosigned By    Initials Name Provider Type Discipline    Rosemary Tripathi PTA Physical Therapy Assistant PT              PT Discharge Summary  Anticipated Discharge Disposition (PT): skilled nursing facility  Reason for Discharge: Discharge from facility  Outcomes Achieved: Discharge from facility occurred on same date as evluation  Discharge Destination: SNF      Rosemary Hernandez PTA   12/11/2020

## 2020-12-11 NOTE — THERAPY DISCHARGE NOTE
Acute Care - Speech Language Pathology Discharge Summary  [facility]       Patient Name: Ezequiel Abdalla  : 1945  MRN: [id number]    Today's Date: 2020                   Admit Date: 2020      SLP Recommendation and Plan  Mechanical soft solids and honey thick liquids    Visit Dx:    ICD-10-CM ICD-9-CM   1. COVID-19  U07.1 079.89   2. Acute UTI  N39.0 599.0   3. Chronic atrial fibrillation (CMS/HCC)  I48.20 427.31   4. Oropharyngeal dysphagia  R13.12 787.22   5. Impaired mobility and ADLs  Z74.09 V49.89    Z78.9    6. Impaired mobility  Z74.09 799.89   7. Chronic obstructive pulmonary disease, unspecified COPD type (CMS/[facility])  J44.9 496   8. Alcohol abuse  F10.10 305.00   9. Chronic atrial fibrillation with RVR (CMS/[facility])  I48.20 427.31   10. Multiple falls  R29.6 V15.88   11. Polycythemia  D75.1 238.4   12. Elevated prostate specific antigen (PSA)  R97.20 790.93               SLP GOALS     Row Name 20 1500 12/10/20 1010          Oral Nutrition/Hydration Goal 1 (SLP)    Oral Nutrition/Hydration Goal 1, SLP  Pt will tolerate least restrictive diet with no overt s/s of aspiration.   -MB  Pt will tolerate least restrictive diet with no overt s/s of aspiration.   -CS     Time Frame (Oral Nutrition/Hydration Goal 1, SLP)  by discharge  -MB  by discharge  -CS     Barriers (Oral Nutrition/Hydration Goal 1, SLP)  n/a  -MB  n/a  -CS     Progress/Outcomes (Oral Nutrition/Hydration Goal 1, SLP)  goal partially met  -MB  continuing progress toward goal  -CS        Pharyngeal Strengthening Exercise Goal 1 (SLP)    Activity (Pharyngeal Strengthening Goal 1, SLP)  increase timing  -MB  increase timing  -CS     Increase Timing  gustatory stimulation (sour/cold)  -MB  gustatory stimulation (sour/cold)  -CS     [place]/Accuracy (Pharyngeal Strengthening Goal 1, SLP)  independently (over 90% accuracy)  -MB  independently (over 90% accuracy)  -CS     Time Frame (Pharyngeal Strengthening Goal 1, SLP)  short term  goal (STG);by discharge  -MB  short term goal (STG);by discharge  -CS     Barriers (Pharyngeal Strengthening Goal 1, SLP)  n/a  -MB  n/a  -CS     Progress/Outcomes (Pharyngeal Strengthening Goal 1, SLP)  goal partially met  -MB  continuing progress toward goal  -CS       User Key  (r) = Recorded By, (t) = Taken By, (c) = Cosigned By    Initials Name Provider Type    Kameron Bueno, CCC-SLP Speech and Language Pathologist    Mo Atkinson, MS CCC-SLP Speech and Language Pathologist                  SLP Discharge Summary  Anticipated Discharge Disposition (SLP): unknown  Reason for Discharge: discharge from this facility  Progress Toward Achieving Short/long Term Goals: goals partially met within established timelines  Discharge Destination: [facility]      Kameron Ness CCC-SLP  12/11/2020

## 2020-12-11 NOTE — THERAPY DISCHARGE NOTE
Acute Care - Occupational Therapy Discharge Summary  [facility]     Patient Name: Ezequiel Abdalla  : 1945  MRN: [id number]    Today's Date: 2020                 Admit Date: 2020        OT Recommendation and Plan    Visit Dx:    ICD-10-CM ICD-9-CM   1. COVID-19  U07.1 079.89   2. Acute UTI  N39.0 599.0   3. Chronic atrial fibrillation (CMS/[facility])  I48.20 427.31   4. Oropharyngeal dysphagia  R13.12 787.22   5. Impaired mobility and ADLs  Z74.09 V49.89    Z78.9    6. Impaired mobility  Z74.09 799.89   7. Chronic obstructive pulmonary disease, unspecified COPD type (CMS/[facility])  J44.9 496   8. Alcohol abuse  F10.10 305.00   9. Chronic atrial fibrillation with RVR (CMS/[facility])  I48.20 427.31   10. Multiple falls  R29.6 V15.88   11. Polycythemia  D75.1 238.4   12. Elevated prostate specific antigen (PSA)  R97.20 790.93               Rehab Goal Summary     Row Name 20 1530 20 1500 20 0731       Bed Mobility Goal 1 (PT)    Activity/Assistive Device (Bed Mobility Goal 1, PT)  --  --  rolling to left;rolling to right;sit to supine;supine to sit  -    [place] Level/Cues Needed (Bed Mobility Goal 1, PT)  --  --  contact guard assist  -    Time Frame (Bed Mobility Goal 1, PT)  --  --  long term goal (LTG)  -    Progress/Outcomes (Bed Mobility Goal 1, PT)  --  --  goal not met  -       Transfer Goal 1 (PT)    Activity/Assistive Device (Transfer Goal 1, PT)  --  --  sit-to-stand/stand-to-sit;bed-to-chair/chair-to-bed;walker, rolling  -    [place] Level/Cues Needed (Transfer Goal 1, PT)  --  --  2 person assist;moderate assist (50-74% patient effort)  -    Time Frame (Transfer Goal 1, PT)  --  --  long term goal (LTG)  -    Progress/Outcome (Transfer Goal 1, PT)  --  --  goal not met  -       Gait Training Goal 1 (PT)    Activity/Assistive Device (Gait Training Goal 1, PT)  --  --  gait (walking locomotion);improve balance and speed;increase endurance/gait distance;increase energy  conservation;decrease fall risk;walker, rolling  -MF    [place] Level (Gait Training Goal 1, PT)  --  --  minimum assist (75% or more patient effort)  -MF    Distance (Gait Training Goal 1, PT)  --  --  10  -MF    Time Frame (Gait Training Goal 1, PT)  --  --  long term goal (LTG)  -MF    Progress/Outcome (Gait Training Goal 1, PT)  --  --  goal not met  -MF       Dressing Goal 1 (OT)    Activity/Device (Dressing Goal 1, OT)  upper body dressing  -MT  --  --    [place]/Cues Needed (Dressing Goal 1, OT)  minimum assist (75% or more patient effort);set-up required;verbal cues required  -MT  --  --    Time Frame (Dressing Goal 1, OT)  long term goal (LTG);by discharge  -MT  --  --    Progress/Outcome (Dressing Goal 1, OT)  goal not met  -MT  --  --       Grooming Goal 1 (OT)    Activity/Device (Grooming Goal 1, OT)  grooming skills, all  -MT  --  --    [place] (Grooming Goal 1, OT)  minimum assist (75% or more patient effort);set-up required;verbal cues required  -MT  --  --    Time Frame (Grooming Goal 1, OT)  long term goal (LTG);by discharge  -MT  --  --    Progress/Outcome (Grooming Goal 1, OT)  goal not met  -MT  --  --       Self-Feeding Goal 1 (OT)    Activity/Device (Self-Feeding Goal 1, OT)  self-feeding skills, all  -MT  --  --    [place] Level/Cues Needed (Self-Feeding Goal 1, OT)  minimum assist (75% or more patient effort);verbal cues required;set-up required  -MT  --  --    Time Frame (Self-Feeding Goal 1, OT)  long term goal (LTG);by discharge  -MT  --  --    Progress/Outcomes (Self-Feeding Goal 1, OT)  goal not met  -MT  --  --       Oral Nutrition/Hydration Goal 1 (SLP)    Oral Nutrition/Hydration Goal 1, SLP  --  Pt will tolerate least restrictive diet with no overt s/s of aspiration.   -MB  --    Time Frame (Oral Nutrition/Hydration Goal 1, SLP)  --  by discharge  -MB  --    Barriers (Oral Nutrition/Hydration Goal 1, SLP)  --  n/a  -MB  --    Progress/Outcomes (Oral  Nutrition/Hydration Goal 1, SLP)  --  goal partially met  -MB  --       Pharyngeal Strengthening Exercise Goal 1 (SLP)    Activity (Pharyngeal Strengthening Goal 1, SLP)  --  increase timing  -MB  --    Increase Timing  --  gustatory stimulation (sour/cold)  -MB  --    [place]/Accuracy (Pharyngeal Strengthening Goal 1, SLP)  --  independently (over 90% accuracy)  -MB  --    Time Frame (Pharyngeal Strengthening Goal 1, SLP)  --  short term goal (STG);by discharge  -MB  --    Barriers (Pharyngeal Strengthening Goal 1, SLP)  --  n/a  -MB  --    Progress/Outcomes (Pharyngeal Strengthening Goal 1, SLP)  --  goal partially met  -MB  --      User Key  (r) = Recorded By, (t) = Taken By, (c) = Cosigned By    Initials Name Provider Type Discipline    Kameron Bueno, CCC-SLP Speech and Language Pathologist SLP    Rosemary Tripathi, PTA Physical Therapy Assistant PT    Mellissa Razo COTA/L Occupational Therapy Assistant OT          Outcome Measures     Row Name 12/10/20 1100             How much help from another is currently needed...    Putting on and taking off regular lower body clothing?  1  -JJ      Bathing (including washing, rinsing, and drying)  1  -JJ      Toileting (which includes using toilet bed pan or urinal)  1  -JJ      Putting on and taking off regular upper body clothing  2  -JJ      Taking care of personal grooming (such as brushing teeth)  2  -JJ      Eating meals  3  -JJ      AM-PAC 6 Clicks Score (OT)  10  -JJ         Functional Assessment    Outcome Measure Options  AM-PAC 6 Clicks Daily Activity (OT)  -JJ        User Key  (r) = Recorded By, (t) = Taken By, (c) = Cosigned By    Initials Name Provider Type    Katalina Vaughan OTR/L Occupational Therapist                  OT Discharge Summary  Reason for Discharge: Discharge from facility  Outcomes Achieved: Refer to plan of care for updates on goals achieved  Discharge Destination: SNF      GENEVIEVE Medrano  12/11/2020

## 2020-12-11 NOTE — NURSING NOTE
"Multiple attempts by multiple staff to change pt diaper, pt yells at staff to leave him alone. Yells he g\"hates women\", gets very agitated male staff also assisted but pt continues to refuse  "

## 2020-12-13 LAB
BACTERIA SPEC AEROBE CULT: NORMAL
BACTERIA SPEC AEROBE CULT: NORMAL

## 2021-01-01 ENCOUNTER — HOSPITAL ENCOUNTER (EMERGENCY)
Facility: HOSPITAL | Age: 76
Discharge: NURSING, REHAB OR LONG-TERM CARE | End: 2021-01-01
Attending: EMERGENCY MEDICINE | Admitting: EMERGENCY MEDICINE

## 2021-01-01 ENCOUNTER — APPOINTMENT (OUTPATIENT)
Dept: CT IMAGING | Facility: HOSPITAL | Age: 76
End: 2021-01-01

## 2021-01-01 VITALS
TEMPERATURE: 98 F | SYSTOLIC BLOOD PRESSURE: 91 MMHG | OXYGEN SATURATION: 99 % | HEIGHT: 68 IN | RESPIRATION RATE: 16 BRPM | DIASTOLIC BLOOD PRESSURE: 69 MMHG | HEART RATE: 61 BPM | BODY MASS INDEX: 22.32 KG/M2 | WEIGHT: 147.3 LBS

## 2021-01-01 DIAGNOSIS — T07.XXXA ABRASIONS OF MULTIPLE SITES: ICD-10-CM

## 2021-01-01 DIAGNOSIS — S00.83XA CONTUSION OF FACE, INITIAL ENCOUNTER: Primary | ICD-10-CM

## 2021-01-01 DIAGNOSIS — S02.2XXA CLOSED FRACTURE OF NASAL BONE, INITIAL ENCOUNTER: ICD-10-CM

## 2021-01-01 LAB — VALPROATE SERPL-MCNC: 37.3 MCG/ML (ref 50–125)

## 2021-01-01 PROCEDURE — 99283 EMERGENCY DEPT VISIT LOW MDM: CPT

## 2021-01-01 PROCEDURE — 70486 CT MAXILLOFACIAL W/O DYE: CPT

## 2021-01-01 PROCEDURE — 70450 CT HEAD/BRAIN W/O DYE: CPT

## 2021-01-01 PROCEDURE — 80164 ASSAY DIPROPYLACETIC ACD TOT: CPT | Performed by: EMERGENCY MEDICINE

## 2021-01-01 PROCEDURE — 36415 COLL VENOUS BLD VENIPUNCTURE: CPT

## 2021-01-01 RX ORDER — HYDROCODONE BITARTRATE AND ACETAMINOPHEN 5; 325 MG/1; MG/1
1 TABLET ORAL EVERY 6 HOURS PRN
Qty: 3 TABLET | Refills: 0 | Status: SHIPPED | OUTPATIENT
Start: 2021-01-01 | End: 2021-01-01

## 2021-01-01 RX ORDER — CLINDAMYCIN HYDROCHLORIDE 300 MG/1
300 CAPSULE ORAL 3 TIMES DAILY
Qty: 21 CAPSULE | Refills: 0 | Status: ON HOLD | OUTPATIENT
Start: 2021-01-01 | End: 2021-07-27

## 2021-01-01 RX ADMIN — SODIUM CHLORIDE 500 ML: 0.9 INJECTION, SOLUTION INTRAVENOUS at 17:22

## 2021-01-01 NOTE — ED PROVIDER NOTES
Subjective   75-year-old male presents to the ER for evaluation after mechanical fall out of his wheelchair.  He does have a history of A. fib but is not on any anticoagulation.  Patient lost his balance and fell forward out of his wheelchair striking his face on the asphalt.  Sustained an abrasion to the bridge of his nose and a contusion to his forehead.  Also has a skin tear to his right hand.  He denies any loss of consciousness, neck pain, focal numbness or weakness.  No chest pain, shortness of breath, abdominal pain, nausea or vomiting.      History provided by:  Patient      Review of Systems   All other systems reviewed and are negative.      Past Medical History:   Diagnosis Date   • A-fib (CMS/HCC)    • Alcohol abuse    • Anxiety    • COPD (chronic obstructive pulmonary disease) (CMS/HCC)    • COPD (chronic obstructive pulmonary disease) (CMS/HCC)    • Hypotension    • Lung disease    • Malaise and fatigue    • Neurological disease    • Polycythemia    • SOB (shortness of breath)    • Brina-Parkinson-White syndrome        No Known Allergies    Past Surgical History:   Procedure Laterality Date   • KNEE SURGERY     • PACEMAKER IMPLANTATION      [facility] [facility]    • WRIST SURGERY         Family History   Problem Relation Age of Onset   • No Known Problems Mother    • Cancer Father    • Heart disease Father    • COPD Sister        Social History     Socioeconomic History   • Marital status:      Spouse name: Not on file   • Number of children: Not on file   • Years of education: Not on file   • Highest education level: Not on file   Tobacco Use   • Smoking status: Heavy Tobacco Smoker     Packs/day: 2.00     Years: 60.00     Pack years: 120.00   • Smokeless tobacco: Never Used   Substance and Sexual Activity   • Alcohol use: Yes     Alcohol/week: 28.0 standard drinks     Types: 21 Cans of beer, 7 Shots of liquor per week     Comment: 2-3 BEERS, 1 GIN DAILY   • Drug use: No   • Sexual activity: Never            Objective   Physical Exam  Vitals signs and nursing note reviewed.   Constitutional:       Appearance: Normal appearance. He is normal weight.   HENT:      Head: Normocephalic.      Comments: Forehead contusion, abrasion to his nose     Right Ear: Tympanic membrane normal.      Left Ear: Tympanic membrane normal.      Nose:      Comments: Abrasion to nasal bridge     Mouth/Throat:      Mouth: Mucous membranes are dry.      Pharynx: Oropharynx is clear.      Comments: Midface stable, but symmetric  Eyes:      Extraocular Movements: Extraocular movements intact.      Conjunctiva/sclera: Conjunctivae normal.      Pupils: Pupils are equal, round, and reactive to light.      Comments: No periorbital trauma   Neck:      Musculoskeletal: Normal range of motion and neck supple. No neck rigidity or muscular tenderness.   Cardiovascular:      Pulses: Normal pulses.      Heart sounds: Normal heart sounds. No murmur. No friction rub. No gallop.    Pulmonary:      Effort: Pulmonary effort is normal.      Breath sounds: Normal breath sounds. No wheezing, rhonchi or rales.   Abdominal:      General: Abdomen is flat. Bowel sounds are normal.      Palpations: Abdomen is soft.   Musculoskeletal: Normal range of motion.         General: No swelling, tenderness or deformity.   Skin:     General: Skin is warm and dry.      Capillary Refill: Capillary refill takes less than 2 seconds.      Comments: Abrasion to nasal bridge, abrasion to dorsum of right hand   Neurological:      General: No focal deficit present.      Mental Status: He is alert. Mental status is at baseline.      Sensory: No sensory deficit.      Motor: No weakness.         Procedures           ED Course  ED Course as of Jan 01 1816 Fri Jan 01, 2021   1710 Hx of hypotension, bp stable in ED. Afebrile, hr normal. Given 500cc bolus fluids.  Valproic acid level normal.  ET had negative for acute process, CT max face does show some minimally displaced nasal bone  fractures.  Does have some abrasions overlying the nasal fractures.  Patient also has skin tear and abrasion to his right upper extremity or a contusion.  Will DC with course of antibiotics, have patient return for worsening symptoms.    [AW]      ED Course User Index  [AW] Vikram Tillman MD                                           MDM  Number of Diagnoses or Management Options  Abrasions of multiple sites: minor  Closed fracture of nasal bone, initial encounter: new and requires workup  Contusion of face, initial encounter: new and requires workup     Amount and/or Complexity of Data Reviewed  Clinical lab tests: reviewed  Tests in the radiology section of CPT®: reviewed    Risk of Complications, Morbidity, and/or Mortality  Presenting problems: moderate  Diagnostic procedures: moderate  Management options: moderate    Patient Progress  Patient progress: improved      Final diagnoses:   Contusion of face, initial encounter   Abrasions of multiple sites   Closed fracture of nasal bone, initial encounter            Vikram Tillman MD  01/01/21 1815

## 2021-07-26 ENCOUNTER — HOSPITAL ENCOUNTER (INPATIENT)
Facility: HOSPITAL | Age: 76
LOS: 6 days | Discharge: NURSING, REHAB OR LONG-TERM CARE | End: 2021-08-02
Attending: EMERGENCY MEDICINE | Admitting: FAMILY MEDICINE

## 2021-07-26 ENCOUNTER — APPOINTMENT (OUTPATIENT)
Dept: CT IMAGING | Facility: HOSPITAL | Age: 76
End: 2021-07-26

## 2021-07-26 ENCOUNTER — APPOINTMENT (OUTPATIENT)
Dept: GENERAL RADIOLOGY | Facility: HOSPITAL | Age: 76
End: 2021-07-26

## 2021-07-26 DIAGNOSIS — N39.0 URINARY TRACT INFECTION WITHOUT HEMATURIA, SITE UNSPECIFIED: ICD-10-CM

## 2021-07-26 DIAGNOSIS — K59.00 CONSTIPATION, UNSPECIFIED CONSTIPATION TYPE: ICD-10-CM

## 2021-07-26 DIAGNOSIS — K57.92 DIVERTICULITIS: ICD-10-CM

## 2021-07-26 DIAGNOSIS — J90 PLEURAL EFFUSION: ICD-10-CM

## 2021-07-26 DIAGNOSIS — A41.9 SEPTIC SHOCK (HCC): Primary | ICD-10-CM

## 2021-07-26 DIAGNOSIS — R13.12 OROPHARYNGEAL DYSPHAGIA: ICD-10-CM

## 2021-07-26 DIAGNOSIS — R65.21 SEPTIC SHOCK (HCC): Primary | ICD-10-CM

## 2021-07-26 LAB
ALBUMIN SERPL-MCNC: 3 G/DL (ref 3.5–5.2)
ALBUMIN/GLOB SERPL: 1.4 G/DL
ALP SERPL-CCNC: 51 U/L (ref 39–117)
ALT SERPL W P-5'-P-CCNC: 16 U/L (ref 1–41)
ANION GAP SERPL CALCULATED.3IONS-SCNC: 13 MMOL/L (ref 5–15)
ANISOCYTOSIS BLD QL: ABNORMAL
APTT PPP: 48.6 SECONDS (ref 24.1–35)
ARTERIAL PATENCY WRIST A: POSITIVE
AST SERPL-CCNC: 27 U/L (ref 1–40)
ATMOSPHERIC PRESS: 751 MMHG
BASE EXCESS BLDA CALC-SCNC: -3.6 MMOL/L (ref 0–2)
BDY SITE: ABNORMAL
BILIRUB SERPL-MCNC: 0.5 MG/DL (ref 0–1.2)
BODY TEMPERATURE: 37 C
BUN SERPL-MCNC: 27 MG/DL (ref 8–23)
BUN/CREAT SERPL: 13.4 (ref 7–25)
CALCIUM SPEC-SCNC: 10.1 MG/DL (ref 8.6–10.5)
CHLORIDE SERPL-SCNC: 109 MMOL/L (ref 98–107)
CO2 SERPL-SCNC: 20 MMOL/L (ref 22–29)
CREAT SERPL-MCNC: 2.01 MG/DL (ref 0.76–1.27)
D-LACTATE SERPL-SCNC: 6.1 MMOL/L (ref 0.5–2)
DEPRECATED RDW RBC AUTO: 51.2 FL (ref 37–54)
ERYTHROCYTE [DISTWIDTH] IN BLOOD BY AUTOMATED COUNT: 14.3 % (ref 12.3–15.4)
GAS FLOW AIRWAY: 2 LPM
GFR SERPL CREATININE-BSD FRML MDRD: 32 ML/MIN/1.73
GLOBULIN UR ELPH-MCNC: 2.1 GM/DL
GLUCOSE BLDC GLUCOMTR-MCNC: 56 MG/DL (ref 70–130)
GLUCOSE BLDC GLUCOMTR-MCNC: 78 MG/DL (ref 70–130)
GLUCOSE SERPL-MCNC: 75 MG/DL (ref 65–99)
HCO3 BLDA-SCNC: 18.9 MMOL/L (ref 20–26)
HCT VFR BLD AUTO: 39.5 % (ref 37.5–51)
HGB BLD-MCNC: 13.1 G/DL (ref 13–17.7)
HOLD SPECIMEN: NORMAL
INR PPP: 1.65 (ref 0.91–1.09)
LIPASE SERPL-CCNC: 15 U/L (ref 13–60)
LYMPHOCYTES # BLD MANUAL: 1.49 10*3/MM3 (ref 0.7–3.1)
LYMPHOCYTES NFR BLD MANUAL: 2.4 % (ref 19.6–45.3)
LYMPHOCYTES NFR BLD MANUAL: 4.8 % (ref 5–12)
Lab: ABNORMAL
MCH RBC QN AUTO: 32.3 PG (ref 26.6–33)
MCHC RBC AUTO-ENTMCNC: 33.2 G/DL (ref 31.5–35.7)
MCV RBC AUTO: 97.5 FL (ref 79–97)
METAMYELOCYTES NFR BLD MANUAL: 7.2 % (ref 0–0)
MODALITY: ABNORMAL
MONOCYTES # BLD AUTO: 1.79 10*3/MM3 (ref 0.1–0.9)
NEUTROPHILS # BLD AUTO: 31.38 10*3/MM3 (ref 1.7–7)
NEUTROPHILS NFR BLD MANUAL: 68 % (ref 42.7–76)
NEUTS BAND NFR BLD MANUAL: 16 % (ref 0–5)
PCO2 BLDA: 26.4 MM HG (ref 35–45)
PCO2 TEMP ADJ BLD: 26.4 MM HG (ref 35–45)
PH BLDA: 7.46 PH UNITS (ref 7.35–7.45)
PH, TEMP CORRECTED: 7.46 PH UNITS (ref 7.35–7.45)
PLATELET # BLD AUTO: 138 10*3/MM3 (ref 140–450)
PMV BLD AUTO: 10.5 FL (ref 6–12)
PO2 BLDA: 74.5 MM HG (ref 83–108)
PO2 TEMP ADJ BLD: 74.5 MM HG (ref 83–108)
POIKILOCYTOSIS BLD QL SMEAR: ABNORMAL
POTASSIUM SERPL-SCNC: 4.2 MMOL/L (ref 3.5–5.2)
PROT SERPL-MCNC: 5.1 G/DL (ref 6–8.5)
PROTHROMBIN TIME: 18.3 SECONDS (ref 11.5–13.4)
RBC # BLD AUTO: 4.05 10*6/MM3 (ref 4.14–5.8)
SAO2 % BLDCOA: 96.7 % (ref 94–99)
SARS-COV-2 RNA PNL SPEC NAA+PROBE: NOT DETECTED
SMALL PLATELETS BLD QL SMEAR: ABNORMAL
SODIUM SERPL-SCNC: 142 MMOL/L (ref 136–145)
TROPONIN T SERPL-MCNC: 0.02 NG/ML (ref 0–0.03)
VARIANT LYMPHS NFR BLD MANUAL: 1.6 % (ref 0–5)
VENTILATOR MODE: ABNORMAL
WBC # BLD AUTO: 37.36 10*3/MM3 (ref 3.4–10.8)
WBC MORPH BLD: NORMAL
WHOLE BLOOD HOLD SPECIMEN: NORMAL

## 2021-07-26 PROCEDURE — 87077 CULTURE AEROBIC IDENTIFY: CPT | Performed by: EMERGENCY MEDICINE

## 2021-07-26 PROCEDURE — 93005 ELECTROCARDIOGRAM TRACING: CPT | Performed by: EMERGENCY MEDICINE

## 2021-07-26 PROCEDURE — 87040 BLOOD CULTURE FOR BACTERIA: CPT | Performed by: EMERGENCY MEDICINE

## 2021-07-26 PROCEDURE — 71045 X-RAY EXAM CHEST 1 VIEW: CPT

## 2021-07-26 PROCEDURE — 84145 PROCALCITONIN (PCT): CPT | Performed by: EMERGENCY MEDICINE

## 2021-07-26 PROCEDURE — 87086 URINE CULTURE/COLONY COUNT: CPT | Performed by: EMERGENCY MEDICINE

## 2021-07-26 PROCEDURE — 93005 ELECTROCARDIOGRAM TRACING: CPT

## 2021-07-26 PROCEDURE — 74176 CT ABD & PELVIS W/O CONTRAST: CPT

## 2021-07-26 PROCEDURE — 85730 THROMBOPLASTIN TIME PARTIAL: CPT | Performed by: EMERGENCY MEDICINE

## 2021-07-26 PROCEDURE — 84443 ASSAY THYROID STIM HORMONE: CPT | Performed by: INTERNAL MEDICINE

## 2021-07-26 PROCEDURE — 51702 INSERT TEMP BLADDER CATH: CPT

## 2021-07-26 PROCEDURE — 36600 WITHDRAWAL OF ARTERIAL BLOOD: CPT

## 2021-07-26 PROCEDURE — 82803 BLOOD GASES ANY COMBINATION: CPT

## 2021-07-26 PROCEDURE — 70450 CT HEAD/BRAIN W/O DYE: CPT

## 2021-07-26 PROCEDURE — 82962 GLUCOSE BLOOD TEST: CPT

## 2021-07-26 PROCEDURE — 84484 ASSAY OF TROPONIN QUANT: CPT | Performed by: EMERGENCY MEDICINE

## 2021-07-26 PROCEDURE — C1751 CATH, INF, PER/CENT/MIDLINE: HCPCS

## 2021-07-26 PROCEDURE — 81001 URINALYSIS AUTO W/SCOPE: CPT | Performed by: EMERGENCY MEDICINE

## 2021-07-26 PROCEDURE — 99291 CRITICAL CARE FIRST HOUR: CPT

## 2021-07-26 PROCEDURE — 25010000002 CEFTRIAXONE PER 250 MG: Performed by: EMERGENCY MEDICINE

## 2021-07-26 PROCEDURE — 83036 HEMOGLOBIN GLYCOSYLATED A1C: CPT | Performed by: INTERNAL MEDICINE

## 2021-07-26 PROCEDURE — 85007 BL SMEAR W/DIFF WBC COUNT: CPT | Performed by: EMERGENCY MEDICINE

## 2021-07-26 PROCEDURE — 85025 COMPLETE CBC W/AUTO DIFF WBC: CPT | Performed by: EMERGENCY MEDICINE

## 2021-07-26 PROCEDURE — 83690 ASSAY OF LIPASE: CPT | Performed by: EMERGENCY MEDICINE

## 2021-07-26 PROCEDURE — 83605 ASSAY OF LACTIC ACID: CPT | Performed by: EMERGENCY MEDICINE

## 2021-07-26 PROCEDURE — 87635 SARS-COV-2 COVID-19 AMP PRB: CPT | Performed by: EMERGENCY MEDICINE

## 2021-07-26 PROCEDURE — 85610 PROTHROMBIN TIME: CPT | Performed by: EMERGENCY MEDICINE

## 2021-07-26 PROCEDURE — 80053 COMPREHEN METABOLIC PANEL: CPT | Performed by: EMERGENCY MEDICINE

## 2021-07-26 PROCEDURE — 83735 ASSAY OF MAGNESIUM: CPT | Performed by: INTERNAL MEDICINE

## 2021-07-26 PROCEDURE — 84439 ASSAY OF FREE THYROXINE: CPT | Performed by: INTERNAL MEDICINE

## 2021-07-26 PROCEDURE — 71250 CT THORAX DX C-: CPT

## 2021-07-26 PROCEDURE — 93010 ELECTROCARDIOGRAM REPORT: CPT | Performed by: EMERGENCY MEDICINE

## 2021-07-26 PROCEDURE — 87186 SC STD MICRODIL/AGAR DIL: CPT | Performed by: EMERGENCY MEDICINE

## 2021-07-26 PROCEDURE — 83880 ASSAY OF NATRIURETIC PEPTIDE: CPT | Performed by: EMERGENCY MEDICINE

## 2021-07-26 RX ORDER — DEXTROSE MONOHYDRATE 25 G/50ML
50 INJECTION, SOLUTION INTRAVENOUS ONCE
Status: COMPLETED | OUTPATIENT
Start: 2021-07-26 | End: 2021-07-26

## 2021-07-26 RX ADMIN — CEFTRIAXONE SODIUM 2 G: 2 INJECTION, POWDER, FOR SOLUTION INTRAMUSCULAR; INTRAVENOUS at 23:08

## 2021-07-26 RX ADMIN — DEXTROSE MONOHYDRATE 50 ML: 25 INJECTION, SOLUTION INTRAVENOUS at 23:42

## 2021-07-26 RX ADMIN — SODIUM CHLORIDE, POTASSIUM CHLORIDE, SODIUM LACTATE AND CALCIUM CHLORIDE 2600 ML: 600; 310; 30; 20 INJECTION, SOLUTION INTRAVENOUS at 23:05

## 2021-07-27 ENCOUNTER — APPOINTMENT (OUTPATIENT)
Dept: GENERAL RADIOLOGY | Facility: HOSPITAL | Age: 76
End: 2021-07-27

## 2021-07-27 ENCOUNTER — APPOINTMENT (OUTPATIENT)
Dept: CARDIOLOGY | Facility: HOSPITAL | Age: 76
End: 2021-07-27

## 2021-07-27 PROBLEM — R65.21 SEPTIC SHOCK (HCC): Status: ACTIVE | Noted: 2021-07-27

## 2021-07-27 PROBLEM — N17.9 AKI (ACUTE KIDNEY INJURY) (HCC): Status: ACTIVE | Noted: 2021-07-27

## 2021-07-27 PROBLEM — I48.20 CHRONIC ATRIAL FIBRILLATION WITH RVR (HCC): Chronic | Status: ACTIVE | Noted: 2017-02-12

## 2021-07-27 PROBLEM — A41.9 SEPTIC SHOCK (HCC): Status: ACTIVE | Noted: 2021-07-27

## 2021-07-27 PROBLEM — E87.20 LACTIC ACIDOSIS: Status: ACTIVE | Noted: 2021-07-27

## 2021-07-27 PROBLEM — F03.90 DEMENTIA (HCC): Status: ACTIVE | Noted: 2018-07-02

## 2021-07-27 PROBLEM — K59.00 OBSTIPATION: Status: ACTIVE | Noted: 2021-07-27

## 2021-07-27 PROBLEM — J44.9 COPD (CHRONIC OBSTRUCTIVE PULMONARY DISEASE): Chronic | Status: ACTIVE | Noted: 2017-02-12

## 2021-07-27 PROBLEM — I10 HTN (HYPERTENSION): Status: ACTIVE | Noted: 2019-06-06

## 2021-07-27 PROBLEM — G93.41 METABOLIC ENCEPHALOPATHY: Status: ACTIVE | Noted: 2021-07-27

## 2021-07-27 PROBLEM — A49.9 UTI (URINARY TRACT INFECTION), BACTERIAL: Status: ACTIVE | Noted: 2021-07-27

## 2021-07-27 PROBLEM — R11.2 NAUSEA & VOMITING: Status: ACTIVE | Noted: 2021-07-27

## 2021-07-27 PROBLEM — N39.0 UTI (URINARY TRACT INFECTION), BACTERIAL: Status: ACTIVE | Noted: 2021-07-27

## 2021-07-27 LAB
ALBUMIN SERPL-MCNC: 3.4 G/DL (ref 3.5–5.2)
ALBUMIN/GLOB SERPL: 1.4 G/DL
ALP SERPL-CCNC: 78 U/L (ref 39–117)
ALT SERPL W P-5'-P-CCNC: 18 U/L (ref 1–41)
ANION GAP SERPL CALCULATED.3IONS-SCNC: 12 MMOL/L (ref 5–15)
ARTERIAL PATENCY WRIST A: POSITIVE
AST SERPL-CCNC: 39 U/L (ref 1–40)
ATMOSPHERIC PRESS: 750 MMHG
BACTERIA UR QL AUTO: ABNORMAL /HPF
BASE EXCESS BLDA CALC-SCNC: 0.4 MMOL/L (ref 0–2)
BDY SITE: ABNORMAL
BH CV ECHO MEAS - AO MAX PG (FULL): 5.4 MMHG
BH CV ECHO MEAS - AO MAX PG: 8.3 MMHG
BH CV ECHO MEAS - AO MEAN PG (FULL): 3 MMHG
BH CV ECHO MEAS - AO MEAN PG: 4 MMHG
BH CV ECHO MEAS - AO ROOT AREA (BSA CORRECTED): 2
BH CV ECHO MEAS - AO ROOT AREA: 10.8 CM^2
BH CV ECHO MEAS - AO ROOT DIAM: 3.7 CM
BH CV ECHO MEAS - AO V2 MAX: 144 CM/SEC
BH CV ECHO MEAS - AO V2 MEAN: 94.7 CM/SEC
BH CV ECHO MEAS - AO V2 VTI: 19.7 CM
BH CV ECHO MEAS - AVA(I,A): 2.3 CM^2
BH CV ECHO MEAS - AVA(I,D): 2.3 CM^2
BH CV ECHO MEAS - AVA(V,A): 2.5 CM^2
BH CV ECHO MEAS - AVA(V,D): 2.5 CM^2
BH CV ECHO MEAS - BSA(HAYCOCK): 1.8 M^2
BH CV ECHO MEAS - BSA: 1.8 M^2
BH CV ECHO MEAS - BZI_BMI: 21.4 KILOGRAMS/M^2
BH CV ECHO MEAS - BZI_METRIC_HEIGHT: 177.8 CM
BH CV ECHO MEAS - BZI_METRIC_WEIGHT: 67.6 KG
BH CV ECHO MEAS - EDV(CUBED): 120.6 ML
BH CV ECHO MEAS - EDV(MOD-SP2): 78.8 ML
BH CV ECHO MEAS - EDV(MOD-SP4): 86.9 ML
BH CV ECHO MEAS - EDV(TEICH): 115 ML
BH CV ECHO MEAS - EF(CUBED): 48.9 %
BH CV ECHO MEAS - EF(MOD-SP2): 45.9 %
BH CV ECHO MEAS - EF(MOD-SP4): 42 %
BH CV ECHO MEAS - EF(TEICH): 40.9 %
BH CV ECHO MEAS - ESV(CUBED): 61.6 ML
BH CV ECHO MEAS - ESV(MOD-SP2): 42.6 ML
BH CV ECHO MEAS - ESV(MOD-SP4): 50.4 ML
BH CV ECHO MEAS - ESV(TEICH): 67.9 ML
BH CV ECHO MEAS - FS: 20 %
BH CV ECHO MEAS - IVS/LVPW: 1.5
BH CV ECHO MEAS - IVSD: 1.1 CM
BH CV ECHO MEAS - LA DIMENSION: 4.7 CM
BH CV ECHO MEAS - LA/AO: 1.3
BH CV ECHO MEAS - LAT PEAK E' VEL: 9.3 CM/SEC
BH CV ECHO MEAS - LV DIASTOLIC VOL/BSA (35-75): 47.2 ML/M^2
BH CV ECHO MEAS - LV MASS(C)D: 160.6 GRAMS
BH CV ECHO MEAS - LV MASS(C)DI: 87.2 GRAMS/M^2
BH CV ECHO MEAS - LV MAX PG: 2.9 MMHG
BH CV ECHO MEAS - LV MEAN PG: 1 MMHG
BH CV ECHO MEAS - LV SYSTOLIC VOL/BSA (12-30): 27.4 ML/M^2
BH CV ECHO MEAS - LV V1 MAX: 85.1 CM/SEC
BH CV ECHO MEAS - LV V1 MEAN: 52.6 CM/SEC
BH CV ECHO MEAS - LV V1 VTI: 10.9 CM
BH CV ECHO MEAS - LVIDD: 4.9 CM
BH CV ECHO MEAS - LVIDS: 4 CM
BH CV ECHO MEAS - LVLD AP2: 6.9 CM
BH CV ECHO MEAS - LVLD AP4: 7.1 CM
BH CV ECHO MEAS - LVLS AP2: 7.1 CM
BH CV ECHO MEAS - LVLS AP4: 7.4 CM
BH CV ECHO MEAS - LVOT AREA (M): 4.2 CM^2
BH CV ECHO MEAS - LVOT AREA: 4.2 CM^2
BH CV ECHO MEAS - LVOT DIAM: 2.3 CM
BH CV ECHO MEAS - LVPWD: 0.74 CM
BH CV ECHO MEAS - MED PEAK E' VEL: 8.92 CM/SEC
BH CV ECHO MEAS - MV A MAX VEL: 57.6 CM/SEC
BH CV ECHO MEAS - MV DEC TIME: 0.3 SEC
BH CV ECHO MEAS - MV E MAX VEL: 37 CM/SEC
BH CV ECHO MEAS - MV E/A: 0.64
BH CV ECHO MEAS - SI(AO): 115 ML/M^2
BH CV ECHO MEAS - SI(CUBED): 32 ML/M^2
BH CV ECHO MEAS - SI(LVOT): 24.6 ML/M^2
BH CV ECHO MEAS - SI(MOD-SP2): 19.7 ML/M^2
BH CV ECHO MEAS - SI(MOD-SP4): 19.8 ML/M^2
BH CV ECHO MEAS - SI(TEICH): 25.5 ML/M^2
BH CV ECHO MEAS - SV(AO): 211.8 ML
BH CV ECHO MEAS - SV(CUBED): 58.9 ML
BH CV ECHO MEAS - SV(LVOT): 45.3 ML
BH CV ECHO MEAS - SV(MOD-SP2): 36.2 ML
BH CV ECHO MEAS - SV(MOD-SP4): 36.5 ML
BH CV ECHO MEAS - SV(TEICH): 47 ML
BH CV ECHO MEAS - TR MAX VEL: 255 CM/SEC
BH CV ECHO MEASUREMENTS AVERAGE E/E' RATIO: 4.06
BILIRUB SERPL-MCNC: 0.5 MG/DL (ref 0–1.2)
BILIRUB UR QL STRIP: NEGATIVE
BODY TEMPERATURE: 37 C
BUN SERPL-MCNC: 31 MG/DL (ref 8–23)
BUN/CREAT SERPL: 17.6 (ref 7–25)
BURR CELLS BLD QL SMEAR: ABNORMAL
CALCIUM SPEC-SCNC: 10.1 MG/DL (ref 8.6–10.5)
CHLORIDE SERPL-SCNC: 104 MMOL/L (ref 98–107)
CLARITY UR: ABNORMAL
CO2 SERPL-SCNC: 25 MMOL/L (ref 22–29)
COLOR UR: ABNORMAL
CREAT SERPL-MCNC: 1.76 MG/DL (ref 0.76–1.27)
D-LACTATE SERPL-SCNC: 4.1 MMOL/L (ref 0.5–2)
DEPRECATED RDW RBC AUTO: 53.2 FL (ref 37–54)
ERYTHROCYTE [DISTWIDTH] IN BLOOD BY AUTOMATED COUNT: 14.6 % (ref 12.3–15.4)
GAS FLOW AIRWAY: 2 LPM
GFR SERPL CREATININE-BSD FRML MDRD: 38 ML/MIN/1.73
GLOBULIN UR ELPH-MCNC: 2.5 GM/DL
GLUCOSE BLDC GLUCOMTR-MCNC: 72 MG/DL (ref 70–130)
GLUCOSE BLDC GLUCOMTR-MCNC: 99 MG/DL (ref 70–130)
GLUCOSE SERPL-MCNC: 78 MG/DL (ref 65–99)
GLUCOSE UR STRIP-MCNC: NEGATIVE MG/DL
HBA1C MFR BLD: 4.9 % (ref 4.8–5.6)
HCO3 BLDA-SCNC: 23.1 MMOL/L (ref 20–26)
HCT VFR BLD AUTO: 43.1 % (ref 37.5–51)
HGB BLD-MCNC: 14.3 G/DL (ref 13–17.7)
HGB UR QL STRIP.AUTO: ABNORMAL
HOLD SPECIMEN: NORMAL
HYALINE CASTS UR QL AUTO: ABNORMAL /LPF
KETONES UR QL STRIP: NEGATIVE
LEFT ATRIUM VOLUME INDEX: 44.3 ML/M2
LEFT ATRIUM VOLUME: 81.5 CM3
LEUKOCYTE ESTERASE UR QL STRIP.AUTO: ABNORMAL
LYMPHOCYTES # BLD MANUAL: 1 10*3/MM3 (ref 0.7–3.1)
LYMPHOCYTES NFR BLD MANUAL: 2 % (ref 19.6–45.3)
LYMPHOCYTES NFR BLD MANUAL: 8 % (ref 5–12)
Lab: ABNORMAL
MAGNESIUM SERPL-MCNC: 1.6 MG/DL (ref 1.6–2.4)
MAXIMAL PREDICTED HEART RATE: 142 BPM
MCH RBC QN AUTO: 32.6 PG (ref 26.6–33)
MCHC RBC AUTO-ENTMCNC: 33.2 G/DL (ref 31.5–35.7)
MCV RBC AUTO: 98.2 FL (ref 79–97)
METAMYELOCYTES NFR BLD MANUAL: 3 % (ref 0–0)
MODALITY: ABNORMAL
MONOCYTES # BLD AUTO: 4 10*3/MM3 (ref 0.1–0.9)
NEUTROPHILS # BLD AUTO: 43.01 10*3/MM3 (ref 1.7–7)
NEUTROPHILS NFR BLD MANUAL: 71 % (ref 42.7–76)
NEUTS BAND NFR BLD MANUAL: 15 % (ref 0–5)
NITRITE UR QL STRIP: NEGATIVE
NT-PROBNP SERPL-MCNC: 6859 PG/ML (ref 0–1800)
PCO2 BLDA: 30.9 MM HG (ref 35–45)
PCO2 TEMP ADJ BLD: 30.9 MM HG (ref 35–45)
PH BLDA: 7.48 PH UNITS (ref 7.35–7.45)
PH UR STRIP.AUTO: 7 [PH] (ref 5–8)
PH, TEMP CORRECTED: 7.48 PH UNITS (ref 7.35–7.45)
PLASMA CELL PREC NFR BLD MANUAL: 1 % (ref 0–0)
PLAT MORPH BLD: NORMAL
PLATELET # BLD AUTO: 140 10*3/MM3 (ref 140–450)
PMV BLD AUTO: 11.1 FL (ref 6–12)
PO2 BLDA: 59.2 MM HG (ref 83–108)
PO2 TEMP ADJ BLD: 59.2 MM HG (ref 83–108)
POIKILOCYTOSIS BLD QL SMEAR: ABNORMAL
POTASSIUM SERPL-SCNC: 4.8 MMOL/L (ref 3.5–5.2)
PROCALCITONIN SERPL-MCNC: 242.56 NG/ML (ref 0–0.25)
PROT SERPL-MCNC: 5.9 G/DL (ref 6–8.5)
PROT UR QL STRIP: ABNORMAL
RBC # BLD AUTO: 4.39 10*6/MM3 (ref 4.14–5.8)
RBC # UR: ABNORMAL /HPF
REF LAB TEST METHOD: ABNORMAL
SAO2 % BLDCOA: 93.5 % (ref 94–99)
SODIUM SERPL-SCNC: 141 MMOL/L (ref 136–145)
SP GR UR STRIP: 1.02 (ref 1–1.03)
SQUAMOUS #/AREA URNS HPF: ABNORMAL /HPF
STRESS TARGET HR: 121 BPM
T4 FREE SERPL-MCNC: 1.07 NG/DL (ref 0.93–1.7)
TSH SERPL DL<=0.05 MIU/L-ACNC: 1.84 UIU/ML (ref 0.27–4.2)
UROBILINOGEN UR QL STRIP: ABNORMAL
VENTILATOR MODE: ABNORMAL
WBC # BLD AUTO: 50.01 10*3/MM3 (ref 3.4–10.8)
WBC CLUMPS # UR AUTO: ABNORMAL /HPF
WBC MORPH BLD: NORMAL
WBC UR QL AUTO: ABNORMAL /HPF

## 2021-07-27 PROCEDURE — 82962 GLUCOSE BLOOD TEST: CPT

## 2021-07-27 PROCEDURE — 85007 BL SMEAR W/DIFF WBC COUNT: CPT | Performed by: INTERNAL MEDICINE

## 2021-07-27 PROCEDURE — 25010000002 PIPERACILLIN SOD-TAZOBACTAM PER 1 G: Performed by: INTERNAL MEDICINE

## 2021-07-27 PROCEDURE — 71045 X-RAY EXAM CHEST 1 VIEW: CPT

## 2021-07-27 PROCEDURE — 94640 AIRWAY INHALATION TREATMENT: CPT

## 2021-07-27 PROCEDURE — 80053 COMPREHEN METABOLIC PANEL: CPT | Performed by: INTERNAL MEDICINE

## 2021-07-27 PROCEDURE — 93306 TTE W/DOPPLER COMPLETE: CPT

## 2021-07-27 PROCEDURE — 25010000002 FUROSEMIDE PER 20 MG: Performed by: EMERGENCY MEDICINE

## 2021-07-27 PROCEDURE — 82803 BLOOD GASES ANY COMBINATION: CPT

## 2021-07-27 PROCEDURE — 25010000002 PERFLUTREN 6.52 MG/ML SUSPENSION: Performed by: INTERNAL MEDICINE

## 2021-07-27 PROCEDURE — 02HV33Z INSERTION OF INFUSION DEVICE INTO SUPERIOR VENA CAVA, PERCUTANEOUS APPROACH: ICD-10-PCS | Performed by: EMERGENCY MEDICINE

## 2021-07-27 PROCEDURE — 36600 WITHDRAWAL OF ARTERIAL BLOOD: CPT

## 2021-07-27 PROCEDURE — 93010 ELECTROCARDIOGRAM REPORT: CPT | Performed by: EMERGENCY MEDICINE

## 2021-07-27 PROCEDURE — 93306 TTE W/DOPPLER COMPLETE: CPT | Performed by: INTERNAL MEDICINE

## 2021-07-27 PROCEDURE — 93005 ELECTROCARDIOGRAM TRACING: CPT | Performed by: EMERGENCY MEDICINE

## 2021-07-27 PROCEDURE — 85025 COMPLETE CBC W/AUTO DIFF WBC: CPT | Performed by: INTERNAL MEDICINE

## 2021-07-27 PROCEDURE — 83605 ASSAY OF LACTIC ACID: CPT | Performed by: EMERGENCY MEDICINE

## 2021-07-27 PROCEDURE — 94799 UNLISTED PULMONARY SVC/PX: CPT

## 2021-07-27 PROCEDURE — 25010000002 HYDROCORTISONE SODIUM SUCCINATE 100 MG RECONSTITUTED SOLUTION: Performed by: INTERNAL MEDICINE

## 2021-07-27 RX ORDER — ONDANSETRON 2 MG/ML
4 INJECTION INTRAMUSCULAR; INTRAVENOUS EVERY 6 HOURS PRN
Status: DISCONTINUED | OUTPATIENT
Start: 2021-07-27 | End: 2021-07-27 | Stop reason: SDUPTHER

## 2021-07-27 RX ORDER — DILTIAZEM HYDROCHLORIDE 120 MG/1
120 CAPSULE, COATED, EXTENDED RELEASE ORAL DAILY
Status: DISCONTINUED | OUTPATIENT
Start: 2021-07-27 | End: 2021-07-27

## 2021-07-27 RX ORDER — TAMSULOSIN HYDROCHLORIDE 0.4 MG/1
0.4 CAPSULE ORAL DAILY
Status: DISCONTINUED | OUTPATIENT
Start: 2021-07-27 | End: 2021-07-27

## 2021-07-27 RX ORDER — MELATONIN
2000 DAILY
Status: DISCONTINUED | OUTPATIENT
Start: 2021-07-27 | End: 2021-07-27

## 2021-07-27 RX ORDER — NOREPINEPHRINE BIT/0.9 % NACL 8 MG/250ML
.02-.3 INFUSION BOTTLE (ML) INTRAVENOUS
Status: DISCONTINUED | OUTPATIENT
Start: 2021-07-27 | End: 2021-07-29

## 2021-07-27 RX ORDER — MULTIPLE VITAMINS W/ MINERALS TAB 9MG-400MCG
1 TAB ORAL DAILY
Status: DISCONTINUED | OUTPATIENT
Start: 2021-07-27 | End: 2021-07-27

## 2021-07-27 RX ORDER — FAMOTIDINE 10 MG/ML
20 INJECTION, SOLUTION INTRAVENOUS DAILY
Status: DISCONTINUED | OUTPATIENT
Start: 2021-07-27 | End: 2021-08-03 | Stop reason: HOSPADM

## 2021-07-27 RX ORDER — SODIUM CHLORIDE, SODIUM LACTATE, POTASSIUM CHLORIDE, CALCIUM CHLORIDE 600; 310; 30; 20 MG/100ML; MG/100ML; MG/100ML; MG/100ML
50 INJECTION, SOLUTION INTRAVENOUS CONTINUOUS
Status: DISCONTINUED | OUTPATIENT
Start: 2021-07-27 | End: 2021-07-31

## 2021-07-27 RX ORDER — ONDANSETRON 4 MG/1
4 TABLET, FILM COATED ORAL EVERY 6 HOURS PRN
Status: DISCONTINUED | OUTPATIENT
Start: 2021-07-27 | End: 2021-08-03 | Stop reason: HOSPADM

## 2021-07-27 RX ORDER — SODIUM CHLORIDE 0.9 % (FLUSH) 0.9 %
10 SYRINGE (ML) INJECTION AS NEEDED
Status: DISCONTINUED | OUTPATIENT
Start: 2021-07-27 | End: 2021-08-03 | Stop reason: HOSPADM

## 2021-07-27 RX ORDER — DIVALPROEX SODIUM 125 MG/1
125 TABLET, DELAYED RELEASE ORAL DAILY
COMMUNITY

## 2021-07-27 RX ORDER — SODIUM CHLORIDE 0.9 % (FLUSH) 0.9 %
10 SYRINGE (ML) INJECTION EVERY 12 HOURS SCHEDULED
Status: DISCONTINUED | OUTPATIENT
Start: 2021-07-27 | End: 2021-08-03 | Stop reason: HOSPADM

## 2021-07-27 RX ORDER — FUROSEMIDE 10 MG/ML
40 INJECTION INTRAMUSCULAR; INTRAVENOUS ONCE
Status: COMPLETED | OUTPATIENT
Start: 2021-07-27 | End: 2021-07-27

## 2021-07-27 RX ORDER — ZINC SULFATE 50(220)MG
220 CAPSULE ORAL DAILY
Status: DISCONTINUED | OUTPATIENT
Start: 2021-07-27 | End: 2021-07-27

## 2021-07-27 RX ORDER — ACETAMINOPHEN 325 MG/1
650 TABLET ORAL EVERY 4 HOURS PRN
Status: DISCONTINUED | OUTPATIENT
Start: 2021-07-27 | End: 2021-08-03 | Stop reason: HOSPADM

## 2021-07-27 RX ORDER — LORAZEPAM 0.5 MG/1
0.5 TABLET ORAL DAILY
Status: DISCONTINUED | OUTPATIENT
Start: 2021-07-27 | End: 2021-07-27

## 2021-07-27 RX ORDER — ACETAMINOPHEN 325 MG/1
650 TABLET ORAL EVERY 6 HOURS PRN
Status: DISCONTINUED | OUTPATIENT
Start: 2021-07-27 | End: 2021-07-27 | Stop reason: SDUPTHER

## 2021-07-27 RX ORDER — ASCORBIC ACID 500 MG
500 TABLET ORAL DAILY
Status: DISCONTINUED | OUTPATIENT
Start: 2021-07-27 | End: 2021-07-27

## 2021-07-27 RX ORDER — BUPROPION HYDROCHLORIDE 150 MG/1
150 TABLET ORAL DAILY
Status: DISCONTINUED | OUTPATIENT
Start: 2021-07-27 | End: 2021-07-27

## 2021-07-27 RX ORDER — HYDROCODONE BITARTRATE AND ACETAMINOPHEN 7.5; 325 MG/1; MG/1
1 TABLET ORAL EVERY 6 HOURS PRN
COMMUNITY
End: 2021-08-02 | Stop reason: HOSPADM

## 2021-07-27 RX ORDER — ASPIRIN 325 MG
325 TABLET, DELAYED RELEASE (ENTERIC COATED) ORAL DAILY
Status: DISCONTINUED | OUTPATIENT
Start: 2021-07-27 | End: 2021-07-27

## 2021-07-27 RX ORDER — ONDANSETRON 2 MG/ML
4 INJECTION INTRAMUSCULAR; INTRAVENOUS EVERY 6 HOURS PRN
Status: DISCONTINUED | OUTPATIENT
Start: 2021-07-27 | End: 2021-08-03 | Stop reason: HOSPADM

## 2021-07-27 RX ORDER — POLYETHYLENE GLYCOL 3350 17 G/17G
17 POWDER, FOR SOLUTION ORAL DAILY PRN
Status: DISCONTINUED | OUTPATIENT
Start: 2021-07-27 | End: 2021-08-03 | Stop reason: HOSPADM

## 2021-07-27 RX ORDER — MIRTAZAPINE 15 MG/1
15 TABLET, FILM COATED ORAL NIGHTLY
Status: DISCONTINUED | OUTPATIENT
Start: 2021-07-27 | End: 2021-07-27

## 2021-07-27 RX ORDER — ACETAMINOPHEN 650 MG/1
650 SUPPOSITORY RECTAL EVERY 4 HOURS PRN
Status: DISCONTINUED | OUTPATIENT
Start: 2021-07-27 | End: 2021-08-03 | Stop reason: HOSPADM

## 2021-07-27 RX ORDER — FAMOTIDINE 20 MG/1
20 TABLET, FILM COATED ORAL DAILY
Status: DISCONTINUED | OUTPATIENT
Start: 2021-07-27 | End: 2021-07-27

## 2021-07-27 RX ORDER — PRAVASTATIN SODIUM 20 MG
20 TABLET ORAL NIGHTLY
Status: DISCONTINUED | OUTPATIENT
Start: 2021-07-27 | End: 2021-07-27

## 2021-07-27 RX ORDER — DIVALPROEX SODIUM 250 MG/1
250 TABLET, DELAYED RELEASE ORAL DAILY
Status: DISCONTINUED | OUTPATIENT
Start: 2021-07-27 | End: 2021-07-27

## 2021-07-27 RX ADMIN — TAZOBACTAM SODIUM AND PIPERACILLIN SODIUM 3.38 G: 375; 3 INJECTION, SOLUTION INTRAVENOUS at 05:15

## 2021-07-27 RX ADMIN — Medication 0.05 MCG/KG/MIN: at 16:36

## 2021-07-27 RX ADMIN — SODIUM CHLORIDE, PRESERVATIVE FREE 10 ML: 5 INJECTION INTRAVENOUS at 09:03

## 2021-07-27 RX ADMIN — FAMOTIDINE 20 MG: 10 INJECTION INTRAVENOUS at 09:02

## 2021-07-27 RX ADMIN — METRONIDAZOLE 500 MG: 500 INJECTION, SOLUTION INTRAVENOUS at 20:28

## 2021-07-27 RX ADMIN — IPRATROPIUM BROMIDE 0.5 MG: 0.5 SOLUTION RESPIRATORY (INHALATION) at 19:56

## 2021-07-27 RX ADMIN — Medication 0.02 MCG/KG/MIN: at 01:07

## 2021-07-27 RX ADMIN — METRONIDAZOLE 500 MG: 500 INJECTION, SOLUTION INTRAVENOUS at 04:12

## 2021-07-27 RX ADMIN — SODIUM CHLORIDE, POTASSIUM CHLORIDE, SODIUM LACTATE AND CALCIUM CHLORIDE 100 ML/HR: 600; 310; 30; 20 INJECTION, SOLUTION INTRAVENOUS at 05:16

## 2021-07-27 RX ADMIN — PERFLUTREN 1.17 MG: 6.52 INJECTION, SUSPENSION INTRAVENOUS at 09:43

## 2021-07-27 RX ADMIN — IPRATROPIUM BROMIDE 0.5 MG: 0.5 SOLUTION RESPIRATORY (INHALATION) at 09:53

## 2021-07-27 RX ADMIN — METRONIDAZOLE 500 MG: 500 INJECTION, SOLUTION INTRAVENOUS at 12:55

## 2021-07-27 RX ADMIN — TAZOBACTAM SODIUM AND PIPERACILLIN SODIUM 3.38 G: 375; 3 INJECTION, SOLUTION INTRAVENOUS at 12:55

## 2021-07-27 RX ADMIN — SODIUM CHLORIDE, PRESERVATIVE FREE 10 ML: 5 INJECTION INTRAVENOUS at 21:30

## 2021-07-27 RX ADMIN — IPRATROPIUM BROMIDE 0.5 MG: 0.5 SOLUTION RESPIRATORY (INHALATION) at 14:01

## 2021-07-27 RX ADMIN — IPRATROPIUM BROMIDE 0.5 MG: 0.5 SOLUTION RESPIRATORY (INHALATION) at 06:51

## 2021-07-27 RX ADMIN — FUROSEMIDE 40 MG: 10 INJECTION, SOLUTION INTRAMUSCULAR; INTRAVENOUS at 04:10

## 2021-07-27 RX ADMIN — HYDROCORTISONE SODIUM SUCCINATE 250 MG: 100 INJECTION, POWDER, FOR SOLUTION INTRAMUSCULAR; INTRAVENOUS at 05:15

## 2021-07-27 RX ADMIN — TAZOBACTAM SODIUM AND PIPERACILLIN SODIUM 3.38 G: 375; 3 INJECTION, SOLUTION INTRAVENOUS at 20:28

## 2021-07-27 RX ADMIN — SODIUM CHLORIDE, POTASSIUM CHLORIDE, SODIUM LACTATE AND CALCIUM CHLORIDE 75 ML/HR: 600; 310; 30; 20 INJECTION, SOLUTION INTRAVENOUS at 20:02

## 2021-07-28 ENCOUNTER — APPOINTMENT (OUTPATIENT)
Dept: GENERAL RADIOLOGY | Facility: HOSPITAL | Age: 76
End: 2021-07-28

## 2021-07-28 PROBLEM — T83.511A URINARY TRACT INFECTION ASSOCIATED WITH INDWELLING URETHRAL CATHETER (HCC): Status: ACTIVE | Noted: 2021-07-27

## 2021-07-28 LAB
ANION GAP SERPL CALCULATED.3IONS-SCNC: 5 MMOL/L (ref 5–15)
BUN SERPL-MCNC: 34 MG/DL (ref 8–23)
BUN/CREAT SERPL: 25.6 (ref 7–25)
CALCIUM SPEC-SCNC: 9.7 MG/DL (ref 8.6–10.5)
CHLORIDE SERPL-SCNC: 112 MMOL/L (ref 98–107)
CO2 SERPL-SCNC: 28 MMOL/L (ref 22–29)
CREAT SERPL-MCNC: 1.33 MG/DL (ref 0.76–1.27)
D-LACTATE SERPL-SCNC: 2.2 MMOL/L (ref 0.5–2)
DEPRECATED RDW RBC AUTO: 51.1 FL (ref 37–54)
ERYTHROCYTE [DISTWIDTH] IN BLOOD BY AUTOMATED COUNT: 14.6 % (ref 12.3–15.4)
GFR SERPL CREATININE-BSD FRML MDRD: 52 ML/MIN/1.73
GLUCOSE SERPL-MCNC: 72 MG/DL (ref 65–99)
HCT VFR BLD AUTO: 34.7 % (ref 37.5–51)
HGB BLD-MCNC: 11.7 G/DL (ref 13–17.7)
LYMPHOCYTES # BLD MANUAL: 2.69 10*3/MM3 (ref 0.7–3.1)
LYMPHOCYTES NFR BLD MANUAL: 2 % (ref 5–12)
LYMPHOCYTES NFR BLD MANUAL: 8 % (ref 19.6–45.3)
MAGNESIUM SERPL-MCNC: 1.8 MG/DL (ref 1.6–2.4)
MCH RBC QN AUTO: 32.5 PG (ref 26.6–33)
MCHC RBC AUTO-ENTMCNC: 33.7 G/DL (ref 31.5–35.7)
MCV RBC AUTO: 96.4 FL (ref 79–97)
METAMYELOCYTES NFR BLD MANUAL: 2 % (ref 0–0)
MONOCYTES # BLD AUTO: 0.54 10*3/MM3 (ref 0.1–0.9)
NEUTROPHILS # BLD AUTO: 23.15 10*3/MM3 (ref 1.7–7)
NEUTROPHILS NFR BLD MANUAL: 78 % (ref 42.7–76)
NEUTS BAND NFR BLD MANUAL: 8 % (ref 0–5)
PHOSPHATE SERPL-MCNC: 2.2 MG/DL (ref 2.5–4.5)
PLATELET # BLD AUTO: 103 10*3/MM3 (ref 140–450)
PMV BLD AUTO: 11 FL (ref 6–12)
POIKILOCYTOSIS BLD QL SMEAR: ABNORMAL
POTASSIUM SERPL-SCNC: 3.7 MMOL/L (ref 3.5–5.2)
PROCALCITONIN SERPL-MCNC: 124.68 NG/ML (ref 0–0.25)
QT INTERVAL: 302 MS
QT INTERVAL: 316 MS
QTC INTERVAL: 406 MS
QTC INTERVAL: 444 MS
RBC # BLD AUTO: 3.6 10*6/MM3 (ref 4.14–5.8)
SMALL PLATELETS BLD QL SMEAR: ABNORMAL
SODIUM SERPL-SCNC: 145 MMOL/L (ref 136–145)
SPHEROCYTES BLD QL SMEAR: ABNORMAL
VARIANT LYMPHS NFR BLD MANUAL: 2 % (ref 0–5)
WBC # BLD AUTO: 26.92 10*3/MM3 (ref 3.4–10.8)
WBC MORPH BLD: NORMAL

## 2021-07-28 PROCEDURE — 94799 UNLISTED PULMONARY SVC/PX: CPT

## 2021-07-28 PROCEDURE — 25010000002 PIPERACILLIN SOD-TAZOBACTAM PER 1 G: Performed by: INTERNAL MEDICINE

## 2021-07-28 PROCEDURE — 74018 RADEX ABDOMEN 1 VIEW: CPT

## 2021-07-28 PROCEDURE — 80048 BASIC METABOLIC PNL TOTAL CA: CPT | Performed by: INTERNAL MEDICINE

## 2021-07-28 PROCEDURE — 84145 PROCALCITONIN (PCT): CPT | Performed by: INTERNAL MEDICINE

## 2021-07-28 PROCEDURE — 25010000002 ZIPRASIDONE MESYLATE PER 10 MG: Performed by: INTERNAL MEDICINE

## 2021-07-28 PROCEDURE — 83735 ASSAY OF MAGNESIUM: CPT | Performed by: INTERNAL MEDICINE

## 2021-07-28 PROCEDURE — 99223 1ST HOSP IP/OBS HIGH 75: CPT | Performed by: CLINICAL NURSE SPECIALIST

## 2021-07-28 PROCEDURE — 83605 ASSAY OF LACTIC ACID: CPT | Performed by: INTERNAL MEDICINE

## 2021-07-28 PROCEDURE — 84100 ASSAY OF PHOSPHORUS: CPT | Performed by: INTERNAL MEDICINE

## 2021-07-28 PROCEDURE — 85007 BL SMEAR W/DIFF WBC COUNT: CPT | Performed by: INTERNAL MEDICINE

## 2021-07-28 PROCEDURE — 85025 COMPLETE CBC W/AUTO DIFF WBC: CPT | Performed by: INTERNAL MEDICINE

## 2021-07-28 RX ORDER — ZIPRASIDONE MESYLATE 20 MG/ML
20 INJECTION, POWDER, LYOPHILIZED, FOR SOLUTION INTRAMUSCULAR ONCE
Status: COMPLETED | OUTPATIENT
Start: 2021-07-28 | End: 2021-07-28

## 2021-07-28 RX ORDER — ZIPRASIDONE MESYLATE 20 MG/ML
20 INJECTION, POWDER, LYOPHILIZED, FOR SOLUTION INTRAMUSCULAR EVERY 12 HOURS PRN
Status: DISCONTINUED | OUTPATIENT
Start: 2021-07-28 | End: 2021-08-03 | Stop reason: HOSPADM

## 2021-07-28 RX ORDER — BISACODYL 10 MG
10 SUPPOSITORY, RECTAL RECTAL DAILY
Status: DISCONTINUED | OUTPATIENT
Start: 2021-07-28 | End: 2021-08-03 | Stop reason: HOSPADM

## 2021-07-28 RX ADMIN — SODIUM CHLORIDE, POTASSIUM CHLORIDE, SODIUM LACTATE AND CALCIUM CHLORIDE 75 ML/HR: 600; 310; 30; 20 INJECTION, SOLUTION INTRAVENOUS at 22:15

## 2021-07-28 RX ADMIN — METRONIDAZOLE 500 MG: 500 INJECTION, SOLUTION INTRAVENOUS at 05:07

## 2021-07-28 RX ADMIN — TAZOBACTAM SODIUM AND PIPERACILLIN SODIUM 3.38 G: 375; 3 INJECTION, SOLUTION INTRAVENOUS at 20:15

## 2021-07-28 RX ADMIN — ZIPRASIDONE MESYLATE 20 MG: 20 INJECTION, POWDER, LYOPHILIZED, FOR SOLUTION INTRAMUSCULAR at 03:01

## 2021-07-28 RX ADMIN — IPRATROPIUM BROMIDE 0.5 MG: 0.5 SOLUTION RESPIRATORY (INHALATION) at 18:45

## 2021-07-28 RX ADMIN — IPRATROPIUM BROMIDE 0.5 MG: 0.5 SOLUTION RESPIRATORY (INHALATION) at 10:16

## 2021-07-28 RX ADMIN — SODIUM CHLORIDE, POTASSIUM CHLORIDE, SODIUM LACTATE AND CALCIUM CHLORIDE 75 ML/HR: 600; 310; 30; 20 INJECTION, SOLUTION INTRAVENOUS at 08:41

## 2021-07-28 RX ADMIN — SODIUM CHLORIDE, PRESERVATIVE FREE 10 ML: 5 INJECTION INTRAVENOUS at 08:37

## 2021-07-28 RX ADMIN — FAMOTIDINE 20 MG: 10 INJECTION INTRAVENOUS at 08:36

## 2021-07-28 RX ADMIN — IPRATROPIUM BROMIDE 0.5 MG: 0.5 SOLUTION RESPIRATORY (INHALATION) at 06:20

## 2021-07-28 RX ADMIN — TAZOBACTAM SODIUM AND PIPERACILLIN SODIUM 3.38 G: 375; 3 INJECTION, SOLUTION INTRAVENOUS at 05:07

## 2021-07-28 RX ADMIN — SODIUM CHLORIDE, PRESERVATIVE FREE 10 ML: 5 INJECTION INTRAVENOUS at 20:15

## 2021-07-28 RX ADMIN — TAZOBACTAM SODIUM AND PIPERACILLIN SODIUM 3.38 G: 375; 3 INJECTION, SOLUTION INTRAVENOUS at 12:00

## 2021-07-28 RX ADMIN — IPRATROPIUM BROMIDE 0.5 MG: 0.5 SOLUTION RESPIRATORY (INHALATION) at 13:49

## 2021-07-28 RX ADMIN — BISACODYL 10 MG: 10 SUPPOSITORY RECTAL at 10:27

## 2021-07-29 LAB
ALBUMIN SERPL-MCNC: 2.7 G/DL (ref 3.5–5.2)
ALBUMIN/GLOB SERPL: 1.3 G/DL
ALP SERPL-CCNC: 53 U/L (ref 39–117)
ALT SERPL W P-5'-P-CCNC: 16 U/L (ref 1–41)
ANION GAP SERPL CALCULATED.3IONS-SCNC: 9 MMOL/L (ref 5–15)
AST SERPL-CCNC: 34 U/L (ref 1–40)
BACTERIA SPEC AEROBE CULT: ABNORMAL
BILIRUB SERPL-MCNC: 0.4 MG/DL (ref 0–1.2)
BUN SERPL-MCNC: 26 MG/DL (ref 8–23)
BUN/CREAT SERPL: 24.3 (ref 7–25)
BURR CELLS BLD QL SMEAR: ABNORMAL
CALCIUM SPEC-SCNC: 10 MG/DL (ref 8.6–10.5)
CHLORIDE SERPL-SCNC: 114 MMOL/L (ref 98–107)
CO2 SERPL-SCNC: 26 MMOL/L (ref 22–29)
CREAT SERPL-MCNC: 1.07 MG/DL (ref 0.76–1.27)
DEPRECATED RDW RBC AUTO: 52.1 FL (ref 37–54)
ERYTHROCYTE [DISTWIDTH] IN BLOOD BY AUTOMATED COUNT: 14.4 % (ref 12.3–15.4)
GFR SERPL CREATININE-BSD FRML MDRD: 67 ML/MIN/1.73
GLOBULIN UR ELPH-MCNC: 2.1 GM/DL
GLUCOSE BLDC GLUCOMTR-MCNC: 114 MG/DL (ref 70–130)
GLUCOSE SERPL-MCNC: 44 MG/DL (ref 65–99)
HCT VFR BLD AUTO: 37.3 % (ref 37.5–51)
HGB BLD-MCNC: 12.1 G/DL (ref 13–17.7)
LYMPHOCYTES # BLD MANUAL: 2.89 10*3/MM3 (ref 0.7–3.1)
LYMPHOCYTES NFR BLD MANUAL: 11.2 % (ref 19.6–45.3)
LYMPHOCYTES NFR BLD MANUAL: 4.1 % (ref 5–12)
MAGNESIUM SERPL-MCNC: 1.9 MG/DL (ref 1.6–2.4)
MCH RBC QN AUTO: 32.1 PG (ref 26.6–33)
MCHC RBC AUTO-ENTMCNC: 32.4 G/DL (ref 31.5–35.7)
MCV RBC AUTO: 98.9 FL (ref 79–97)
MONOCYTES # BLD AUTO: 0.97 10*3/MM3 (ref 0.1–0.9)
NEUTROPHILS # BLD AUTO: 19.85 10*3/MM3 (ref 1.7–7)
NEUTROPHILS NFR BLD MANUAL: 75.5 % (ref 42.7–76)
NEUTS BAND NFR BLD MANUAL: 8.2 % (ref 0–5)
OVALOCYTES BLD QL SMEAR: ABNORMAL
PHOSPHATE SERPL-MCNC: 1.9 MG/DL (ref 2.5–4.5)
PLATELET # BLD AUTO: 104 10*3/MM3 (ref 140–450)
PMV BLD AUTO: 10.7 FL (ref 6–12)
POIKILOCYTOSIS BLD QL SMEAR: ABNORMAL
POTASSIUM SERPL-SCNC: 3.7 MMOL/L (ref 3.5–5.2)
PROT SERPL-MCNC: 4.8 G/DL (ref 6–8.5)
RBC # BLD AUTO: 3.77 10*6/MM3 (ref 4.14–5.8)
SMALL PLATELETS BLD QL SMEAR: ABNORMAL
SODIUM SERPL-SCNC: 149 MMOL/L (ref 136–145)
VARIANT LYMPHS NFR BLD MANUAL: 1 % (ref 0–5)
WBC # BLD AUTO: 23.72 10*3/MM3 (ref 3.4–10.8)
WBC MORPH BLD: NORMAL

## 2021-07-29 PROCEDURE — 25010000002 FUROSEMIDE PER 20 MG: Performed by: INTERNAL MEDICINE

## 2021-07-29 PROCEDURE — 85025 COMPLETE CBC W/AUTO DIFF WBC: CPT | Performed by: INTERNAL MEDICINE

## 2021-07-29 PROCEDURE — 25010000002 PIPERACILLIN SOD-TAZOBACTAM PER 1 G: Performed by: INTERNAL MEDICINE

## 2021-07-29 PROCEDURE — 80053 COMPREHEN METABOLIC PANEL: CPT | Performed by: INTERNAL MEDICINE

## 2021-07-29 PROCEDURE — 82962 GLUCOSE BLOOD TEST: CPT

## 2021-07-29 PROCEDURE — 85007 BL SMEAR W/DIFF WBC COUNT: CPT | Performed by: INTERNAL MEDICINE

## 2021-07-29 PROCEDURE — 84100 ASSAY OF PHOSPHORUS: CPT | Performed by: INTERNAL MEDICINE

## 2021-07-29 PROCEDURE — 83735 ASSAY OF MAGNESIUM: CPT | Performed by: INTERNAL MEDICINE

## 2021-07-29 PROCEDURE — 99232 SBSQ HOSP IP/OBS MODERATE 35: CPT | Performed by: CLINICAL NURSE SPECIALIST

## 2021-07-29 PROCEDURE — 94799 UNLISTED PULMONARY SVC/PX: CPT

## 2021-07-29 RX ORDER — DEXTROSE MONOHYDRATE 25 G/50ML
INJECTION, SOLUTION INTRAVENOUS
Status: COMPLETED
Start: 2021-07-29 | End: 2021-07-29

## 2021-07-29 RX ORDER — FUROSEMIDE 10 MG/ML
20 INJECTION INTRAMUSCULAR; INTRAVENOUS ONCE
Status: COMPLETED | OUTPATIENT
Start: 2021-07-29 | End: 2021-07-29

## 2021-07-29 RX ADMIN — TAZOBACTAM SODIUM AND PIPERACILLIN SODIUM 3.38 G: 375; 3 INJECTION, SOLUTION INTRAVENOUS at 12:01

## 2021-07-29 RX ADMIN — TAZOBACTAM SODIUM AND PIPERACILLIN SODIUM 3.38 G: 375; 3 INJECTION, SOLUTION INTRAVENOUS at 22:06

## 2021-07-29 RX ADMIN — FUROSEMIDE 20 MG: 10 INJECTION, SOLUTION INTRAMUSCULAR; INTRAVENOUS at 10:18

## 2021-07-29 RX ADMIN — POTASSIUM PHOSPHATE, MONOBASIC AND POTASSIUM PHOSPHATE, DIBASIC 45 MMOL: 224; 236 INJECTION, SOLUTION, CONCENTRATE INTRAVENOUS at 04:53

## 2021-07-29 RX ADMIN — DEXTROSE MONOHYDRATE 50 ML: 500 INJECTION PARENTERAL at 04:24

## 2021-07-29 RX ADMIN — BISACODYL 10 MG: 10 SUPPOSITORY RECTAL at 08:20

## 2021-07-29 RX ADMIN — SODIUM CHLORIDE, PRESERVATIVE FREE 10 ML: 5 INJECTION INTRAVENOUS at 04:26

## 2021-07-29 RX ADMIN — SODIUM CHLORIDE, PRESERVATIVE FREE 10 ML: 5 INJECTION INTRAVENOUS at 22:06

## 2021-07-29 RX ADMIN — SODIUM CHLORIDE, POTASSIUM CHLORIDE, SODIUM LACTATE AND CALCIUM CHLORIDE 50 ML/HR: 600; 310; 30; 20 INJECTION, SOLUTION INTRAVENOUS at 12:08

## 2021-07-29 RX ADMIN — TAZOBACTAM SODIUM AND PIPERACILLIN SODIUM 3.38 G: 375; 3 INJECTION, SOLUTION INTRAVENOUS at 03:13

## 2021-07-29 RX ADMIN — SODIUM CHLORIDE, PRESERVATIVE FREE 10 ML: 5 INJECTION INTRAVENOUS at 08:20

## 2021-07-29 RX ADMIN — FAMOTIDINE 20 MG: 10 INJECTION INTRAVENOUS at 08:20

## 2021-07-29 RX ADMIN — IPRATROPIUM BROMIDE 0.5 MG: 0.5 SOLUTION RESPIRATORY (INHALATION) at 06:34

## 2021-07-30 ENCOUNTER — APPOINTMENT (OUTPATIENT)
Dept: CT IMAGING | Facility: HOSPITAL | Age: 76
End: 2021-07-30

## 2021-07-30 ENCOUNTER — APPOINTMENT (OUTPATIENT)
Dept: GENERAL RADIOLOGY | Facility: HOSPITAL | Age: 76
End: 2021-07-30

## 2021-07-30 PROBLEM — D69.6 THROMBOCYTOPENIA (HCC): Status: ACTIVE | Noted: 2021-07-30

## 2021-07-30 LAB
ANION GAP SERPL CALCULATED.3IONS-SCNC: 9 MMOL/L (ref 5–15)
BUN SERPL-MCNC: 23 MG/DL (ref 8–23)
BUN/CREAT SERPL: 22.1 (ref 7–25)
CALCIUM SPEC-SCNC: 9.6 MG/DL (ref 8.6–10.5)
CHLORIDE SERPL-SCNC: 112 MMOL/L (ref 98–107)
CO2 SERPL-SCNC: 28 MMOL/L (ref 22–29)
CREAT SERPL-MCNC: 1.04 MG/DL (ref 0.76–1.27)
DEPRECATED RDW RBC AUTO: 50.7 FL (ref 37–54)
ERYTHROCYTE [DISTWIDTH] IN BLOOD BY AUTOMATED COUNT: 14.3 % (ref 12.3–15.4)
GFR SERPL CREATININE-BSD FRML MDRD: 69 ML/MIN/1.73
GLUCOSE SERPL-MCNC: 67 MG/DL (ref 65–99)
HCT VFR BLD AUTO: 39.9 % (ref 37.5–51)
HGB BLD-MCNC: 13.3 G/DL (ref 13–17.7)
MCH RBC QN AUTO: 32.4 PG (ref 26.6–33)
MCHC RBC AUTO-ENTMCNC: 33.3 G/DL (ref 31.5–35.7)
MCV RBC AUTO: 97.3 FL (ref 79–97)
PLATELET # BLD AUTO: 115 10*3/MM3 (ref 140–450)
PMV BLD AUTO: 10.3 FL (ref 6–12)
POTASSIUM SERPL-SCNC: 3.8 MMOL/L (ref 3.5–5.2)
RBC # BLD AUTO: 4.1 10*6/MM3 (ref 4.14–5.8)
SODIUM SERPL-SCNC: 149 MMOL/L (ref 136–145)
WBC # BLD AUTO: 18.61 10*3/MM3 (ref 3.4–10.8)

## 2021-07-30 PROCEDURE — 85027 COMPLETE CBC AUTOMATED: CPT | Performed by: INTERNAL MEDICINE

## 2021-07-30 PROCEDURE — 74176 CT ABD & PELVIS W/O CONTRAST: CPT

## 2021-07-30 PROCEDURE — 25010000002 CEFTRIAXONE PER 250 MG: Performed by: INTERNAL MEDICINE

## 2021-07-30 PROCEDURE — 99233 SBSQ HOSP IP/OBS HIGH 50: CPT | Performed by: CLINICAL NURSE SPECIALIST

## 2021-07-30 PROCEDURE — 80048 BASIC METABOLIC PNL TOTAL CA: CPT | Performed by: INTERNAL MEDICINE

## 2021-07-30 PROCEDURE — 25010000002 PIPERACILLIN SOD-TAZOBACTAM PER 1 G: Performed by: INTERNAL MEDICINE

## 2021-07-30 PROCEDURE — 25010000002 IOPAMIDOL 61 % SOLUTION: Performed by: INTERNAL MEDICINE

## 2021-07-30 PROCEDURE — 25010000002 ZIPRASIDONE MESYLATE PER 10 MG: Performed by: INTERNAL MEDICINE

## 2021-07-30 RX ADMIN — TAZOBACTAM SODIUM AND PIPERACILLIN SODIUM 3.38 G: 375; 3 INJECTION, SOLUTION INTRAVENOUS at 04:09

## 2021-07-30 RX ADMIN — SODIUM CHLORIDE, PRESERVATIVE FREE 10 ML: 5 INJECTION INTRAVENOUS at 09:04

## 2021-07-30 RX ADMIN — FAMOTIDINE 20 MG: 10 INJECTION INTRAVENOUS at 09:04

## 2021-07-30 RX ADMIN — SODIUM CHLORIDE, POTASSIUM CHLORIDE, SODIUM LACTATE AND CALCIUM CHLORIDE 50 ML/HR: 600; 310; 30; 20 INJECTION, SOLUTION INTRAVENOUS at 09:07

## 2021-07-30 RX ADMIN — SODIUM CHLORIDE, PRESERVATIVE FREE 10 ML: 5 INJECTION INTRAVENOUS at 21:11

## 2021-07-30 RX ADMIN — ZIPRASIDONE MESYLATE 20 MG: 20 INJECTION, POWDER, LYOPHILIZED, FOR SOLUTION INTRAMUSCULAR at 14:10

## 2021-07-30 RX ADMIN — IOPAMIDOL 50 ML: 612 INJECTION, SOLUTION INTRAVENOUS at 12:42

## 2021-07-30 RX ADMIN — CEFTRIAXONE 1 G: 1 INJECTION, POWDER, FOR SOLUTION INTRAMUSCULAR; INTRAVENOUS at 12:43

## 2021-07-31 PROBLEM — E83.39 HYPOPHOSPHATEMIA: Status: ACTIVE | Noted: 2021-07-31

## 2021-07-31 PROBLEM — E87.6 HYPOKALEMIA: Status: ACTIVE | Noted: 2021-07-31

## 2021-07-31 PROBLEM — E87.0 HYPERNATREMIA: Status: ACTIVE | Noted: 2021-07-31

## 2021-07-31 LAB
ANION GAP SERPL CALCULATED.3IONS-SCNC: 12 MMOL/L (ref 5–15)
BACTERIA SPEC AEROBE CULT: NORMAL
BUN SERPL-MCNC: 20 MG/DL (ref 8–23)
BUN/CREAT SERPL: 23.5 (ref 7–25)
CALCIUM SPEC-SCNC: 9.9 MG/DL (ref 8.6–10.5)
CHLORIDE SERPL-SCNC: 115 MMOL/L (ref 98–107)
CO2 SERPL-SCNC: 26 MMOL/L (ref 22–29)
CREAT SERPL-MCNC: 0.85 MG/DL (ref 0.76–1.27)
DEPRECATED RDW RBC AUTO: 50.4 FL (ref 37–54)
ERYTHROCYTE [DISTWIDTH] IN BLOOD BY AUTOMATED COUNT: 14.2 % (ref 12.3–15.4)
GFR SERPL CREATININE-BSD FRML MDRD: 87 ML/MIN/1.73
GLUCOSE SERPL-MCNC: 77 MG/DL (ref 65–99)
HCT VFR BLD AUTO: 39.8 % (ref 37.5–51)
HGB BLD-MCNC: 13.1 G/DL (ref 13–17.7)
MAGNESIUM SERPL-MCNC: 2 MG/DL (ref 1.6–2.4)
MCH RBC QN AUTO: 31.8 PG (ref 26.6–33)
MCHC RBC AUTO-ENTMCNC: 32.9 G/DL (ref 31.5–35.7)
MCV RBC AUTO: 96.6 FL (ref 79–97)
PHOSPHATE SERPL-MCNC: 1.3 MG/DL (ref 2.5–4.5)
PLATELET # BLD AUTO: 130 10*3/MM3 (ref 140–450)
PMV BLD AUTO: 10.9 FL (ref 6–12)
POTASSIUM SERPL-SCNC: 3.4 MMOL/L (ref 3.5–5.2)
RBC # BLD AUTO: 4.12 10*6/MM3 (ref 4.14–5.8)
SODIUM SERPL-SCNC: 153 MMOL/L (ref 136–145)
WBC # BLD AUTO: 16.02 10*3/MM3 (ref 3.4–10.8)

## 2021-07-31 PROCEDURE — 85027 COMPLETE CBC AUTOMATED: CPT | Performed by: INTERNAL MEDICINE

## 2021-07-31 PROCEDURE — 92610 EVALUATE SWALLOWING FUNCTION: CPT | Performed by: SPEECH-LANGUAGE PATHOLOGIST

## 2021-07-31 PROCEDURE — 80048 BASIC METABOLIC PNL TOTAL CA: CPT | Performed by: INTERNAL MEDICINE

## 2021-07-31 PROCEDURE — 84100 ASSAY OF PHOSPHORUS: CPT | Performed by: INTERNAL MEDICINE

## 2021-07-31 PROCEDURE — 25010000002 CEFTRIAXONE PER 250 MG: Performed by: INTERNAL MEDICINE

## 2021-07-31 PROCEDURE — 83735 ASSAY OF MAGNESIUM: CPT | Performed by: INTERNAL MEDICINE

## 2021-07-31 RX ORDER — DEXTROSE MONOHYDRATE 50 MG/ML
50 INJECTION, SOLUTION INTRAVENOUS CONTINUOUS
Status: DISCONTINUED | OUTPATIENT
Start: 2021-07-31 | End: 2021-08-03 | Stop reason: HOSPADM

## 2021-07-31 RX ADMIN — SODIUM CHLORIDE, PRESERVATIVE FREE 10 ML: 5 INJECTION INTRAVENOUS at 20:41

## 2021-07-31 RX ADMIN — CEFTRIAXONE 1 G: 1 INJECTION, POWDER, FOR SOLUTION INTRAMUSCULAR; INTRAVENOUS at 14:29

## 2021-07-31 RX ADMIN — POTASSIUM PHOSPHATE, MONOBASIC AND POTASSIUM PHOSPHATE, DIBASIC 30 MMOL: 224; 236 INJECTION, SOLUTION, CONCENTRATE INTRAVENOUS at 10:53

## 2021-07-31 RX ADMIN — FAMOTIDINE 20 MG: 10 INJECTION INTRAVENOUS at 08:25

## 2021-07-31 RX ADMIN — SODIUM CHLORIDE, PRESERVATIVE FREE 10 ML: 5 INJECTION INTRAVENOUS at 08:25

## 2021-07-31 RX ADMIN — DEXTROSE MONOHYDRATE 75 ML/HR: 50 INJECTION, SOLUTION INTRAVENOUS at 16:57

## 2021-08-01 LAB
ANION GAP SERPL CALCULATED.3IONS-SCNC: 6 MMOL/L (ref 5–15)
BACTERIA SPEC AEROBE CULT: NORMAL
BUN SERPL-MCNC: 15 MG/DL (ref 8–23)
BUN/CREAT SERPL: 20 (ref 7–25)
CALCIUM SPEC-SCNC: 9.5 MG/DL (ref 8.6–10.5)
CHLORIDE SERPL-SCNC: 116 MMOL/L (ref 98–107)
CO2 SERPL-SCNC: 25 MMOL/L (ref 22–29)
CREAT SERPL-MCNC: 0.75 MG/DL (ref 0.76–1.27)
DEPRECATED RDW RBC AUTO: 49.9 FL (ref 37–54)
ERYTHROCYTE [DISTWIDTH] IN BLOOD BY AUTOMATED COUNT: 14.2 % (ref 12.3–15.4)
GFR SERPL CREATININE-BSD FRML MDRD: 101 ML/MIN/1.73
GLUCOSE SERPL-MCNC: 128 MG/DL (ref 65–99)
HCT VFR BLD AUTO: 37.1 % (ref 37.5–51)
HGB BLD-MCNC: 12.2 G/DL (ref 13–17.7)
MAGNESIUM SERPL-MCNC: 1.9 MG/DL (ref 1.6–2.4)
MCH RBC QN AUTO: 31.5 PG (ref 26.6–33)
MCHC RBC AUTO-ENTMCNC: 32.9 G/DL (ref 31.5–35.7)
MCV RBC AUTO: 95.9 FL (ref 79–97)
PHOSPHATE SERPL-MCNC: 1.6 MG/DL (ref 2.5–4.5)
PLATELET # BLD AUTO: 141 10*3/MM3 (ref 140–450)
PMV BLD AUTO: 10.6 FL (ref 6–12)
POTASSIUM SERPL-SCNC: 3.2 MMOL/L (ref 3.5–5.2)
RBC # BLD AUTO: 3.87 10*6/MM3 (ref 4.14–5.8)
SODIUM SERPL-SCNC: 147 MMOL/L (ref 136–145)
WBC # BLD AUTO: 12.79 10*3/MM3 (ref 3.4–10.8)

## 2021-08-01 PROCEDURE — 85027 COMPLETE CBC AUTOMATED: CPT | Performed by: INTERNAL MEDICINE

## 2021-08-01 PROCEDURE — 83735 ASSAY OF MAGNESIUM: CPT | Performed by: INTERNAL MEDICINE

## 2021-08-01 PROCEDURE — 25010000002 CEFTRIAXONE PER 250 MG: Performed by: INTERNAL MEDICINE

## 2021-08-01 PROCEDURE — 84100 ASSAY OF PHOSPHORUS: CPT | Performed by: INTERNAL MEDICINE

## 2021-08-01 PROCEDURE — 80048 BASIC METABOLIC PNL TOTAL CA: CPT | Performed by: INTERNAL MEDICINE

## 2021-08-01 RX ORDER — TAMSULOSIN HYDROCHLORIDE 0.4 MG/1
0.4 CAPSULE ORAL DAILY
Status: DISCONTINUED | OUTPATIENT
Start: 2021-08-01 | End: 2021-08-03 | Stop reason: HOSPADM

## 2021-08-01 RX ORDER — MULTIPLE VITAMINS W/ MINERALS TAB 9MG-400MCG
1 TAB ORAL DAILY
Status: DISCONTINUED | OUTPATIENT
Start: 2021-08-01 | End: 2021-08-03 | Stop reason: HOSPADM

## 2021-08-01 RX ORDER — MIRTAZAPINE 15 MG/1
15 TABLET, FILM COATED ORAL NIGHTLY
Status: DISCONTINUED | OUTPATIENT
Start: 2021-08-01 | End: 2021-08-03 | Stop reason: HOSPADM

## 2021-08-01 RX ORDER — LORAZEPAM 0.5 MG/1
0.5 TABLET ORAL DAILY
Status: DISCONTINUED | OUTPATIENT
Start: 2021-08-01 | End: 2021-08-03 | Stop reason: HOSPADM

## 2021-08-01 RX ORDER — DIVALPROEX SODIUM 250 MG/1
250 TABLET, DELAYED RELEASE ORAL DAILY
Status: DISCONTINUED | OUTPATIENT
Start: 2021-08-01 | End: 2021-08-03 | Stop reason: HOSPADM

## 2021-08-01 RX ORDER — ASPIRIN 81 MG/1
81 TABLET ORAL DAILY
Status: DISCONTINUED | OUTPATIENT
Start: 2021-08-01 | End: 2021-08-03 | Stop reason: HOSPADM

## 2021-08-01 RX ORDER — PRAVASTATIN SODIUM 20 MG
20 TABLET ORAL NIGHTLY
Status: DISCONTINUED | OUTPATIENT
Start: 2021-08-01 | End: 2021-08-03 | Stop reason: HOSPADM

## 2021-08-01 RX ORDER — BUPROPION HYDROCHLORIDE 150 MG/1
150 TABLET ORAL DAILY
Status: DISCONTINUED | OUTPATIENT
Start: 2021-08-01 | End: 2021-08-03 | Stop reason: HOSPADM

## 2021-08-01 RX ADMIN — DEXTROSE MONOHYDRATE 75 ML/HR: 50 INJECTION, SOLUTION INTRAVENOUS at 06:14

## 2021-08-01 RX ADMIN — DIVALPROEX SODIUM 250 MG: 250 TABLET, DELAYED RELEASE ORAL at 14:55

## 2021-08-01 RX ADMIN — PRAVASTATIN SODIUM 20 MG: 20 TABLET ORAL at 20:28

## 2021-08-01 RX ADMIN — FAMOTIDINE 20 MG: 10 INJECTION INTRAVENOUS at 08:44

## 2021-08-01 RX ADMIN — THIAMINE HCL TAB 100 MG 100 MG: 100 TAB at 14:55

## 2021-08-01 RX ADMIN — LORAZEPAM 0.5 MG: 0.5 TABLET ORAL at 14:55

## 2021-08-01 RX ADMIN — SODIUM CHLORIDE, PRESERVATIVE FREE 10 ML: 5 INJECTION INTRAVENOUS at 20:28

## 2021-08-01 RX ADMIN — ASPIRIN 81 MG: 81 TABLET, COATED ORAL at 14:55

## 2021-08-01 RX ADMIN — MIRTAZAPINE 15 MG: 15 TABLET, FILM COATED ORAL at 20:28

## 2021-08-01 RX ADMIN — TAMSULOSIN HYDROCHLORIDE 0.4 MG: 0.4 CAPSULE ORAL at 14:55

## 2021-08-01 RX ADMIN — POTASSIUM PHOSPHATE, MONOBASIC AND POTASSIUM PHOSPHATE, DIBASIC 30 MMOL: 224; 236 INJECTION, SOLUTION, CONCENTRATE INTRAVENOUS at 08:44

## 2021-08-01 RX ADMIN — CEFTRIAXONE 1 G: 1 INJECTION, POWDER, FOR SOLUTION INTRAMUSCULAR; INTRAVENOUS at 11:09

## 2021-08-01 RX ADMIN — BUPROPION HYDROCHLORIDE 150 MG: 150 TABLET, FILM COATED, EXTENDED RELEASE ORAL at 14:55

## 2021-08-01 RX ADMIN — SODIUM CHLORIDE, PRESERVATIVE FREE 10 ML: 5 INJECTION INTRAVENOUS at 08:44

## 2021-08-01 RX ADMIN — Medication 1 TABLET: at 14:55

## 2021-08-01 NOTE — PROGRESS NOTES
[facility]  INPATIENT PROGRESS NOTE    Patient Name: Ezequiel Abdalla  Date of Admission: 7/26/2021  Today's Date: 08/01/21  Length of Stay: 5  Primary Care Physician: Shane Banks MD    Subjective   Chief Complaint: Weakness.  HPI  Seems to be less agitated and doing better with the nasogastric tube out.  Remain out of restraints.  He was able to feed himself some today.    Speech therapy cleared him for soft, ground foods with nectar thick liquids.    Sodium improving on dextrose fluids.  Replacing potassium and phosphorus IV.    Review of Systems   Unable to obtain secondary to his metabolic encephalopathy superimposed on his chronic dementia.    Objective    Temp:  [97.4 °F (36.3 °C)-98.5 °F (36.9 °C)] 98.4 °F (36.9 °C)  Heart Rate:  [76-97] 82  Resp:  [18-20] 18  BP: (111-142)/(72-85) 132/82  Physical Exam  Constitutional:       Appearance: He is well-developed.      Comments: Sleeping, but arousable.  Chronically ill-appearing.  No family present. Discussed with his nurse, Debi.   HENT:      Head: Normocephalic and atraumatic.      Nose:      Comments: NGT taken out on 7/30.   Eyes:      Conjunctiva/sclera: Conjunctivae normal.      Pupils: Pupils are equal, round, and reactive to light.   Neck:      Vascular: No JVD.   Cardiovascular:      Rate and Rhythm: Normal rate and regular rhythm.      Heart sounds: Normal heart sounds. No murmur heard.   No friction rub. No gallop.       Comments: Sinus rhythm at present.   Pulmonary:      Effort: Pulmonary effort is normal. No respiratory distress.      Breath sounds: No wheezing or rales.      Comments: On room air.   Chest:      Chest wall: No tenderness.   Abdominal:      General: Bowel sounds are normal. There is no distension.      Palpations: Abdomen is soft.      Tenderness: There is no abdominal tenderness. There is no guarding or rebound.   Musculoskeletal:         General: Swelling (trace) present.  No tenderness or deformity. Normal range of motion.      Cervical back: Neck supple.   Skin:     General: Skin is warm and dry.      Findings: No rash.   Neurological:      General: No focal deficit present.      Mental Status: He is alert.      Cranial Nerves: No cranial nerve deficit.      Motor: Weakness present. No abnormal muscle tone.      Deep Tendon Reflexes: Reflexes normal.   Psychiatric:      Comments: Altered.  Said to have dementia is at baseline. He is calm and cooperative at present. The nurses have the restraints off at present.          Results Review:  I have reviewed the labs, radiology results, and diagnostic studies.    Laboratory Data:   Results from last 7 days   Lab Units 08/01/21  0549 07/31/21  0648 07/30/21  0609   WBC 10*3/mm3 12.79* 16.02* 18.61*   HEMOGLOBIN g/dL 12.2* 13.1 13.3   HEMATOCRIT % 37.1* 39.8 39.9   PLATELETS 10*3/mm3 141 130* 115*     Results from last 7 days   Lab Units 08/01/21  0549 07/31/21  0648 07/30/21  0610 07/29/21  0312 07/29/21  0312 07/28/21  0605 07/27/21  0835 07/26/21  2228 07/26/21  2228   SODIUM mmol/L 147* 153* 149*   < > 149*   < > 141   < > 142   POTASSIUM mmol/L 3.2* 3.4* 3.8   < > 3.7   < > 4.8   < > 4.2   CHLORIDE mmol/L 116* 115* 112*   < > 114*   < > 104   < > 109*   CO2 mmol/L 25.0 26.0 28.0   < > 26.0   < > 25.0   < > 20.0*   BUN mg/dL 15 20 23   < > 26*   < > 31*   < > 27*   CREATININE mg/dL 0.75* 0.85 1.04   < > 1.07   < > 1.76*   < > 2.01*   CALCIUM mg/dL 9.5 9.9 9.6   < > 10.0   < > 10.1   < > 10.1   BILIRUBIN mg/dL  --   --   --   --  0.4  --  0.5  --  0.5   ALK PHOS U/L  --   --   --   --  53  --  78  --  51   ALT (SGPT) U/L  --   --   --   --  16  --  18  --  16   AST (SGOT) U/L  --   --   --   --  34  --  39  --  27   GLUCOSE mg/dL 128* 77 67   < > 44*   < > 78   < > 75    < > = values in this interval not displayed.     Results from last 7 days   Lab Units 08/01/21  0549 07/31/21  0648 07/29/21  0312   MAGNESIUM mg/dL 1.9 2.0 1.9    PHOSPHORUS mg/dL 1.6* 1.3* 1.9*     I have reviewed the patient's current medications.     Assessment/Plan     Active Hospital Problems    Diagnosis    • **Septic shock (CMS/[facility])    • Urinary tract infection associated with indwelling urethral catheter (Klebsiella)    • DEEPIKA (acute kidney injury) (CMS/[facility])    • Lactic acidosis    • Metabolic encephalopathy    • Dementia (CMS/HCC)    • Hypernatremia    • Hypokalemia    • Hypophosphatemia    • Thrombocytopenia (CMS/HCC)    • Obstipation with fecal impaction    • Nausea & vomiting    • Chronic atrial fibrillation with RVR (CMS/HCC)    • COPD (chronic obstructive pulmonary disease) (CMS/[facility])      Plan:  The patient was admitted on 7/27 by Dr. Morse. He presented from [facility] with low blood pressure and altered mental status.  He was found to have a significant UTI.  He has a chronic indwelling [place] catheter.  He has apparently not been having bowel movements and has a fecal impaction and appears to be obstipated on CT.  He was vomiting feculent material so an NG tube was placed.  He was admitted to the intensive care unit on norepinephrine through a central line placed in the emergency department.    Received ceftriaxone in the ER. Dr. Morse placed him on Flagyl and Zosyn. Stopped Flagyl on 7/29. Lactate at presentation greater than 6 and then down to 2.2. Procalcitonin was significantly elevated at 242 and has come down. Blood cultures with no growth.  Urine culture shows Klebsiella pneumonia only resistant to ampicillin.  Transitioned to ceftriaxone monotherapy on 7/31.  He received a sepsis bolus with LR in the emergency department.  Continues on LR at present.  No signs of volume overload.  Weaned norepinephrine off on 7/28.  Adequate BP since. Dr. Morse gave him a dose of Solu-Cortef, which I did not continue.    COVID 19 testing negative. He had COVID 19 in December 2020.     DEEPIKA improving with supportive care.  Serum creatinine at presentation 2.01.   Down to 0.85 today.  His serum creatinine was normal at 0.59 in December 2020. Gave 20 mg IV Lasix on 7/29.  I placed him on dextrose fluids on 7/31 to correct his hypernatremia.  Replacing potassium and phosphorus IV again today.    He has a history of paroxysmal atrial fibrillation.  He remains in sinus rhythm. Not on chronic anticoagulation, but takes a full dose aspirin.  I will resume a baby aspirin today.  He does not appear to need his diltiazem at present.    He spent several days with an NGT to [facility].  His abdominal exam improved.  He had catharsis with suppositories.  I was going to obtain an SBFT on 7/29, but radiology ultimately did not do it.  We did a CT scan of the abdomen and pelvis with oral contrast through the nasogastric tube.  Stomach no longer distended.  No obstruction.  NG tube discontinued on the evening of 7/30.    Speech therapy cleared him for soft, ground foods with nectar thick liquids.    Reconcile and resume medications today now that he is tolerating diet..     He developed very mild thrombocytopenia that is likely secondary to his sepsis.  This has rebounded and normalized.    SCDs for DVT prophylaxis.    Palliative care consulted for goals of care and code status discussions. They spoke to his guardian, who is also his granddaughter. She has elected to make him a no CPR with no intubation or artificial feedings.    Transferred to the floor on 7/29.    Discharge Planning: [facility] in 1-2 days.     Electronically signed by Wilmar Jalloh DO, 08/01/21, 13:37 CDT.

## 2021-08-01 NOTE — PLAN OF CARE
Goal Outcome Evaluation:  Plan of Care Reviewed With: patient        Progress: improving  Outcome Summary: Pt has cooperated with staff this shift when providing care. Pt has started to eat and drink at meals with staff ast. Home meds started back up. Pills crushed and given in pudding. Phosphorus still critical but improving at 1.6 from yesterday's 1.3 with IV replacement provided. IV D5W rate slowed to 50. Bates still in place. Q4 bates care provided. VSS. Safety maintained. Will cont to monitor.

## 2021-08-01 NOTE — PLAN OF CARE
Problem: Adult Inpatient Plan of Care  Goal: Plan of Care Review  Outcome: Ongoing, Progressing  Flowsheets (Taken 8/1/2021 6856)  Progress: no change  Plan of Care Reviewed With: patient  Outcome Summary: Pt disoriented x4.  Incontent of stool.  Montgomery in place.  Began resisting care towards end of shift.  VSS.  Safety maintained.

## 2021-08-02 VITALS
DIASTOLIC BLOOD PRESSURE: 63 MMHG | OXYGEN SATURATION: 92 % | HEART RATE: 62 BPM | SYSTOLIC BLOOD PRESSURE: 107 MMHG | HEIGHT: 70 IN | BODY MASS INDEX: 22.94 KG/M2 | WEIGHT: 160.2 LBS | RESPIRATION RATE: 16 BRPM | TEMPERATURE: 97.9 F

## 2021-08-02 PROCEDURE — 25010000002 CEFTRIAXONE PER 250 MG: Performed by: INTERNAL MEDICINE

## 2021-08-02 PROCEDURE — 92526 ORAL FUNCTION THERAPY: CPT

## 2021-08-02 RX ORDER — ASPIRIN 81 MG/1
81 TABLET ORAL DAILY
Start: 2021-08-03

## 2021-08-02 RX ORDER — CEFDINIR 300 MG/1
300 CAPSULE ORAL DAILY
Qty: 10 CAPSULE | Refills: 0 | Status: ON HOLD | OUTPATIENT
Start: 2021-08-02 | End: 2022-01-01

## 2021-08-02 RX ORDER — QUETIAPINE FUMARATE 25 MG/1
25 TABLET, FILM COATED ORAL NIGHTLY
Status: ON HOLD
Start: 2021-08-02 | End: 2022-01-01

## 2021-08-02 RX ADMIN — BUPROPION HYDROCHLORIDE 150 MG: 150 TABLET, FILM COATED, EXTENDED RELEASE ORAL at 09:13

## 2021-08-02 RX ADMIN — DIVALPROEX SODIUM 250 MG: 250 TABLET, DELAYED RELEASE ORAL at 09:13

## 2021-08-02 RX ADMIN — THIAMINE HCL TAB 100 MG 100 MG: 100 TAB at 09:13

## 2021-08-02 RX ADMIN — TAMSULOSIN HYDROCHLORIDE 0.4 MG: 0.4 CAPSULE ORAL at 09:13

## 2021-08-02 RX ADMIN — ASPIRIN 81 MG: 81 TABLET, COATED ORAL at 09:13

## 2021-08-02 RX ADMIN — LORAZEPAM 0.5 MG: 0.5 TABLET ORAL at 09:13

## 2021-08-02 RX ADMIN — CEFTRIAXONE 1 G: 1 INJECTION, POWDER, FOR SOLUTION INTRAMUSCULAR; INTRAVENOUS at 12:09

## 2021-08-02 RX ADMIN — FAMOTIDINE 20 MG: 10 INJECTION INTRAVENOUS at 09:13

## 2021-08-02 RX ADMIN — SODIUM CHLORIDE, PRESERVATIVE FREE 10 ML: 5 INJECTION INTRAVENOUS at 09:13

## 2021-08-02 RX ADMIN — Medication 1 TABLET: at 09:13

## 2021-08-02 NOTE — DISCHARGE SUMMARY
[facility] Medicine Services  DISCHARGE SUMMARY       Date of Admission: 7/26/2021  Date of Discharge:  8/2/2021  Primary Care Physician: Shane Banks MD    Discharge Diagnoses:  Active Hospital Problems    Diagnosis    • **Septic shock (CMS/[facility])    • Hypernatremia    • Hypokalemia    • Hypophosphatemia    • Thrombocytopenia (CMS/HCC)    • DEEPIKA (acute kidney injury) (CMS/[facility])    • Lactic acidosis    • Urinary tract infection associated with indwelling urethral catheter (Klebsiella)    • Metabolic encephalopathy    • Obstipation with fecal impaction    • Nausea & vomiting    • Dementia (CMS/HCC)    • Chronic atrial fibrillation with RVR (CMS/HCC)    • COPD (chronic obstructive pulmonary disease) (CMS/[facility])          Presenting Problem/History of Present Illness:  Septic shock (CMS/[facility]) [A41.9, R65.21]     Chief Complaint on Day of Discharge:   No complaint    History of Present Illness on Day of Discharge:   Patient is back to baseline.  He is not agitated and seems to be able to feed himself a puréed diet.  Oral intake has been reasonably good since been moving on to a soft diet with nectar thickened liquids.  He has had a stool today and is appropriate for discharge back to skilled nursing facility.    Hospital Course  This 76-year-old nursing home patient was admitted on 7/27 presenting with low blood pressure and change in mental status.  He was found to have a urinary tract infection secondary to chronic indwelling Montgomery catheter.  He apparently has not been having bowel movements and presented with a fecal impaction with obstipation noted on CT scan.  He was vomiting feculent material and an NG tube was placed.  He was admitted to the ICU on a Levophed drip through a central line which was placed in the emergency department.  He received ceftriaxone in the emergency department and he was subsequently placed on Flagyl and Zosyn.  Zosyn was discontinued on 7/29.  Lactate was  elevated at admission at 6 and then decreased to 2.2.  Procalcitonin was also elevated at 242 and has decreased.  Blood cultures showed no growth.  Urine culture showed Klebsiella pneumonia pansensitive.  Patient was transitioned to Rocephin monotherapy on 7/31.  He received a sepsis bolus with lactated Ringer's in the emergency room and continued on lactated Ringer's throughout his hospitalization.  Levophed was weaned on 7/28.  Acute kidney injury was noted at the time of admission and resolved with supportive care and IV fluids.  20 mg of Lasix was given on 7/29 out of concern for overhydration.  D5 was started on 7/31 to correct hypernatremia.  Electrolytes including potassium and phosphorus were supplemented throughout his hospital stay.  The patient was noted to have a history of PAF.  He remained in sinus rhythm throughout his hospital stay.  He was not on chronic anticoagulation but is on aspirin.  Diltiazem was held as his rate was well controlled throughout his stay.  An NG tube was placed to intermittent low wall suction at the time of admission.  His abdominal exam improved.  He had several bowel movements with suppositories.  Repeat CT scan of the abdomen and pelvis with oral contrast through an NG tube showed that the stomach was no longer distended with noted obstruction and no obstipation.  NG tube was discontinued on 7/31.  Speech therapy cleared him for soft, ground foods with nectar thickened liquids and he tolerated the transition well.  He has essentially back to his baseline.  Palliative care was consulted for goals of care.  After speaking to his guardian, his granddaughter, she elected to make him no CPR with no intubation or artificial feedings.  The patient was transferred to the medical surgical floor on 7/29.  Continued to improve.  He is appropriate for discharge back to skilled nursing facility today.      Pertinent Test Results:   Lab Results (last 7 days)     Procedure Component Value  Units Date/Time    Phosphorus [[id number]]  (Abnormal) Collected: 08/01/21 0549    Specimen: Blood Updated: 08/01/21 0713     Phosphorus 1.6 mg/dL     Basic Metabolic Panel [[id number]]  (Abnormal) Collected: 08/01/21 0549    Specimen: Blood Updated: 08/01/21 0646     Glucose 128 mg/dL      BUN 15 mg/dL      Creatinine 0.75 mg/dL      Sodium 147 mmol/L      Potassium 3.2 mmol/L      Chloride 116 mmol/L      CO2 25.0 mmol/L      Calcium 9.5 mg/dL      eGFR Non African Amer 101 mL/min/1.73      BUN/Creatinine Ratio 20.0     Anion Gap 6.0 mmol/L     Narrative:      GFR Normal >60  Chronic Kidney Disease <60  Kidney Failure <15      Magnesium [[id number]]  (Normal) Collected: 08/01/21 0549    Specimen: Blood Updated: 08/01/21 0646     Magnesium 1.9 mg/dL     CBC (No Diff) [[id number]]  (Abnormal) Collected: 08/01/21 0549    Specimen: Blood Updated: 08/01/21 0608     WBC 12.79 10*3/mm3      RBC 3.87 10*6/mm3      Hemoglobin 12.2 g/dL      Hematocrit 37.1 %      MCV 95.9 fL      MCH 31.5 pg      MCHC 32.9 g/dL      RDW 14.2 %      RDW-SD 49.9 fl      MPV 10.6 fL      Platelets 141 10*3/mm3     Blood Culture - Blood, Arm, Right [[id number]] Collected: 07/26/21 2228    Specimen: Blood from Arm, Right Updated: 08/01/21 0101     Blood Culture No growth at 5 days    Blood Culture - Blood, Arm, Right [[id number]] Collected: 07/26/21 2306    Specimen: Blood from Arm, Right Updated: 07/31/21 2316     Blood Culture No growth at 5 days    Phosphorus [[id number]]  (Abnormal) Collected: 07/31/21 0648    Specimen: Blood Updated: 07/31/21 0812     Phosphorus 1.3 mg/dL     Basic Metabolic Panel [[id number]]  (Abnormal) Collected: 07/31/21 0648    Specimen: Blood Updated: 07/31/21 0749     Glucose 77 mg/dL      BUN 20 mg/dL      Creatinine 0.85 mg/dL      Sodium 153 mmol/L      Potassium 3.4 mmol/L      Comment: Slight hemolysis detected by analyzer. Results may be affected.        Chloride 115 mmol/L      CO2 26.0 mmol/L      Calcium 9.9 mg/dL       eGFR Non African Amer 87 mL/min/1.73      BUN/Creatinine Ratio 23.5     Anion Gap 12.0 mmol/L     Narrative:      GFR Normal >60  Chronic Kidney Disease <60  Kidney Failure <15      Magnesium [[id number]]  (Normal) Collected: 07/31/21 0648    Specimen: Blood Updated: 07/31/21 0743     Magnesium 2.0 mg/dL     CBC (No Diff) [[id number]]  (Abnormal) Collected: 07/31/21 0648    Specimen: Blood Updated: 07/31/21 0715     WBC 16.02 10*3/mm3      RBC 4.12 10*6/mm3      Hemoglobin 13.1 g/dL      Hematocrit 39.8 %      MCV 96.6 fL      MCH 31.8 pg      MCHC 32.9 g/dL      RDW 14.2 %      RDW-SD 50.4 fl      MPV 10.9 fL      Platelets 130 10*3/mm3     Basic Metabolic Panel [[id number]]  (Abnormal) Collected: 07/30/21 0610    Specimen: Blood Updated: 07/30/21 0651     Glucose 67 mg/dL      BUN 23 mg/dL      Creatinine 1.04 mg/dL      Sodium 149 mmol/L      Potassium 3.8 mmol/L      Chloride 112 mmol/L      CO2 28.0 mmol/L      Calcium 9.6 mg/dL      eGFR Non African Amer 69 mL/min/1.73      BUN/Creatinine Ratio 22.1     Anion Gap 9.0 mmol/L     Narrative:      GFR Normal >60  Chronic Kidney Disease <60  Kidney Failure <15      CBC (No Diff) [[id number]]  (Abnormal) Collected: 07/30/21 0609    Specimen: Blood Updated: 07/30/21 0623     WBC 18.61 10*3/mm3      RBC 4.10 10*6/mm3      Hemoglobin 13.3 g/dL      Hematocrit 39.9 %      MCV 97.3 fL      MCH 32.4 pg      MCHC 33.3 g/dL      RDW 14.3 %      RDW-SD 50.7 fl      MPV 10.3 fL      Platelets 115 10*3/mm3     Urine Culture - Urine, Urine, Catheter [[id number]]  (Abnormal)  (Susceptibility) Collected: 07/26/21 3474    Specimen: Urine, Catheter Updated: 07/29/21 1009     Urine Culture >100,000 CFU/mL Klebsiella pneumoniae ssp pneumoniae    Susceptibility      Klebsiella pneumoniae ssp pneumoniae      DEMARCO      Ampicillin Resistant      Ampicillin + Sulbactam Susceptible      Cefazolin Susceptible      Cefepime Susceptible      Ceftazidime Susceptible      Ceftriaxone Susceptible       Gentamicin Susceptible      Levofloxacin Susceptible      Nitrofurantoin Susceptible      Piperacillin + Tazobactam Susceptible      Tetracycline Susceptible      Trimethoprim + Sulfamethoxazole Susceptible               Linear View                   POC Glucose Once [[id number]]  (Normal) Collected: 07/29/21 0436    Specimen: Blood Updated: 07/29/21 0447     Glucose 114 mg/dL      Comment: : TMCCCAROLYN Norman ID: [id number]       Comprehensive Metabolic Panel [[id number]]  (Abnormal) Collected: 07/29/21 0312    Specimen: Blood Updated: 07/29/21 0416     Glucose 44 mg/dL      BUN 26 mg/dL      Creatinine 1.07 mg/dL      Sodium 149 mmol/L      Potassium 3.7 mmol/L      Chloride 114 mmol/L      CO2 26.0 mmol/L      Calcium 10.0 mg/dL      Total Protein 4.8 g/dL      Albumin 2.70 g/dL      ALT (SGPT) 16 U/L      AST (SGOT) 34 U/L      Alkaline Phosphatase 53 U/L      Total Bilirubin 0.4 mg/dL      eGFR Non African Amer 67 mL/min/1.73      Globulin 2.1 gm/dL      A/G Ratio 1.3 g/dL      BUN/Creatinine Ratio 24.3     Anion Gap 9.0 mmol/L     Narrative:      GFR Normal >60  Chronic Kidney Disease <60  Kidney Failure <15      Phosphorus [[id number]]  (Abnormal) Collected: 07/29/21 0312    Specimen: Blood Updated: 07/29/21 0415     Phosphorus 1.9 mg/dL     Manual Differential [[id number]]  (Abnormal) Collected: 07/29/21 0312    Specimen: Blood Updated: 07/29/21 0413     Neutrophil % 75.5 %      Lymphocyte % 11.2 %      Monocyte % 4.1 %      Bands %  8.2 %      Atypical Lymphocyte % 1.0 %      Neutrophils Absolute 19.85 10*3/mm3      Lymphocytes Absolute 2.89 10*3/mm3      Monocytes Absolute 0.97 10*3/mm3      Crenated RBC's Slight/1+     Ovalocytes Slight/1+     Poikilocytes Slight/1+     WBC Morphology Normal     Platelet Estimate Decreased    CBC & Differential [[id number]]  (Abnormal) Collected: 07/29/21 0312    Specimen: Blood Updated: 07/29/21 0413    Narrative:      The following orders were  "created for panel order CBC & Differential.  Procedure                               Abnormality         Status                     ---------                               -----------         ------                     CBC Auto Differential[[id number]]        Abnormal            Final result                 Please view results for these tests on the individual orders.    CBC Auto Differential [[id number]]  (Abnormal) Collected: 07/29/21 0312    Specimen: Blood Updated: 07/29/21 0413     WBC 23.72 10*3/mm3      RBC 3.77 10*6/mm3      Hemoglobin 12.1 g/dL      Hematocrit 37.3 %      MCV 98.9 fL      MCH 32.1 pg      MCHC 32.4 g/dL      RDW 14.4 %      RDW-SD 52.1 fl      MPV 10.7 fL      Platelets 104 10*3/mm3     Magnesium [[id number]]  (Normal) Collected: 07/29/21 0312    Specimen: Blood Updated: 07/29/21 0353     Magnesium 1.9 mg/dL     Procalcitonin [[id number]]  (Abnormal) Collected: 07/28/21 0605    Specimen: Blood Updated: 07/28/21 0944     Procalcitonin 124.68 ng/mL     Narrative:      As a Marker for Sepsis (Non-Neonates):     1. <0.5 ng/mL represents a low risk of severe sepsis and/or septic shock.  2. >2 ng/mL represents a high risk of severe sepsis and/or septic shock.    As a Marker for Lower Respiratory Tract Infections that require antibiotic therapy:  PCT on Admission     Antibiotic Therapy             6-12 Hrs later  >0.5                          Strongly Recommended            >0.25 - <0.5             Recommended  0.1 - 0.25                  Discouraged                       Remeasure/reassess PCT  <0.1                         Strongly Discouraged         Remeasure/reassess PCT      As 28 day mortality risk marker: \"Change in Procalcitonin Result\" (>80% or <=80%) if Day 0 (or Day 1) and Day 4 values are available. Refer to http://www.[employer]-pct-calculator.com/    Change in PCT <=80 %   A decrease of PCT levels below or equal to 80% defines a positive change in PCT test result representing a higher risk " for 28-day all-cause mortality of patients diagnosed with severe sepsis or septic shock.    Change in PCT >80 %   A decrease of PCT levels of more than 80% defines a negative change in PCT result representing a lower risk for 28-day all-cause mortality of patients diagnosed with severe sepsis or septic shock.                Lactic Acid, Plasma [[id number]]  (Abnormal) Collected: 07/28/21 0830    Specimen: Blood Updated: 07/28/21 0916     Lactate 2.2 mmol/L     Basic Metabolic Panel [[id number]]  (Abnormal) Collected: 07/28/21 0605    Specimen: Blood Updated: 07/28/21 0652     Glucose 72 mg/dL      BUN 34 mg/dL      Creatinine 1.33 mg/dL      Sodium 145 mmol/L      Potassium 3.7 mmol/L      Chloride 112 mmol/L      CO2 28.0 mmol/L      Calcium 9.7 mg/dL      eGFR Non African Amer 52 mL/min/1.73      BUN/Creatinine Ratio 25.6     Anion Gap 5.0 mmol/L     Narrative:      GFR Normal >60  Chronic Kidney Disease <60  Kidney Failure <15      Phosphorus [[id number]]  (Abnormal) Collected: 07/28/21 0605    Specimen: Blood Updated: 07/28/21 0652     Phosphorus 2.2 mg/dL     Magnesium [[id number]]  (Normal) Collected: 07/28/21 0605    Specimen: Blood Updated: 07/28/21 0652     Magnesium 1.8 mg/dL     Manual Differential [[id number]]  (Abnormal) Collected: 07/28/21 0605    Specimen: Blood Updated: 07/28/21 0651     Neutrophil % 78.0 %      Lymphocyte % 8.0 %      Monocyte % 2.0 %      Bands %  8.0 %      Metamyelocyte % 2.0 %      Atypical Lymphocyte % 2.0 %      Neutrophils Absolute 23.15 10*3/mm3      Lymphocytes Absolute 2.69 10*3/mm3      Monocytes Absolute 0.54 10*3/mm3      Poikilocytes Slight/1+     Spherocytes Slight/1+     WBC Morphology Normal     Platelet Estimate Decreased    CBC & Differential [[id number]]  (Abnormal) Collected: 07/28/21 0605    Specimen: Blood Updated: 07/28/21 0651    Narrative:      The following orders were created for panel order CBC & Differential.  Procedure                                Abnormality         Status                     ---------                               -----------         ------                     CBC Auto Differential[[id number]]        Abnormal            Final result                 Please view results for these tests on the individual orders.    CBC Auto Differential [[id number]]  (Abnormal) Collected: 07/28/21 0605    Specimen: Blood Updated: 07/28/21 0651     WBC 26.92 10*3/mm3      RBC 3.60 10*6/mm3      Hemoglobin 11.7 g/dL      Hematocrit 34.7 %      MCV 96.4 fL      MCH 32.5 pg      MCHC 33.7 g/dL      RDW 14.6 %      RDW-SD 51.1 fl      MPV 11.0 fL      Platelets 103 10*3/mm3     Manual Differential [[id number]]  (Abnormal) Collected: 07/27/21 0835    Specimen: Blood Updated: 07/27/21 0948     Neutrophil % 71.0 %      Lymphocyte % 2.0 %      Monocyte % 8.0 %      Bands %  15.0 %      Metamyelocyte % 3.0 %      Plasma Cells % 1.0 %      Neutrophils Absolute 43.01 10*3/mm3      Lymphocytes Absolute 1.00 10*3/mm3      Monocytes Absolute 4.00 10*3/mm3      Crenated RBC's Slight/1+     Poikilocytes Mod/2+     WBC Morphology Normal     Platelet Morphology Normal    CBC Auto Differential [[id number]]  (Abnormal) Collected: 07/27/21 0835    Specimen: Blood Updated: 07/27/21 0948     WBC 50.01 10*3/mm3      RBC 4.39 10*6/mm3      Hemoglobin 14.3 g/dL      Hematocrit 43.1 %      MCV 98.2 fL      MCH 32.6 pg      MCHC 33.2 g/dL      RDW 14.6 %      RDW-SD 53.2 fl      MPV 11.1 fL      Platelets 140 10*3/mm3     Comprehensive Metabolic Panel [[id number]]  (Abnormal) Collected: 07/27/21 0835    Specimen: Blood Updated: 07/27/21 0938     Glucose 78 mg/dL      BUN 31 mg/dL      Creatinine 1.76 mg/dL      Sodium 141 mmol/L      Potassium 4.8 mmol/L      Chloride 104 mmol/L      CO2 25.0 mmol/L      Calcium 10.1 mg/dL      Total Protein 5.9 g/dL      Albumin 3.40 g/dL      ALT (SGPT) 18 U/L      AST (SGOT) 39 U/L      Alkaline Phosphatase 78 U/L      Total Bilirubin 0.5 mg/dL      eGFR  Non  Amer 38 mL/min/1.73      Globulin 2.5 gm/dL      A/G Ratio 1.4 g/dL      BUN/Creatinine Ratio 17.6     Anion Gap 12.0 mmol/L     Narrative:      GFR Normal >60  Chronic Kidney Disease <60  Kidney Failure <15      TSH [[id number]]  (Normal) Collected: 07/26/21 2228    Specimen: Blood Updated: 07/27/21 0509     TSH 1.840 uIU/mL     T4, Free [[id number]]  (Normal) Collected: 07/26/21 2228    Specimen: Blood Updated: 07/27/21 0508     Free T4 1.07 ng/dL     Narrative:      Results may be falsely increased if patient taking Biotin.      Hemoglobin A1c [[id number]]  (Normal) Collected: 07/26/21 2228    Specimen: Blood Updated: 07/27/21 0500     Hemoglobin A1C 4.90 %     Narrative:      Hemoglobin A1C Ranges:    Increased Risk for Diabetes  5.7% to 6.4%  Diabetes                     >= 6.5%  Diabetic Goal                < 7.0%    Magnesium [[id number]]  (Normal) Collected: 07/26/21 2228    Specimen: Blood Updated: 07/27/21 0456     Magnesium 1.6 mg/dL     POC Glucose Once [[id number]]  (Normal) Collected: 07/27/21 0301    Specimen: Blood Updated: 07/27/21 0313     Glucose 72 mg/dL      Comment: : [id number] Sarwatleonard JamiMeter ID: [id number]       STAT Lactic Acid, Reflex [[id number]]  (Abnormal) Collected: 07/27/21 0217    Specimen: Blood Updated: 07/27/21 0249     Lactate 4.1 mmol/L     Blood Gas, Arterial - [[id number]]  (Abnormal) Collected: 07/27/21 0240    Specimen: Arterial Blood Updated: 07/27/21 0245     Site Right Radial     Leighton's Test Positive     pH, Arterial 7.483 pH units      Comment: 83 Value above reference range        pCO2, Arterial 30.9 mm Hg      Comment: 84 Value below reference range        pO2, Arterial 59.2 mm Hg      Comment: 84 Value below reference range        HCO3, Arterial 23.1 mmol/L      Base Excess, Arterial 0.4 mmol/L      O2 Saturation, Arterial 93.5 %      Comment: 84 Value below reference range        Temperature 37.0 C      Barometric Pressure for Blood Gas 750 mmHg       Modality Nasal Cannula     Flow Rate 2.0 lpm      Ventilator Mode NA     Collected by [id number]     Comment: Meter: [id number]     :  [id number]        pCO2, Temperature Corrected 30.9 mm Hg      pH, Temp Corrected 7.483 pH Units      pO2, Temperature Corrected 59.2 mm Hg     [place] Draw [[id number]] Collected: 07/26/21 2228    Specimen: Blood Updated: 07/27/21 0231    Narrative:      The following orders were created for panel order [place] Draw.  Procedure                               Abnormality         Status                     ---------                               -----------         ------                     Green Top (Gel)[[id number]]                                  Final result               Lavender Top[[id number]]                                     Final result               Red Top[[id number]]                                          Final result               [place] Blood Culture Guevara...[[id number]]                      Final result               Ibrahim Top[[id number]]                                         Final result                 Please view results for these tests on the individual orders.    Gray Top [[id number]] Collected: 07/26/21 2228    Specimen: Blood Updated: 07/27/21 0231     Extra Tube Hold for add-ons.     Comment: Auto resulted.       BNP [[id number]]  (Abnormal) Collected: 07/26/21 2228    Specimen: Blood Updated: 07/27/21 0110     proBNP 6,859.0 pg/mL     Narrative:      Among patients with dyspnea, NT-proBNP is highly sensitive for the detection of acute congestive heart failure. In addition NT-proBNP of <300 pg/ml effectively rules out acute congestive heart failure with 99% negative predictive value.    Results may be falsely decreased if patient taking Biotin.      Procalcitonin [[id number]]  (Abnormal) Collected: 07/26/21 2228    Specimen: Blood Updated: 07/27/21 0105     Procalcitonin 242.56 ng/mL     Narrative:      As a Marker for Sepsis (Non-Neonates):     1. <0.5 ng/mL  "represents a low risk of severe sepsis and/or septic shock.  2. >2 ng/mL represents a high risk of severe sepsis and/or septic shock.    As a Marker for Lower Respiratory Tract Infections that require antibiotic therapy:  PCT on Admission     Antibiotic Therapy             6-12 Hrs later  >0.5                          Strongly Recommended            >0.25 - <0.5             Recommended  0.1 - 0.25                  Discouraged                       Remeasure/reassess PCT  <0.1                         Strongly Discouraged         Remeasure/reassess PCT      As 28 day mortality risk marker: \"Change in Procalcitonin Result\" (>80% or <=80%) if Day 0 (or Day 1) and Day 4 values are available. Refer to http://www.[email or web address]ct-calculator.com/    Change in PCT <=80 %   A decrease of PCT levels below or equal to 80% defines a positive change in PCT test result representing a higher risk for 28-day all-cause mortality of patients diagnosed with severe sepsis or septic shock.    Change in PCT >80 %   A decrease of PCT levels of more than 80% defines a negative change in PCT result representing a lower risk for 28-day all-cause mortality of patients diagnosed with severe sepsis or septic shock.                Urinalysis, Microscopic Only - Urine, Catheter [[id number]]  (Abnormal) Collected: 07/26/21 2354    Specimen: Urine, Catheter Updated: 07/27/21 0037     RBC, UA       Unable to determine due to loaded field     /HPF     WBC, UA Too Numerous to Count /HPF      Bacteria, UA 4+ /HPF      Squamous Epithelial Cells, UA       Unable to determine due to loaded field     /HPF     Hyaline Casts, UA       Unable to determine due to loaded field     /LPF     WBC Clumps, UA Large/3+ /HPF      Methodology Manual Light Microscopy    POC Glucose Once [[id number]]  (Normal) Collected: 07/27/21 0025    Specimen: Blood Updated: 07/27/21 0036     Glucose 99 mg/dL      Comment: : [id number] Saira JamiMeter ID: [id number]       Urinalysis " With Culture If Indicated - Urine, Catheter [[id number]]  (Abnormal) Collected: 07/26/21 2354    Specimen: Urine, Catheter Updated: 07/27/21 0023     Color, UA Dark Yellow     Appearance, UA Turbid     pH, UA 7.0     Specific Gravity, UA 1.016     Glucose, UA Negative     Ketones, UA Negative     Bilirubin, UA Negative     Blood, UA Moderate (2+)     Protein,  mg/dL (2+)     Leuk Esterase, UA Large (3+)     Nitrite, UA Negative     Urobilinogen, UA 1.0 E.U./dL    COVID PRE-OP / PRE-PROCEDURE SCREENING ORDER (NO ISOLATION) - Swab, Nasal Cavity [[id number]]  (Normal) Collected: 07/26/21 2235    Specimen: Swab from Nasal Cavity Updated: 07/26/21 2355    Narrative:      The following orders were created for panel order COVID PRE-OP / PRE-PROCEDURE SCREENING ORDER (NO ISOLATION) - Swab, Nasal Cavity.  Procedure                               Abnormality         Status                     ---------                               -----------         ------                     COVID-19,[facility]...[[id number]]  Normal              Final result                 Please view results for these tests on the individual orders.    COVID-19,[facility] IN-HOUSE,Nasal Swab No Transport Media 3-4 HR TAT - Swab, Nasal Cavity [[id number]]  (Normal) Collected: 07/26/21 2235    Specimen: Swab from Nasal Cavity Updated: 07/26/21 2355     COVID19 Not Detected    Narrative:      Fact sheet for providers: https://www.fda.gov/media/604446/download     Fact sheet for patients: https://www.fda.gov/media/793131/download    Test performed by PCR.    Consider negative results in combination with clinical observations, patient history, and epidemiological information.    aPTT [[id number]]  (Abnormal) Collected: 07/26/21 2306    Specimen: Blood Updated: 07/26/21 2333     PTT 48.6 seconds     Protime-INR [[id number]]  (Abnormal) Collected: 07/26/21 2306    Specimen: Blood Updated: 07/26/21 2333     Protime 18.3 Seconds      INR 1.65    Lavender Top  [[id number]] Collected: 07/26/21 2228    Specimen: Blood Updated: 07/26/21 2331     Extra Tube hold for add-on     Comment: Auto resulted       [facility] Blood Culture Bottle Set [[id number]] Collected: 07/26/21 2228    Specimen: Blood from Arm, Right Updated: 07/26/21 2331     Extra Tube Hold for add-ons.     Comment: Auto resulted.       Green Top (Gel) [[id number]] Collected: 07/26/21 2228    Specimen: Blood Updated: 07/26/21 2331     Extra Tube Hold for add-ons.     Comment: Auto resulted.       Red Top [[id number]] Collected: 07/26/21 2228    Specimen: Blood Updated: 07/26/21 2331     Extra Tube Hold for add-ons.     Comment: Auto resulted.       POC Glucose Once [[id number]]  (Abnormal) Collected: 07/26/21 2318    Specimen: Blood Updated: 07/26/21 2329     Glucose 56 mg/dL      Comment: : [id number] Saira JamiMeter ID: [id number]       Blood Gas, Arterial - [[id number]]  (Abnormal) Collected: 07/26/21 2320    Specimen: Arterial Blood Updated: 07/26/21 2325     Site Right Radial     Leighton's Test Positive     pH, Arterial 7.464 pH units      Comment: 83 Value above reference range        pCO2, Arterial 26.4 mm Hg      Comment: 84 Value below reference range        pO2, Arterial 74.5 mm Hg      Comment: 84 Value below reference range        HCO3, Arterial 18.9 mmol/L      Comment: 84 Value below reference range        Base Excess, Arterial -3.6 mmol/L      Comment: 84 Value below reference range        O2 Saturation, Arterial 96.7 %      Temperature 37.0 C      Barometric Pressure for Blood Gas 751 mmHg      Modality Nasal Cannula     Flow Rate 2.0 lpm      Ventilator Mode NA     Collected by [id number]     Comment: Meter: [id number]     :  [id number]        pCO2, Temperature Corrected 26.4 mm Hg      pH, Temp Corrected 7.464 pH Units      pO2, Temperature Corrected 74.5 mm Hg     Comprehensive Metabolic Panel [[id number]]  (Abnormal) Collected: 07/26/21 2228    Specimen: Blood Updated: 07/26/21 2318      Glucose 75 mg/dL      BUN 27 mg/dL      Creatinine 2.01 mg/dL      Sodium 142 mmol/L      Potassium 4.2 mmol/L      Chloride 109 mmol/L      CO2 20.0 mmol/L      Calcium 10.1 mg/dL      Total Protein 5.1 g/dL      Albumin 3.00 g/dL      ALT (SGPT) 16 U/L      AST (SGOT) 27 U/L      Alkaline Phosphatase 51 U/L      Total Bilirubin 0.5 mg/dL      eGFR Non African Amer 32 mL/min/1.73      Globulin 2.1 gm/dL      A/G Ratio 1.4 g/dL      BUN/Creatinine Ratio 13.4     Anion Gap 13.0 mmol/L     Narrative:      GFR Normal >60  Chronic Kidney Disease <60  Kidney Failure <15      Manual Differential [[id number]]  (Abnormal) Collected: 07/26/21 2228    Specimen: Blood Updated: 07/26/21 2318     Neutrophil % 68.0 %      Lymphocyte % 2.4 %      Monocyte % 4.8 %      Bands %  16.0 %      Metamyelocyte % 7.2 %      Atypical Lymphocyte % 1.6 %      Neutrophils Absolute 31.38 10*3/mm3      Lymphocytes Absolute 1.49 10*3/mm3      Monocytes Absolute 1.79 10*3/mm3      Anisocytosis Slight/1+     Poikilocytes Slight/1+     WBC Morphology Normal     Platelet Estimate Decreased    CBC & Differential [[id number]]  (Abnormal) Collected: 07/26/21 2228    Specimen: Blood Updated: 07/26/21 2318    Narrative:      The following orders were created for panel order CBC & Differential.  Procedure                               Abnormality         Status                     ---------                               -----------         ------                     CBC Auto Differential[[id number]]        Abnormal            Final result                 Please view results for these tests on the individual orders.    CBC Auto Differential [[id number]]  (Abnormal) Collected: 07/26/21 2228    Specimen: Blood Updated: 07/26/21 2318     WBC 37.36 10*3/mm3      RBC 4.05 10*6/mm3      Hemoglobin 13.1 g/dL      Hematocrit 39.5 %      MCV 97.5 fL      MCH 32.3 pg      MCHC 33.2 g/dL      RDW 14.3 %      RDW-SD 51.2 fl      MPV 10.5 fL      Platelets 138 10*3/mm3      Troponin [[id number]]  (Normal) Collected: 07/26/21 2228    Specimen: Blood Updated: 07/26/21 2314     Troponin T 0.016 ng/mL     Narrative:      Troponin T Reference Range:  <= 0.03 ng/mL-   Negative for AMI  >0.03 ng/mL-     Abnormal for myocardial necrosis.  Clinicians would have to utilize clinical acumen, EKG, Troponin and serial changes to determine if it is an Acute Myocardial Infarction or myocardial injury due to an underlying chronic condition.       Results may be falsely decreased if patient taking Biotin.      Lactic Acid, Plasma [[id number]]  (Abnormal) Collected: 07/26/21 2228    Specimen: Blood Updated: 07/26/21 2313     Lactate 6.1 mmol/L     Lipase [[id number]]  (Normal) Collected: 07/26/21 2228    Specimen: Blood Updated: 07/26/21 2313     Lipase 15 U/L     POC Glucose Once [[id number]]  (Normal) Collected: 07/26/21 2221    Specimen: Blood Updated: 07/26/21 2232     Glucose 78 mg/dL      Comment: : [id number] Diaz TheresaMeter ID: [id number]           Imaging Results (Last 7 Days)     Procedure Component Value Units Date/Time    CT Abdomen Pelvis Without Contrast [[id number]] Collected: 07/30/21 1728     Updated: 07/30/21 1733    Narrative:      EXAMINATION: CT ABDOMEN PELVIS WO CONTRAST-      7/30/2021 3:09 PM CDT     HISTORY: Obstipation, vomiting feculent material; A41.9-Sepsis,  unspecified organism; R65.21-Severe sepsis with septic shock;  N39.0-Urinary tract infection, site not specified; A44-Ygsadjm effusion,  not elsewhere classified; K59.00-Constipation, unspecified;  K57.92-Diverticulitis of intestine, part unspecified, without  perforation or abscess without bleeding     In order to have a CT radiation dose as low as reasonably achievable  Automated Exposure Control was utilized for adjustment of the mA and/or  KV according to patient size.     DLP in mGycm= 1106.     Abdomen/pelvis CT with oral contrast only.     Comparison is made with 7/27/2021.     Cardiomegaly.  Small amount of  pericardial fluid.     Bibasilar infiltrate or atelectasis is unchanged from 3 days ago.     Nasogastric tube now present.  The stomach is decompressed.     The noncontrast appearance of the liver, gallbladder, pancreas, and  spleen is appropriate.     Adrenal glands and kidneys are within normal limits.     Aortic calcification with no aneurysm.     No bowel dilation.  Contrast is seen throughout the nondilated bowel loops and within the  rectum.     Urinary bladder catheter is present. The urinary bladder has an  unchanged thick-walled appearance.     The rectosigmoid fecal material noted previously has cleared.     Summary:  1. Nasogastric tube now present and the stomach is decompressed.  2. No bowel obstruction is seen.  3. No mass or fluid collection.                                   This report was finalized on 07/30/2021 17:30 by Dr. Den Ramey MD.    XR Abdomen KUB [[id number]] Collected: 07/28/21 0739     Updated: 07/28/21 0743    Narrative:      XR ABDOMEN KUB- 7/28/2021 5:36 AM CDT     HISTORY: Obstipation; A41.9-Sepsis, unspecified organism; R65.21-Severe  sepsis with septic shock; N39.0-Urinary tract infection, site not  specified; E78-Gsgmoab effusion, not elsewhere classified;  K59.00-Constipation, unspecified; K57.92-Diverticulitis of intestine,  part unspecified, without perforation or abscess without bleeding       COMPARISON: None     FINDINGS:  There is a nonspecific bowel gas pattern. Nasogastric tube is present  satisfactorily position. Catheter is present in the midline of the  pelvis. Compression screw is present in the right femoral head and neck.     No acute skeletal abnormality is identified.        Impression:      1. Non specific bowel gas pattern..         This report was finalized on 07/28/2021 07:40 by Dr. Romeo Wen MD.    XR Chest 1 View [[id number]] Collected: 07/27/21 0802     Updated: 07/27/21 0806    Narrative:      Frontal supine radiograph of the chest 7/27/2021 3:00  AM CDT     History: sob; A41.9-Sepsis, unspecified organism; R65.21-Severe sepsis  with septic shock; N39.0-Urinary tract infection, site not specified     Comparison: Chest x-ray dated 7/26/2021      Findings:   Lines and tubes are stable in position. No new opacities or  pneumothoraces are visualized in the chest. The cardiomediastinal  silhouette and pulmonary vascularity are unchanged.       No acute osseous or soft tissue abnormality is noted.        Impression:      Impression:   1.   No significant interval change since previous exam.        This report was finalized on 07/27/2021 08:03 by Dr. Nancy Dowd MD.    CT Abdomen Pelvis Without Contrast [[id number]] Collected: 07/27/21 0752     Updated: 07/27/21 0759    Narrative:      EXAMINATION: CT ABDOMEN PELVIS WO CONTRAST-      7/27/2021 12:04 AM CDT     HISTORY: Altered mental status. Hypotension. Tachycardia. Sepsis.     In order to have a CT radiation dose as low as reasonably achievable  Automated Exposure Control was utilized for adjustment of the mA and/or  KV according to patient size.     DLP in mGycm= 560.     Noncontrast abdomen/pelvis CT.  No comparison.     A preliminary [facility] report was faxed to the Emergency Department  immediately after this study was interpreted at 3:08 AM.     The arms overlie the upper abdomen with some mild streak artifact.  Also detail is affected by respiratory motion.     Mildly distended gallbladder with no visible stones.  Noncontrast appearance of the liver and spleen is within normal limits.  The pancreas shows no abnormality.     Normal appearance of the adrenal glands and kidneys.  No renal stone or hydronephrosis.     Aortic calcification with no aneurysm.     Distended fluid-filled stomach.     Prominent rectosigmoid fecal material with the appearance of rectal  fecal impaction.  Urinary bladder catheter present.  Scattered sigmoid diverticula.  No diverticulitis.     No mass or abscess is seen.  No bowel  obstruction.     Severe degenerative lumbar spine changes with multilevel compression  deformities which appear to be chronic though are indeterminate in age.  (Acute fractures of the lumbar spine are noted on a remote comparison  from 2006).     Summary:  1. Distended fluid-filled stomach.  2. Rectal fecal impaction.  3. No mass, abscess, or obstruction is seen.                                         This report was finalized on 07/27/2021 07:56 by Dr. Den Ramey MD.    CT Chest Without Contrast Diagnostic [[id number]] Collected: 07/27/21 0750     Updated: 07/27/21 0755    Narrative:      EXAMINATION: CT CHEST WO CONTRAST DIAGNOSTIC-      7/27/2021 12:04 AM CDT     HISTORY: Altered mental status. Hypotension. Tachycardia. Sepsis.     In order to have a CT radiation dose as low as reasonably achievable  Automated Exposure Control was utilized for adjustment of the mA and/or  KV according to patient size.     DLP in mGycm= 669.     Noncontrast chest CT.     Comparison is made with chest CT from December 4, 2020.     A preliminary [facility] report was faxed to the Emergency Department  immediately after this study was interpreted at 2:47 AM.     Cardiomegaly.  Coronary artery calcification.  Left-sided cardiac pacer.     Granulomatous lymph node calcification at the left hilum.     Interstitial lung disease with bibasilar atelectasis.  No pneumothorax.  No focal consolidation.  No more than trace pleural fluid.     Summary:  1. Cardiomegaly and interstitial lung disease. Questionable mild  interstitial edema.  2. No focal pulmonary consolidation.                                   This report was finalized on 07/27/2021 07:52 by Dr. Den Ramey MD.    XR Chest 1 View [[id number]] Collected: 07/27/21 0654     Updated: 07/27/21 0658    Narrative:      EXAM: XR CHEST 1 VW- 7/27/2021 12:45 AM CDT     HISTORY: central line placement; A41.9-Sepsis, unspecified organism;  R65.21-Severe sepsis with septic shock;  N39.0-Urinary tract infection,  site not specified       COMPARISON: July 26, 2021     TECHNIQUE: Frontal radiograph of the chest     FINDINGS:   There is improved aeration of the right and left lung. The pulmonary  vascular margins are more distinct. This improvement may be due to the  improved inspiration. The cardiac silhouettes mildly enlarged. Right  internal jugular catheter satisfactorily positioned..      The osseous structures and surrounding soft tissues demonstrate no acute  abnormality.          Impression:      1. Cardiomegaly. Previous noted mild changes of pulmonary vascular  congestion are improved.        This report was finalized on 07/27/2021 06:55 by Dr. Romeo Wen MD.    CT Head Without Contrast [[id number]] Collected: 07/27/21 0653     Updated: 07/27/21 0657    Narrative:      EXAMINATION:   CT HEAD WO CONTRAST-  7/27/2021 6:53 AM CDT     HISTORY: CT BRAIN without contrast 7/27/2021 12:04 AM CDT     HISTORY: Altered mental status     COMPARISON: January 1, 2021      DLP: 2662 mGy cm. In order to have a CT radiation dose as low as  reasonably achievable, Automated Exposure Control was utilized for  adjustment of the mA and/or KV according to patient size.     TECHNIQUE: Serial axial tomographic images of the brain were obtained  without the use of intravenous contrast.      FINDINGS:   The midline structures are nondisplaced. There is moderate cerebral and  cerebellar volume loss, with an associated increase in the prominence of  the ventricles and sulci. The basilar cisterns are normal in size and  configuration. There is no evidence of intracranial hemorrhage or  mass-effect. There is low attenuation in the periventricular white  matter, consistent with chronic ischemic change. There are no abnormal  extra-axial fluid collections. There is no evidence of tonsillar  herniation.      The included orbits and their contents are unremarkable. The visualized  paranasal sinuses, mastoid air cells  "and middle ear cavities are clear.  The visualized osseous structures and overlying soft tissues of the  skull and face are intact.        Impression:      Changes of aging with no acute intracranial abnormality.  This report was finalized on 07/27/2021 06:54 by Dr. Romeo Wen MD.    XR Chest 1 View [[id number]] Collected: 07/27/21 0652     Updated: 07/27/21 0656    Narrative:      EXAM: XR CHEST 1 VW- 7/26/2021 11:25 PM CDT     HISTORY: fevers       COMPARISON: December 4, 2020.     TECHNIQUE: Frontal radiograph of the chest     FINDINGS:   Pulmonary vascular margins are indistinct. Most likely this is mild  pulmonary vascular congestion.. Cardiac silhouettes mildly enlarged.  Pacing device is present in the soft tissues of the left chest..      The osseous structures and surrounding soft tissues demonstrate no acute  abnormality.          Impression:      1. Cardiomegaly with indistinct pulmonary vascular margins most likely  representing mild or early pulmonary vascular congestion.        This report was finalized on 07/27/2021 06:53 by Dr. Romeo Wne MD.            Condition on Discharge:    Stable    Physical Exam on Discharge:  BP 98/56 (BP Location: Left arm, Patient Position: Lying)   Pulse 83   Temp 98.4 °F (36.9 °C) (Oral)   Resp 16   Ht 177.8 cm (70\")   Wt 72.7 kg (160 lb 3.2 oz)   SpO2 98%   BMI 22.99 kg/m²   Physical Exam     Constitutional:       Appearance: He is well-developed.      Comments: Sleeping, but arousable.  Chronically ill-appearing.  No family present.   HENT:      Head: Normocephalic and atraumatic.      Nose:   Eyes:      Conjunctiva/sclera: Conjunctivae normal.      Pupils: Pupils are equal, round, and reactive to light.   Neck:      Vascular: No JVD.   Cardiovascular:      Rate and Rhythm: Normal rate and regular rhythm.      Heart sounds: Normal heart sounds. No murmur heard.   No friction rub. No gallop.       Comments: Sinus rhythm at present.   Pulmonary:      " Effort: Pulmonary effort is normal. No respiratory distress.      Breath sounds: No wheezing or rales.      Comments: On room air.   Chest:      Chest wall: No tenderness.   Abdominal:      General: Bowel sounds are normal. There is no distension.      Palpations: Abdomen is soft.      Tenderness: There is no abdominal tenderness. There is no guarding or rebound.   Musculoskeletal:         General: Swelling (trace) present. No tenderness or deformity. Normal range of motion.      Cervical back: Neck supple.   Skin:     General: Skin is warm and dry.      Findings: No rash.   Neurological:      General: No focal deficit present.      Mental Status: He is alert.      Cranial Nerves: No cranial nerve deficit.      Motor: Weakness present. No abnormal muscle tone.      Deep Tendon Reflexes: Reflexes normal.   Psychiatric:      Comments: Dementia is at baseline. He is calm and cooperative at present.     Discharge Disposition:  Skilled Nursing Facility ([place] - External)    Discharge Medications:     Discharge Medications      New Medications      Instructions Start Date   cefdinir 300 MG capsule  Commonly known as: OMNICEF   300 mg, Oral, Daily      QUEtiapine 25 MG tablet  Commonly known as: SEROquel   25 mg, Oral, Nightly         Changes to Medications      Instructions Start Date   aspirin 81 MG EC tablet  What changed:   · medication strength  · how much to take  · when to take this   81 mg, Oral, Daily   Start Date: August 3, 2021        Continue These Medications      Instructions Start Date   acetaminophen 325 MG tablet  Commonly known as: TYLENOL   650 mg, Oral, Every 6 Hours PRN      buPROPion  MG 24 hr tablet  Commonly known as: WELLBUTRIN XL   150 mg, Oral, Daily      divalproex 250 MG DR tablet  Commonly known as: DEPAKOTE   250 mg, Oral, Every Night at Bedtime      divalproex 125 MG DR tablet  Commonly known as: DEPAKOTE   125 mg, Oral, Daily      mirtazapine 15 MG tablet  Commonly known as: REMERON   15  mg, Oral, Nightly      multivitamin with minerals tablet tablet   1 tablet, Oral, Daily      polyethylene glycol 17 g packet  Commonly known as: MiraLax   17 g, Oral, Daily PRN      pravastatin 20 MG tablet  Commonly known as: PRAVACHOL   20 mg, Oral, Daily      tamsulosin 0.4 MG capsule 24 hr capsule  Commonly known as: FLOMAX   1 capsule, Oral, Daily      thiamine 100 MG tablet tablet  Commonly known as: VITAMIN B-1   100 mg, Oral, Daily      tiotropium 18 MCG per inhalation capsule  Commonly known as: SPIRIVA   1 capsule, Inhalation, Daily - RT      vitamin C 500 MG tablet  Commonly known as: ASCORBIC ACID   500 mg, Oral, Daily         Stop These Medications    dilTIAZem 30 MG tablet  Commonly known as: CARDIZEM     HYDROcodone-acetaminophen 7.5-325 MG per tablet  Commonly known as: NORCO     LORazepam 0.5 MG tablet  Commonly known as: ATIVAN            Discharge Diet:   Diet Instructions     Diet: Soft Texture; Nectar / Syrup Thick Liquids; Ground      Discharge Diet: Soft Texture    Fluid Consistency: Nectar / Syrup Thick Liquids    Soft Options: Ground          Discharge Care Plan / Instructions:   Discharge to skilled nursing facility    Activity at Discharge:   Activity Instructions     Activity as Tolerated             Electronically signed by Parag Stokes DO, 08/02/21, 15:42 CDT.    Time: Discharge Over 30 min    Part of this note may be an electronic transcription/translation of spoken language to printed text using the Dragon Dictation system.

## 2021-08-02 NOTE — PLAN OF CARE
Problem: Adult Inpatient Plan of Care  Goal: Plan of Care Review  Outcome: Ongoing, Progressing  Flowsheets (Taken 8/2/2021 2130)  Progress: no change  Plan of Care Reviewed With: patient  Outcome Summary: Pt disoriented x4, VSS. No c/o pain during the night. Q4 bates care. Pt becomes agitated when attempting to clean up pt. SCDs for VTE prevention. Bed alarm set, call light in reach, safety maintained.

## 2021-08-02 NOTE — DISCHARGE PLACEMENT REQUEST
"Leelee Abdalla (76 y.o. Male)     Date of Birth Social Security Number Address Home Phone MRN    [id number]  [id number] [facility] [address] [phone] [id number]    Mormonism Marital Status          Spiritism        Admission Date Admission Type Admitting Provider Attending Provider Department, Room/Bed    7/26/21 Emergency Parag Stokes DO Robinson, Maurice S, DO [facility] 3A, 337/1    Discharge Date Discharge Disposition Discharge Destination         Skilled Nursing Facility (DC - External)              Attending Provider: Parag Stokes DO    Allergies: No Known Allergies    Isolation: None   Infection: None   Code Status: No CPR    Ht: 177.8 cm (70\")   Wt: 72.7 kg (160 lb 3.2 oz)    Admission Cmt: None   Principal Problem: Septic shock (CMS/[facility]) [A41.9,R65.21]                 Active Insurance as of 7/26/2021     Primary Coverage     Payor Plan Insurance Group Employer/Plan Group    [facility] [id number]     Payor Plan Address Payor Plan Phone Number Payor Plan Fax Number Effective Dates    [address]   1/1/2017 - None Entered    [facility] [phone]       Subscriber Name Subscriber Birth Date Member ID       LEELEE ABDALLA R 1945 [id number]           Secondary Coverage     Payor Plan Insurance Group Employer/Plan Group    MEDICARE MEDICARE A & B      Payor Plan Address Payor Plan Phone Number Payor Plan Fax Number Effective Dates    [address] [phone]  2/1/2008 - None Entered    [facility] [id number]       Subscriber Name Subscriber Birth Date Member ID       LEELEE ABDALLA R 1945 [id number]                 Emergency Contacts      (Rel.) Home Phone Work Phone Mobile Phone    ANJANA ANDERSON (GRANDDAUGHTER) (Guardian) -- -- [phone]    Nursing, Home [phone] -- --                 Discharge Summary      Parag Stokes DO at 08/02/21 1541              [facility] Medicine " Services  DISCHARGE SUMMARY       Date of Admission: 7/26/2021  Date of Discharge:  8/2/2021  Primary Care Physician: Shane Banks MD    Discharge Diagnoses:  Active Hospital Problems    Diagnosis    • **Septic shock (CMS/[facility])    • Hypernatremia    • Hypokalemia    • Hypophosphatemia    • Thrombocytopenia (CMS/[facility])    • DEEPIKA (acute kidney injury) (CMS/[facility])    • Lactic acidosis    • Urinary tract infection associated with indwelling urethral catheter (Klebsiella)    • Metabolic encephalopathy    • Obstipation with fecal impaction    • Nausea & vomiting    • Dementia (CMS/[facility])    • Chronic atrial fibrillation with RVR (CMS/[facility])    • COPD (chronic obstructive pulmonary disease) (CMS/[facility])          Presenting Problem/History of Present Illness:  Septic shock (CMS/[facility]) [A41.9, R65.21]     Chief Complaint on Day of Discharge:   No complaint    History of Present Illness on Day of Discharge:   Patient is back to baseline.  He is not agitated and seems to be able to feed himself a puréed diet.  Oral intake has been reasonably good since been moving on to a soft diet with nectar thickened liquids.  He has had a stool today and is appropriate for discharge back to skilled nursing facility.    Hospital Course  This 76-year-old nursing home patient was admitted on 7/27 presenting with low blood pressure and change in mental status.  He was found to have a urinary tract infection secondary to chronic indwelling Montgomery catheter.  He apparently has not been having bowel movements and presented with a fecal impaction with obstipation noted on CT scan.  He was vomiting feculent material and an NG tube was placed.  He was admitted to the ICU on a Levophed drip through a central line which was placed in the emergency department.  He received ceftriaxone in the emergency department and he was subsequently placed on Flagyl and Zosyn.  Zosyn was discontinued on 7/29.  Lactate was elevated at admission at 6 and then decreased to 2.2.   Procalcitonin was also elevated at 242 and has decreased.  Blood cultures showed no growth.  Urine culture showed Klebsiella pneumonia pansensitive.  Patient was transitioned to Rocephin monotherapy on 7/31.  He received a sepsis bolus with lactated Ringer's in the emergency room and continued on lactated Ringer's throughout his hospitalization.  Levophed was weaned on 7/28.  Acute kidney injury was noted at the time of admission and resolved with supportive care and IV fluids.  20 mg of Lasix was given on 7/29 out of concern for overhydration.  D5 was started on 7/31 to correct hypernatremia.  Electrolytes including potassium and phosphorus were supplemented throughout his hospital stay.  The patient was noted to have a history of PAF.  He remained in sinus rhythm throughout his hospital stay.  He was not on chronic anticoagulation but is on aspirin.  Diltiazem was held as his rate was well controlled throughout his stay.  An NG tube was placed to intermittent low wall suction at the time of admission.  His abdominal exam improved.  He had several bowel movements with suppositories.  Repeat CT scan of the abdomen and pelvis with oral contrast through an NG tube showed that the stomach was no longer distended with noted obstruction and no obstipation.  NG tube was discontinued on 7/31.  Speech therapy cleared him for soft, ground foods with nectar thickened liquids and he tolerated the transition well.  He has essentially back to his baseline.  Palliative care was consulted for goals of care.  After speaking to his guardian, his granddaughter, she elected to make him no CPR with no intubation or artificial feedings.  The patient was transferred to the medical surgical floor on 7/29.  Continued to improve.  He is appropriate for discharge back to skilled nursing facility today.      Pertinent Test Results:   Lab Results (last 7 days)     Procedure Component Value Units Date/Time    Phosphorus [[id number]]  (Abnormal)  Collected: 08/01/21 0549    Specimen: Blood Updated: 08/01/21 0713     Phosphorus 1.6 mg/dL     Basic Metabolic Panel [[id number]]  (Abnormal) Collected: 08/01/21 0549    Specimen: Blood Updated: 08/01/21 0646     Glucose 128 mg/dL      BUN 15 mg/dL      Creatinine 0.75 mg/dL      Sodium 147 mmol/L      Potassium 3.2 mmol/L      Chloride 116 mmol/L      CO2 25.0 mmol/L      Calcium 9.5 mg/dL      eGFR Non African Amer 101 mL/min/1.73      BUN/Creatinine Ratio 20.0     Anion Gap 6.0 mmol/L     Narrative:      GFR Normal >60  Chronic Kidney Disease <60  Kidney Failure <15      Magnesium [[id number]]  (Normal) Collected: 08/01/21 0549    Specimen: Blood Updated: 08/01/21 0646     Magnesium 1.9 mg/dL     CBC (No Diff) [[id number]]  (Abnormal) Collected: 08/01/21 0549    Specimen: Blood Updated: 08/01/21 0608     WBC 12.79 10*3/mm3      RBC 3.87 10*6/mm3      Hemoglobin 12.2 g/dL      Hematocrit 37.1 %      MCV 95.9 fL      MCH 31.5 pg      MCHC 32.9 g/dL      RDW 14.2 %      RDW-SD 49.9 fl      MPV 10.6 fL      Platelets 141 10*3/mm3     Blood Culture - Blood, Arm, Right [[id number]] Collected: 07/26/21 2228    Specimen: Blood from Arm, Right Updated: 08/01/21 0101     Blood Culture No growth at 5 days    Blood Culture - Blood, Arm, Right [[id number]] Collected: 07/26/21 2306    Specimen: Blood from Arm, Right Updated: 07/31/21 2316     Blood Culture No growth at 5 days    Phosphorus [[id number]]  (Abnormal) Collected: 07/31/21 0648    Specimen: Blood Updated: 07/31/21 0812     Phosphorus 1.3 mg/dL     Basic Metabolic Panel [[id number]]  (Abnormal) Collected: 07/31/21 0648    Specimen: Blood Updated: 07/31/21 0749     Glucose 77 mg/dL      BUN 20 mg/dL      Creatinine 0.85 mg/dL      Sodium 153 mmol/L      Potassium 3.4 mmol/L      Comment: Slight hemolysis detected by analyzer. Results may be affected.        Chloride 115 mmol/L      CO2 26.0 mmol/L      Calcium 9.9 mg/dL      eGFR Non African Amer 87 mL/min/1.73       BUN/Creatinine Ratio 23.5     Anion Gap 12.0 mmol/L     Narrative:      GFR Normal >60  Chronic Kidney Disease <60  Kidney Failure <15      Magnesium [[id number]]  (Normal) Collected: 07/31/21 0648    Specimen: Blood Updated: 07/31/21 0743     Magnesium 2.0 mg/dL     CBC (No Diff) [[id number]]  (Abnormal) Collected: 07/31/21 0648    Specimen: Blood Updated: 07/31/21 0715     WBC 16.02 10*3/mm3      RBC 4.12 10*6/mm3      Hemoglobin 13.1 g/dL      Hematocrit 39.8 %      MCV 96.6 fL      MCH 31.8 pg      MCHC 32.9 g/dL      RDW 14.2 %      RDW-SD 50.4 fl      MPV 10.9 fL      Platelets 130 10*3/mm3     Basic Metabolic Panel [[id number]]  (Abnormal) Collected: 07/30/21 0610    Specimen: Blood Updated: 07/30/21 0651     Glucose 67 mg/dL      BUN 23 mg/dL      Creatinine 1.04 mg/dL      Sodium 149 mmol/L      Potassium 3.8 mmol/L      Chloride 112 mmol/L      CO2 28.0 mmol/L      Calcium 9.6 mg/dL      eGFR Non African Amer 69 mL/min/1.73      BUN/Creatinine Ratio 22.1     Anion Gap 9.0 mmol/L     Narrative:      GFR Normal >60  Chronic Kidney Disease <60  Kidney Failure <15      CBC (No Diff) [[id number]]  (Abnormal) Collected: 07/30/21 0609    Specimen: Blood Updated: 07/30/21 0623     WBC 18.61 10*3/mm3      RBC 4.10 10*6/mm3      Hemoglobin 13.3 g/dL      Hematocrit 39.9 %      MCV 97.3 fL      MCH 32.4 pg      MCHC 33.3 g/dL      RDW 14.3 %      RDW-SD 50.7 fl      MPV 10.3 fL      Platelets 115 10*3/mm3     Urine Culture - Urine, Urine, Catheter [[id number]]  (Abnormal)  (Susceptibility) Collected: 07/26/21 9402    Specimen: Urine, Catheter Updated: 07/29/21 1009     Urine Culture >100,000 CFU/mL Klebsiella pneumoniae ssp pneumoniae    Susceptibility      Klebsiella pneumoniae ssp pneumoniae      DEMARCO      Ampicillin Resistant      Ampicillin + Sulbactam Susceptible      Cefazolin Susceptible      Cefepime Susceptible      Ceftazidime Susceptible      Ceftriaxone Susceptible      Gentamicin Susceptible      Levofloxacin  Susceptible      Nitrofurantoin Susceptible      Piperacillin + Tazobactam Susceptible      Tetracycline Susceptible      Trimethoprim + Sulfamethoxazole Susceptible               Linear View                   POC Glucose Once [[id number]]  (Normal) Collected: 07/29/21 0436    Specimen: Blood Updated: 07/29/21 0447     Glucose 114 mg/dL      Comment: : [id number] CardenasMeter ID: [id number]       Comprehensive Metabolic Panel [[id number]]  (Abnormal) Collected: 07/29/21 0312    Specimen: Blood Updated: 07/29/21 0416     Glucose 44 mg/dL      BUN 26 mg/dL      Creatinine 1.07 mg/dL      Sodium 149 mmol/L      Potassium 3.7 mmol/L      Chloride 114 mmol/L      CO2 26.0 mmol/L      Calcium 10.0 mg/dL      Total Protein 4.8 g/dL      Albumin 2.70 g/dL      ALT (SGPT) 16 U/L      AST (SGOT) 34 U/L      Alkaline Phosphatase 53 U/L      Total Bilirubin 0.4 mg/dL      eGFR Non African Amer 67 mL/min/1.73      Globulin 2.1 gm/dL      A/G Ratio 1.3 g/dL      BUN/Creatinine Ratio 24.3     Anion Gap 9.0 mmol/L     Narrative:      GFR Normal >60  Chronic Kidney Disease <60  Kidney Failure <15      Phosphorus [[id number]]  (Abnormal) Collected: 07/29/21 0312    Specimen: Blood Updated: 07/29/21 0415     Phosphorus 1.9 mg/dL     Manual Differential [[id number]]  (Abnormal) Collected: 07/29/21 0312    Specimen: Blood Updated: 07/29/21 0413     Neutrophil % 75.5 %      Lymphocyte % 11.2 %      Monocyte % 4.1 %      Bands %  8.2 %      Atypical Lymphocyte % 1.0 %      Neutrophils Absolute 19.85 10*3/mm3      Lymphocytes Absolute 2.89 10*3/mm3      Monocytes Absolute 0.97 10*3/mm3      Crenated RBC's Slight/1+     Ovalocytes Slight/1+     Poikilocytes Slight/1+     WBC Morphology Normal     Platelet Estimate Decreased    CBC & Differential [[id number]]  (Abnormal) Collected: 07/29/21 0312    Specimen: Blood Updated: 07/29/21 0413    Narrative:      The following orders were created for panel order CBC &  "Differential.  Procedure                               Abnormality         Status                     ---------                               -----------         ------                     CBC Auto Differential[[id number]]        Abnormal            Final result                 Please view results for these tests on the individual orders.    CBC Auto Differential [[id number]]  (Abnormal) Collected: 07/29/21 0312    Specimen: Blood Updated: 07/29/21 0413     WBC 23.72 10*3/mm3      RBC 3.77 10*6/mm3      Hemoglobin 12.1 g/dL      Hematocrit 37.3 %      MCV 98.9 fL      MCH 32.1 pg      MCHC 32.4 g/dL      RDW 14.4 %      RDW-SD 52.1 fl      MPV 10.7 fL      Platelets 104 10*3/mm3     Magnesium [[id number]]  (Normal) Collected: 07/29/21 0312    Specimen: Blood Updated: 07/29/21 0353     Magnesium 1.9 mg/dL     Procalcitonin [[id number]]  (Abnormal) Collected: 07/28/21 0605    Specimen: Blood Updated: 07/28/21 0944     Procalcitonin 124.68 ng/mL     Narrative:      As a Marker for Sepsis (Non-Neonates):     1. <0.5 ng/mL represents a low risk of severe sepsis and/or septic shock.  2. >2 ng/mL represents a high risk of severe sepsis and/or septic shock.    As a Marker for Lower Respiratory Tract Infections that require antibiotic therapy:  PCT on Admission     Antibiotic Therapy             6-12 Hrs later  >0.5                          Strongly Recommended            >0.25 - <0.5             Recommended  0.1 - 0.25                  Discouraged                       Remeasure/reassess PCT  <0.1                         Strongly Discouraged         Remeasure/reassess PCT      As 28 day mortality risk marker: \"Change in Procalcitonin Result\" (>80% or <=80%) if Day 0 (or Day 1) and Day 4 values are available. Refer to http://www.[employer]-pct-calculator.com/    Change in PCT <=80 %   A decrease of PCT levels below or equal to 80% defines a positive change in PCT test result representing a higher risk for 28-day all-cause mortality " of patients diagnosed with severe sepsis or septic shock.    Change in PCT >80 %   A decrease of PCT levels of more than 80% defines a negative change in PCT result representing a lower risk for 28-day all-cause mortality of patients diagnosed with severe sepsis or septic shock.                Lactic Acid, Plasma [[id number]]  (Abnormal) Collected: 07/28/21 0830    Specimen: Blood Updated: 07/28/21 0916     Lactate 2.2 mmol/L     Basic Metabolic Panel [[id number]]  (Abnormal) Collected: 07/28/21 0605    Specimen: Blood Updated: 07/28/21 0652     Glucose 72 mg/dL      BUN 34 mg/dL      Creatinine 1.33 mg/dL      Sodium 145 mmol/L      Potassium 3.7 mmol/L      Chloride 112 mmol/L      CO2 28.0 mmol/L      Calcium 9.7 mg/dL      eGFR Non African Amer 52 mL/min/1.73      BUN/Creatinine Ratio 25.6     Anion Gap 5.0 mmol/L     Narrative:      GFR Normal >60  Chronic Kidney Disease <60  Kidney Failure <15      Phosphorus [[id number]]  (Abnormal) Collected: 07/28/21 0605    Specimen: Blood Updated: 07/28/21 0652     Phosphorus 2.2 mg/dL     Magnesium [[id number]]  (Normal) Collected: 07/28/21 0605    Specimen: Blood Updated: 07/28/21 0652     Magnesium 1.8 mg/dL     Manual Differential [[id number]]  (Abnormal) Collected: 07/28/21 0605    Specimen: Blood Updated: 07/28/21 0651     Neutrophil % 78.0 %      Lymphocyte % 8.0 %      Monocyte % 2.0 %      Bands %  8.0 %      Metamyelocyte % 2.0 %      Atypical Lymphocyte % 2.0 %      Neutrophils Absolute 23.15 10*3/mm3      Lymphocytes Absolute 2.69 10*3/mm3      Monocytes Absolute 0.54 10*3/mm3      Poikilocytes Slight/1+     Spherocytes Slight/1+     WBC Morphology Normal     Platelet Estimate Decreased    CBC & Differential [[id number]]  (Abnormal) Collected: 07/28/21 0605    Specimen: Blood Updated: 07/28/21 0651    Narrative:      The following orders were created for panel order CBC & Differential.  Procedure                               Abnormality         Status                      ---------                               -----------         ------                     CBC Auto Differential[[id number]]        Abnormal            Final result                 Please view results for these tests on the individual orders.    CBC Auto Differential [[id number]]  (Abnormal) Collected: 07/28/21 0605    Specimen: Blood Updated: 07/28/21 0651     WBC 26.92 10*3/mm3      RBC 3.60 10*6/mm3      Hemoglobin 11.7 g/dL      Hematocrit 34.7 %      MCV 96.4 fL      MCH 32.5 pg      MCHC 33.7 g/dL      RDW 14.6 %      RDW-SD 51.1 fl      MPV 11.0 fL      Platelets 103 10*3/mm3     Manual Differential [[id number]]  (Abnormal) Collected: 07/27/21 0835    Specimen: Blood Updated: 07/27/21 0948     Neutrophil % 71.0 %      Lymphocyte % 2.0 %      Monocyte % 8.0 %      Bands %  15.0 %      Metamyelocyte % 3.0 %      Plasma Cells % 1.0 %      Neutrophils Absolute 43.01 10*3/mm3      Lymphocytes Absolute 1.00 10*3/mm3      Monocytes Absolute 4.00 10*3/mm3      Crenated RBC's Slight/1+     Poikilocytes Mod/2+     WBC Morphology Normal     Platelet Morphology Normal    CBC Auto Differential [[id number]]  (Abnormal) Collected: 07/27/21 0835    Specimen: Blood Updated: 07/27/21 0948     WBC 50.01 10*3/mm3      RBC 4.39 10*6/mm3      Hemoglobin 14.3 g/dL      Hematocrit 43.1 %      MCV 98.2 fL      MCH 32.6 pg      MCHC 33.2 g/dL      RDW 14.6 %      RDW-SD 53.2 fl      MPV 11.1 fL      Platelets 140 10*3/mm3     Comprehensive Metabolic Panel [[id number]]  (Abnormal) Collected: 07/27/21 0835    Specimen: Blood Updated: 07/27/21 0938     Glucose 78 mg/dL      BUN 31 mg/dL      Creatinine 1.76 mg/dL      Sodium 141 mmol/L      Potassium 4.8 mmol/L      Chloride 104 mmol/L      CO2 25.0 mmol/L      Calcium 10.1 mg/dL      Total Protein 5.9 g/dL      Albumin 3.40 g/dL      ALT (SGPT) 18 U/L      AST (SGOT) 39 U/L      Alkaline Phosphatase 78 U/L      Total Bilirubin 0.5 mg/dL      eGFR Non African Amer 38 mL/min/1.73       Globulin 2.5 gm/dL      A/G Ratio 1.4 g/dL      BUN/Creatinine Ratio 17.6     Anion Gap 12.0 mmol/L     Narrative:      GFR Normal >60  Chronic Kidney Disease <60  Kidney Failure <15      TSH [[id number]]  (Normal) Collected: 07/26/21 2228    Specimen: Blood Updated: 07/27/21 0509     TSH 1.840 uIU/mL     T4, Free [[id number]]  (Normal) Collected: 07/26/21 2228    Specimen: Blood Updated: 07/27/21 0508     Free T4 1.07 ng/dL     Narrative:      Results may be falsely increased if patient taking Biotin.      Hemoglobin A1c [[id number]]  (Normal) Collected: 07/26/21 2228    Specimen: Blood Updated: 07/27/21 0500     Hemoglobin A1C 4.90 %     Narrative:      Hemoglobin A1C Ranges:    Increased Risk for Diabetes  5.7% to 6.4%  Diabetes                     >= 6.5%  Diabetic Goal                < 7.0%    Magnesium [[id number]]  (Normal) Collected: 07/26/21 2228    Specimen: Blood Updated: 07/27/21 0456     Magnesium 1.6 mg/dL     POC Glucose Once [[id number]]  (Normal) Collected: 07/27/21 0301    Specimen: Blood Updated: 07/27/21 0313     Glucose 72 mg/dL      Comment: : [id number] Sarwatleonard JamiMeter ID: [id number]       STAT Lactic Acid, Reflex [[id number]]  (Abnormal) Collected: 07/27/21 0217    Specimen: Blood Updated: 07/27/21 0249     Lactate 4.1 mmol/L     Blood Gas, Arterial - [[id number]]  (Abnormal) Collected: 07/27/21 0240    Specimen: Arterial Blood Updated: 07/27/21 0245     Site Right Radial     Leighton's Test Positive     pH, Arterial 7.483 pH units      Comment: 83 Value above reference range        pCO2, Arterial 30.9 mm Hg      Comment: 84 Value below reference range        pO2, Arterial 59.2 mm Hg      Comment: 84 Value below reference range        HCO3, Arterial 23.1 mmol/L      Base Excess, Arterial 0.4 mmol/L      O2 Saturation, Arterial 93.5 %      Comment: 84 Value below reference range        Temperature 37.0 C      Barometric Pressure for Blood Gas 750 mmHg      Modality Nasal Cannula     Flow Rate  2.0 lpm      Ventilator Mode NA     Collected by [id number]     Comment: Meter: [id number]     :  [id number]        pCO2, Temperature Corrected 30.9 mm Hg      pH, Temp Corrected 7.483 pH Units      pO2, Temperature Corrected 59.2 mm Hg     [place] Draw [[id number]] Collected: 07/26/21 2228    Specimen: Blood Updated: 07/27/21 0231    Narrative:      The following orders were created for panel order [place] Draw.  Procedure                               Abnormality         Status                     ---------                               -----------         ------                     Green Top (Gel)[[id number]]                                  Final result               Lavender Top[[id number]]                                     Final result               Red Top[[id number]]                                          Final result               [place] Blood Culture Guevara...[[id number]]                      Final result               Ibrahim Top[[id number]]                                         Final result                 Please view results for these tests on the individual orders.    Gray Top [[id number]] Collected: 07/26/21 2228    Specimen: Blood Updated: 07/27/21 0231     Extra Tube Hold for add-ons.     Comment: Auto resulted.       BNP [[id number]]  (Abnormal) Collected: 07/26/21 2228    Specimen: Blood Updated: 07/27/21 0110     proBNP 6,859.0 pg/mL     Narrative:      Among patients with dyspnea, NT-proBNP is highly sensitive for the detection of acute congestive heart failure. In addition NT-proBNP of <300 pg/ml effectively rules out acute congestive heart failure with 99% negative predictive value.    Results may be falsely decreased if patient taking Biotin.      Procalcitonin [[id number]]  (Abnormal) Collected: 07/26/21 2228    Specimen: Blood Updated: 07/27/21 0105     Procalcitonin 242.56 ng/mL     Narrative:      As a Marker for Sepsis (Non-Neonates):     1. <0.5 ng/mL represents a low risk of severe  "sepsis and/or septic shock.  2. >2 ng/mL represents a high risk of severe sepsis and/or septic shock.    As a Marker for Lower Respiratory Tract Infections that require antibiotic therapy:  PCT on Admission     Antibiotic Therapy             6-12 Hrs later  >0.5                          Strongly Recommended            >0.25 - <0.5             Recommended  0.1 - 0.25                  Discouraged                       Remeasure/reassess PCT  <0.1                         Strongly Discouraged         Remeasure/reassess PCT      As 28 day mortality risk marker: \"Change in Procalcitonin Result\" (>80% or <=80%) if Day 0 (or Day 1) and Day 4 values are available. Refer to [email or web address] [email or web address] [place]pct[place]calculator[place]/    Change in PCT <=80 %   A decrease of PCT levels below or equal to 80% defines a positive change in PCT test result representing a higher risk for 28-day all-cause mortality of patients diagnosed with severe sepsis or septic shock.    Change in PCT >80 %   A decrease of PCT levels of more than 80% defines a negative change in PCT result representing a lower risk for 28-day all-cause mortality of patients diagnosed with severe sepsis or septic shock.                Urinalysis, Microscopic Only - Urine, Catheter [[id number]]  (Abnormal) Collected: 07/26/21 2354    Specimen: Urine, Catheter Updated: 07/27/21 0037     RBC, UA       Unable to determine due to loaded field     /HPF     WBC, UA Too Numerous to Count /HPF      Bacteria, UA 4+ /HPF      Squamous Epithelial Cells, UA       Unable to determine due to loaded field     /HPF     Hyaline Casts, UA       Unable to determine due to loaded field     /LPF     WBC Clumps, UA Large/3+ /HPF      Methodology Manual Light Microscopy    POC Glucose Once [[id number]]  (Normal) Collected: 07/27/21 0025    Specimen: Blood Updated: 07/27/21 0036     Glucose 99 mg/dL      Comment: : [id number] Saira JamiMeter ID: [id number]       Urinalysis With Culture If Indicated - " Urine, Catheter [[id number]]  (Abnormal) Collected: 07/26/21 2354    Specimen: Urine, Catheter Updated: 07/27/21 0023     Color, UA Dark Yellow     Appearance, UA Turbid     pH, UA 7.0     Specific Gravity, UA 1.016     Glucose, UA Negative     Ketones, UA Negative     Bilirubin, UA Negative     Blood, UA Moderate (2+)     Protein,  mg/dL (2+)     Leuk Esterase, UA Large (3+)     Nitrite, UA Negative     Urobilinogen, UA 1.0 E.U./dL    COVID PRE-OP / PRE-PROCEDURE SCREENING ORDER (NO ISOLATION) - Swab, Nasal Cavity [[id number]]  (Normal) Collected: 07/26/21 2235    Specimen: Swab from Nasal Cavity Updated: 07/26/21 2355    Narrative:      The following orders were created for panel order COVID PRE-OP / PRE-PROCEDURE SCREENING ORDER (NO ISOLATION) - Swab, Nasal Cavity.  Procedure                               Abnormality         Status                     ---------                               -----------         ------                     COVID-19,[facility]...[[id number]]  Normal              Final result                 Please view results for these tests on the individual orders.    COVID-19,[facility] IN-HOUSE,Nasal Swab No Transport Media 3-4 HR TAT - Swab, Nasal Cavity [[id number]]  (Normal) Collected: 07/26/21 2235    Specimen: Swab from Nasal Cavity Updated: 07/26/21 2355     COVID19 Not Detected    Narrative:      Fact sheet for providers: https://www.fda.gov/media/110602/download     Fact sheet for patients: https://www.fda.gov/media/494805/download    Test performed by PCR.    Consider negative results in combination with clinical observations, patient history, and epidemiological information.    aPTT [[id number]]  (Abnormal) Collected: 07/26/21 2306    Specimen: Blood Updated: 07/26/21 2333     PTT 48.6 seconds     Protime-INR [[id number]]  (Abnormal) Collected: 07/26/21 2306    Specimen: Blood Updated: 07/26/21 2333     Protime 18.3 Seconds      INR 1.65    Lavender Top [[id number]] Collected:  07/26/21 2228    Specimen: Blood Updated: 07/26/21 2331     Extra Tube hold for add-on     Comment: Auto resulted       [facility] Blood Culture Bottle Set [[id number]] Collected: 07/26/21 2228    Specimen: Blood from Arm, Right Updated: 07/26/21 2331     Extra Tube Hold for add-ons.     Comment: Auto resulted.       Green Top (Gel) [[id number]] Collected: 07/26/21 2228    Specimen: Blood Updated: 07/26/21 2331     Extra Tube Hold for add-ons.     Comment: Auto resulted.       Red Top [[id number]] Collected: 07/26/21 2228    Specimen: Blood Updated: 07/26/21 2331     Extra Tube Hold for add-ons.     Comment: Auto resulted.       POC Glucose Once [[id number]]  (Abnormal) Collected: 07/26/21 2318    Specimen: Blood Updated: 07/26/21 2329     Glucose 56 mg/dL      Comment: : [id number] Saira JamiMeter ID: [id number]       Blood Gas, Arterial - [[id number]]  (Abnormal) Collected: 07/26/21 2320    Specimen: Arterial Blood Updated: 07/26/21 2325     Site Right Radial     Leighton's Test Positive     pH, Arterial 7.464 pH units      Comment: 83 Value above reference range        pCO2, Arterial 26.4 mm Hg      Comment: 84 Value below reference range        pO2, Arterial 74.5 mm Hg      Comment: 84 Value below reference range        HCO3, Arterial 18.9 mmol/L      Comment: 84 Value below reference range        Base Excess, Arterial -3.6 mmol/L      Comment: 84 Value below reference range        O2 Saturation, Arterial 96.7 %      Temperature 37.0 C      Barometric Pressure for Blood Gas 751 mmHg      Modality Nasal Cannula     Flow Rate 2.0 lpm      Ventilator Mode NA     Collected by [id number]     Comment: Meter: [id number]     :  [id number]        pCO2, Temperature Corrected 26.4 mm Hg      pH, Temp Corrected 7.464 pH Units      pO2, Temperature Corrected 74.5 mm Hg     Comprehensive Metabolic Panel [[id number]]  (Abnormal) Collected: 07/26/21 2228    Specimen: Blood Updated: 07/26/21 2318     Glucose 75 mg/dL       BUN 27 mg/dL      Creatinine 2.01 mg/dL      Sodium 142 mmol/L      Potassium 4.2 mmol/L      Chloride 109 mmol/L      CO2 20.0 mmol/L      Calcium 10.1 mg/dL      Total Protein 5.1 g/dL      Albumin 3.00 g/dL      ALT (SGPT) 16 U/L      AST (SGOT) 27 U/L      Alkaline Phosphatase 51 U/L      Total Bilirubin 0.5 mg/dL      eGFR Non African Amer 32 mL/min/1.73      Globulin 2.1 gm/dL      A/G Ratio 1.4 g/dL      BUN/Creatinine Ratio 13.4     Anion Gap 13.0 mmol/L     Narrative:      GFR Normal >60  Chronic Kidney Disease <60  Kidney Failure <15      Manual Differential [[id number]]  (Abnormal) Collected: 07/26/21 2228    Specimen: Blood Updated: 07/26/21 2318     Neutrophil % 68.0 %      Lymphocyte % 2.4 %      Monocyte % 4.8 %      Bands %  16.0 %      Metamyelocyte % 7.2 %      Atypical Lymphocyte % 1.6 %      Neutrophils Absolute 31.38 10*3/mm3      Lymphocytes Absolute 1.49 10*3/mm3      Monocytes Absolute 1.79 10*3/mm3      Anisocytosis Slight/1+     Poikilocytes Slight/1+     WBC Morphology Normal     Platelet Estimate Decreased    CBC & Differential [[id number]]  (Abnormal) Collected: 07/26/21 2228    Specimen: Blood Updated: 07/26/21 2318    Narrative:      The following orders were created for panel order CBC & Differential.  Procedure                               Abnormality         Status                     ---------                               -----------         ------                     CBC Auto Differential[[id number]]        Abnormal            Final result                 Please view results for these tests on the individual orders.    CBC Auto Differential [[id number]]  (Abnormal) Collected: 07/26/21 2228    Specimen: Blood Updated: 07/26/21 2318     WBC 37.36 10*3/mm3      RBC 4.05 10*6/mm3      Hemoglobin 13.1 g/dL      Hematocrit 39.5 %      MCV 97.5 fL      MCH 32.3 pg      MCHC 33.2 g/dL      RDW 14.3 %      RDW-SD 51.2 fl      MPV 10.5 fL      Platelets 138 10*3/mm3     Troponin [[id number]]   (Normal) Collected: 07/26/21 2228    Specimen: Blood Updated: 07/26/21 2314     Troponin T 0.016 ng/mL     Narrative:      Troponin T Reference Range:  <= 0.03 ng/mL-   Negative for AMI  >0.03 ng/mL-     Abnormal for myocardial necrosis.  Clinicians would have to utilize clinical acumen, EKG, Troponin and serial changes to determine if it is an Acute Myocardial Infarction or myocardial injury due to an underlying chronic condition.       Results may be falsely decreased if patient taking Biotin.      Lactic Acid, Plasma [[id number]]  (Abnormal) Collected: 07/26/21 2228    Specimen: Blood Updated: 07/26/21 2313     Lactate 6.1 mmol/L     Lipase [[id number]]  (Normal) Collected: 07/26/21 2228    Specimen: Blood Updated: 07/26/21 2313     Lipase 15 U/L     POC Glucose Once [[id number]]  (Normal) Collected: 07/26/21 2221    Specimen: Blood Updated: 07/26/21 2232     Glucose 78 mg/dL      Comment: : [id number] Diaz TheresaMeter ID: [id number]           Imaging Results (Last 7 Days)     Procedure Component Value Units Date/Time    CT Abdomen Pelvis Without Contrast [[id number]] Collected: 07/30/21 1728     Updated: 07/30/21 1733    Narrative:      EXAMINATION: CT ABDOMEN PELVIS WO CONTRAST-      7/30/2021 3:09 PM CDT     HISTORY: Obstipation, vomiting feculent material; A41.9-Sepsis,  unspecified organism; R65.21-Severe sepsis with septic shock;  N39.0-Urinary tract infection, site not specified; B44-Qehnhcs effusion,  not elsewhere classified; K59.00-Constipation, unspecified;  K57.92-Diverticulitis of intestine, part unspecified, without  perforation or abscess without bleeding     In order to have a CT radiation dose as low as reasonably achievable  Automated Exposure Control was utilized for adjustment of the mA and/or  KV according to patient size.     DLP in mGycm= 1106.     Abdomen/pelvis CT with oral contrast only.     Comparison is made with 7/27/2021.     Cardiomegaly.  Small amount of pericardial fluid.      Bibasilar infiltrate or atelectasis is unchanged from 3 days ago.     Nasogastric tube now present.  The stomach is decompressed.     The noncontrast appearance of the liver, gallbladder, pancreas, and  spleen is appropriate.     Adrenal glands and kidneys are within normal limits.     Aortic calcification with no aneurysm.     No bowel dilation.  Contrast is seen throughout the nondilated bowel loops and within the  rectum.     Urinary bladder catheter is present. The urinary bladder has an  unchanged thick-walled appearance.     The rectosigmoid fecal material noted previously has cleared.     Summary:  1. Nasogastric tube now present and the stomach is decompressed.  2. No bowel obstruction is seen.  3. No mass or fluid collection.                                   This report was finalized on 07/30/2021 17:30 by Dr. Den Ramey MD.    XR Abdomen KUB [[id number]] Collected: 07/28/21 0739     Updated: 07/28/21 0743    Narrative:      XR ABDOMEN KUB- 7/28/2021 5:36 AM CDT     HISTORY: Obstipation; A41.9-Sepsis, unspecified organism; R65.21-Severe  sepsis with septic shock; N39.0-Urinary tract infection, site not  specified; J59-Ixwlrzu effusion, not elsewhere classified;  K59.00-Constipation, unspecified; K57.92-Diverticulitis of intestine,  part unspecified, without perforation or abscess without bleeding       COMPARISON: None     FINDINGS:  There is a nonspecific bowel gas pattern. Nasogastric tube is present  satisfactorily position. Catheter is present in the midline of the  pelvis. Compression screw is present in the right femoral head and neck.     No acute skeletal abnormality is identified.        Impression:      1. Non specific bowel gas pattern..         This report was finalized on 07/28/2021 07:40 by Dr. Romeo Wen MD.    XR Chest 1 View [[id number]] Collected: 07/27/21 0802     Updated: 07/27/21 0806    Narrative:      Frontal supine radiograph of the chest 7/27/2021 3:00 AM CDT     History:  sob; A41.9-Sepsis, unspecified organism; R65.21-Severe sepsis  with septic shock; N39.0-Urinary tract infection, site not specified     Comparison: Chest x-ray dated 7/26/2021      Findings:   Lines and tubes are stable in position. No new opacities or  pneumothoraces are visualized in the chest. The cardiomediastinal  silhouette and pulmonary vascularity are unchanged.       No acute osseous or soft tissue abnormality is noted.        Impression:      Impression:   1.   No significant interval change since previous exam.        This report was finalized on 07/27/2021 08:03 by Dr. Nancy Dowd MD.    CT Abdomen Pelvis Without Contrast [[id number]] Collected: 07/27/21 0752     Updated: 07/27/21 0759    Narrative:      EXAMINATION: CT ABDOMEN PELVIS WO CONTRAST-      7/27/2021 12:04 AM CDT     HISTORY: Altered mental status. Hypotension. Tachycardia. Sepsis.     In order to have a CT radiation dose as low as reasonably achievable  Automated Exposure Control was utilized for adjustment of the mA and/or  KV according to patient size.     DLP in mGycm= 560.     Noncontrast abdomen/pelvis CT.  No comparison.     A preliminary [facility] report was faxed to the Emergency Department  immediately after this study was interpreted at 3:08 AM.     The arms overlie the upper abdomen with some mild streak artifact.  Also detail is affected by respiratory motion.     Mildly distended gallbladder with no visible stones.  Noncontrast appearance of the liver and spleen is within normal limits.  The pancreas shows no abnormality.     Normal appearance of the adrenal glands and kidneys.  No renal stone or hydronephrosis.     Aortic calcification with no aneurysm.     Distended fluid-filled stomach.     Prominent rectosigmoid fecal material with the appearance of rectal  fecal impaction.  Urinary bladder catheter present.  Scattered sigmoid diverticula.  No diverticulitis.     No mass or abscess is seen.  No bowel obstruction.     Severe  degenerative lumbar spine changes with multilevel compression  deformities which appear to be chronic though are indeterminate in age.  (Acute fractures of the lumbar spine are noted on a remote comparison  from 2006).     Summary:  1. Distended fluid-filled stomach.  2. Rectal fecal impaction.  3. No mass, abscess, or obstruction is seen.                                         This report was finalized on 07/27/2021 07:56 by Dr. Den Ramey MD.    CT Chest Without Contrast Diagnostic [[id number]] Collected: 07/27/21 0750     Updated: 07/27/21 0755    Narrative:      EXAMINATION: CT CHEST WO CONTRAST DIAGNOSTIC-      7/27/2021 12:04 AM CDT     HISTORY: Altered mental status. Hypotension. Tachycardia. Sepsis.     In order to have a CT radiation dose as low as reasonably achievable  Automated Exposure Control was utilized for adjustment of the mA and/or  KV according to patient size.     DLP in mGycm= 669.     Noncontrast chest CT.     Comparison is made with chest CT from December 4, 2020.     A preliminary [facility] report was faxed to the Emergency Department  immediately after this study was interpreted at 2:47 AM.     Cardiomegaly.  Coronary artery calcification.  Left-sided cardiac pacer.     Granulomatous lymph node calcification at the left hilum.     Interstitial lung disease with bibasilar atelectasis.  No pneumothorax.  No focal consolidation.  No more than trace pleural fluid.     Summary:  1. Cardiomegaly and interstitial lung disease. Questionable mild  interstitial edema.  2. No focal pulmonary consolidation.                                   This report was finalized on 07/27/2021 07:52 by Dr. Den Ramey MD.    XR Chest 1 View [[id number]] Collected: 07/27/21 0654     Updated: 07/27/21 0658    Narrative:      EXAM: XR CHEST 1 VW- 7/27/2021 12:45 AM CDT     HISTORY: central line placement; A41.9-Sepsis, unspecified organism;  R65.21-Severe sepsis with septic shock; N39.0-Urinary tract infection,  site  not specified       COMPARISON: July 26, 2021     TECHNIQUE: Frontal radiograph of the chest     FINDINGS:   There is improved aeration of the right and left lung. The pulmonary  vascular margins are more distinct. This improvement may be due to the  improved inspiration. The cardiac silhouettes mildly enlarged. Right  internal jugular catheter satisfactorily positioned..      The osseous structures and surrounding soft tissues demonstrate no acute  abnormality.          Impression:      1. Cardiomegaly. Previous noted mild changes of pulmonary vascular  congestion are improved.        This report was finalized on 07/27/2021 06:55 by Dr. Romeo Wen MD.    CT Head Without Contrast [[id number]] Collected: 07/27/21 0653     Updated: 07/27/21 0657    Narrative:      EXAMINATION:   CT HEAD WO CONTRAST-  7/27/2021 6:53 AM CDT     HISTORY: CT BRAIN without contrast 7/27/2021 12:04 AM CDT     HISTORY: Altered mental status     COMPARISON: January 1, 2021      DLP: 2662 mGy cm. In order to have a CT radiation dose as low as  reasonably achievable, Automated Exposure Control was utilized for  adjustment of the mA and/or KV according to patient size.     TECHNIQUE: Serial axial tomographic images of the brain were obtained  without the use of intravenous contrast.      FINDINGS:   The midline structures are nondisplaced. There is moderate cerebral and  cerebellar volume loss, with an associated increase in the prominence of  the ventricles and sulci. The basilar cisterns are normal in size and  configuration. There is no evidence of intracranial hemorrhage or  mass-effect. There is low attenuation in the periventricular white  matter, consistent with chronic ischemic change. There are no abnormal  extra-axial fluid collections. There is no evidence of tonsillar  herniation.      The included orbits and their contents are unremarkable. The visualized  paranasal sinuses, mastoid air cells and middle ear cavities are  "clear.  The visualized osseous structures and overlying soft tissues of the  skull and face are intact.        Impression:      Changes of aging with no acute intracranial abnormality.  This report was finalized on 07/27/2021 06:54 by Dr. Romeo Wen MD.    XR Chest 1 View [[id number]] Collected: 07/27/21 0652     Updated: 07/27/21 0656    Narrative:      EXAM: XR CHEST 1 VW- 7/26/2021 11:25 PM CDT     HISTORY: fevers       COMPARISON: December 4, 2020.     TECHNIQUE: Frontal radiograph of the chest     FINDINGS:   Pulmonary vascular margins are indistinct. Most likely this is mild  pulmonary vascular congestion.. Cardiac silhouettes mildly enlarged.  Pacing device is present in the soft tissues of the left chest..      The osseous structures and surrounding soft tissues demonstrate no acute  abnormality.          Impression:      1. Cardiomegaly with indistinct pulmonary vascular margins most likely  representing mild or early pulmonary vascular congestion.        This report was finalized on 07/27/2021 06:53 by Dr. Romeo Wen MD.            Condition on Discharge:    Stable    Physical Exam on Discharge:  BP 98/56 (BP Location: Left arm, Patient Position: Lying)   Pulse 83   Temp 98.4 °F (36.9 °C) (Oral)   Resp 16   Ht 177.8 cm (70\")   Wt 72.7 kg (160 lb 3.2 oz)   SpO2 98%   BMI 22.99 kg/m²   Physical Exam     Constitutional:       Appearance: He is well-developed.      Comments: Sleeping, but arousable.  Chronically ill-appearing.  No family present.   HENT:      Head: Normocephalic and atraumatic.      Nose:   Eyes:      Conjunctiva/sclera: Conjunctivae normal.      Pupils: Pupils are equal, round, and reactive to light.   Neck:      Vascular: No JVD.   Cardiovascular:      Rate and Rhythm: Normal rate and regular rhythm.      Heart sounds: Normal heart sounds. No murmur heard.   No friction rub. No gallop.       Comments: Sinus rhythm at present.   Pulmonary:      Effort: Pulmonary effort is " normal. No respiratory distress.      Breath sounds: No wheezing or rales.      Comments: On room air.   Chest:      Chest wall: No tenderness.   Abdominal:      General: Bowel sounds are normal. There is no distension.      Palpations: Abdomen is soft.      Tenderness: There is no abdominal tenderness. There is no guarding or rebound.   Musculoskeletal:         General: Swelling (trace) present. No tenderness or deformity. Normal range of motion.      Cervical back: Neck supple.   Skin:     General: Skin is warm and dry.      Findings: No rash.   Neurological:      General: No focal deficit present.      Mental Status: He is alert.      Cranial Nerves: No cranial nerve deficit.      Motor: Weakness present. No abnormal muscle tone.      Deep Tendon Reflexes: Reflexes normal.   Psychiatric:      Comments: Dementia is at baseline. He is calm and cooperative at present.     Discharge Disposition:  Skilled Nursing Facility ([place] - External)    Discharge Medications:     Discharge Medications      New Medications      Instructions Start Date   cefdinir 300 MG capsule  Commonly known as: OMNICEF   300 mg, Oral, Daily      QUEtiapine 25 MG tablet  Commonly known as: SEROquel   25 mg, Oral, Nightly         Changes to Medications      Instructions Start Date   aspirin 81 MG EC tablet  What changed:   · medication strength  · how much to take  · when to take this   81 mg, Oral, Daily   Start Date: August 3, 2021        Continue These Medications      Instructions Start Date   acetaminophen 325 MG tablet  Commonly known as: TYLENOL   650 mg, Oral, Every 6 Hours PRN      buPROPion  MG 24 hr tablet  Commonly known as: WELLBUTRIN XL   150 mg, Oral, Daily      divalproex 250 MG DR tablet  Commonly known as: DEPAKOTE   250 mg, Oral, Every Night at Bedtime      divalproex 125 MG DR tablet  Commonly known as: DEPAKOTE   125 mg, Oral, Daily      mirtazapine 15 MG tablet  Commonly known as: REMERON   15 mg, Oral, Nightly       multivitamin with minerals tablet tablet   1 tablet, Oral, Daily      polyethylene glycol 17 g packet  Commonly known as: MiraLax   17 g, Oral, Daily PRN      pravastatin 20 MG tablet  Commonly known as: PRAVACHOL   20 mg, Oral, Daily      tamsulosin 0.4 MG capsule 24 hr capsule  Commonly known as: FLOMAX   1 capsule, Oral, Daily      thiamine 100 MG tablet tablet  Commonly known as: VITAMIN B-1   100 mg, Oral, Daily      tiotropium 18 MCG per inhalation capsule  Commonly known as: SPIRIVA   1 capsule, Inhalation, Daily - RT      vitamin C 500 MG tablet  Commonly known as: ASCORBIC ACID   500 mg, Oral, Daily         Stop These Medications    dilTIAZem 30 MG tablet  Commonly known as: CARDIZEM     HYDROcodone-acetaminophen 7.5-325 MG per tablet  Commonly known as: NORCO     LORazepam 0.5 MG tablet  Commonly known as: ATIVAN            Discharge Diet:   Diet Instructions     Diet: Soft Texture; Nectar / Syrup Thick Liquids; Ground      Discharge Diet: Soft Texture    Fluid Consistency: Nectar / Syrup Thick Liquids    Soft Options: Ground          Discharge Care Plan / Instructions:   Discharge to skilled nursing facility    Activity at Discharge:   Activity Instructions     Activity as Tolerated             Electronically signed by Parag Stokes DO, 08/02/21, 15:42 CDT.    Time: Discharge Over 30 min    Part of this note may be an electronic transcription/translation of spoken language to printed text using the Dragon Dictation system.        Electronically signed by Parag Stokes DO at 08/02/21 1549       Discharge Order (From admission, onward)     Start     Ordered    08/02/21 1539  Discharge patient  Once     Expected Discharge Date: 08/02/21    Discharge Disposition: Skilled Nursing Facility ([place] - External)    Physician of Record for Attribution - Please select from Treatment Team: CHELY FINN [[id number]]    Review needed by CMO to determine Physician of Record: No       Question Answer Comment    Physician of Record for Attribution - Please select from Treatment Team CHELY FINN    Review needed by CMO to determine Physician of Record No        08/02/21 3471

## 2021-08-02 NOTE — NURSING NOTE
Called report to Peter at [phone] at [facility]. He stated no questions or concerns and ambulance has been called for the patient to be discharged.

## 2021-08-02 NOTE — PLAN OF CARE
Goal Outcome Evaluation:     PATIENT IS CONFUSED AND SEVERELY HARD OF HEARING, HAS NO COMPLAINT OF PAIN. SKIN IS UNREMARKABLE WITH NO WOUNDS OR DRESSINGS. CALL LIGHT IS WITHIN REACH BUT PATIENT UNABLE TO SHOW UNDERSTANDING OF USE, FREQUENT ROUNDING DONE FOR SAFETY. MOVES ALL EXTREMITIES AND HAS NO NEUROVASCULAR DEFICITS. PATIENT EATING WELL WITHOUT DIFFICULTY. IV FLUIDS AND ANTIBIOTICS CONTINUING. SAFETY IS MAINTAINED.

## 2021-08-02 NOTE — PLAN OF CARE
Goal Outcome Evaluation:              Outcome Summary: RD nutrition follow-up completed.  Pt's po intake average is 38% of the last 2 meals.  Confusion and agitation per RN.  Will continue to monitor po intake and follow for further d/c needs

## 2021-08-02 NOTE — PLAN OF CARE
"  Problem: Palliative Care  Goal: Enhanced Quality of Life  Outcome: Ongoing, Progressing     Patient is awake, but disoriented. He is [place] and only states, \"I cannot hear you\". He did not appear to be in any distress or discomfort.     VSS and he is currently on 92% room air. Spoke with Shanika Madison and discharge date to [facility] is unknown at this time. Per physician note, possibly today or tomorrow.     Will continue to follow until discharge.         MIGDALAI Brizuela  8/2/2021        "

## 2021-08-02 NOTE — THERAPY TREATMENT NOTE
Acute Care - Speech Language Pathology   Swallow Treatment Note [facility]     Patient Name: Ezequiel Abdalla  : 1945  MRN: [id number]  Today's Date: 2021               Admit Date: 2021  SLP treatment complete. Pt sitting upright in bed at time of SLP arrival. He is alert but is very [place] and therefore has significant difficulty following commands. He completed 2x trial of nectar thick and thin liquids before refusing all other trials. No overt s/s of aspiration noted. Pt ok to continue a mechanical soft diet with nectar thick liquids. SLP will continue to follow for potential diet upgrade.   Mo Boyd, MS CCC-SLP 2021 13:35 CDT    Visit Dx:     ICD-10-CM ICD-9-CM   1. Septic shock (CMS/HCC)  A41.9 038.9    R65.21 785.52     995.92   2. Urinary tract infection without hematuria, site unspecified  N39.0 599.0   3. Pleural effusion  J90 511.9   4. Constipation, unspecified constipation type  K59.00 564.00   5. Diverticulitis  K57.92 562.11   6. Oropharyngeal dysphagia  R13.12 787.22     Patient Active Problem List   Diagnosis   • Polycythemia   • COPD (chronic obstructive pulmonary disease) (CMS/HCC)   • Alcohol abuse   • Chronic atrial fibrillation with RVR (CMS/HCC)   • Multiple falls   • Elevated prostate specific antigen (PSA)   • COVID-19   • Septic shock (CMS/HCC)   • Dementia (CMS/HCC)   • HTN (hypertension)   • DEEPIKA (acute kidney injury) (CMS/HCC)   • Lactic acidosis   • Urinary tract infection associated with indwelling urethral catheter (Klebsiella)   • Metabolic encephalopathy   • Obstipation with fecal impaction   • Nausea & vomiting   • Thrombocytopenia (CMS/HCC)   • Hypernatremia   • Hypokalemia   • Hypophosphatemia     Past Medical History:   Diagnosis Date   • A-fib (CMS/HCC)    • Alcohol abuse    • Anxiety    • COPD (chronic obstructive pulmonary disease) (CMS/HCC)    • COPD (chronic obstructive pulmonary disease) (CMS/HCC)    • Hypotension    • Lung disease    • Malaise and fatigue     • Neurological disease    • Polycythemia    • SOB (shortness of breath)    • Brina-Parkinson-White syndrome      Past Surgical History:   Procedure Laterality Date   • KNEE SURGERY     • PACEMAKER IMPLANTATION      [facility]    • WRIST SURGERY         SLP Recommendation and Plan                                                                       SWALLOW EVALUATION (last 72 hours)      SLP Adult Swallow Evaluation     Row Name 08/02/21 0915 07/31/21 1040                Rehab Evaluation    Document Type  therapy note (daily note)  -CS  evaluation  -BN       Subjective Information  no complaints  -CS  no complaints  -BN       Patient Observations  alert;cooperative;agree to therapy  -CS  alert;cooperative  -BN       Patient/Family/Caregiver Comments/Observations  No family present  -CS  no family present  -BN       Care Plan Review  care plan/treatment goals reviewed  -CS  evaluation/treatment results reviewed  -BN       Care Plan Review, Other Participant(s)  caregiver  -CS  caregiver  -BN       Patient Effort  adequate  -CS  adequate  -BN          General Information    Patient Profile Reviewed  --  yes  -BN       Pertinent History Of Current Problem  --  acute septic shock. Hx of dementia, [place], permanant A-fib, alcohol abuse, neurological disease, silva parkinson white syndrome  -BN       Current Method of Nutrition  --  NPO  -BN       Precautions/Limitations, Vision  --  WFL;for purposes of eval  -BN       Precautions/Limitations, Hearing  --  hearing impairment, bilaterally;other (see comments) very [place]  -BN       Prior Level of Function-Communication  --  cognitive-linguistic impairment  -BN       Prior Level of Function-Swallowing  --  no diet consistency restrictions  -BN       Plans/Goals Discussed with  --  other (see comments) RN  -BN       Barriers to Rehab  --  cognitive status  -BN       Patient's Goals for Discharge  --  patient did not state  -BN          Pain    Additional Documentation  Pain  Scale: FACES Pre/Post-Treatment (Group)  -CS  Pain Scale: FACES Pre/Post-Treatment (Group)  -BN          Pain Scale: FACES Pre/Post-Treatment    Pain: FACES Scale, Pretreatment  0-->no hurt  -CS  0-->no hurt  -BN       Posttreatment Pain Rating  0-->no hurt  -CS  0-->no hurt  -BN          Oral Motor Structure and Function    Dentition Assessment  --  missing teeth  -BN       Secretion Management  --  WNL/WFL  -BN       Mucosal Quality  --  moist, healthy  -BN          Oral Musculature and Cranial Nerve Assessment    Oral Motor General Assessment  --  unable to assess;other (see comments) unable to follow commands  -BN          General Eating/Swallowing Observations    Respiratory Support Currently in Use  --  room air  -BN       Eating/Swallowing Skills  --  fed by SLP  -       Positioning During Eating  --  upright in bed  -BN       Utensils Used  --  spoon;straw  -BN       Consistencies Trialed  --  regular textures;pudding thick;honey-thick liquids;nectar/syrup-thick liquids;thin liquids  -          Clinical Swallow Eval    Oral Prep Phase  --  impaired  -BN       Oral Transit  --  impaired  -BN       Oral Residue  --  impaired  -BN       Pharyngeal Phase  --  suspected pharyngeal impairment  -BN       Esophageal Phase  --  unremarkable  -       Clinical Swallow Evaluation Summary  --   clinical bedside swallow evaluation completed. Pt currently admitted for acute septic shock. Hx of dementia, [place], permanant A-fib, alcohol abuse, neurological disease, silva parkinson white syndrome. Pt is extremely [place] and has difficulty following commands despite cues d/t hearing. Full range of consistencies presented except mech soft. Pt demo with increased oral transit with all trials. Expiratory wheezes noted during PO. Delayed cough with wet vocal quality following thin liquids. Prolonged mastication of regular solids. Pt okay to begin mech soft diet with nectar thick liquids. Meds crushed in applesauce. ST cannot  fully r/o aspiration with PO. RN to continue to monitor for any increased lung congestion. Pt may require assist with PO. ST to follow and treat.   -BN          Oral Prep Concerns    Oral Prep Concerns  --  prolonged mastication  -BN       Prolonged Mastication  --  regular consistencies  -BN          Oral Transit Concerns    Oral Transit Concerns  --  increased oral transit time  -BN       Increased Oral Transit Time  --  pudding;honey;nectar;thin  -BN          Oral Residue Concerns    Oral Residue Concerns  --  diffuse residue throughout oral cavity  -BN       Diffuse Residue Throughout Oral Cavity  --  regular consistencies  -BN          Pharyngeal Phase Concerns    Pharyngeal Phase Concerns  --  cough;wet vocal quality  -BN       Wet Vocal Quality  --  thin  -BN       Cough  --  thin  -BN          Clinical Impression    SLP Swallowing Diagnosis  --  suspected pharyngeal dysphagia  -BN       Functional Impact  --  risk of aspiration/pneumonia  -BN       Rehab Potential/Prognosis, Swallowing  --  adequate, monitor progress closely  -BN       Swallow Criteria for Skilled Therapeutic Interventions Met  --  demonstrates skilled criteria  -BN          Recommendations    Therapy Frequency (Swallow)  --  at least;2 days per week  -BN       Predicted Duration Therapy Intervention (Days)  --  until discharge  -BN       SLP Diet Recommendation  --  soft textures;nectar thick liquids  -BN       Recommended Precautions and Strategies  --  upright posture during/after eating;small bites of food and sips of liquid;general aspiration precautions;assist with feeding  -BN       Oral Care Recommendations  --  Oral Care BID/PRN  -BN       SLP Rec. for Method of Medication Administration  --  meds crushed;with pudding or applesauce  -BN       Monitor for Signs of Aspiration  --  yes;notify SLP if any concerns;gurgly voice;throat clearing;pneumonia;right lower lobe infiltrates  -BN       Anticipated Discharge Disposition (SLP)  --   skilled nursing facility  -BN          Swallow Goals (SLP)    Oral Nutrition/Hydration Goal Selection (SLP)  oral nutrition/hydration, SLP goal 1  -CS  oral nutrition/hydration, SLP goal 1  -BN          Oral Nutrition/Hydration Goal 1 (SLP)    Oral Nutrition/Hydration Goal 1, SLP  Pt will tolerate LRD without s/s of aspiration  -CS  Pt will tolerate LRD without s/s of aspiration  -BN       Time Frame (Oral Nutrition/Hydration Goal 1, SLP)  short term goal (STG);by discharge  -CS  short term goal (STG);by discharge  -BN       Barriers (Oral Nutrition/Hydration Goal 1, SLP)  cognition  -CS  cognition  -BN       Progress/Outcomes (Oral Nutrition/Hydration Goal 1, SLP)  goal ongoing  -CS  goal ongoing  -BN         User Key  (r) = Recorded By, (t) = Taken By, (c) = Cosigned By    Initials Name Effective Dates    Coco Troy, CCC-SLP 06/16/21 -     Mo Atkinson, MS CCC-SLP 06/16/21 -           EDUCATION  The patient has been educated in the following areas:   Dysphagia (Swallowing Impairment).       SLP GOALS     Row Name 08/02/21 0915 07/31/21 1040          Oral Nutrition/Hydration Goal 1 (SLP)    Oral Nutrition/Hydration Goal 1, SLP  Pt will tolerate LRD without s/s of aspiration  -CS  Pt will tolerate LRD without s/s of aspiration  -BN     Time Frame (Oral Nutrition/Hydration Goal 1, SLP)  short term goal (STG);by discharge  -CS  short term goal (STG);by discharge  -BN     Barriers (Oral Nutrition/Hydration Goal 1, SLP)  cognition  -CS  cognition  -BN     Progress/Outcomes (Oral Nutrition/Hydration Goal 1, SLP)  goal ongoing  -CS  goal ongoing  -BN       User Key  (r) = Recorded By, (t) = Taken By, (c) = Cosigned By    Initials Name Provider Type    Coco Troy, CCC-SLP Speech and Language Pathologist    Mo Atkinson, MS CCC-SLP Speech and Language Pathologist             Time Calculation:   Time Calculation- SLP     Row Name 08/02/21 1333             Time Calculation- SLP    SLP  Start Time  0915  -      SLP Stop Time  0940  -CS      SLP Time Calculation (min)  25 min  -CS      SLP Received On  08/02/21  -CS         Untimed Charges    15016-IH Treatment Swallow Minutes  25  -CS         Total Minutes    Untimed Charges Total Minutes  25  -CS       Total Minutes  25  -CS        User Key  (r) = Recorded By, (t) = Taken By, (c) = Cosigned By    Initials Name Provider Type     Mo Boyd, MS CCC-SLP Speech and Language Pathologist          Therapy Charges for Today     Code Description Service Date Service Provider Modifiers Qty    [id number]  ST TREATMENT SWALLOW 2 8/2/2021 Mo Boyd MS CCC-SLP GN 1               Mo Boyd MS CCC-SLP  8/2/2021

## 2021-08-02 NOTE — PROGRESS NOTES
Continued Stay Note  [facility]     Patient Name: Ezequiel Abdalla  MRN: [id number]  Today's Date: 8/2/2021    Admit Date: 7/26/2021    Discharge Plan     Row Name 08/02/21 1605       Plan    Final Discharge Disposition Code  03 - skilled nursing facility (SNF)    Final Note  Pt is being dcd back to [facility] today, skilled. Per admissions at [facility] pt does not need a new Covid test before returning. Faxed DC summary to [facility] ([phone]). RN can call report to [phone]        Discharge Codes    No documentation.       Expected Discharge Date and Time     Expected Discharge Date Expected Discharge Time    Aug 2, 2021             FARHANA Lauren

## 2021-08-02 NOTE — PLAN OF CARE
SLP treatment complete. Pt sitting upright in bed at time of SLP arrival. He is alert but is very [place] and therefore has significant difficulty following commands. He completed 2x trial of nectar thick and thin liquids before refusing all other trials. No overt s/s of aspiration noted. Pt ok to continue a mechanical soft diet with nectar thick liquids. SLP will continue to follow for potential diet upgrade.

## 2021-08-03 NOTE — THERAPY DISCHARGE NOTE
Acute Care - Speech Language Pathology Discharge Summary  [facility]       Patient Name: Ezequiel Abdalla  : 1945  MRN: [id number]    Today's Date: 8/3/2021                   Admit Date: 2021      SLP Recommendation and Plan  Mechanical soft solids and nectar thick liquids    Visit Dx:    ICD-10-CM ICD-9-CM   1. Septic shock (CMS/HCC)  A41.9 038.9    R65.21 785.52     995.92   2. Urinary tract infection without hematuria, site unspecified  N39.0 599.0   3. Pleural effusion  J90 511.9   4. Constipation, unspecified constipation type  K59.00 564.00   5. Diverticulitis  K57.92 562.11   6. Oropharyngeal dysphagia  R13.12 787.22               SLP GOALS     Row Name 21 1400 21 0915          Oral Nutrition/Hydration Goal 1 (SLP)    Oral Nutrition/Hydration Goal 1, SLP  Pt will tolerate LRD without s/s of aspiration  -MB  Pt will tolerate LRD without s/s of aspiration  -CS     Time Frame (Oral Nutrition/Hydration Goal 1, SLP)  short term goal (STG);by discharge  -MB  short term goal (STG);by discharge  -CS     Barriers (Oral Nutrition/Hydration Goal 1, SLP)  cognition  -MB  cognition  -CS     Progress/Outcomes (Oral Nutrition/Hydration Goal 1, SLP)  goal not met  -MB  goal ongoing  -CS       User Key  (r) = Recorded By, (t) = Taken By, (c) = Cosigned By    Initials Name Provider Type    Kameron Bueno CCC-SLP Speech and Language Pathologist    Mo Atkinson, MS CCC-SLP Speech and Language Pathologist                  SLP Discharge Summary  Anticipated Discharge Disposition (SLP): skilled nursing facility  Reason for Discharge: discharge from this facility  Progress Toward Achieving Short/long Term Goals: goals partially met within established timelines  Discharge Destination: SNF      Kameron Ness CCC-SLP  8/3/2021

## 2022-01-01 ENCOUNTER — HOSPITAL ENCOUNTER (INPATIENT)
Facility: HOSPITAL | Age: 77
LOS: 2 days | End: 2022-09-16
Attending: EMERGENCY MEDICINE | Admitting: FAMILY MEDICINE

## 2022-01-01 ENCOUNTER — APPOINTMENT (OUTPATIENT)
Dept: GENERAL RADIOLOGY | Facility: HOSPITAL | Age: 77
End: 2022-01-01

## 2022-01-01 VITALS
BODY MASS INDEX: 21.48 KG/M2 | RESPIRATION RATE: 8 BRPM | HEIGHT: 69 IN | OXYGEN SATURATION: 68 % | SYSTOLIC BLOOD PRESSURE: 80 MMHG | TEMPERATURE: 98 F | DIASTOLIC BLOOD PRESSURE: 38 MMHG | HEART RATE: 114 BPM | WEIGHT: 145 LBS

## 2022-01-01 DIAGNOSIS — J69.0 ASPIRATION PNEUMONIA, UNSPECIFIED ASPIRATION PNEUMONIA TYPE, UNSPECIFIED LATERALITY, UNSPECIFIED PART OF LUNG: ICD-10-CM

## 2022-01-01 DIAGNOSIS — R65.21 SEPTIC SHOCK: Primary | ICD-10-CM

## 2022-01-01 DIAGNOSIS — R06.89 RESPIRATORY INSUFFICIENCY: ICD-10-CM

## 2022-01-01 DIAGNOSIS — R41.82 ALTERED MENTAL STATUS, UNSPECIFIED ALTERED MENTAL STATUS TYPE: ICD-10-CM

## 2022-01-01 DIAGNOSIS — I48.91 ATRIAL FIBRILLATION, UNSPECIFIED TYPE: ICD-10-CM

## 2022-01-01 DIAGNOSIS — N30.01 ACUTE CYSTITIS WITH HEMATURIA: ICD-10-CM

## 2022-01-01 DIAGNOSIS — A41.9 SEPTIC SHOCK: Primary | ICD-10-CM

## 2022-01-01 DIAGNOSIS — N17.9 ACUTE RENAL FAILURE, UNSPECIFIED ACUTE RENAL FAILURE TYPE: ICD-10-CM

## 2022-01-01 LAB
ACANTHOCYTES BLD QL SMEAR: ABNORMAL
ALBUMIN SERPL-MCNC: 2.6 G/DL (ref 3.5–5.2)
ALBUMIN SERPL-MCNC: 3.4 G/DL (ref 3.5–5.2)
ALBUMIN/GLOB SERPL: 1.1 G/DL
ALBUMIN/GLOB SERPL: 1.1 G/DL
ALP SERPL-CCNC: 54 U/L (ref 39–117)
ALP SERPL-CCNC: 74 U/L (ref 39–117)
ALT SERPL W P-5'-P-CCNC: 6 U/L (ref 1–41)
ALT SERPL W P-5'-P-CCNC: 6 U/L (ref 1–41)
ANION GAP SERPL CALCULATED.3IONS-SCNC: 14 MMOL/L (ref 5–15)
ANION GAP SERPL CALCULATED.3IONS-SCNC: 21 MMOL/L (ref 5–15)
ARTERIAL PATENCY WRIST A: POSITIVE
AST SERPL-CCNC: 14 U/L (ref 1–40)
AST SERPL-CCNC: 17 U/L (ref 1–40)
ATMOSPHERIC PRESS: 751 MMHG
BACTERIA SPEC AEROBE CULT: ABNORMAL
BACTERIA UR QL AUTO: ABNORMAL /HPF
BASE EXCESS BLDA CALC-SCNC: -12.5 MMOL/L (ref 0–2)
BDY SITE: ABNORMAL
BILIRUB SERPL-MCNC: 0.7 MG/DL (ref 0–1.2)
BILIRUB SERPL-MCNC: 0.8 MG/DL (ref 0–1.2)
BILIRUB UR QL STRIP: NEGATIVE
BODY TEMPERATURE: 37 C
BUN SERPL-MCNC: 48 MG/DL (ref 8–23)
BUN SERPL-MCNC: 51 MG/DL (ref 8–23)
BUN/CREAT SERPL: 23 (ref 7–25)
BUN/CREAT SERPL: 29.7 (ref 7–25)
BURR CELLS BLD QL SMEAR: ABNORMAL
BURR CELLS BLD QL SMEAR: ABNORMAL
CALCIUM SPEC-SCNC: 10 MG/DL (ref 8.6–10.5)
CALCIUM SPEC-SCNC: 11.1 MG/DL (ref 8.6–10.5)
CHLORIDE SERPL-SCNC: 106 MMOL/L (ref 98–107)
CHLORIDE SERPL-SCNC: 109 MMOL/L (ref 98–107)
CLARITY UR: ABNORMAL
CO2 SERPL-SCNC: 19 MMOL/L (ref 22–29)
CO2 SERPL-SCNC: 21 MMOL/L (ref 22–29)
COLOR UR: ABNORMAL
CREAT SERPL-MCNC: 1.72 MG/DL (ref 0.76–1.27)
CREAT SERPL-MCNC: 2.09 MG/DL (ref 0.76–1.27)
CRP SERPL-MCNC: 6.93 MG/DL (ref 0–0.5)
D DIMER PPP FEU-MCNC: 9.67 MCGFEU/ML (ref 0–0.5)
D-LACTATE SERPL-SCNC: 11.6 MMOL/L (ref 0.5–2)
D-LACTATE SERPL-SCNC: 8.8 MMOL/L (ref 0.5–2)
DEPRECATED RDW RBC AUTO: 52.7 FL (ref 37–54)
DEPRECATED RDW RBC AUTO: 54.1 FL (ref 37–54)
EGFRCR SERPLBLD CKD-EPI 2021: 31.6 ML/MIN/1.73
EGFRCR SERPLBLD CKD-EPI 2021: 39.9 ML/MIN/1.73
EPAP: 6
ERYTHROCYTE [DISTWIDTH] IN BLOOD BY AUTOMATED COUNT: 14.3 % (ref 12.3–15.4)
ERYTHROCYTE [DISTWIDTH] IN BLOOD BY AUTOMATED COUNT: 14.4 % (ref 12.3–15.4)
GLOBULIN UR ELPH-MCNC: 2.4 GM/DL
GLOBULIN UR ELPH-MCNC: 3 GM/DL
GLUCOSE SERPL-MCNC: 122 MG/DL (ref 65–99)
GLUCOSE SERPL-MCNC: 147 MG/DL (ref 65–99)
GLUCOSE UR STRIP-MCNC: NEGATIVE MG/DL
HCO3 BLDA-SCNC: 14.1 MMOL/L (ref 20–26)
HCT VFR BLD AUTO: 41.9 % (ref 37.5–51)
HCT VFR BLD AUTO: 50.5 % (ref 37.5–51)
HGB BLD-MCNC: 13.2 G/DL (ref 13–17.7)
HGB BLD-MCNC: 15.9 G/DL (ref 13–17.7)
HGB UR QL STRIP.AUTO: ABNORMAL
HYALINE CASTS UR QL AUTO: ABNORMAL /LPF
INHALED O2 CONCENTRATION: 100 %
INR PPP: 1.15 (ref 0.91–1.09)
IPAP: 20
KETONES UR QL STRIP: ABNORMAL
LEUKOCYTE ESTERASE UR QL STRIP.AUTO: ABNORMAL
LYMPHOCYTES # BLD MANUAL: 2.93 10*3/MM3 (ref 0.7–3.1)
LYMPHOCYTES # BLD MANUAL: 4.07 10*3/MM3 (ref 0.7–3.1)
LYMPHOCYTES NFR BLD MANUAL: 3 % (ref 5–12)
LYMPHOCYTES NFR BLD MANUAL: 6.1 % (ref 5–12)
Lab: ABNORMAL
MCH RBC QN AUTO: 31.7 PG (ref 26.6–33)
MCH RBC QN AUTO: 32.1 PG (ref 26.6–33)
MCHC RBC AUTO-ENTMCNC: 31.5 G/DL (ref 31.5–35.7)
MCHC RBC AUTO-ENTMCNC: 31.5 G/DL (ref 31.5–35.7)
MCV RBC AUTO: 100.5 FL (ref 79–97)
MCV RBC AUTO: 101.8 FL (ref 79–97)
METAMYELOCYTES NFR BLD MANUAL: 17.3 % (ref 0–0)
METAMYELOCYTES NFR BLD MANUAL: 25.3 % (ref 0–0)
MODALITY: ABNORMAL
MONOCYTES # BLD: 0.61 10*3/MM3 (ref 0.1–0.9)
MONOCYTES # BLD: 1.17 10*3/MM3 (ref 0.1–0.9)
MYELOCYTES NFR BLD MANUAL: 2 % (ref 0–0)
MYELOCYTES NFR BLD MANUAL: 2 % (ref 0–0)
NEUTROPHILS # BLD AUTO: 11.35 10*3/MM3 (ref 1.7–7)
NEUTROPHILS # BLD AUTO: 9.98 10*3/MM3 (ref 1.7–7)
NEUTROPHILS NFR BLD MANUAL: 15.3 % (ref 42.7–76)
NEUTROPHILS NFR BLD MANUAL: 21.2 % (ref 42.7–76)
NEUTS BAND NFR BLD MANUAL: 28.3 % (ref 0–5)
NEUTS BAND NFR BLD MANUAL: 43.9 % (ref 0–5)
NITRITE UR QL STRIP: NEGATIVE
NT-PROBNP SERPL-MCNC: 5422 PG/ML (ref 0–1800)
PCO2 BLDA: 34.2 MM HG (ref 35–45)
PCO2 TEMP ADJ BLD: 34.2 MM HG (ref 35–45)
PH BLDA: 7.22 PH UNITS (ref 7.35–7.45)
PH UR STRIP.AUTO: 5.5 [PH] (ref 5–8)
PH, TEMP CORRECTED: 7.22 PH UNITS (ref 7.35–7.45)
PLAT MORPH BLD: NORMAL
PLAT MORPH BLD: NORMAL
PLATELET # BLD AUTO: 193 10*3/MM3 (ref 140–450)
PLATELET # BLD AUTO: 245 10*3/MM3 (ref 140–450)
PMV BLD AUTO: 11.1 FL (ref 6–12)
PMV BLD AUTO: 11.3 FL (ref 6–12)
PO2 BLDA: 135 MM HG (ref 83–108)
PO2 TEMP ADJ BLD: 135 MM HG (ref 83–108)
POIKILOCYTOSIS BLD QL SMEAR: ABNORMAL
POIKILOCYTOSIS BLD QL SMEAR: ABNORMAL
POTASSIUM SERPL-SCNC: 3.5 MMOL/L (ref 3.5–5.2)
POTASSIUM SERPL-SCNC: 4.5 MMOL/L (ref 3.5–5.2)
PROCALCITONIN SERPL-MCNC: 30.5 NG/ML (ref 0–0.25)
PROCALCITONIN SERPL-MCNC: 41.44 NG/ML (ref 0–0.25)
PROT SERPL-MCNC: 5 G/DL (ref 6–8.5)
PROT SERPL-MCNC: 6.4 G/DL (ref 6–8.5)
PROT UR QL STRIP: ABNORMAL
PROTHROMBIN TIME: 14.3 SECONDS (ref 11.9–14.6)
PSV: 14 CMH2O
QT INTERVAL: 232 MS
QT INTERVAL: 284 MS
QTC INTERVAL: 350 MS
QTC INTERVAL: 494 MS
RBC # BLD AUTO: 4.17 10*6/MM3 (ref 4.14–5.8)
RBC # BLD AUTO: 4.96 10*6/MM3 (ref 4.14–5.8)
RBC # UR STRIP: ABNORMAL /HPF
REF LAB TEST METHOD: ABNORMAL
SAO2 % BLDCOA: 99 % (ref 94–99)
SARS-COV-2 RNA PNL SPEC NAA+PROBE: NOT DETECTED
SCHISTOCYTES BLD QL SMEAR: ABNORMAL
SET MECH RESP RATE: 16
SODIUM SERPL-SCNC: 144 MMOL/L (ref 136–145)
SODIUM SERPL-SCNC: 146 MMOL/L (ref 136–145)
SP GR UR STRIP: 1.02 (ref 1–1.03)
SQUAMOUS #/AREA URNS HPF: ABNORMAL /HPF
TROPONIN T SERPL-MCNC: 0.02 NG/ML (ref 0–0.03)
TROPONIN T SERPL-MCNC: 0.02 NG/ML (ref 0–0.03)
TROPONIN T SERPL-MCNC: 0.04 NG/ML (ref 0–0.03)
UROBILINOGEN UR QL STRIP: ABNORMAL
VARIANT LYMPHS NFR BLD MANUAL: 15.3 % (ref 19.6–45.3)
VARIANT LYMPHS NFR BLD MANUAL: 16.2 % (ref 19.6–45.3)
VARIANT LYMPHS NFR BLD MANUAL: 4 % (ref 0–5)
VENTILATOR MODE: ABNORMAL
WBC # UR STRIP: ABNORMAL /HPF
WBC MORPH BLD: NORMAL
WBC MORPH BLD: NORMAL
WBC NRBC COR # BLD: 19.17 10*3/MM3 (ref 3.4–10.8)
WBC NRBC COR # BLD: 20.17 10*3/MM3 (ref 3.4–10.8)

## 2022-01-01 PROCEDURE — 25010000002 MORPHINE (PF) 10 MG/ML SOLUTION: Performed by: INTERNAL MEDICINE

## 2022-01-01 PROCEDURE — 99223 1ST HOSP IP/OBS HIGH 75: CPT

## 2022-01-01 PROCEDURE — 85379 FIBRIN DEGRADATION QUANT: CPT | Performed by: EMERGENCY MEDICINE

## 2022-01-01 PROCEDURE — 87186 SC STD MICRODIL/AGAR DIL: CPT | Performed by: EMERGENCY MEDICINE

## 2022-01-01 PROCEDURE — 25010000002 ADENOSINE PER 6 MG

## 2022-01-01 PROCEDURE — 84484 ASSAY OF TROPONIN QUANT: CPT | Performed by: INTERNAL MEDICINE

## 2022-01-01 PROCEDURE — 80053 COMPREHEN METABOLIC PANEL: CPT | Performed by: INTERNAL MEDICINE

## 2022-01-01 PROCEDURE — 87086 URINE CULTURE/COLONY COUNT: CPT | Performed by: EMERGENCY MEDICINE

## 2022-01-01 PROCEDURE — 84145 PROCALCITONIN (PCT): CPT | Performed by: EMERGENCY MEDICINE

## 2022-01-01 PROCEDURE — 94799 UNLISTED PULMONARY SVC/PX: CPT

## 2022-01-01 PROCEDURE — 84145 PROCALCITONIN (PCT): CPT | Performed by: INTERNAL MEDICINE

## 2022-01-01 PROCEDURE — 94761 N-INVAS EAR/PLS OXIMETRY MLT: CPT

## 2022-01-01 PROCEDURE — 25010000002 PIPERACILLIN SOD-TAZOBACTAM PER 1 G: Performed by: EMERGENCY MEDICINE

## 2022-01-01 PROCEDURE — 83605 ASSAY OF LACTIC ACID: CPT | Performed by: EMERGENCY MEDICINE

## 2022-01-01 PROCEDURE — 25010000002 LORAZEPAM PER 2 MG: Performed by: INTERNAL MEDICINE

## 2022-01-01 PROCEDURE — 25010000002 ONDANSETRON PER 1 MG: Performed by: EMERGENCY MEDICINE

## 2022-01-01 PROCEDURE — 51702 INSERT TEMP BLADDER CATH: CPT

## 2022-01-01 PROCEDURE — 93010 ELECTROCARDIOGRAM REPORT: CPT | Performed by: INTERNAL MEDICINE

## 2022-01-01 PROCEDURE — 87635 SARS-COV-2 COVID-19 AMP PRB: CPT | Performed by: EMERGENCY MEDICINE

## 2022-01-01 PROCEDURE — 36415 COLL VENOUS BLD VENIPUNCTURE: CPT

## 2022-01-01 PROCEDURE — 99233 SBSQ HOSP IP/OBS HIGH 50: CPT

## 2022-01-01 PROCEDURE — 36600 WITHDRAWAL OF ARTERIAL BLOOD: CPT

## 2022-01-01 PROCEDURE — 02HV33Z INSERTION OF INFUSION DEVICE INTO SUPERIOR VENA CAVA, PERCUTANEOUS APPROACH: ICD-10-PCS | Performed by: EMERGENCY MEDICINE

## 2022-01-01 PROCEDURE — 87040 BLOOD CULTURE FOR BACTERIA: CPT | Performed by: EMERGENCY MEDICINE

## 2022-01-01 PROCEDURE — 71045 X-RAY EXAM CHEST 1 VIEW: CPT

## 2022-01-01 PROCEDURE — 25010000002 MORPHINE PER 10 MG: Performed by: EMERGENCY MEDICINE

## 2022-01-01 PROCEDURE — 81001 URINALYSIS AUTO W/SCOPE: CPT | Performed by: EMERGENCY MEDICINE

## 2022-01-01 PROCEDURE — 85007 BL SMEAR W/DIFF WBC COUNT: CPT | Performed by: EMERGENCY MEDICINE

## 2022-01-01 PROCEDURE — 84484 ASSAY OF TROPONIN QUANT: CPT | Performed by: EMERGENCY MEDICINE

## 2022-01-01 PROCEDURE — 25010000002 ADENOSINE PER 6 MG: Performed by: EMERGENCY MEDICINE

## 2022-01-01 PROCEDURE — 85025 COMPLETE CBC W/AUTO DIFF WBC: CPT | Performed by: EMERGENCY MEDICINE

## 2022-01-01 PROCEDURE — 83880 ASSAY OF NATRIURETIC PEPTIDE: CPT | Performed by: EMERGENCY MEDICINE

## 2022-01-01 PROCEDURE — 85025 COMPLETE CBC W/AUTO DIFF WBC: CPT | Performed by: INTERNAL MEDICINE

## 2022-01-01 PROCEDURE — 94664 DEMO&/EVAL PT USE INHALER: CPT

## 2022-01-01 PROCEDURE — 85007 BL SMEAR W/DIFF WBC COUNT: CPT | Performed by: INTERNAL MEDICINE

## 2022-01-01 PROCEDURE — 99291 CRITICAL CARE FIRST HOUR: CPT

## 2022-01-01 PROCEDURE — 94660 CPAP INITIATION&MGMT: CPT

## 2022-01-01 PROCEDURE — 5A2204Z RESTORATION OF CARDIAC RHYTHM, SINGLE: ICD-10-PCS | Performed by: EMERGENCY MEDICINE

## 2022-01-01 PROCEDURE — 25010000002 PIPERACILLIN SOD-TAZOBACTAM PER 1 G: Performed by: INTERNAL MEDICINE

## 2022-01-01 PROCEDURE — 87077 CULTURE AEROBIC IDENTIFY: CPT | Performed by: EMERGENCY MEDICINE

## 2022-01-01 PROCEDURE — 85610 PROTHROMBIN TIME: CPT | Performed by: EMERGENCY MEDICINE

## 2022-01-01 PROCEDURE — 82803 BLOOD GASES ANY COMBINATION: CPT

## 2022-01-01 PROCEDURE — 86140 C-REACTIVE PROTEIN: CPT | Performed by: EMERGENCY MEDICINE

## 2022-01-01 PROCEDURE — 93005 ELECTROCARDIOGRAM TRACING: CPT | Performed by: EMERGENCY MEDICINE

## 2022-01-01 PROCEDURE — C1751 CATH, INF, PER/CENT/MIDLINE: HCPCS

## 2022-01-01 PROCEDURE — 80053 COMPREHEN METABOLIC PANEL: CPT | Performed by: EMERGENCY MEDICINE

## 2022-01-01 RX ORDER — MULTIPLE VITAMINS W/ MINERALS TAB 9MG-400MCG
1 TAB ORAL DAILY
Status: DISCONTINUED | OUTPATIENT
Start: 2022-01-01 | End: 2022-01-01

## 2022-01-01 RX ORDER — MORPHINE SULFATE 1 MG/ML
2 INJECTION, SOLUTION INTRAVENOUS
Status: DISCONTINUED | OUTPATIENT
Start: 2022-01-01 | End: 2022-01-01 | Stop reason: HOSPADM

## 2022-01-01 RX ORDER — NYSTATIN 100000 [USP'U]/G
1 POWDER TOPICAL DAILY PRN
COMMUNITY

## 2022-01-01 RX ORDER — SODIUM CHLORIDE 0.9 % (FLUSH) 0.9 %
10 SYRINGE (ML) INJECTION AS NEEDED
Status: DISCONTINUED | OUTPATIENT
Start: 2022-01-01 | End: 2022-01-01 | Stop reason: HOSPADM

## 2022-01-01 RX ORDER — PROCHLORPERAZINE EDISYLATE 5 MG/ML
5 INJECTION INTRAMUSCULAR; INTRAVENOUS EVERY 6 HOURS PRN
Status: DISCONTINUED | OUTPATIENT
Start: 2022-01-01 | End: 2022-01-01 | Stop reason: HOSPADM

## 2022-01-01 RX ORDER — ACETAMINOPHEN 650 MG/1
650 SUPPOSITORY RECTAL EVERY 4 HOURS PRN
Status: DISCONTINUED | OUTPATIENT
Start: 2022-01-01 | End: 2022-01-01 | Stop reason: HOSPADM

## 2022-01-01 RX ORDER — LORAZEPAM 2 MG/ML
1 INJECTION INTRAMUSCULAR
Status: DISCONTINUED | OUTPATIENT
Start: 2022-01-01 | End: 2022-01-01 | Stop reason: HOSPADM

## 2022-01-01 RX ORDER — DIVALPROEX SODIUM 125 MG/1
125 TABLET, DELAYED RELEASE ORAL DAILY
Status: DISCONTINUED | OUTPATIENT
Start: 2022-01-01 | End: 2022-01-01

## 2022-01-01 RX ORDER — IPRATROPIUM BROMIDE AND ALBUTEROL SULFATE 2.5; .5 MG/3ML; MG/3ML
3 SOLUTION RESPIRATORY (INHALATION) EVERY 4 HOURS PRN
Status: DISCONTINUED | OUTPATIENT
Start: 2022-01-01 | End: 2022-01-01 | Stop reason: HOSPADM

## 2022-01-01 RX ORDER — DOCUSATE SODIUM 100 MG/1
100 CAPSULE, LIQUID FILLED ORAL 2 TIMES DAILY
COMMUNITY

## 2022-01-01 RX ORDER — LORAZEPAM 2 MG/ML
2 INJECTION INTRAMUSCULAR
Status: DISCONTINUED | OUTPATIENT
Start: 2022-01-01 | End: 2022-01-01 | Stop reason: HOSPADM

## 2022-01-01 RX ORDER — NOREPINEPHRINE BIT/0.9 % NACL 8 MG/250ML
.02-.3 INFUSION BOTTLE (ML) INTRAVENOUS
Status: DISCONTINUED | OUTPATIENT
Start: 2022-01-01 | End: 2022-01-01

## 2022-01-01 RX ORDER — ADENOSINE 3 MG/ML
INJECTION, SOLUTION INTRAVENOUS
Status: COMPLETED
Start: 2022-01-01 | End: 2022-01-01

## 2022-01-01 RX ORDER — NITROGLYCERIN 0.4 MG/1
0.4 TABLET SUBLINGUAL
COMMUNITY

## 2022-01-01 RX ORDER — LORAZEPAM 2 MG/ML
0.5 INJECTION INTRAMUSCULAR
Status: DISCONTINUED | OUTPATIENT
Start: 2022-01-01 | End: 2022-01-01 | Stop reason: HOSPADM

## 2022-01-01 RX ORDER — SODIUM CHLORIDE, SODIUM LACTATE, POTASSIUM CHLORIDE, CALCIUM CHLORIDE 600; 310; 30; 20 MG/100ML; MG/100ML; MG/100ML; MG/100ML
100 INJECTION, SOLUTION INTRAVENOUS CONTINUOUS
Status: DISCONTINUED | OUTPATIENT
Start: 2022-01-01 | End: 2022-01-01

## 2022-01-01 RX ORDER — ADENOSINE 3 MG/ML
6 INJECTION, SOLUTION INTRAVENOUS ONCE
Status: COMPLETED | OUTPATIENT
Start: 2022-01-01 | End: 2022-01-01

## 2022-01-01 RX ORDER — LORAZEPAM 2 MG/ML
2 CONCENTRATE ORAL
Status: DISCONTINUED | OUTPATIENT
Start: 2022-01-01 | End: 2022-01-01 | Stop reason: HOSPADM

## 2022-01-01 RX ORDER — ACETAMINOPHEN 325 MG/1
650 TABLET ORAL EVERY 4 HOURS PRN
Status: DISCONTINUED | OUTPATIENT
Start: 2022-01-01 | End: 2022-01-01

## 2022-01-01 RX ORDER — MORPHINE SULFATE 2 MG/ML
2 INJECTION, SOLUTION INTRAMUSCULAR; INTRAVENOUS ONCE
Status: DISCONTINUED | OUTPATIENT
Start: 2022-01-01 | End: 2022-01-01

## 2022-01-01 RX ORDER — DIVALPROEX SODIUM 250 MG/1
250 TABLET, DELAYED RELEASE ORAL NIGHTLY
Status: DISCONTINUED | OUTPATIENT
Start: 2022-01-01 | End: 2022-01-01

## 2022-01-01 RX ORDER — CERAMIDES 1,3,6-II
1 CREAM (GRAM) TOPICAL NIGHTLY
COMMUNITY

## 2022-01-01 RX ORDER — SODIUM CHLORIDE 0.9 % (FLUSH) 0.9 %
10 SYRINGE (ML) INJECTION EVERY 12 HOURS SCHEDULED
Status: DISCONTINUED | OUTPATIENT
Start: 2022-01-01 | End: 2022-01-01

## 2022-01-01 RX ORDER — TAMSULOSIN HYDROCHLORIDE 0.4 MG/1
0.4 CAPSULE ORAL DAILY
Status: DISCONTINUED | OUTPATIENT
Start: 2022-01-01 | End: 2022-01-01

## 2022-01-01 RX ORDER — NICOTINE POLACRILEX 4 MG
15 LOZENGE BUCCAL 3 TIMES DAILY PRN
COMMUNITY

## 2022-01-01 RX ORDER — PROCHLORPERAZINE 25 MG
25 SUPPOSITORY, RECTAL RECTAL EVERY 12 HOURS PRN
Status: DISCONTINUED | OUTPATIENT
Start: 2022-01-01 | End: 2022-01-01 | Stop reason: HOSPADM

## 2022-01-01 RX ORDER — LORAZEPAM 2 MG/ML
1 CONCENTRATE ORAL
Status: DISCONTINUED | OUTPATIENT
Start: 2022-01-01 | End: 2022-01-01 | Stop reason: HOSPADM

## 2022-01-01 RX ORDER — POLYETHYLENE GLYCOL 3350 17 G/17G
17 POWDER, FOR SOLUTION ORAL DAILY PRN
COMMUNITY

## 2022-01-01 RX ORDER — LORAZEPAM 0.5 MG/1
0.5 TABLET ORAL 3 TIMES DAILY
COMMUNITY

## 2022-01-01 RX ORDER — ATROPINE SULFATE 10 MG/ML
2 SOLUTION/ DROPS OPHTHALMIC 2 TIMES DAILY PRN
Status: DISCONTINUED | OUTPATIENT
Start: 2022-01-01 | End: 2022-01-01 | Stop reason: HOSPADM

## 2022-01-01 RX ORDER — FAMOTIDINE 10 MG/ML
20 INJECTION, SOLUTION INTRAVENOUS DAILY
Status: DISCONTINUED | OUTPATIENT
Start: 2022-01-01 | End: 2022-01-01

## 2022-01-01 RX ORDER — ONDANSETRON 2 MG/ML
4 INJECTION INTRAMUSCULAR; INTRAVENOUS EVERY 6 HOURS PRN
Status: DISCONTINUED | OUTPATIENT
Start: 2022-01-01 | End: 2022-01-01 | Stop reason: HOSPADM

## 2022-01-01 RX ORDER — SCOLOPAMINE TRANSDERMAL SYSTEM 1 MG/1
1 PATCH, EXTENDED RELEASE TRANSDERMAL
Status: DISCONTINUED | OUTPATIENT
Start: 2022-01-01 | End: 2022-01-01 | Stop reason: HOSPADM

## 2022-01-01 RX ORDER — LORAZEPAM 2 MG/ML
0.5 CONCENTRATE ORAL
Status: DISCONTINUED | OUTPATIENT
Start: 2022-01-01 | End: 2022-01-01 | Stop reason: HOSPADM

## 2022-01-01 RX ORDER — GLUCAGON INJECTION, SOLUTION 1 MG/.2ML
1 INJECTION, SOLUTION SUBCUTANEOUS 3 TIMES DAILY PRN
COMMUNITY

## 2022-01-01 RX ORDER — ONDANSETRON 2 MG/ML
4 INJECTION INTRAMUSCULAR; INTRAVENOUS ONCE
Status: COMPLETED | OUTPATIENT
Start: 2022-01-01 | End: 2022-01-01

## 2022-01-01 RX ADMIN — GLYCERIN 1 DROP: .002; .002; .01 SOLUTION/ DROPS OPHTHALMIC at 04:06

## 2022-01-01 RX ADMIN — Medication 0.16 MCG/KG/MIN: at 03:04

## 2022-01-01 RX ADMIN — SODIUM CHLORIDE, POTASSIUM CHLORIDE, SODIUM LACTATE AND CALCIUM CHLORIDE 1000 ML: 600; 310; 30; 20 INJECTION, SOLUTION INTRAVENOUS at 04:12

## 2022-01-01 RX ADMIN — Medication 0.1 MCG/KG/MIN: at 02:28

## 2022-01-01 RX ADMIN — TAZOBACTAM SODIUM AND PIPERACILLIN SODIUM 3.38 G: 375; 3 INJECTION, SOLUTION INTRAVENOUS at 10:16

## 2022-01-01 RX ADMIN — Medication 0.08 MCG/KG/MIN: at 02:23

## 2022-01-01 RX ADMIN — VASOPRESSIN 0.03 UNITS/MIN: 20 INJECTION INTRAVENOUS at 04:00

## 2022-01-01 RX ADMIN — IPRATROPIUM BROMIDE 0.5 MG: 0.5 SOLUTION RESPIRATORY (INHALATION) at 06:52

## 2022-01-01 RX ADMIN — MORPHINE SULFATE 4 MG/HR: 10 INJECTION, SOLUTION INTRAMUSCULAR; INTRAVENOUS at 15:18

## 2022-01-01 RX ADMIN — LORAZEPAM 0.5 MG: 2 INJECTION, SOLUTION INTRAMUSCULAR; INTRAVENOUS at 14:10

## 2022-01-01 RX ADMIN — Medication 0.06 MCG/KG/MIN: at 02:18

## 2022-01-01 RX ADMIN — MORPHINE SULFATE 2 MG/HR: 10 INJECTION, SOLUTION INTRAMUSCULAR; INTRAVENOUS at 21:37

## 2022-01-01 RX ADMIN — Medication 0.18 MCG/KG/MIN: at 03:11

## 2022-01-01 RX ADMIN — ADENOSINE 6 MG: 3 INJECTION INTRAVENOUS at 01:59

## 2022-01-01 RX ADMIN — ALBUTEROL SULFATE 1.25 MG: 2.5 SOLUTION RESPIRATORY (INHALATION) at 09:52

## 2022-01-01 RX ADMIN — FAMOTIDINE 20 MG: 10 INJECTION INTRAVENOUS at 08:29

## 2022-01-01 RX ADMIN — MORPHINE SULFATE 2 MG/HR: 10 INJECTION, SOLUTION INTRAMUSCULAR; INTRAVENOUS at 13:25

## 2022-01-01 RX ADMIN — SODIUM CHLORIDE, POTASSIUM CHLORIDE, SODIUM LACTATE AND CALCIUM CHLORIDE 100 ML/HR: 600; 310; 30; 20 INJECTION, SOLUTION INTRAVENOUS at 07:19

## 2022-01-01 RX ADMIN — LORAZEPAM 2 MG: 2 INJECTION INTRAMUSCULAR; INTRAVENOUS at 09:02

## 2022-01-01 RX ADMIN — Medication 0.3 MCG/KG/MIN: at 03:24

## 2022-01-01 RX ADMIN — Medication 0.12 MCG/KG/MIN: at 02:36

## 2022-01-01 RX ADMIN — Medication 0.14 MCG/KG/MIN: at 02:41

## 2022-01-01 RX ADMIN — IPRATROPIUM BROMIDE 0.5 MG: 0.5 SOLUTION RESPIRATORY (INHALATION) at 09:52

## 2022-01-01 RX ADMIN — SODIUM CHLORIDE 1500 ML: 0.9 INJECTION, SOLUTION INTRAVENOUS at 02:14

## 2022-01-01 RX ADMIN — ADENOSINE 6 MG: 3 INJECTION INTRAVENOUS at 02:01

## 2022-01-01 RX ADMIN — ALBUTEROL SULFATE 1.25 MG: 2.5 SOLUTION RESPIRATORY (INHALATION) at 06:52

## 2022-01-01 RX ADMIN — Medication 10 ML: at 08:31

## 2022-01-01 RX ADMIN — ADENOSINE 6 MG: 3 INJECTION, SOLUTION INTRAVENOUS at 01:59

## 2022-01-01 RX ADMIN — SODIUM CHLORIDE, POTASSIUM CHLORIDE, SODIUM LACTATE AND CALCIUM CHLORIDE 1000 ML: 600; 310; 30; 20 INJECTION, SOLUTION INTRAVENOUS at 02:34

## 2022-01-01 RX ADMIN — Medication 0.04 MCG/KG/MIN: at 02:13

## 2022-01-01 RX ADMIN — ONDANSETRON 4 MG: 2 INJECTION INTRAMUSCULAR; INTRAVENOUS at 05:53

## 2022-01-01 RX ADMIN — TAZOBACTAM SODIUM AND PIPERACILLIN SODIUM 3.38 G: 375; 3 INJECTION, SOLUTION INTRAVENOUS at 02:14

## 2022-01-01 RX ADMIN — MORPHINE SULFATE 4 MG/HR: 10 INJECTION, SOLUTION INTRAMUSCULAR; INTRAVENOUS at 06:51

## 2022-01-01 RX ADMIN — MORPHINE SULFATE 4 MG/HR: 10 INJECTION, SOLUTION INTRAMUSCULAR; INTRAVENOUS at 07:42

## 2022-01-01 RX ADMIN — Medication 0.02 MCG/KG/MIN: at 02:08

## 2022-01-01 RX ADMIN — MORPHINE SULFATE 4 MG/HR: 10 INJECTION, SOLUTION INTRAMUSCULAR; INTRAVENOUS at 23:07

## 2022-01-01 RX ADMIN — LORAZEPAM 2 MG: 2 INJECTION INTRAMUSCULAR; INTRAVENOUS at 10:35

## 2022-01-01 RX ADMIN — MORPHINE SULFATE 7 MG/HR: 10 INJECTION, SOLUTION INTRAMUSCULAR; INTRAVENOUS at 12:56

## 2022-01-01 RX ADMIN — Medication 0.26 MCG/KG/MIN: at 03:18

## 2022-01-01 RX ADMIN — VASOPRESSIN 0.03 UNITS/MIN: 20 INJECTION INTRAVENOUS at 11:47

## 2022-01-01 RX ADMIN — SCOPALAMINE 1 PATCH: 1 PATCH, EXTENDED RELEASE TRANSDERMAL at 04:06

## 2022-09-14 PROBLEM — J96.01 ACUTE RESPIRATORY FAILURE WITH HYPOXIA (HCC): Status: ACTIVE | Noted: 2022-01-01

## 2022-09-14 NOTE — CONSULTS
[facility] Palliative Care Services  Initial Consult    Attending Physician: Denny Whipple MD  Referring Provider:  Denny Whipple MD    Patient Name: Ezequiel Abdalla  Date of Admission: 9/14/2022     Today's Date: 09/14/22     Reason for Referral: Goals of Care/Advance Care Planning and Comfort Care    Code Status and Medical Interventions:   Ordered at: 09/14/22 0736     Medical Intervention Limits:    NO intubation (DNI)    NO cardioversion     Code Status (Patient has no pulse and is not breathing):    No CPR (Do Not Attempt to Resuscitate)     Medical Interventions (Patient has pulse or is breathing):    Limited Support      Subjective     HPI: 77 y.o. male with past medical history of atrial fibrillation, alcohol abuse, anxiety, chronic obstructive pulmonary disease, dementia, polycythemia, presence of cardiac pacemaker, shortness of breath and Brina-Parkinson-White syndrome.  Patient presented to [facility] on 9/14/2022 related to altered mental status, hypoxia and hypotension.  Per chart review he is a resident at [facility].  Chart review also reveals he was in significant respiratory distress however had wishes documented as no CPR and DO NOT INTUBATE.  He was placed on BiPAP with FiO2 100% and ABGs collected revealed pH 7.224, PCO2 34.2 and PO2 135.  Work-up in ED revealed multiple abnormalities in labs including BNP 5,422.0, sodium 146, creatinine 2.09, BUN 48, GFR 31.6, calcium 11.1, albumin 3.40, CRP 6.93, lactate 11.6, procalcitonin 30.50, D-dimer 9.67 and WBC 20.17.    UA revealed 2+ bacteria, TNTC RBC and WBC, 3+ blood and 3+ leukocytes however negative for nitrite.  Urine culture pending.  Chest x-ray revealed cardiomegaly and patchy infiltrates in mid and lower lung zones which may represent patchy pneumonia or mild edema.  ECG in ED revealed supraventricular tachycardia with occasional premature ventricular complexes.  Chemical cardioversion  "with adenosine attempted however unsuccessful and he remained hypotensive so electrical cardioversion was also completed although unsuccessful and patient remained in atrial fibrillation per chart review.  He required central line placement and was started on vasopressors.  He is awaiting admission to intensive care unit for further work-up and treatment.  Labs collected this morning reveal he continues to experience many abnormalities in labs including troponin 0.035, creatinine 1.72, BUN 51, GFR 39.9, albumin 2.60, lactate 8.8, procalcitonin 41.44 and WBC 19.17. He is lying in bed at time of exam, remains on BiPAP support with 70% FiO2. Opens eyes to voice however unable to speak or follow commands at this time. Vasopressin drip stopped around 7 AM although remains on levophed drip. No visitors at bedside.     Advance Care Planning   Advanced Directives: MOST form and guardianship document on file. Guardianship was amended in 2020 and granted to patient's granddaughter, Silvia.     Advance Care Planning Discussion: Call placed to patient's granddaughter/guardian, Silvia to discuss goals of care and current condition.  She reflected on visiting last night and shared she does not want him to suffer however this is a very hard decision to make.  Support provided.  Explored conversations with providers previously and she reports she understands he is critically ill.  Discussed treatment options at length including continuing current limited measures with poor chances that functional recovery versus transitioning to more comfort focused care to ensure symptoms managed rather than life-prolonging interventions.  Silvia reflected on her grandfather's illnesses stating he has had \"multiple stents close to the brink of death however this is the worst I have seen him.\"  Silvia expressed she understands his quality of life is poor and she does not want him to be in any pain.  She also expressed wishes to de-escalate " measures to focus on comfort.  Details and expectations of comfort measures discussed at length.  Reports she is going to call patient's son to see if he was able to come visit as she shared he and patient do not have good relationship however son wanted to say goodbyes.  Silvia states she is heading to hospital now to be here to say goodbye to her grandfather as well.  She has requested him to continue on vasopressors and BiPAP support until she has arrived.  Nursing and attending physician notified.  Silvia appears to have good prognostic awareness.    The patient receives support from his granddaughter. Patient's granddaughter is his guardian.   Due to the palliative care, goals of care, treatment options and medical priorities we will establish an advance care plan.      Review of Systems   Unable to perform ROS: mental status change     Pain Assessment  PAINAD Breathin-->normal  PAINAD Negative Vocalization: 0-->none  PAINAD Body Language: 0-->relaxed  Past Medical History:   Diagnosis Date   • A-fib ([facility])    • Alcohol abuse    • Anxiety    • COPD (chronic obstructive pulmonary disease) ([facility])    • COPD (chronic obstructive pulmonary disease) ([facility])    • Hypotension    • Lung disease    • Malaise and fatigue    • Neurological disease    • Polycythemia    • SOB (shortness of breath)    • Brina-Parkinson-White syndrome       Past Surgical History:   Procedure Laterality Date   • KNEE SURGERY     • PACEMAKER IMPLANTATION      [facility]    • WRIST SURGERY        Social History     Socioeconomic History   • Marital status:    Tobacco Use   • Smoking status: Heavy Tobacco Smoker     Packs/day: 2.00     Years: 60.00     Pack years: 120.00   • Smokeless tobacco: Never Used   Substance and Sexual Activity   • Alcohol use: Yes     Alcohol/week: 28.0 standard drinks     Types: 21 Cans of beer, 7 Shots of liquor per week     Comment: 2-3 BEERS, 1 GIN DAILY   • Drug use: No   • Sexual activity: Never     Family  History   Problem Relation Age of Onset   • No Known Problems Mother    • Cancer Father    • Heart disease Father    • COPD Sister       No Known Allergies    Objective   Diagnostics: Reviewed    Intake/Output Summary (Last 24 hours) at 9/14/2022 0954  Last data filed at 9/14/2022 0625  Gross per 24 hour   Intake 3550 ml   Output 300 ml   Net 3250 ml       Current medication reviewed for route, type, dose and frequency and are current per MAR at time of dictation.  Current Facility-Administered Medications   Medication Dose Route Frequency Provider Last Rate Last Admin   • acetaminophen (TYLENOL) tablet 650 mg  650 mg Oral Q4H PRN Boston Fontana DO        Or   • acetaminophen (TYLENOL) suppository 650 mg  650 mg Rectal Q4H PRN Boston Fontana DO       • albuterol (PROVENTIL) nebulizer solution 0.5% 2.5 mg/0.5mL  1.25 mg Nebulization 4x Daily - RT Boston Fontana DO   1.25 mg at 09/14/22 0952    And   • ipratropium (ATROVENT) nebulizer solution 0.5 mg  0.5 mg Nebulization 4x Daily - RT Boston Fontana DO   0.5 mg at 09/14/22 0952   • divalproex (DEPAKOTE) DR tablet 125 mg  125 mg Oral Daily Boston Fontana DO       • divalproex (DEPAKOTE) DR tablet 250 mg  250 mg Oral Nightly Boston Fontana DO       • famotidine (PEPCID) injection 20 mg  20 mg Intravenous Daily Denny Whipple MD   20 mg at 09/14/22 0829   • lactated ringers infusion  100 mL/hr Intravenous Continuous Boston Fontana  mL/hr at 09/14/22 0719 100 mL/hr at 09/14/22 0719   • Morphine sulfate (PF) injection 2 mg  2 mg Intravenous Once Vikram Tillman MD       • multivitamin with minerals 1 tablet  1 tablet Oral Daily Boston Fontana DO       • norepinephrine (LEVOPHED) 8 mg in 250 mL NS infusion (premix)  0.02-0.3 mcg/kg/min Intravenous Titrated Vikram Tillman MD 17.27 mL/hr at 09/14/22 0826 0.14 mcg/kg/min at 09/14/22 0826   • ondansetron (ZOFRAN) injection 4 mg  4 mg Intravenous Q6H PRN Boston Fontana  DO Me[place]       • piperacillin-tazobactam (ZOSYN) 3.375 g in iso-osmotic dextrose 50 ml (premix)  3.375 g Intravenous Q8H Boston Fontana DO       • sodium chloride 0.9 % flush 10 mL  10 mL Intravenous Q12H Boston Fontana DO       • sodium chloride 0.9 % flush 10 mL  10 mL Intravenous PRN Boston Fontana DO   10 mL at 09/14/22 0831   • tamsulosin (FLOMAX) 24 hr capsule 0.4 mg  0.4 mg Oral Daily Boston Fontana DO       • Vasopressin (PITRESSIN) 20 Units in sodium chloride 0.9 % 100 mL infusion  0.03 Units/min Intravenous Continuous Vikram Tillman MD   Stopped at 09/14/22 0717     Current Outpatient Medications   Medication Sig Dispense Refill   • acetaminophen (TYLENOL) 325 MG tablet Take 2 tablets by mouth Every 6 (Six) Hours As Needed for Mild Pain .  0   • aspirin 81 MG EC tablet Take 1 tablet by mouth Daily.     • buPROPion XL (WELLBUTRIN XL) 150 MG 24 hr tablet Take 150 mg by mouth Daily.     • cefdinir (OMNICEF) 300 MG capsule Take 1 capsule by mouth Daily. 10 capsule 0   • divalproex (DEPAKOTE) 125 MG DR tablet Take 125 mg by mouth Daily.     • divalproex (DEPAKOTE) 250 MG DR tablet Take 250 mg by mouth every night at bedtime.     • mirtazapine (REMERON) 15 MG tablet Take 15 mg by mouth Every Night.     • multivitamin with minerals tablet tablet Take 1 tablet by mouth Daily.     • polyethylene glycol (MiraLax) 17 g packet Take 17 g by mouth Daily As Needed (constipation).     • pravastatin (PRAVACHOL) 20 MG tablet Take 20 mg by mouth Daily.     • QUEtiapine (SEROquel) 25 MG tablet Take 1 tablet by mouth Every Night.     • tamsulosin (FLOMAX) 0.4 MG capsule 24 hr capsule Take 1 capsule by mouth Daily.     • thiamine (VITAMIN B1) 100 MG tablet Take 1 tablet by mouth Daily.     • tiotropium (SPIRIVA) 18 MCG per inhalation capsule Place 1 capsule into inhaler and inhale Daily.     • vitamin C (ASCORBIC ACID) 500 MG tablet Take 500 mg by mouth Daily.      lactated ringers, 100 mL/hr, Last  "Rate: 100 mL/hr (09/14/22 0719)  norepinephrine, 0.02-0.3 mcg/kg/min, Last Rate: 0.14 mcg/kg/min (09/14/22 0826)  vasopressin, 0.03 Units/min, Last Rate: Stopped (09/14/22 0717)     •  acetaminophen **OR** acetaminophen  •  ondansetron  •  sodium chloride  Assessment   BP (!) 88/70 (BP Location: Right arm, Patient Position: Lying)   Pulse (!) 122   Temp 100.1 °F (37.8 °C) (Rectal)   Resp (!) 30   Ht 175.3 cm (69\")   Wt 65.8 kg (145 lb)   SpO2 93%   BMI 21.41 kg/m²     Physical Exam  Vitals and nursing note reviewed.   Constitutional:       General: He is not in acute distress.     Appearance: He is ill-appearing.   HENT:      Head: Normocephalic and atraumatic.   Eyes:      General: Lids are normal.   Neck:      Trachea: Trachea normal.   Cardiovascular:      Rate and Rhythm: Tachycardia present. Rhythm irregular.   Pulmonary:      Effort: Tachypnea present.      Breath sounds: Decreased breath sounds present.      Comments: BiPAP 70%.   Chest:      Chest wall: No deformity or swelling.   Abdominal:      General: There is no distension.      Palpations: Abdomen is soft.   Genitourinary:     Comments: Indwelling urinary catheter in place.  Musculoskeletal:      Cervical back: Neck supple.   Skin:     General: Skin is warm and dry.      Coloration: Skin is pale.   Neurological:      Mental Status: He is lethargic.      Motor: Weakness present.   Psychiatric:         Behavior: Behavior is not agitated.         Cognition and Memory: Cognition is impaired.     Functional status: Palliative Performance Scale Score: Performance 10% based on the following measures: Ambulation: Totally bed bound, Activity and Evidence of Disease: Unable to do any work, extensive evidence of disease, Self-Care: Total care required,  Intake: Mouth care only, LOC: Drowsy or comatose.  Nutritional status: Albumin 2.60. Body mass index is 21.41 kg/m²..  Patient status: Disease state: No further treatment being " pursued.    Impression/Problem List:  1. Dementia (FAST 7C)  2. Acute respiratory failure with hypoxia  3. Septic shock  4. Chronic obstructive pulmonary disease  5. Acute kidney injury  6. Atrial fibrillation with RVR  7. Acute cystitis with hematuria and pyuria  8. Pulmonary infiltrates on CXR  9. Impaired mobility and activities of daily living  10. Hypoalbuminemia  11. Presence of cardiac pacemaker  12. History of alcohol abuse   13. Elevated troponin    Plan / Recommendations     1. Palliative Care Encounter   - Goals of care include CODE STATUS changes to NO CPR and comfort measure interventions.     - Prognosis is poor long-term secondary to dementia (FAST 7C), impaired mobility and ADLs, COPD, septic shock, acute respiratory failure with hypoxia, DEEPIKA, A. fib with RVR, hypoalbuminemia and other comorbidities listed above.    - Spoke to patient's granddaughter/guardian, Silvia, via telephone to discuss goals of care.  Details of conversation above.    - Treatment options explored at length and after discussion expressed wishes to de-escalate CODE STATUS to no CPR and comfort measures.    - Details and expectations of comfort measures also discussed at length.    - Wishes to continue BiPAP and vasopressors until she arrives shortly to say goodbye.     - Notified nursing and attending of wishes.     - Order placed for nursing to check cardiac pacemaker and determine if has ICD function.     2. Comfort Measures  - Discontinue orders not in line with comfort.     - Once patient's granddaughter arrives and expresses she is ready for transition may stop vasopressors and wean off BiPAP to nasal cannula for comfort.     - Orders placed for titratable morphine drip for pain and/or dyspnea with assistance of Luis Daniel Roldan DO.     - Additional as needed palliative/comfort adjuvants also added with assistance of Luis Daniel Roldan DO.     - Thank you for allowing us to participate in patient's plan of care. Palliative Care  Team will continue to follow patient.     Time spent:70 minutes spent reviewing medical and medication records, assessing and examining patient, discussing with family, answering questions, providing some guidance about a plan and documentation of care, and coordinating care with other healthcare members, with > 50% time spent face to face.     Electronically signed by, NASRIN Osei, 09/14/22.

## 2022-09-14 NOTE — ED NOTES
"Witnessed phone call between Dr. Tillman and granddaughter (guardian), granddaughter has opted for no escalation for care and understands that he can potentially pass away and her request was just \"keep him comfortable\" and acknowledged understanding of explanation for withdrawal of care, advancing care, or no escalation by Dr. Tillman.   "

## 2022-09-14 NOTE — H&P
[facility] Medicine Services  HISTORY AND PHYSICAL    Date of Admission: 9/14/2022  Primary Care Physician: Shane Banks MD    Subjective     Chief Complaint: Aspiration    History of Present Illness  77-year-old male who presents the emergency department in acute distress.  He is a DNR.  He was found to have pyuria.  He has a thick level of pus in his Montgomery catheter bag at bedside.  He was in septic shock.  He was cardioversion in the emergency department.  The patient is unable to give any history.  There is no family at the side.  He was placed on a BiPAP.  Montgomery catheter is in place as noted above.  He was started on Zosyn in the emergency department for potential aspiration.  Previously, he has had a Klebsiella urinary tract infection.  Patient's heart rate was 134.  His O2 setting on the BiPAP was 70%.        Review of Systems   Unable to obtain from patient  Nursing facility thought that the patient had aspirated    Otherwise complete ROS reviewed and negative except as mentioned in the HPI.    Past Medical History:   Past Medical History:   Diagnosis Date   • A-fib ([facility])    • Alcohol abuse    • Anxiety    • COPD (chronic obstructive pulmonary disease) ([facility])    • COPD (chronic obstructive pulmonary disease) ([facility])    • Hypotension    • Lung disease    • Malaise and fatigue    • Neurological disease    • Polycythemia    • SOB (shortness of breath)    • Brina-Parkinson-White syndrome      Past Surgical History:  Past Surgical History:   Procedure Laterality Date   • KNEE SURGERY     • PACEMAKER IMPLANTATION      [facility]    • WRIST SURGERY       Social History:  reports that he has been smoking. He has a 120.00 pack-year smoking history. He has never used smokeless tobacco. He reports current alcohol use of about 28.0 standard drinks of alcohol per week. He reports that he does not use drugs.    Family History: family history includes COPD in his sister; Cancer in his  father; Heart disease in his father; No Known Problems in his mother.      Allergies:  No Known Allergies    Medications:  Prior to Admission medications    Medication Sig Start Date End Date Taking? Authorizing Provider   acetaminophen (TYLENOL) 325 MG tablet Take 2 tablets by mouth Every 6 (Six) Hours As Needed for Mild Pain . 12/10/20   Denny Whipple MD   aspirin 81 MG EC tablet Take 1 tablet by mouth Daily. 8/3/21   Parag Stokes DO   buPROPion XL (WELLBUTRIN XL) 150 MG 24 hr tablet Take 150 mg by mouth Daily.    Valerio Rizo MD   cefdinir (OMNICEF) 300 MG capsule Take 1 capsule by mouth Daily. 8/2/21   Parag Stokes DO   divalproex (DEPAKOTE) 125 MG DR tablet Take 125 mg by mouth Daily.    Valerio Rizo MD   divalproex (DEPAKOTE) 250 MG DR tablet Take 250 mg by mouth every night at bedtime.    Valerio Rizo MD   mirtazapine (REMERON) 15 MG tablet Take 15 mg by mouth Every Night.    Valerio Rizo MD   multivitamin with minerals tablet tablet Take 1 tablet by mouth Daily.    Valerio Rizo MD   polyethylene glycol (MiraLax) 17 g packet Take 17 g by mouth Daily As Needed (constipation). 12/10/20   Denny Whipple MD   pravastatin (PRAVACHOL) 20 MG tablet Take 20 mg by mouth Daily.    Valerio Rizo MD   QUEtiapine (SEROquel) 25 MG tablet Take 1 tablet by mouth Every Night. 8/2/21   Parag Stokes DO   tamsulosin (FLOMAX) 0.4 MG capsule 24 hr capsule Take 1 capsule by mouth Daily.    Valerio Rizo MD   thiamine (VITAMIN B1) 100 MG tablet Take 1 tablet by mouth Daily. 2/15/17   Andrea Cote MD   tiotropium (SPIRIVA) 18 MCG per inhalation capsule Place 1 capsule into inhaler and inhale Daily.    Valerio Rizo MD   vitamin C (ASCORBIC ACID) 500 MG tablet Take 500 mg by mouth Daily.    Valerio Rizo MD I have utilized all available immediate resources to obtain, update, and review the patient's current  "medications.    Objective     Vital Signs: BP (!) 84/65   Pulse (!) 127   Temp 100.1 °F (37.8 °C) (Rectal)   Resp (!) 42   Ht 175.3 cm (69\")   Wt 65.8 kg (145 lb)   SpO2 98%   BMI 21.41 kg/m²   Physical Exam  Vitals reviewed.   Constitutional:       Appearance: He is ill-appearing.      Comments: Thin and frail.   HENT:      Head: Normocephalic and atraumatic.      Right Ear: External ear normal.      Left Ear: External ear normal.      Nose: Nose normal.      Mouth/Throat:      Mouth: Mucous membranes are dry.      Pharynx: No oropharyngeal exudate.   Eyes:      General: No scleral icterus.     Conjunctiva/sclera: Conjunctivae normal.   Cardiovascular:      Rate and Rhythm: Normal rate and regular rhythm.      Heart sounds: Normal heart sounds.   Pulmonary:      Breath sounds: Rhonchi and rales present.      Comments: BiPAP in place  Abdominal:      General: There is no distension.   Genitourinary:     Comments: Montgomery in place with obvious pus in the Montgomery bag  Musculoskeletal:         General: No tenderness.      Cervical back: Normal range of motion and neck supple.      Right lower leg: No edema.      Left lower leg: No edema.      Comments: Lower extremity muscle wasting and contractures in the bilateral lower extremities.   Skin:     General: Skin is warm and dry.      Comments: Bruising on the upper extremities   Neurological:      Mental Status: He is alert.      Cranial Nerves: No cranial nerve deficit.      Comments: Minimally responsive, but awake   Psychiatric:      Comments: Flat with poor eye contact and no tracking       Results Reviewed:  Lab Results (last 24 hours)     Procedure Component Value Units Date/Time    Troponin [[id number]] Collected: 09/14/22 0405    Specimen: Blood Updated: 09/14/22 0411    COVID-19,[facility] IN-HOUSE,Nasal Swab No Transport Media 3-4 HR TAT - Swab, Nasal Cavity [[id number]]  (Normal) Collected: 09/14/22 0302    Specimen: Swab from Nasal Cavity Updated: 09/14/22 " 0346     COVID19 Not Detected    Narrative:      Fact sheet for providers: https://www.fda.gov/media/588239/download     Fact sheet for patients: https://www.fda.gov/media/703033/download    Test performed by PCR.    Consider negative results in combination with clinical observations, patient history, and epidemiological information.    Blood Culture - Blood, Arm, Right [[id number]] Collected: 09/14/22 0301    Specimen: Blood from Arm, Right Updated: 09/14/22 0310    Blood Culture - Blood, Arm, Left [[id number]] Collected: 09/14/22 0255    Specimen: Blood from Arm, Left Updated: 09/14/22 0309    Urinalysis With Culture If Indicated - Urine, Catheter [[id number]]  (Abnormal) Collected: 09/14/22 0248    Specimen: Urine, Catheter Updated: 09/14/22 0309     Color, UA Dark Yellow     Appearance, UA Turbid     pH, UA 5.5     Specific Gravity, UA 1.017     Glucose, UA Negative     Ketones, UA Trace     Bilirubin, UA Negative     Blood, UA Large (3+)     Protein,  mg/dL (2+)     Leuk Esterase, UA Large (3+)     Nitrite, UA Negative     Urobilinogen, UA 1.0 E.U./dL    Narrative:      In absence of clinical symptoms, the presence of pyuria, bacteria, and/or nitrites on the urinalysis result does not correlate with infection.    Urinalysis, Microscopic Only - Urine, Catheter [[id number]]  (Abnormal) Collected: 09/14/22 0248    Specimen: Urine, Catheter Updated: 09/14/22 0309     RBC, UA Too Numerous to Count /HPF      WBC, UA Too Numerous to Count /HPF      Bacteria, UA 2+ /HPF      Squamous Epithelial Cells, UA None Seen /HPF      Hyaline Casts, UA None Seen /LPF      Methodology Manual Light Microscopy    Urine Culture - Urine, Urine, Catheter [[id number]] Collected: 09/14/22 0248    Specimen: Urine, Catheter Updated: 09/14/22 0309    Blood Gas, Arterial - [[id number]]  (Abnormal) Collected: 09/14/22 0303    Specimen: Arterial Blood Updated: 09/14/22 0302     Site Right Radial     Leighton's Test Positive     pH, Arterial 7.224  pH units      Comment: 84 Value below reference range        pCO2, Arterial 34.2 mm Hg      Comment: 84 Value below reference range        pO2, Arterial 135.0 mm Hg      Comment: 83 Value above reference range        HCO3, Arterial 14.1 mmol/L      Comment: 84 Value below reference range        Base Excess, Arterial -12.5 mmol/L      Comment: 84 Value below reference range        O2 Saturation, Arterial 99.0 %      Temperature 37.0 C      Barometric Pressure for Blood Gas 751 mmHg      Modality BiPap     FIO2 100 %      Ventilator Mode BiPAP     Set Mech Resp Rate 16.0     PSV 14.0 cmH2O      IPAP 20     Comment: Meter: [id number]     :  [id number]        EPAP 6     Collected by [id number]     pCO2, Temperature Corrected 34.2 mm Hg      pH, Temp Corrected 7.224 pH Units      pO2, Temperature Corrected 135 mm Hg     Manual Differential [[id number]]  (Abnormal) Collected: 09/14/22 0153    Specimen: Blood Updated: 09/14/22 0238     Neutrophil % 21.2 %      Lymphocyte % 16.2 %      Monocyte % 3.0 %      Bands %  28.3 %      Metamyelocyte % 25.3 %      Myelocyte % 2.0 %      Atypical Lymphocyte % 4.0 %      Neutrophils Absolute 9.98 10*3/mm3      Lymphocytes Absolute 4.07 10*3/mm3      Monocytes Absolute 0.61 10*3/mm3      Crenated RBC's Mod/2+     Poikilocytes Mod/2+     WBC Morphology Normal     Platelet Morphology Normal    CBC & Differential [[id number]]  (Abnormal) Collected: 09/14/22 0153    Specimen: Blood Updated: 09/14/22 0238    Narrative:      The following orders were created for panel order CBC & Differential.  Procedure                               Abnormality         Status                     ---------                               -----------         ------                     CBC Auto Differential[[id number]]        Abnormal            Final result                 Please view results for these tests on the individual orders.    CBC Auto Differential [[id number]]  (Abnormal) Collected: 09/14/22  "0153    Specimen: Blood Updated: 09/14/22 0238     WBC 20.17 10*3/mm3      RBC 4.96 10*6/mm3      Hemoglobin 15.9 g/dL      Hematocrit 50.5 %      .8 fL      MCH 32.1 pg      MCHC 31.5 g/dL      RDW 14.4 %      RDW-SD 54.1 fl      MPV 11.1 fL      Platelets 245 10*3/mm3     Narrative:      The previously reported component NRBC is no longer being reported. Previous result was 0.0 /100 WBC (Reference Range: 0.0-0.2 /100 WBC) on 9/14/2022 at 0229 CDT.    Procalcitonin [[id number]]  (Abnormal) Collected: 09/14/22 0153    Specimen: Blood Updated: 09/14/22 0233     Procalcitonin 30.50 ng/mL     Narrative:      As a Marker for Sepsis (Non-Neonates):    1. <0.5 ng/mL represents a low risk of severe sepsis and/or septic shock.  2. >2 ng/mL represents a high risk of severe sepsis and/or septic shock.    As a Marker for Lower Respiratory Tract Infections that require antibiotic therapy:    PCT on Admission    Antibiotic Therapy       6-12 Hrs later    >0.5                Strongly Recommended  >0.25 - <0.5        Recommended   0.1 - 0.25          Discouraged              Remeasure/reassess PCT  <0.1                Strongly Discouraged     Remeasure/reassess PCT    As 28 day mortality risk marker: \"Change in Procalcitonin Result\" (>80% or <=80%) if Day 0 (or Day 1) and Day 4 values are available. Refer to http://www.[employer]-pct-calculator.com    Change in PCT <=80%  A decrease of PCT levels below or equal to 80% defines a positive change in PCT test result representing a higher risk for 28-day all-cause mortality of patients diagnosed with severe sepsis for septic shock.    Change in PCT >80%  A decrease of PCT levels of more than 80% defines a negative change in PCT result representing a lower risk for 28-day all-cause mortality of patients diagnosed with severe sepsis or septic shock.       Protime-INR [[id number]]  (Abnormal) Collected: 09/14/22 0153    Specimen: Blood Updated: 09/14/22 0230     Protime 14.3 Seconds      " INR 1.15    D-dimer, Quantitative [[id number]]  (Abnormal) Collected: 09/14/22 0153    Specimen: Blood Updated: 09/14/22 0230     D-Dimer, Quantitative 9.67 MCGFEU/mL     Narrative:      Reference Range is 0-0.50 MCGFEU/mL. However, results <0.50 MCGFEU/mL tends to rule out DVT or PE. Results >0.50 MCGFEU/mL are not useful in predicting absence or presence of DVT or PE.      Lactic Acid, Plasma [[id number]]  (Abnormal) Collected: 09/14/22 0153    Specimen: Blood Updated: 09/14/22 0229     Lactate 11.6 mmol/L     C-reactive Protein [[id number]]  (Abnormal) Collected: 09/14/22 0153    Specimen: Blood Updated: 09/14/22 0228     C-Reactive Protein 6.93 mg/dL     Comprehensive Metabolic Panel [[id number]]  (Abnormal) Collected: 09/14/22 0153    Specimen: Blood Updated: 09/14/22 0228     Glucose 147 mg/dL      BUN 48 mg/dL      Creatinine 2.09 mg/dL      Sodium 146 mmol/L      Potassium 3.5 mmol/L      Comment: Slight hemolysis detected by analyzer. Results may be affected.        Chloride 106 mmol/L      CO2 19.0 mmol/L      Calcium 11.1 mg/dL      Total Protein 6.4 g/dL      Albumin 3.40 g/dL      ALT (SGPT) 6 U/L      AST (SGOT) 14 U/L      Alkaline Phosphatase 74 U/L      Total Bilirubin 0.8 mg/dL      Globulin 3.0 gm/dL      A/G Ratio 1.1 g/dL      BUN/Creatinine Ratio 23.0     Anion Gap 21.0 mmol/L      eGFR 31.6 mL/min/1.73      Comment: National Kidney Foundation and American Society of Nephrology (ASN) Task Force recommended calculation based on the Chronic Kidney Disease Epidemiology Collaboration (CKD-EPI) equation refit without adjustment for race.       Narrative:      GFR Normal >60  Chronic Kidney Disease <60  Kidney Failure <15      Troponin [[id number]]  (Normal) Collected: 09/14/22 0153    Specimen: Blood Updated: 09/14/22 0226     Troponin T 0.022 ng/mL     Narrative:      Troponin T Reference Range:  <= 0.03 ng/mL-   Negative for AMI  >0.03 ng/mL-     Abnormal for myocardial necrosis.  Clinicians would have  to utilize clinical acumen, EKG, Troponin and serial changes to determine if it is an Acute Myocardial Infarction or myocardial injury due to an underlying chronic condition.       Results may be falsely decreased if patient taking Biotin.      BNP [[id number]]  (Abnormal) Collected: 09/14/22 0153    Specimen: Blood Updated: 09/14/22 0226     proBNP 5,422.0 pg/mL     Narrative:      Among patients with dyspnea, NT-proBNP is highly sensitive for the detection of acute congestive heart failure. In addition NT-proBNP of <300 pg/ml effectively rules out acute congestive heart failure with 99% negative predictive value.    Results may be falsely decreased if patient taking Biotin.          Imaging Results (Last 24 Hours)     Procedure Component Value Units Date/Time    XR Chest 1 View [[id number]] Resulted: 09/14/22 0207     Updated: 09/14/22 0236        I have personally reviewed and interpreted the radiology studies and ECG obtained at time of admission.     Assessment / Plan     Assessment:   Active Hospital Problems    Diagnosis    • Septic shock (HCC)      Impression:  1. Septic shock  2.  UTI with pyuria  3.  Probable aspiration  4.  Epilepsy  5.  DEEPIKA  6.  Hypotension, on exam maximum dose of Levophed being administered  7.  Acute respiratory failure requiring BiPAP use      Plan:   1.  Admit to the ICU  2.  Zosyn was started in the ED, will continue   3.  Seizure, fall, aspiration, and skin precautions  4.  Half-strength DuoNeb  5.  NPO until evaluated by speech  6.  PT and OT consult  7.  Follow-up labs in the morning   8.  IV fluids   9.  Continue BiPAP use     code Status/Advanced Care Plan: DNR/DNI    The patient's surrogate decision maker is family.     I discussed my findings and recommendations with the staff.    Estimated length of stay is extended.     The patient was seen and examined by me on 9/14/2022 at seen after midnight.    Electronically signed by Boston Fontaan DO, 09/14/22, 04:17  CDT.

## 2022-09-14 NOTE — ED NOTES
Palliative care at bs.     Pt resting comfortably. No distress noted. Cardiac, BP, respiratory monitors in place with alarms on. Will continue to monitor.

## 2022-09-14 NOTE — ED NOTES
This nurse, Therese green supervisor, and MD Tillman verified on chart that patient has a dni/dnr with limited basic medical intervention.

## 2022-09-14 NOTE — PROGRESS NOTES
[facility] Medicine Services  INPATIENT PROGRESS NOTE    Length of Stay: 0  Date of Admission: 9/14/2022  Primary Care Physician: Shane Banks MD    Subjective   Chief Complaint: Respiratory failure/sepsis/UTI/acute kidney injury    HPI   Ongoing 20 wean off Levophed and vasopressin drip.  Blood pressures on low side, heart rates 110 at this time.  T-max 100.1.  Patient is currently requiring BiPAP.  Patient is a DNR/DNI.  Status post cardioversion.  Status post adenosine x3.  Patient is also from a nursing home.    Review of Systems   Unable obtain due to altered mental status and on BiPAP.    All pertinent negatives and positives are as above. All other systems have been reviewed and are negative unless otherwise stated.     Objective    Temp:  [100.1 °F (37.8 °C)] 100.1 °F (37.8 °C)  Heart Rate:  [] 110  Resp:  [30-45] 30  BP: ()/() 88/70    Intake/Output Summary (Last 24 hours) at 9/14/2022 0840  Last data filed at 9/14/2022 0625  Gross per 24 hour   Intake 3550 ml   Output 300 ml   Net 3250 ml     Physical Exam  Vitals and nursing note reviewed.   Constitutional:       Comments: Chronically ill.  Cachectic.  Advanced age.   HENT:      Head: Normocephalic.   Eyes:      Conjunctiva/sclera: Conjunctivae normal.      Pupils: Pupils are equal, round, and reactive to light.   Neck:      Vascular: No JVD.   Cardiovascular:      Rate and Rhythm: Tachycardia present. Rhythm irregular.      Heart sounds: Normal heart sounds.   Pulmonary:      Effort: No respiratory distress.      Breath sounds: Rhonchi present. No wheezing or rales.      Comments: Slight rhonchi bilateral, on BiPAP.  Diminished breath sound bilateral.  Chest:      Chest wall: No tenderness.   Abdominal:      General: Bowel sounds are normal. There is no distension.      Palpations: Abdomen is soft.      Tenderness: There is no abdominal tenderness.   Musculoskeletal:         General: No  tenderness or deformity.      Cervical back: Neck supple.   Skin:     General: Skin is warm and dry.      Capillary Refill: Capillary refill takes 2 to 3 seconds.      Findings: No rash.   Neurological:      Cranial Nerves: No cranial nerve deficit.      Motor: Weakness present. No abnormal muscle tone.      Coordination: Coordination abnormal.      Gait: Gait abnormal.      Deep Tendon Reflexes: Reflexes normal.         Results Review:  Lab Results (last 24 hours)     Procedure Component Value Units Date/Time    Comprehensive Metabolic Panel [[id number]]  (Abnormal) Collected: 09/14/22 0655    Specimen: Blood Updated: 09/14/22 0742     Glucose 122 mg/dL      BUN 51 mg/dL      Creatinine 1.72 mg/dL      Sodium 144 mmol/L      Potassium 4.5 mmol/L      Chloride 109 mmol/L      CO2 21.0 mmol/L      Calcium 10.0 mg/dL      Total Protein 5.0 g/dL      Albumin 2.60 g/dL      ALT (SGPT) 6 U/L      AST (SGOT) 17 U/L      Alkaline Phosphatase 54 U/L      Total Bilirubin 0.7 mg/dL      Globulin 2.4 gm/dL      A/G Ratio 1.1 g/dL      BUN/Creatinine Ratio 29.7     Anion Gap 14.0 mmol/L      eGFR 39.9 mL/min/1.73      Comment: National Kidney Foundation and American Society of Nephrology (ASN) Task Force recommended calculation based on the Chronic Kidney Disease Epidemiology Collaboration (CKD-EPI) equation refit without adjustment for race.       Narrative:      GFR Normal >60  Chronic Kidney Disease <60  Kidney Failure <15      Manual Differential [[id number]]  (Abnormal) Collected: 09/14/22 0655    Specimen: Blood Updated: 09/14/22 0734     Neutrophil % 15.3 %      Lymphocyte % 15.3 %      Monocyte % 6.1 %      Bands %  43.9 %      Metamyelocyte % 17.3 %      Myelocyte % 2.0 %      Neutrophils Absolute 11.35 10*3/mm3      Lymphocytes Absolute 2.93 10*3/mm3      Monocytes Absolute 1.17 10*3/mm3      Acanthocytes Slight/1+     Crenated RBC's Large/3+     Poikilocytes Large/3+     Schistocytes Slight/1+     WBC Morphology Normal  "    Platelet Morphology Normal    CBC Auto Differential [[id number]]  (Abnormal) Collected: 09/14/22 0655    Specimen: Blood Updated: 09/14/22 0734     WBC 19.17 10*3/mm3      RBC 4.17 10*6/mm3      Hemoglobin 13.2 g/dL      Hematocrit 41.9 %      .5 fL      MCH 31.7 pg      MCHC 31.5 g/dL      RDW 14.3 %      RDW-SD 52.7 fl      MPV 11.3 fL      Platelets 193 10*3/mm3     Narrative:      The previously reported component NRBC is no longer being reported. Previous result was 0.0 /100 WBC (Reference Range: 0.0-0.2 /100 WBC) on 9/14/2022 at 0718 CDT.    Procalcitonin [[id number]]  (Abnormal) Collected: 09/14/22 0655    Specimen: Blood Updated: 09/14/22 0733     Procalcitonin 41.44 ng/mL     Narrative:      As a Marker for Sepsis (Non-Neonates):    1. <0.5 ng/mL represents a low risk of severe sepsis and/or septic shock.  2. >2 ng/mL represents a high risk of severe sepsis and/or septic shock.    As a Marker for Lower Respiratory Tract Infections that require antibiotic therapy:    PCT on Admission    Antibiotic Therapy       6-12 Hrs later    >0.5                Strongly Recommended  >0.25 - <0.5        Recommended   0.1 - 0.25          Discouraged              Remeasure/reassess PCT  <0.1                Strongly Discouraged     Remeasure/reassess PCT    As 28 day mortality risk marker: \"Change in Procalcitonin Result\" (>80% or <=80%) if Day 0 (or Day 1) and Day 4 values are available. Refer to http://www.[employer]-pct-calculator.com    Change in PCT <=80%  A decrease of PCT levels below or equal to 80% defines a positive change in PCT test result representing a higher risk for 28-day all-cause mortality of patients diagnosed with severe sepsis for septic shock.    Change in PCT >80%  A decrease of PCT levels of more than 80% defines a negative change in PCT result representing a lower risk for 28-day all-cause mortality of patients diagnosed with severe sepsis or septic shock.       Troponin [[id number]]  (Abnormal) " Collected: 09/14/22 0655    Specimen: Blood Updated: 09/14/22 0728     Troponin T 0.035 ng/mL     Narrative:      Troponin T Reference Range:  <= 0.03 ng/mL-   Negative for AMI  >0.03 ng/mL-     Abnormal for myocardial necrosis.  Clinicians would have to utilize clinical acumen, EKG, Troponin and serial changes to determine if it is an Acute Myocardial Infarction or myocardial injury due to an underlying chronic condition.       Results may be falsely decreased if patient taking Biotin.      STAT Lactic Acid, Reflex [[id number]]  (Abnormal) Collected: 09/14/22 0548    Specimen: Blood Updated: 09/14/22 0618     Lactate 8.8 mmol/L     Troponin [[id number]]  (Normal) Collected: 09/14/22 0405    Specimen: Blood Updated: 09/14/22 0431     Troponin T 0.018 ng/mL     Narrative:      Troponin T Reference Range:  <= 0.03 ng/mL-   Negative for AMI  >0.03 ng/mL-     Abnormal for myocardial necrosis.  Clinicians would have to utilize clinical acumen, EKG, Troponin and serial changes to determine if it is an Acute Myocardial Infarction or myocardial injury due to an underlying chronic condition.       Results may be falsely decreased if patient taking Biotin.      COVID-19,[facility]-HOUSE,Nasal Swab No Transport Media 3-4 HR TAT - Swab, Nasal Cavity [[id number]]  (Normal) Collected: 09/14/22 0302    Specimen: Swab from Nasal Cavity Updated: 09/14/22 0346     COVID19 Not Detected    Narrative:      Fact sheet for providers: https://www.fda.gov/media/092324/download     Fact sheet for patients: https://www.fda.gov/media/720656/download    Test performed by PCR.    Consider negative results in combination with clinical observations, patient history, and epidemiological information.    Blood Culture - Blood, Arm, Right [[id number]] Collected: 09/14/22 0301    Specimen: Blood from Arm, Right Updated: 09/14/22 0310    Blood Culture - Blood, Arm, Left [[id number]] Collected: 09/14/22 0255    Specimen: Blood from Arm, Left Updated:  09/14/22 0309    Urinalysis With Culture If Indicated - Urine, Catheter [[id number]]  (Abnormal) Collected: 09/14/22 0248    Specimen: Urine, Catheter Updated: 09/14/22 0309     Color, UA Dark Yellow     Appearance, UA Turbid     pH, UA 5.5     Specific Gravity, UA 1.017     Glucose, UA Negative     Ketones, UA Trace     Bilirubin, UA Negative     Blood, UA Large (3+)     Protein,  mg/dL (2+)     Leuk Esterase, UA Large (3+)     Nitrite, UA Negative     Urobilinogen, UA 1.0 E.U./dL    Narrative:      In absence of clinical symptoms, the presence of pyuria, bacteria, and/or nitrites on the urinalysis result does not correlate with infection.    Urinalysis, Microscopic Only - Urine, Catheter [[id number]]  (Abnormal) Collected: 09/14/22 0248    Specimen: Urine, Catheter Updated: 09/14/22 0309     RBC, UA Too Numerous to Count /HPF      WBC, UA Too Numerous to Count /HPF      Bacteria, UA 2+ /HPF      Squamous Epithelial Cells, UA None Seen /HPF      Hyaline Casts, UA None Seen /LPF      Methodology Manual Light Microscopy    Urine Culture - Urine, Urine, Catheter [[id number]] Collected: 09/14/22 0248    Specimen: Urine, Catheter Updated: 09/14/22 0309    Blood Gas, Arterial - [[id number]]  (Abnormal) Collected: 09/14/22 0303    Specimen: Arterial Blood Updated: 09/14/22 0302     Site Right Radial     Leighton's Test Positive     pH, Arterial 7.224 pH units      Comment: 84 Value below reference range        pCO2, Arterial 34.2 mm Hg      Comment: 84 Value below reference range        pO2, Arterial 135.0 mm Hg      Comment: 83 Value above reference range        HCO3, Arterial 14.1 mmol/L      Comment: 84 Value below reference range        Base Excess, Arterial -12.5 mmol/L      Comment: 84 Value below reference range        O2 Saturation, Arterial 99.0 %      Temperature 37.0 C      Barometric Pressure for Blood Gas 751 mmHg      Modality BiPap     FIO2 100 %      Ventilator Mode BiPAP     Set [facility] Resp Rate 16.0     PSV  14.0 cmH2O      IPAP 20     Comment: Meter: [id number]     :  [id number]        EPAP 6     Collected by [id number]     pCO2, Temperature Corrected 34.2 mm Hg      pH, Temp Corrected 7.224 pH Units      pO2, Temperature Corrected 135 mm Hg     Manual Differential [[id number]]  (Abnormal) Collected: 09/14/22 0153    Specimen: Blood Updated: 09/14/22 0238     Neutrophil % 21.2 %      Lymphocyte % 16.2 %      Monocyte % 3.0 %      Bands %  28.3 %      Metamyelocyte % 25.3 %      Myelocyte % 2.0 %      Atypical Lymphocyte % 4.0 %      Neutrophils Absolute 9.98 10*3/mm3      Lymphocytes Absolute 4.07 10*3/mm3      Monocytes Absolute 0.61 10*3/mm3      Crenated RBC's Mod/2+     Poikilocytes Mod/2+     WBC Morphology Normal     Platelet Morphology Normal    CBC & Differential [[id number]]  (Abnormal) Collected: 09/14/22 0153    Specimen: Blood Updated: 09/14/22 0238    Narrative:      The following orders were created for panel order CBC & Differential.  Procedure                               Abnormality         Status                     ---------                               -----------         ------                     CBC Auto Differential[[id number]]        Abnormal            Final result                 Please view results for these tests on the individual orders.    CBC Auto Differential [[id number]]  (Abnormal) Collected: 09/14/22 0153    Specimen: Blood Updated: 09/14/22 0238     WBC 20.17 10*3/mm3      RBC 4.96 10*6/mm3      Hemoglobin 15.9 g/dL      Hematocrit 50.5 %      .8 fL      MCH 32.1 pg      MCHC 31.5 g/dL      RDW 14.4 %      RDW-SD 54.1 fl      MPV 11.1 fL      Platelets 245 10*3/mm3     Narrative:      The previously reported component NRBC is no longer being reported. Previous result was 0.0 /100 WBC (Reference Range: 0.0-0.2 /100 WBC) on 9/14/2022 at 0229 T.    Procalcitonin [[id number]]  (Abnormal) Collected: 09/14/22 0153    Specimen: Blood Updated: 09/14/22 0233     Procalcitonin  "30.50 ng/mL     Narrative:      As a Marker for Sepsis (Non-Neonates):    1. <0.5 ng/mL represents a low risk of severe sepsis and/or septic shock.  2. >2 ng/mL represents a high risk of severe sepsis and/or septic shock.    As a Marker for Lower Respiratory Tract Infections that require antibiotic therapy:    PCT on Admission    Antibiotic Therapy       6-12 Hrs later    >0.5                Strongly Recommended  >0.25 - <0.5        Recommended   0.1 - 0.25          Discouraged              Remeasure/reassess PCT  <0.1                Strongly Discouraged     Remeasure/reassess PCT    As 28 day mortality risk marker: \"Change in Procalcitonin Result\" (>80% or <=80%) if Day 0 (or Day 1) and Day 4 values are available. Refer to [email or web address]-pct-[email or web address]    Change in PCT <=80%  A decrease of PCT levels below or equal to 80% defines a positive change in PCT test result representing a higher risk for 28-day all-cause mortality of patients diagnosed with severe sepsis for septic shock.    Change in PCT >80%  A decrease of PCT levels of more than 80% defines a negative change in PCT result representing a lower risk for 28-day all-cause mortality of patients diagnosed with severe sepsis or septic shock.       Protime-INR [[id number]]  (Abnormal) Collected: 09/14/22 0153    Specimen: Blood Updated: 09/14/22 0230     Protime 14.3 Seconds      INR 1.15    D-dimer, Quantitative [[id number]]  (Abnormal) Collected: 09/14/22 0153    Specimen: Blood Updated: 09/14/22 0230     D-Dimer, Quantitative 9.67 MCGFEU/mL     Narrative:      Reference Range is 0-0.50 MCGFEU/mL. However, results <0.50 MCGFEU/mL tends to rule out DVT or PE. Results >0.50 MCGFEU/mL are not useful in predicting absence or presence of DVT or PE.      Lactic Acid, Plasma [[id number]]  (Abnormal) Collected: 09/14/22 0153    Specimen: Blood Updated: 09/14/22 0229     Lactate 11.6 mmol/L     C-reactive Protein [[id number]]  (Abnormal) Collected: 09/14/22 0153    " Specimen: Blood Updated: 09/14/22 0228     C-Reactive Protein 6.93 mg/dL     Comprehensive Metabolic Panel [[id number]]  (Abnormal) Collected: 09/14/22 0153    Specimen: Blood Updated: 09/14/22 0228     Glucose 147 mg/dL      BUN 48 mg/dL      Creatinine 2.09 mg/dL      Sodium 146 mmol/L      Potassium 3.5 mmol/L      Comment: Slight hemolysis detected by analyzer. Results may be affected.        Chloride 106 mmol/L      CO2 19.0 mmol/L      Calcium 11.1 mg/dL      Total Protein 6.4 g/dL      Albumin 3.40 g/dL      ALT (SGPT) 6 U/L      AST (SGOT) 14 U/L      Alkaline Phosphatase 74 U/L      Total Bilirubin 0.8 mg/dL      Globulin 3.0 gm/dL      A/G Ratio 1.1 g/dL      BUN/Creatinine Ratio 23.0     Anion Gap 21.0 mmol/L      eGFR 31.6 mL/min/1.73      Comment: National Kidney Foundation and American Society of Nephrology (ASN) Task Force recommended calculation based on the Chronic Kidney Disease Epidemiology Collaboration (CKD-EPI) equation refit without adjustment for race.       Narrative:      GFR Normal >60  Chronic Kidney Disease <60  Kidney Failure <15      Troponin [[id number]]  (Normal) Collected: 09/14/22 0153    Specimen: Blood Updated: 09/14/22 0226     Troponin T 0.022 ng/mL     Narrative:      Troponin T Reference Range:  <= 0.03 ng/mL-   Negative for AMI  >0.03 ng/mL-     Abnormal for myocardial necrosis.  Clinicians would have to utilize clinical acumen, EKG, Troponin and serial changes to determine if it is an Acute Myocardial Infarction or myocardial injury due to an underlying chronic condition.       Results may be falsely decreased if patient taking Biotin.      BNP [[id number]]  (Abnormal) Collected: 09/14/22 0153    Specimen: Blood Updated: 09/14/22 0226     proBNP 5,422.0 pg/mL     Narrative:      Among patients with dyspnea, NT-proBNP is highly sensitive for the detection of acute congestive heart failure. In addition NT-proBNP of <300 pg/ml effectively rules out acute congestive heart failure  with 99% negative predictive value.    Results may be falsely decreased if patient taking Biotin.             Cultures:  No results found for: BLOODCX, URINECX, WOUNDCX, MRSACX, RESPCX, STOOLCX    Radiology Data:    Imaging Results (Last 24 Hours)     Procedure Component Value Units Date/Time    XR Chest 1 View [[id number]] Collected: 09/14/22 0656     Updated: 09/14/22 0701    Narrative:      EXAMINATION:  XR CHEST 1 VW-  9/14/2022 2:07 AM CDT     HISTORY: Respiratory distress.     COMPARISON: 7/27/2021.     TECHNIQUE: Single view AP image.     FINDINGS:  There are patchy infiltrates in the mid and lower lung zones  right greater than left. No significant pleural effusion is seen. There  is cardiomegaly. The thoracic aorta is tortuous. There is a prominent  air-filled structure below the diaphragm and may represent distended  stomach or other bowel. Free air is felt to be less likely.       Impression:      1. Patchy infiltrates in the mid and lower lung zones may represent  patchy pneumonia or mild edema.  2. Cardiomegaly.  3. Prominent air-filled structure below the diaphragm may represent a  distended stomach or other bowel. Free air is felt to be less likely.        This report was finalized on 09/14/2022 06:58 by Dr. Jett Gore MD.          No Known Allergies    Scheduled meds:   albuterol, 1.25 mg, Nebulization, 4x Daily - RT   And  ipratropium, 0.5 mg, Nebulization, 4x Daily - RT  divalproex, 125 mg, Oral, Daily  divalproex, 250 mg, Oral, Nightly  famotidine, 20 mg, Intravenous, Daily  Morphine, 2 mg, Intravenous, Once  multivitamin with minerals, 1 tablet, Oral, Daily  piperacillin-tazobactam, 3.375 g, Intravenous, Q8H  sodium chloride, 10 mL, Intravenous, Q12H  tamsulosin, 0.4 mg, Oral, Daily        PRN meds:  •  acetaminophen **OR** acetaminophen  •  ondansetron  •  sodium chloride    Assessment/Plan       COPD (chronic obstructive pulmonary disease) (HCC)    Septic shock (HCC)    Dementia (HCC)    DEEPIKA  (acute kidney injury) (HCC)    Urinary tract infection associated with indwelling urethral catheter (Klebsiella)    Nausea & vomiting    Hypernatremia    Acute respiratory failure with hypoxia (HCC)      Plan:  Respiratory distress/aspiration pneumonia/COPD. Zosyn antibiotic started for possible aspiration.  Half-strength duo nebs.  Chest x-ray-Patchy infiltrates in the mid and lower lung zones may represent  patchy pneumonia or mild edema, Cardiomegaly, Prominent air-filled structure below the diaphragm may represent a distended stomach or other bowel. Free air is felt to be less likely.  BiPAP.    UTI/urinary obstruction/pyuria.  Pus in the [place] cath bag at bedside.  Septic shock.  History of Klebsiella urinary tract infection.  Flomax.  On Zosyn.    Hypotension . Levophed drip.  Status post sepsis bolus.  Vasopressin as needed.  Wean down vasopressin and Levophed drip when able.    Atrial for with RVR/pacemaker..  Status post adenosine x3.  Status post conversion in ER.  Heart rate was 134.  BNP 5000.  Troponin last 1.035.  Echocardiogram 7/27/2021- ejection fraction 56 to 60%, less than 1 cm pericardial effusion, tricuspid regurgitation, left atrial volume moderately increased.    Epilepsy.  Depakote.     Acute kidney injury.  IV hydration.  Creatinine 2.09.    Hypernatremia.  We will follow.    Nausea.  Zosyn.  Pepcid IV.    Falling/skin precaution.    Nutrition.  N.p.o . speech consult.  Multivitamins.    Deconditioning.  PT and OT consult.    Blood cultures pending.  Urine cultures pending.  COVID-19-negative.    Discharge Planning: DNR.  DNI.  Patient is from a nursing home.    Electronically signed by Denny Whipple MD, 09/14/22, 7:24 AM CDT.

## 2022-09-14 NOTE — ED PROVIDER NOTES
Subjective   PIT    77-year-old male presents to the ED from [facility] with complaint of altered mental status, hypoxia, hypotension.  He is DNR/DNI with limited medical interventions.  History of A. fib, COPD, dementia.  Per EMS, patient was found altered hypoxic hypotensive at the nursing home.  Transported emergently to the emergency department for evaluation.  Upon arrival, patient was in respiratory distress and appeared to be near death.  We did review the patient's chart to confirm CODE STATUS.  Advanced directive documents specifically state do not perform CPR, DO NOT INTUBATE.  Unable to obtain any additional information.      History provided by:  Patient  History limited by:  Severe respiratory distress and mental status change      Review of Systems   Unable to perform ROS: Mental status change       Past Medical History:   Diagnosis Date   • A-fib (HCC)    • Alcohol abuse    • Anxiety    • COPD (chronic obstructive pulmonary disease) (HCC)    • COPD (chronic obstructive pulmonary disease) (HCC)    • Hypotension    • Lung disease    • Malaise and fatigue    • Neurological disease    • Polycythemia    • SOB (shortness of breath)    • Brina-Parkinson-White syndrome        No Known Allergies    Past Surgical History:   Procedure Laterality Date   • KNEE SURGERY     • PACEMAKER IMPLANTATION      [facility]    • WRIST SURGERY         Family History   Problem Relation Age of Onset   • No Known Problems Mother    • Cancer Father    • Heart disease Father    • COPD Sister        Social History     Socioeconomic History   • Marital status:    Tobacco Use   • Smoking status: Heavy Tobacco Smoker     Packs/day: 2.00     Years: 60.00     Pack years: 120.00   • Smokeless tobacco: Never Used   Substance and Sexual Activity   • Alcohol use: Yes     Alcohol/week: 28.0 standard drinks     Types: 21 Cans of beer, 7 Shots of liquor per week     Comment: 2-3 BEERS, 1 GIN DAILY   • Drug use:  No   • Sexual activity: Never           Objective   Physical Exam  Vitals and nursing note reviewed.   Constitutional:       General: He is in acute distress.      Appearance: He is toxic-appearing.      Comments: Unresponsive elderly male in respiratory distress   HENT:      Head: Normocephalic and atraumatic.      Nose: Nose normal. No congestion or rhinorrhea.      Mouth/Throat:      Mouth: Mucous membranes are dry.   Eyes:      Extraocular Movements: Extraocular movements intact.      Conjunctiva/sclera: Conjunctivae normal.      Pupils: Pupils are equal, round, and reactive to light.   Cardiovascular:      Rate and Rhythm: Tachycardia present. Rhythm irregular.      Pulses: Normal pulses.      Heart sounds: Normal heart sounds.   Pulmonary:      Effort: Pulmonary effort is normal.      Breath sounds: Normal breath sounds. No wheezing, rhonchi or rales.   Abdominal:      General: Abdomen is flat. Bowel sounds are normal.      Palpations: Abdomen is soft.   Musculoskeletal:      Cervical back: Normal range of motion and neck supple.   Skin:     Capillary Refill: Capillary refill takes 2 to 3 seconds.      Coloration: Skin is pale.   Neurological:      Comments: [email or web address]         Central Line At Bedside    Date/Time: 9/14/2022 2:28 AM  Performed by: Vikram Tillman MD  Authorized by: Vikram Tillman MD     Consent:     Consent obtained:  Emergent situation    Risks discussed:  Arterial puncture, incorrect placement, nerve damage, bleeding and infection  Universal protocol:     Patient identity confirmed:  Hospital-assigned identification number  Pre-procedure details:     Indication(s): central venous access      Hand hygiene: Hand hygiene performed prior to insertion      Sterile barrier technique: All elements of maximal sterile technique followed      Skin preparation:  Chlorhexidine    Skin preparation agent: Skin preparation agent completely dried prior to procedure    Sedation:     Sedation type:   None  Anesthesia:     Anesthesia method:  None  Procedure details:     Location:  R femoral    Patient position:  Supine    Procedural supplies:  Triple lumen    Catheter size:  7 Fr    Ultrasound guidance: yes      Number of attempts:  3    Successful placement: yes    Post-procedure details:     Post-procedure:  Dressing applied and line sutured    Assessment:  Blood return through all ports and free fluid flow    Procedure completion:  Tolerated    Critical Care  Performed by: Vikram Tillman MD  Authorized by: Vikram Tillman MD     Critical care provider statement:     Critical care time (minutes):  60    Critical care time was exclusive of:  Separately billable procedures and treating other patients    Critical care was necessary to treat or prevent imminent or life-threatening deterioration of the following conditions:  Cardiac failure, respiratory failure, shock, sepsis and metabolic crisis    Critical care was time spent personally by me on the following activities:  Ordering and performing treatments and interventions, ordering and review of laboratory studies, ordering and review of radiographic studies, pulse oximetry, re-evaluation of patient's condition, review of old charts, vascular access procedures, examination of patient, evaluation of patient's response to treatment and development of treatment plan with patient or surrogate    I assumed direction of critical care for this patient from another provider in my specialty: no      Care discussed with: admitting provider               Lab Results (last 24 hours)     Procedure Component Value Units Date/Time    CBC & Differential [[id number]]  (Abnormal) Collected: 09/14/22 0153    Specimen: Blood Updated: 09/14/22 0238    Narrative:      The following orders were created for panel order CBC & Differential.  Procedure                               Abnormality         Status                     ---------                                -----------         ------                     CBC Auto Differential[[id number]]        Abnormal            Final result                 Please view results for these tests on the individual orders.    Comprehensive Metabolic Panel [[id number]]  (Abnormal) Collected: 09/14/22 0153    Specimen: Blood Updated: 09/14/22 0228     Glucose 147 mg/dL      BUN 48 mg/dL      Creatinine 2.09 mg/dL      Sodium 146 mmol/L      Potassium 3.5 mmol/L      Comment: Slight hemolysis detected by analyzer. Results may be affected.        Chloride 106 mmol/L      CO2 19.0 mmol/L      Calcium 11.1 mg/dL      Total Protein 6.4 g/dL      Albumin 3.40 g/dL      ALT (SGPT) 6 U/L      AST (SGOT) 14 U/L      Alkaline Phosphatase 74 U/L      Total Bilirubin 0.8 mg/dL      Globulin 3.0 gm/dL      A/G Ratio 1.1 g/dL      BUN/Creatinine Ratio 23.0     Anion Gap 21.0 mmol/L      eGFR 31.6 mL/min/1.73      Comment: National Kidney Foundation and American Society of Nephrology (ASN) Task Force recommended calculation based on the Chronic Kidney Disease Epidemiology Collaboration (CKD-EPI) equation refit without adjustment for race.       Narrative:      GFR Normal >60  Chronic Kidney Disease <60  Kidney Failure <15      Protime-INR [[id number]]  (Abnormal) Collected: 09/14/22 0153    Specimen: Blood Updated: 09/14/22 0230     Protime 14.3 Seconds      INR 1.15    BNP [[id number]]  (Abnormal) Collected: 09/14/22 0153    Specimen: Blood Updated: 09/14/22 0226     proBNP 5,422.0 pg/mL     Narrative:      Among patients with dyspnea, NT-proBNP is highly sensitive for the detection of acute congestive heart failure. In addition NT-proBNP of <300 pg/ml effectively rules out acute congestive heart failure with 99% negative predictive value.    Results may be falsely decreased if patient taking Biotin.      Troponin [[id number]]  (Normal) Collected: 09/14/22 0153    Specimen: Blood Updated: 09/14/22 0226     Troponin T 0.022 ng/mL     Narrative:      Troponin T  "Reference Range:  <= 0.03 ng/mL-   Negative for AMI  >0.03 ng/mL-     Abnormal for myocardial necrosis.  Clinicians would have to utilize clinical acumen, EKG, Troponin and serial changes to determine if it is an Acute Myocardial Infarction or myocardial injury due to an underlying chronic condition.       Results may be falsely decreased if patient taking Biotin.      D-dimer, Quantitative [[id number]]  (Abnormal) Collected: 09/14/22 0153    Specimen: Blood Updated: 09/14/22 0230     D-Dimer, Quantitative 9.67 MCGFEU/mL     Narrative:      Reference Range is 0-0.50 MCGFEU/mL. However, results <0.50 MCGFEU/mL tends to rule out DVT or PE. Results >0.50 MCGFEU/mL are not useful in predicting absence or presence of DVT or PE.      Lactic Acid, Plasma [[id number]]  (Abnormal) Collected: 09/14/22 0153    Specimen: Blood Updated: 09/14/22 0229     Lactate 11.6 mmol/L     Procalcitonin [[id number]]  (Abnormal) Collected: 09/14/22 0153    Specimen: Blood Updated: 09/14/22 0233     Procalcitonin 30.50 ng/mL     Narrative:      As a Marker for Sepsis (Non-Neonates):    1. <0.5 ng/mL represents a low risk of severe sepsis and/or septic shock.  2. >2 ng/mL represents a high risk of severe sepsis and/or septic shock.    As a Marker for Lower Respiratory Tract Infections that require antibiotic therapy:    PCT on Admission    Antibiotic Therapy       6-12 Hrs later    >0.5                Strongly Recommended  >0.25 - <0.5        Recommended   0.1 - 0.25          Discouraged              Remeasure/reassess PCT  <0.1                Strongly Discouraged     Remeasure/reassess PCT    As 28 day mortality risk marker: \"Change in Procalcitonin Result\" (>80% or <=80%) if Day 0 (or Day 1) and Day 4 values are available. Refer to http://www.[employer]-pct-calculator.com    Change in PCT <=80%  A decrease of PCT levels below or equal to 80% defines a positive change in PCT test result representing a higher risk for 28-day all-cause mortality of " patients diagnosed with severe sepsis for septic shock.    Change in PCT >80%  A decrease of PCT levels of more than 80% defines a negative change in PCT result representing a lower risk for 28-day all-cause mortality of patients diagnosed with severe sepsis or septic shock.       C-reactive Protein [[id number]]  (Abnormal) Collected: 09/14/22 0153    Specimen: Blood Updated: 09/14/22 0228     C-Reactive Protein 6.93 mg/dL     CBC Auto Differential [[id number]]  (Abnormal) Collected: 09/14/22 0153    Specimen: Blood Updated: 09/14/22 0238     WBC 20.17 10*3/mm3      RBC 4.96 10*6/mm3      Hemoglobin 15.9 g/dL      Hematocrit 50.5 %      .8 fL      MCH 32.1 pg      MCHC 31.5 g/dL      RDW 14.4 %      RDW-SD 54.1 fl      MPV 11.1 fL      Platelets 245 10*3/mm3     Narrative:      The previously reported component NRBC is no longer being reported. Previous result was 0.0 /100 WBC (Reference Range: 0.0-0.2 /100 WBC) on 9/14/2022 at 0229 CDT.    Manual Differential [[id number]]  (Abnormal) Collected: 09/14/22 0153    Specimen: Blood Updated: 09/14/22 0238     Neutrophil % 21.2 %      Lymphocyte % 16.2 %      Monocyte % 3.0 %      Bands %  28.3 %      Metamyelocyte % 25.3 %      Myelocyte % 2.0 %      Atypical Lymphocyte % 4.0 %      Neutrophils Absolute 9.98 10*3/mm3      Lymphocytes Absolute 4.07 10*3/mm3      Monocytes Absolute 0.61 10*3/mm3      Crenated RBC's Mod/2+     Poikilocytes Mod/2+     WBC Morphology Normal     Platelet Morphology Normal    Urinalysis With Culture If Indicated - Urine, Catheter [[id number]]  (Abnormal) Collected: 09/14/22 0248    Specimen: Urine, Catheter Updated: 09/14/22 0309     Color, UA Dark Yellow     Appearance, UA Turbid     pH, UA 5.5     Specific Gravity, UA 1.017     Glucose, UA Negative     Ketones, UA Trace     Bilirubin, UA Negative     Blood, UA Large (3+)     Protein,  mg/dL (2+)     Leuk Esterase, UA Large (3+)     Nitrite, UA Negative     Urobilinogen, UA 1.0  E.U./dL    Narrative:      In absence of clinical symptoms, the presence of pyuria, bacteria, and/or nitrites on the urinalysis result does not correlate with infection.    Urinalysis, Microscopic Only - Urine, Catheter [[id number]]  (Abnormal) Collected: 09/14/22 0248    Specimen: Urine, Catheter Updated: 09/14/22 0309     RBC, UA Too Numerous to Count /HPF      WBC, UA Too Numerous to Count /HPF      Bacteria, UA 2+ /HPF      Squamous Epithelial Cells, UA None Seen /HPF      Hyaline Casts, UA None Seen /LPF      Methodology Manual Light Microscopy    Urine Culture - Urine, Urine, Catheter [[id number]] Collected: 09/14/22 0248    Specimen: Urine, Catheter Updated: 09/14/22 0309    Blood Culture - Blood, Arm, Left [[id number]] Collected: 09/14/22 0255    Specimen: Blood from Arm, Left Updated: 09/14/22 0309    Blood Culture - Blood, Arm, Right [[id number]] Collected: 09/14/22 0301    Specimen: Blood from Arm, Right Updated: 09/14/22 0310    COVID-19,[facility] IN-HOUSE,Nasal Swab No Transport Media 3-4 HR TAT - Swab, Nasal Cavity [[id number]]  (Normal) Collected: 09/14/22 0302    Specimen: Swab from Nasal Cavity Updated: 09/14/22 0346     COVID19 Not Detected    Narrative:      Fact sheet for providers: https://www.fda.gov/media/299085/download     Fact sheet for patients: https://www.fda.gov/media/428015/download    Test performed by PCR.    Consider negative results in combination with clinical observations, patient history, and epidemiological information.    Blood Gas, Arterial - [[id number]]  (Abnormal) Collected: 09/14/22 0303    Specimen: Arterial Blood Updated: 09/14/22 0302     Site Right Radial     Leighton's Test Positive     pH, Arterial 7.224 pH units      Comment: 84 Value below reference range        pCO2, Arterial 34.2 mm Hg      Comment: 84 Value below reference range        pO2, Arterial 135.0 mm Hg      Comment: 83 Value above reference range        HCO3, Arterial 14.1 mmol/L      Comment: 84 Value below  reference range        Base Excess, Arterial -12.5 mmol/L      Comment: 84 Value below reference range        O2 Saturation, Arterial 99.0 %      Temperature 37.0 C      Barometric Pressure for Blood Gas 751 mmHg      Modality BiPap     FIO2 100 %      Ventilator Mode BiPAP     Set [facility] Resp Rate 16.0     PSV 14.0 cmH2O      IPAP 20     Comment: Meter: [id number]     :  [id number]        EPAP 6     Collected by [id number]     pCO2, Temperature Corrected 34.2 mm Hg      pH, Temp Corrected 7.224 pH Units      pO2, Temperature Corrected 135 mm Hg     Troponin [[id number]] Collected: 22    Specimen: Blood Updated: 22        ED Course  ED Course as of 09/14/22 0426   Wed Sep 14, 2022   0406 77-year-old male patient who is DNR/DNI with limited interventions, A. fib, COPD, dimension presents to the ED from a nursing home with altered mental status, hypoxic and hypotensive.    Although the patient is DNR/DNI, advance directive does state BiPAP was okay.  In general, he would not be an awake enough candidate for BiPAP given his degree of altered mental status however patient would have likely  within minutes had we not given trial.  So far as tolerated and oxygenation has improved.  Initial sats were in the 60s, PO2 120s on BiPAP.    Patient appeared to be in A. fib with RVR on arrival, however; heart rate spiked to 180s and was regular.  EKG showed SVT so we attempted chemical cardioversion with adenosine with return in rhythm to A. fib with RVR.  Likely chronic A. fib but given his hypotensive state we attempted electrical cardioversion however this was unsuccessful and patient remained in atrial fibrillation.    Central line was placed, patient was started on pressors.  Did have improvement in his blood pressure.  Initial lactate 11.6, WBC 20,000 with 20% bands.  Treating patient for septic shock.    [place] was placed and urine appeared consistent with pyuria.    Received Zosyn to cover  "aspiration pneumonia as well as suspected UTI.    Poor prognosis discussed with family, they are okay with continuing current level of treatment.     We will continue supportive care in the emergency department titrate pressures to keep MAP 65 and plan for admission to the ICU. [AW]   0422 Patient remains critically ill.  He is on 2 pressors at the moment and remains hypotensive.  I had a fiona conversation with granddaughter who is also the guardian and discussed options of withdrawal of care, no escalation of care, or adding additional pressors if needed.  Guardian has opted for no escalation of care understanding that if patient continues to decline he will pass away.  She requests that we \"keep him comfortable\". [AW]      ED Course User Index  [AW] Vikram Tillman MD                                           [facility]    Final diagnoses:   Septic shock (HCC)   Acute cystitis with hematuria   Acute renal failure, unspecified acute renal failure type (HCC)   Respiratory insufficiency   Altered mental status, unspecified altered mental status type   Aspiration pneumonia, unspecified aspiration pneumonia type, unspecified laterality, unspecified part of lung (HCC)   Atrial fibrillation, unspecified type (HCC)       ED Disposition  ED Disposition     ED Disposition   Decision to Admit    Condition   --    Comment   Level of Care: Critical Care [6]   Diagnosis: Septic shock (HCC) [[id number]]   Admitting Physician: CHRISTO WISDOM [[id number]]   Attending Physician: CHRISTO WISDOM [[id number]]   Certification: I Certify That Inpatient Hospital Services Are Medically Necessary For Greater Than 2 Midnights               No follow-up provider specified.       Medication List      No changes were made to your prescriptions during this visit.          Vikram Tillman MD  09/14/22 0417       Vikram Tillman MD  09/14/22 0426    " detailed exam color normal

## 2022-09-15 NOTE — PLAN OF CARE
Goal Outcome Evaluation:      Pt cont to be comfort care pt turned  pt on ms drip resp 8 min , paulo to bsd . O2 at 3l/m

## 2022-09-15 NOTE — PROGRESS NOTES
[facility] Palliative Care Services  Progress Note  Patient Name: Ezequiel Abdalla  Date of Admission: 2022  Today's Date: 09/15/22     Code Status and Medical Interventions:   Ordered at: 22 1159     Level Of Support Discussed With:    Health Care Surrogate     Code Status (Patient has no pulse and is not breathing):    No CPR (Do Not Attempt to Resuscitate)     Medical Interventions (Patient has pulse or is breathing):    Comfort Measures     Comments:    CONTINUE BiPAP AND VASOPRESSORS UNTIL GRANDDAUGHTER/GUARDIAN ARRIVES PLEASE.     Subjective   Chief complaint/Reason for Referral/Visit: Follow up on Comfort Care.    Medical record reviewed. Events noted.  Admitted to medical floor yesterday afternoon.  Remains on comfort measures and currently on morphine drip at 4 mg/h.  He is lying in bed and appears comfortable at time of exam.  His friend is at bedside and reflected on their friendship of 40+ years.    Advance Care Planning   Advanced Directives: Patient has an advance directive on file.      The patient receives support from his extended family and friends. Patient's granddaughter, Silvia, is his guardian.     Due to the palliative care, treatment options, discharge options and medical priorities we will establish an advance care plan.      Review of Systems   Unable to perform ROS: mental status change     Pain Assessment  PAINAD Breathin-->normal  PAINAD Negative Vocalization: 0-->none  PAINAD Facial Expression: 0-->smiling or inexpressive  PAINAD Body Language: 0-->relaxed  PAINAD Consolability: 0-->no need to console  PAINAD Score: 0  Objective   Diagnostics: Reviewed      Intake/Output Summary (Last 24 hours) at 9/15/2022 1317  Last data filed at 9/15/2022 0300  Gross per 24 hour   Intake --   Output 1100 ml   Net -1100 ml     Current Facility-Administered Medications   Medication Dose Route Frequency Provider Last Rate Last Admin   • acetaminophen (TYLENOL) suppository 650  mg  650 mg Rectal Q4H PRN Boston Fontana, DO       • atropine 1 % ophthalmic solution 2 drop  2 drop Sublingual BID PRN Valarie Olivares APRN       • Glycerin-Hypromellose- (ARTIFICIAL TEARS) 0.2-0.2-1 % ophthalmic solution solution 1 drop  1 drop Both Eyes Q30 Min PRN Valarie Olivares APRN   1 drop at 09/15/22 0406   • ipratropium-albuterol (DUO-NEB) nebulizer solution 3 mL  3 mL Nebulization Q4H PRN Valarie Olivares APRN       • LORazepam (ATIVAN) injection 0.5 mg  0.5 mg Intravenous Q1H PRN Luis Daniel Roldan DO   0.5 mg at 09/14/22 1410    Or   • LORazepam (ATIVAN) 2 MG/ML concentrated solution 0.5 mg  0.5 mg Sublingual Q1H PRN Luis Daniel Roldan, DO       • LORazepam (ATIVAN) injection 1 mg  1 mg Intravenous Q1H PRN Luis Daniel Roldan DO        Or   • LORazepam (ATIVAN) 2 MG/ML concentrated solution 1 mg  1 mg Sublingual Q1H PRN Luis Daniel Roldan DO       • LORazepam (ATIVAN) injection 2 mg  2 mg Intravenous Q1H PRN Luis Daniel Roldan DO        Or   • LORazepam (ATIVAN) 2 MG/ML concentrated solution 2 mg  2 mg Sublingual Q1H PRN Luis Daniel Roldan DO       • Morphine 1mg/ml infusion 30ml  2 mg/hr Intravenous Titrated Luis Daniel Roldan DO 4 mL/hr at 09/15/22 0742 4 mg/hr at 09/15/22 0742   • ondansetron (ZOFRAN) injection 4 mg  4 mg Intravenous Q6H PRN Boston Fontana, DO       • prochlorperazine (COMPAZINE) injection 5 mg  5 mg Intravenous Q6H PRN Valarie Olivares APRN        Or   • prochlorperazine (COMPAZINE) suppository 25 mg  25 mg Rectal Q12H PRN Valarie Olivares APRN       • scopolamine patch 1 mg/72 hr  1 patch Transdermal Q72H PRN Valarie Olivares APRN   1 patch at 09/15/22 0406   • sodium chloride 0.9 % flush 10 mL  10 mL Intravenous PRN Boston Fontana, DO   10 mL at 09/14/22 0831     Morphine, 2 mg/hr, Last Rate: 4 mg/hr (09/15/22 0742)      •  [DISCONTINUED] acetaminophen **OR** acetaminophen  •  atropine  •  Glycerin-Hypromellose-  •   "ipratropium-albuterol  •  LORazepam **OR** LORazepam  •  LORazepam **OR** LORazepam  •  LORazepam **OR** LORazepam  •  ondansetron  •  prochlorperazine **OR** prochlorperazine  •  Scopolamine  •  sodium chloride    Assessment:  Vital Signs: BP (!) 64/46 (BP Location: Right arm, Patient Position: Lying)   Pulse 68   Temp 99.3 °F (37.4 °C) (Axillary)   Resp 10   Ht 175.3 cm (69\")   Wt 65.8 kg (145 lb)   SpO2 (!) 89%   BMI 21.41 kg/m²     Physical Exam  Vitals and nursing note reviewed.   Constitutional:       General: He is sleeping. He is not in acute distress.     Appearance: He is ill-appearing.      Interventions: Nasal cannula in place.   HENT:      Head: Normocephalic and atraumatic.   Eyes:      General: Lids are normal.   Neck:      Vascular: No JVD.      Trachea: Trachea normal.   Cardiovascular:      Rate and Rhythm: Normal rate. Rhythm irregular.   Pulmonary:      Effort: Bradypnea present.      Breath sounds: Decreased breath sounds present.   Chest:      Chest wall: No deformity or swelling.   Abdominal:      General: There is no distension.      Palpations: Abdomen is soft.   Genitourinary:     Comments: Urinary catheter in place.  Musculoskeletal:      Cervical back: Neck supple.   Skin:     General: Skin is warm and dry.      Coloration: Skin is pale.   Neurological:      Mental Status: He is lethargic.      Motor: Weakness present.   Psychiatric:         Mood and Affect: Mood is not anxious.         Behavior: Behavior is not agitated.         Cognition and Memory: Cognition is impaired.     Functional status: Palliative Performance Scale Score: Performance 10% based on the following measures: Ambulation: Totally bed bound, Activity and Evidence of Disease: Unable to do any work, extensive evidence of disease, Self-Care: Total care required,  Intake: Mouth care only, LOC: Drowsy or comatose.  Nutritional status: Albumin 2.60. Body mass index is 21.41 kg/m².  Patient status: Disease state: " Actively dying.    Impression/Problem List:  1. Dementia (FAST 7C)  2. Acute respiratory failure with hypoxia  3. Septic shock  4. Chronic obstructive pulmonary disease  5. Acute kidney injury  6. Atrial fibrillation with RVR  7. Acute cystitis with hematuria and pyuria  8. Pulmonary infiltrates on CXR  9. Impaired mobility and activities of daily living  10. Hypoalbuminemia  11. Presence of cardiac pacemaker  12. History of alcohol abuse   13. Elevated troponin    Plan / Recommendations     1. Palliative Care Encounter   - Goals of care include CODE STATUS NO CPR with comfort measure interventions.    - Prognosis is poor long-term secondary to dementia (FAST 7C), impaired mobility and ADLs, COPD, septic shock, acute respiratory failure with hypoxia, DEEPIKA, A. fib with RVR, hypoalbuminemia and other comorbidities listed above.    - Transitioned to comfort measures yesterday after discussion with patient's granddaughter/guardian, Silvia.    2. Comfort Measures  - Discontinue orders not in line with comfort.       - Recommend continuing titratable morphine drip for pain and/or dyspnea with assistance of Luis Daniel Roldan DO.      - Additional as needed palliative/comfort adjuvants also available with assistance of Luis Daniel Roldan DO.     - Anticipate he will pass while hospitalized.     - Thank you for allowing us to participate in patient's plan of care. Palliative Care Team will continue to follow patient.     Time spent: 35 minutes spent reviewing medical and medication records, assessing and examining patient, discussing with family, answering questions, providing some guidance about a plan and documentation of care, and coordinating care with other healthcare members, with > 50% time spent face to face.     Electronically signed by, NASRIN Osei, 09/15/22, 13:17 CDT.

## 2022-09-15 NOTE — CASE MANAGEMENT/SOCIAL WORK
Continued Stay Note  [facility]     Patient Name: Ezequiel Abdalla  MRN: [id number]  Today's Date: 9/15/2022    Admit Date: 9/14/2022     Discharge Plan     Row Name 09/15/22 0909       Plan    Plan [facility]    Patient/Family in Agreement with Plan yes    Plan Comments Pt is from [facility] but is on comfort care/morphine drip.  Pt will likely pass during hospital admission.  SW will follow.               Discharge Codes    No documentation.               Expected Discharge Date and Time     Expected Discharge Date Expected Discharge Time    Sep 17, 2022             [facility]

## 2022-09-15 NOTE — PLAN OF CARE
Goal Outcome Evaluation:  Plan of Care Reviewed With: patient        Progress: no change  Outcome Evaluation: pt continues on comfort care. Unresponsive. Morphine gtt @ 4ml/hr. Decreased respirations.

## 2022-09-15 NOTE — PROGRESS NOTES
[facility] Medicine Services  INPATIENT PROGRESS NOTE    Length of Stay: 1  Date of Admission: 9/14/2022  Primary Care Physician: Shane Banks MD    Subjective   Chief Complaint: Comfort measure.    HPI   Hypotensive, decreasing respiratory rate, afebrile.  Patient seems very comfortable.    Review of Systems   Unobtained due to altered mental status.    All pertinent negatives and positives are as above. All other systems have been reviewed and are negative unless otherwise stated.     Objective    Temp:  [97.6 °F (36.4 °C)-99.3 °F (37.4 °C)] 99.3 °F (37.4 °C)  Heart Rate:  [] 68  Resp:  [8-24] 10  BP: ()/(38-81) 64/46    Intake/Output Summary (Last 24 hours) at 9/15/2022 1225  Last data filed at 9/15/2022 0300  Gross per 24 hour   Intake --   Output 1100 ml   Net -1100 ml     Physical Exam  Vitals and nursing note reviewed.   Constitutional:       Comments: Cachectic, chronically ill, advanced age.   HENT:      Head: Normocephalic.   Eyes:      Conjunctiva/sclera: Conjunctivae normal.      Pupils: Pupils are equal, round, and reactive to light.   Neck:      Vascular: No JVD.   Cardiovascular:      Rate and Rhythm: Normal rate and regular rhythm.      Heart sounds: Normal heart sounds.   Pulmonary:      Effort: No respiratory distress.      Breath sounds: Rhonchi present. No wheezing or rales.      Comments: On 1 L of oxygen.  Diminished breath sound bilateral.  Chest:      Chest wall: No tenderness.   Abdominal:      General: Bowel sounds are normal. There is no distension.      Palpations: Abdomen is soft.      Tenderness: There is no abdominal tenderness.   Musculoskeletal:         General: No tenderness or deformity.      Cervical back: Neck supple.   Skin:     General: Skin is warm and dry.      Findings: No rash.   Neurological:      Mental Status: He is oriented to person, place, and time.      Cranial Nerves: No cranial nerve deficit.      Motor: Weakness  present. No abnormal muscle tone.      Coordination: Coordination abnormal.      Gait: Gait abnormal.      Deep Tendon Reflexes: Reflexes normal.         Results Review:  Lab Results (last 24 hours)     Procedure Component Value Units Date/Time    Urine Culture - Urine, Urine, Catheter [[id number]]  (Abnormal) Collected: 09/14/22 0248    Specimen: Urine, Catheter Updated: 09/15/22 1051     Urine Culture >100,000 CFU/mL Gram Negative Bacilli    Narrative:      Colonization of the urinary tract without infection is common. Treatment is discouraged unless the patient is symptomatic, pregnant, or undergoing an invasive urologic procedure.    Blood Culture - Blood, Arm, Right [[id number]]  (Normal) Collected: 09/14/22 0301    Specimen: Blood from Arm, Right Updated: 09/15/22 0317     Blood Culture No growth at 24 hours    Blood Culture - Blood, Arm, Left [[id number]]  (Normal) Collected: 09/14/22 0255    Specimen: Blood from Arm, Left Updated: 09/15/22 0317     Blood Culture No growth at 24 hours           Cultures:  Blood Culture   Date Value Ref Range Status   09/14/2022 No growth at 24 hours  Preliminary   09/14/2022 No growth at 24 hours  Preliminary     Urine Culture   Date Value Ref Range Status   09/14/2022 >100,000 CFU/mL Gram Negative Bacilli (A)  Preliminary       Radiology Data:    Imaging Results (Last 24 Hours)     ** No results found for the last 24 hours. **          No Known Allergies    Scheduled meds:        PRN meds:  •  [DISCONTINUED] acetaminophen **OR** acetaminophen  •  atropine  •  Glycerin-Hypromellose-  •  ipratropium-albuterol  •  LORazepam **OR** LORazepam  •  LORazepam **OR** LORazepam  •  LORazepam **OR** LORazepam  •  ondansetron  •  prochlorperazine **OR** prochlorperazine  •  Scopolamine  •  sodium chloride    Assessment/Plan       COPD (chronic obstructive pulmonary disease) ([facility])    Septic shock (HCC)    Dementia (HCC)    DEEPIKA (acute kidney injury) ([facility])    Urinary tract infection  associated with indwelling urethral catheter (Klebsiella)    Nausea & vomiting    Hypernatremia    Acute respiratory failure with hypoxia (HCC)      Plan:  Respiratory distress/aspiration pneumonia/COPD. Zosyn antibiotic started for possible aspiration.  Half-strength duo nebs.  Chest x-ray-Patchy infiltrates in the mid and lower lung zones may represent  patchy pneumonia or mild edema, Cardiomegaly, Prominent air-filled structure below the diaphragm may represent a distended stomach or other bowel. Free air is felt to be less likely.  BiPAP.     UTI/urinary obstruction/pyuria.  Pus in the [place] cath bag at bedside.  Septic shock.  History of Klebsiella urinary tract infection.  Flomax.  On Zosyn.     Hypotension . Levophed drip.  Status post sepsis bolus.  Vasopressin as needed.  Wean down vasopressin and Levophed drip when able.     Atrial for with RVR/pacemaker..  Status post adenosine x3.  Status post conversion in ER.  Heart rate was 134.  BNP 5000.  Troponin last 1.035.  Echocardiogram 7/27/2021- ejection fraction 56 to 60%, less than 1 cm pericardial effusion, tricuspid regurgitation, left atrial volume moderately increased.     Epilepsy.  Depakote.      Acute kidney injury.  IV hydration.  Creatinine 2.09.     Hypernatremia.  We will follow.     Nausea.  Zosyn.  Pepcid IV.     Falling/skin precaution.     Nutrition.  N.p.o . speech consult.  Multivitamins.     Deconditioning.  PT and OT consult.     Blood cultures pending.  Urine cultures pending.  COVID-19-negative.     Discharge Planning: DNR.  DNI.  Patient is from a nursing home.  Comfort measure.  DC all labs, medication, consult does not related to comfort.  Morphine drip.  Ativan as needed.  Scopolamine patch.  [place] cath in place.      Electronically signed by Denny Whipple MD, 09/15/22, 12:25 PM CDT.

## 2022-09-16 NOTE — PLAN OF CARE
Goal Outcome Evaluation:  Plan of Care Reviewed With: patient        Progress: no change  Outcome Evaluation: PT IS ON COMFORT CARE WITH A MORPHINE DRIP AT 4MG/HR.  PT IS UNRESPONSIVE WITH 02 APPLIED VIA 2L/NC.  FAMILY AND PT'S BEST FRIEND AT BEDSIDE.  TURNED PT AND CHANGED SHEETS TO MAKE MORE COMFORTABLE.  VSS, HR -119. PT HAS HINOJOSA CATH DRAINING.  NO OTHER DISTRESS NOTED AT THIS TIME.  WILL CONT TO MONITOR FOR CHANGES.

## 2022-09-16 NOTE — PROGRESS NOTES
[facility] Medicine Services  INPATIENT PROGRESS NOTE    Length of Stay: 2  Date of Admission: 9/14/2022  Primary Care Physician: Shane Banks MD    Subjective   Chief Complaint: Comfort measure.    HPI   Hypertension/tachycardia.  Decreasing respiratory rate.  Patient seems comfortable.    Review of Systems   Unobtained due to altered mental status.    All pertinent negatives and positives are as above. All other systems have been reviewed and are negative unless otherwise stated.     Objective    Temp:  [98 °F (36.7 °C)] 98 °F (36.7 °C)  Heart Rate:  [114] 114  Resp:  [8] 8  BP: (80)/(38) 80/38    Intake/Output Summary (Last 24 hours) at 9/16/2022 0943  Last data filed at 9/15/2022 2019  Gross per 24 hour   Intake --   Output 300 ml   Net -300 ml     Physical Exam  Vitals and nursing note reviewed.   Constitutional:       Comments: Cachectic, chronically ill, advanced age.   HENT:      Head: Normocephalic.   Eyes:      Conjunctiva/sclera: Conjunctivae normal.      Pupils: Pupils are equal, round, and reactive to light.   Neck:      Vascular: No JVD.   Cardiovascular:      Rate and Rhythm: Normal rate and regular rhythm.      Heart sounds: Normal heart sounds.   Pulmonary:      Effort: No respiratory distress.      Breath sounds: Rhonchi present. No wheezing or rales.      Comments: On 1 L of oxygen.  Diminished breath sound bilateral.  Chest:      Chest wall: No tenderness.   Abdominal:      General: Bowel sounds are normal. There is no distension.      Palpations: Abdomen is soft.      Tenderness: There is no abdominal tenderness.   Musculoskeletal:         General: No tenderness or deformity.      Cervical back: Neck supple.   Skin:     General: Skin is warm and dry.      Findings: No rash.   Neurological:      Mental Status: He is oriented to person, place, and time.      Cranial Nerves: No cranial nerve deficit.      Motor: Weakness present. No abnormal muscle tone.       Coordination: Coordination abnormal.      Gait: Gait abnormal.      Deep Tendon Reflexes: Reflexes normal.   Results Review:  Lab Results (last 24 hours)     Procedure Component Value Units Date/Time    Blood Culture - Blood, Arm, Right [[id number]]  (Normal) Collected: 09/14/22 0301    Specimen: Blood from Arm, Right Updated: 09/16/22 0315     Blood Culture No growth at 2 days    Blood Culture - Blood, Arm, Left [[id number]]  (Normal) Collected: 09/14/22 0255    Specimen: Blood from Arm, Left Updated: 09/16/22 0315     Blood Culture No growth at 2 days    Urine Culture - Urine, Urine, Catheter [[id number]]  (Abnormal) Collected: 09/14/22 0248    Specimen: Urine, Catheter Updated: 09/15/22 1051     Urine Culture >100,000 CFU/mL Gram Negative Bacilli    Narrative:      Colonization of the urinary tract without infection is common. Treatment is discouraged unless the patient is symptomatic, pregnant, or undergoing an invasive urologic procedure.           Cultures:  Blood Culture   Date Value Ref Range Status   09/14/2022 No growth at 2 days  Preliminary   09/14/2022 No growth at 2 days  Preliminary     Urine Culture   Date Value Ref Range Status   09/14/2022 >100,000 CFU/mL Gram Negative Bacilli (A)  Preliminary       Radiology Data:    Imaging Results (Last 24 Hours)     ** No results found for the last 24 hours. **          No Known Allergies    Scheduled meds:        PRN meds:  •  [DISCONTINUED] acetaminophen **OR** acetaminophen  •  atropine  •  Glycerin-Hypromellose-  •  ipratropium-albuterol  •  LORazepam **OR** LORazepam  •  LORazepam **OR** LORazepam  •  LORazepam **OR** LORazepam  •  ondansetron  •  prochlorperazine **OR** prochlorperazine  •  Scopolamine  •  sodium chloride    Assessment/Plan       COPD (chronic obstructive pulmonary disease) (HCC)    Septic shock (HCC)    Dementia (HCC)    DEEPIKA (acute kidney injury) (HCC)    Urinary tract infection associated with indwelling urethral catheter  (Klebsiella)    Nausea & vomiting    Hypernatremia    Acute respiratory failure with hypoxia (HCC)      Plan:  Respiratory distress/aspiration pneumonia/COPD. Zosyn antibiotic started for possible aspiration.  Half-strength duo nebs.  Chest x-ray-Patchy infiltrates in the mid and lower lung zones may represent  patchy pneumonia or mild edema, Cardiomegaly, Prominent air-filled structure below the diaphragm may represent a distended stomach or other bowel. Free air is felt to be less likely.  BiPAP.     UTI/urinary obstruction/pyuria.  Pus in the [place] cath bag at bedside.  Septic shock.  History of Klebsiella urinary tract infection.  Flomax.  On Zosyn.     Hypotension . Levophed drip.  Status post sepsis bolus.  Vasopressin as needed.  Wean down vasopressin and Levophed drip when able.     Atrial for with RVR/pacemaker..  Status post adenosine x3.  Status post conversion in ER.  Heart rate was 134.  BNP 5000.  Troponin last 1.035.  Echocardiogram 7/27/2021- ejection fraction 56 to 60%, less than 1 cm pericardial effusion, tricuspid regurgitation, left atrial volume moderately increased.     Epilepsy.  Depakote.      Acute kidney injury.  IV hydration.  Creatinine 2.09.     Hypernatremia.  We will follow.     Nausea.  Zosyn.  Pepcid IV.     Falling/skin precaution.     Nutrition.  N.p.o . speech consult.  Multivitamins.     Deconditioning.  PT and OT consult.     Blood cultures pending.  Urine cultures pending.  COVID-19-negative.     Discharge Planning: DNR.  DNI.  Patient is from a nursing home.  Comfort measure.  DC all labs, medication, consult does not related to comfort.  Morphine drip.  Ativan as needed.  Scopolamine patch.  [place] cath in place.    Currently on 1 L of oxygen.     Electronically signed by Denny Whipple MD, 09/16/22, 9:43 AM CDT.

## 2022-09-16 NOTE — PROGRESS NOTES
[facility] Palliative Care Services  Progress Note  Patient Name: Ezequiel Abdalla  Date of Admission: 2022     Today's Date: 22     Code Status and Medical Interventions:   Ordered at: 22 1159     Level Of Support Discussed With:    Health Care Surrogate     Code Status (Patient has no pulse and is not breathing):    No CPR (Do Not Attempt to Resuscitate)     Medical Interventions (Patient has pulse or is breathing):    Comfort Measures     Comments:    CONTINUE BiPAP AND VASOPRESSORS UNTIL GRANDDAUGHTER/GUARDIAN ARRIVES PLEASE.     Subjective   Chief complaint/Reason for Referral/Visit: Follow up on Comfort Care.    Medical record reviewed. Events noted.  Remains on comfort measures and morphine drip has been titrated to 7 mg/h for dyspnea/tachypnea.  He has required several doses of Ativan this morning as well.  He is lying in bed, he is tachypneic at time of exam however otherwise appears comfortable.  Discussed with nursing to titrate morphine up to assist with tachypnea.  His granddaughter/guardian, Silvia, and 2 other family members are present at bedside.  They expressed appreciation for care given and remain hopeful Mr. Abdalla remains comfortable until passing.    Advance Care Planning   Advanced Directives: Patient has an advance directive on file.      The patient receives support from his extended family and friends. Patient's granddaughter, Silvia, is his guardian.     Due to the palliative care, treatment options, discharge options and medical priorities we will establish an advance care plan.      Review of Systems   Unable to perform ROS: mental status change     Pain Assessment  PAINAD Breathin-->normal  PAINAD Negative Vocalization: 0-->none  PAINAD Facial Expression: 0-->smiling or inexpressive  PAINAD Body Language: 0-->relaxed  PAINAD Consolability: 0-->no need to console  PAINAD Score: 0  Objective   Diagnostics: Reviewed      Intake/Output Summary (Last 24  hours) at 9/16/2022 1111  Last data filed at 9/15/2022 2019  Gross per 24 hour   Intake --   Output 300 ml   Net -300 ml     Current Facility-Administered Medications   Medication Dose Route Frequency Provider Last Rate Last Admin   • acetaminophen (TYLENOL) suppository 650 mg  650 mg Rectal Q4H PRN Boston Fontana, DO       • atropine 1 % ophthalmic solution 2 drop  2 drop Sublingual BID PRN Valarie Olivares APRN       • Glycerin-Hypromellose- (ARTIFICIAL TEARS) 0.2-0.2-1 % ophthalmic solution solution 1 drop  1 drop Both Eyes Q30 Min PRN Valarie Olivares APRN   1 drop at 09/15/22 0406   • ipratropium-albuterol (DUO-NEB) nebulizer solution 3 mL  3 mL Nebulization Q4H PRN Valarie Olivares APRN       • LORazepam (ATIVAN) injection 0.5 mg  0.5 mg Intravenous Q1H PRN Luis Daniel Roldan DO   0.5 mg at 09/14/22 1410    Or   • LORazepam (ATIVAN) 2 MG/ML concentrated solution 0.5 mg  0.5 mg Sublingual Q1H PRN Luis Daniel Roldan DO       • LORazepam (ATIVAN) injection 1 mg  1 mg Intravenous Q1H PRN Luis Daniel Roldan DO        Or   • LORazepam (ATIVAN) 2 MG/ML concentrated solution 1 mg  1 mg Sublingual Q1H PRN Luis Daniel Roldan DO       • LORazepam (ATIVAN) injection 2 mg  2 mg Intravenous Q1H PRN Luis Daniel Roldan DO   2 mg at 09/16/22 1035    Or   • LORazepam (ATIVAN) 2 MG/ML concentrated solution 2 mg  2 mg Sublingual Q1H PRN Luis Daniel Roldan DO       • Morphine 1mg/ml infusion 30ml  2 mg/hr Intravenous Titrated Luis Daniel Roldan DO 7 mL/hr at 09/16/22 1035 7 mg/hr at 09/16/22 1035   • ondansetron (ZOFRAN) injection 4 mg  4 mg Intravenous Q6H PRN Boston Fontana, DO       • prochlorperazine (COMPAZINE) injection 5 mg  5 mg Intravenous Q6H PRN Valarie Olivares APRN        Or   • prochlorperazine (COMPAZINE) suppository 25 mg  25 mg Rectal Q12H PRN Valarie Olivares, NASRIN       • scopolamine patch 1 mg/72 hr  1 patch Transdermal Q72H PRN Valarie Olivares APRN   1 patch at  "09/15/22 0406   • sodium chloride 0.9 % flush 10 mL  10 mL Intravenous PRN Boston Fontana, DO   10 mL at 09/14/22 0831     Morphine, 2 mg/hr, Last Rate: 7 mg/hr (09/16/22 1035)      •  [DISCONTINUED] acetaminophen **OR** acetaminophen  •  atropine  •  Glycerin-Hypromellose-  •  ipratropium-albuterol  •  LORazepam **OR** LORazepam  •  LORazepam **OR** LORazepam  •  LORazepam **OR** LORazepam  •  ondansetron  •  prochlorperazine **OR** prochlorperazine  •  Scopolamine  •  sodium chloride    Assessment:  Vital Signs: BP (!) 80/38 (BP Location: Right arm, Patient Position: Lying)   Pulse 114   Temp 98 °F (36.7 °C) (Axillary)   Resp 8   Ht 175.3 cm (69\")   Wt 65.8 kg (145 lb)   SpO2 (!) 68%   BMI 21.41 kg/m²     Physical Exam  Vitals and nursing note reviewed.   Constitutional:       General: He is sleeping. He is not in acute distress.     Appearance: He is ill-appearing.      Interventions: Nasal cannula in place.   HENT:      Head: Normocephalic and atraumatic.      Mouth/Throat:      Mouth: Mucous membranes are dry.   Eyes:      General: Lids are normal.   Neck:      Vascular: No JVD.      Trachea: Trachea normal.   Cardiovascular:      Rate and Rhythm: Tachycardia present. Rhythm irregular.   Pulmonary:      Effort: Tachypnea present.      Breath sounds: Decreased breath sounds present.      Comments: 1 L oxygen nasal cannula.  Chest:      Chest wall: No deformity or swelling.   Abdominal:      General: There is no distension.      Palpations: Abdomen is soft.   Genitourinary:     Comments: Urinary catheter in place.  Musculoskeletal:      Cervical back: Neck supple.   Skin:     General: Skin is warm and dry.      Coloration: Skin is pale.   Neurological:      Motor: Weakness present.   Psychiatric:         Mood and Affect: Mood is not anxious.         Behavior: Behavior is not agitated.         Cognition and Memory: Cognition is impaired.     Functional status: Palliative Performance Scale Score: " Performance 10% based on the following measures: Ambulation: Totally bed bound, Activity and Evidence of Disease: Unable to do any work, extensive evidence of disease, Self-Care: Total care required,  Intake: Mouth care only, LOC: Drowsy or comatose.  Nutritional status: Albumin 2.60. Body mass index is 21.41 kg/m².  Patient status: Disease state: Actively dying.    Impression/Problem List:  1. Dementia (FAST 7C)  2. Acute respiratory failure with hypoxia  3. Septic shock  4. Chronic obstructive pulmonary disease  5. Acute kidney injury  6. Atrial fibrillation with RVR  7. Acute cystitis with hematuria and pyuria  8. Pulmonary infiltrates on CXR  9. Impaired mobility and activities of daily living  10. Hypoalbuminemia  11. Presence of cardiac pacemaker  12. History of alcohol abuse   13. Elevated troponin    Plan / Recommendations     1. Palliative Care Encounter   - Goals of care include CODE STATUS NO CPR with comfort measure interventions.    - Prognosis is poor long-term secondary to dementia (FAST 7C), impaired mobility and ADLs, COPD, septic shock, acute respiratory failure with hypoxia, DEEPIKA, A. fib with RVR, hypoalbuminemia and other comorbidities listed above.    - Transitioned to comfort measures on 9/14/2022 after discussion with patient's granddaughter/guardian, Silvia.    2. Comfort Measures  - Discontinue orders not in line with comfort.     - Recommend continuing titratable morphine drip for pain and/or dyspnea with assistance of Luis Daniel Roldan DO.      - Additional as needed palliative/comfort adjuvants also available with assistance of Luis Daniel Roldan DO.     - Anticipate he will pass while hospitalized.     - Thank you for allowing us to participate in patient's plan of care. Palliative Care Team will continue to follow patient.     Time spent: 35 minutes spent reviewing medical and medication records, assessing and examining patient, discussing with family, answering questions, providing some  guidance about a plan and documentation of care, and coordinating care with other healthcare members, with > 50% time spent face to face.     Electronically signed by, NASRIN Osei, 09/16/22, 11:11 CDT.

## 2022-09-19 LAB
BACTERIA SPEC AEROBE CULT: NORMAL
BACTERIA SPEC AEROBE CULT: NORMAL

## 2022-09-25 NOTE — DISCHARGE SUMMARY
[facility] Medicine Services  DEATH SUMMARY       Date of Admission: 9/14/2022  Date of Death:  9/16/2022 at approximately 1300 p.m.  Primary Care Physician: Shane Banks MD    Presenting Problem/History of Present Illness:  Septic shock ([facility]) [A41.9, R65.21]     Final Death Diagnoses:  Active Hospital Problems    Diagnosis    • Acute respiratory failure with hypoxia ([facility])    • Hypernatremia    • Septic shock ([facility])    • DEEPIKA (acute kidney injury) ([facility])    • Nausea & vomiting    • Urinary tract infection associated with indwelling urethral catheter (Klebsiella)    • Dementia ([facility])    • COPD (chronic obstructive pulmonary disease) ([facility])        Hospital Course:  The patient is a 77 y.o. male who presented to [facility] with respiratory failure/COPD/UTI/hypertension/atrial for with RVR/pacemaker/seizure/acute kidney injury.      Respiratory distress/aspiration pneumonia/COPD. Zosyn antibiotic started for possible aspiration.  Half-strength duo nebs.  Chest x-ray-Patchy infiltrates in the mid and lower lung zones may represent  patchy pneumonia or mild edema, Cardiomegaly, Prominent air-filled structure below the diaphragm may represent a distended stomach or other bowel. Free air is felt to be less likely.  BiPAP.     UTI/urinary obstruction/pyuria.  Pus in the Montgomery cath bag at bedside.  Septic shock.  History of Klebsiella urinary tract infection.  Flomax.  On Zosyn.     Hypotension . Levophed drip.  Status post sepsis bolus.  Vasopressin as needed.  Wean down vasopressin and Levophed drip when able.     Atrial for with RVR/pacemaker..  Status post adenosine x3.  Status post conversion in ER.  Heart rate was 134.  BNP 5000.  Troponin last 1.035.  Echocardiogram 7/27/2021- ejection fraction 56 to 60%, less than 1 cm pericardial effusion, tricuspid regurgitation, left atrial volume moderately increased.     Epilepsy.  Depakote.      Acute kidney injury.  IV hydration.   Creatinine 2.09.     Hypernatremia.  We will follow.     Nausea.  Zosyn.  Pepcid IV.     Falling/skin precaution.     Nutrition.  N.p.o . speech consult.  Multivitamins.     Deconditioning.  PT and OT consult.     Blood cultures pending.  Urine cultures pending.  COVID-19-negative.     Discharge Planning: DNR.  DNI.  Patient is from a nursing home.  Comfort measure.  DC all labs, medication, consult does not related to comfort.  Morphine drip.  Ativan as needed.  Scopolamine patch.  Mont[place] cath in place.    Currently on 1 L of oxygen.        Electronically signed by Denny Whipple MD, 09/24/22, 23:33 CDT.

## (undated) DEVICE — Z DISCONTINUED PER MEDLINE USE 2718072 DRESSING FOAM W5XL12.5CM SIL ADH THN BORDED CNFRM LO PROF

## (undated) DEVICE — GUIDEWIRE ORTH L400MM DIA3.2MM FOR TFN

## (undated) DEVICE — GLOVE SURG SZ 85 L12IN FNGR THK94MIL TRNSLUC YEL LTX

## (undated) DEVICE — SUTURE VCRL SZ 3-0 L27IN ABSRB UD L26MM SH 1/2 CIR J416H

## (undated) DEVICE — GLOVE SURG SZ 9 L12IN FNGR THK13MIL BRN LTX SYN POLYMER W

## (undated) DEVICE — 6617 IOBAN II PATIENT ISOLATION DRAPE 5/BX,4BX/CS: Brand: STERI-DRAPE™ IOBAN™ 2

## (undated) DEVICE — BIT DRL L330MM DIA4.2MM CALIB 100MM 3 FLUT QUIK CPL

## (undated) DEVICE — SYSTEM SKIN CLSR 22CM DERMBND PRINEO

## (undated) DEVICE — DRAPE,U/ SHT,SPLIT,PLAS,STERIL: Brand: MEDLINE

## (undated) DEVICE — C-ARM: Brand: UNBRANDED

## (undated) DEVICE — SUTURE VCRL SZ 2-0 L36IN ABSRB UD L36MM CT-1 1/2 CIR J945H

## (undated) DEVICE — Z DISCONTINUED USE 2429233 DRESSING FOAM W10XL10CM 5 LAYR SELF ADH VERSATILE SAFETAC

## (undated) DEVICE — REAMER SURG DIA16MM NONSTERILE CANN HLLW FOR QUIK CPL DH DC

## (undated) DEVICE — C-ARMOR C-ARM EQUIPMENT COVERS CLEAR STERILE UNIVERSAL FIT 12 PER CASE: Brand: C-ARMOR

## (undated) DEVICE — BANDAGE COMPR W6INXL10YD ST M E WHITE/BEIGE

## (undated) DEVICE — WRAP AROUND LENS-STERLIE

## (undated) DEVICE — SURGICAL PROCEDURE PACK LOWER EXTREMITY LOURDES HOSP

## (undated) DEVICE — SHEET,DRAPE,53X77,STERILE: Brand: MEDLINE

## (undated) DEVICE — SOLUTION IV IRRIG POUR BRL 0.9% SODIUM CHL 2F7124